# Patient Record
Sex: FEMALE | Race: WHITE | Employment: OTHER | URBAN - METROPOLITAN AREA
[De-identification: names, ages, dates, MRNs, and addresses within clinical notes are randomized per-mention and may not be internally consistent; named-entity substitution may affect disease eponyms.]

---

## 2018-10-18 ENCOUNTER — HOSPITAL ENCOUNTER (OUTPATIENT)
Dept: GENERAL RADIOLOGY | Age: 68
Discharge: HOME | End: 2018-10-20
Payer: MEDICARE

## 2018-10-18 DIAGNOSIS — M54.5 LOW BACK PAIN, UNSPECIFIED BACK PAIN LATERALITY, UNSPECIFIED CHRONICITY, WITH SCIATICA PRESENCE UNSPECIFIED: ICD-10-CM

## 2018-10-18 PROCEDURE — 72110 X-RAY EXAM L-2 SPINE 4/>VWS: CPT

## 2019-01-07 DIAGNOSIS — C34.12 MALIGNANT NEOPLASM OF UPPER LOBE OF LEFT LUNG (HCC): ICD-10-CM

## 2019-01-07 DIAGNOSIS — C78.02 MALIGNANT NEOPLASM METASTATIC TO LEFT LUNG (HCC): ICD-10-CM

## 2019-01-07 LAB
ALBUMIN SERPL-MCNC: 4.3 G/DL (ref 3.9–4.9)
ALP BLD-CCNC: 79 U/L (ref 40–130)
ALT SERPL-CCNC: 15 U/L (ref 0–33)
ANION GAP SERPL CALCULATED.3IONS-SCNC: 17 MEQ/L (ref 7–13)
AST SERPL-CCNC: 17 U/L (ref 0–35)
BILIRUB SERPL-MCNC: 0.3 MG/DL (ref 0–1.2)
BUN BLDV-MCNC: 4 MG/DL (ref 8–23)
CALCIUM SERPL-MCNC: 9.7 MG/DL (ref 8.6–10.2)
CHLORIDE BLD-SCNC: 96 MEQ/L (ref 98–107)
CO2: 26 MEQ/L (ref 22–29)
CREAT SERPL-MCNC: 0.67 MG/DL (ref 0.5–0.9)
GFR AFRICAN AMERICAN: >60
GFR NON-AFRICAN AMERICAN: >60
GLOBULIN: 2.9 G/DL (ref 2.3–3.5)
GLUCOSE BLD-MCNC: 90 MG/DL (ref 74–109)
POTASSIUM SERPL-SCNC: 4.2 MEQ/L (ref 3.5–5.1)
SODIUM BLD-SCNC: 139 MEQ/L (ref 132–144)
TOTAL PROTEIN: 7.2 G/DL (ref 6.4–8.1)

## 2019-01-18 PROBLEM — C78.01 SECONDARY MALIGNANT NEOPLASM OF RIGHT LUNG (HCC): Status: ACTIVE | Noted: 2019-01-18

## 2019-01-18 PROBLEM — C78.02 SECONDARY MALIGNANT NEOPLASM OF LEFT LUNG (HCC): Status: ACTIVE | Noted: 2019-01-18

## 2019-02-01 ENCOUNTER — OFFICE VISIT (OUTPATIENT)
Dept: PALLATIVE CARE | Age: 69
End: 2019-02-01
Payer: MEDICARE

## 2019-02-01 VITALS
DIASTOLIC BLOOD PRESSURE: 75 MMHG | RESPIRATION RATE: 20 BRPM | SYSTOLIC BLOOD PRESSURE: 143 MMHG | WEIGHT: 154 LBS | HEIGHT: 63 IN | HEART RATE: 96 BPM | TEMPERATURE: 98.6 F | OXYGEN SATURATION: 96 % | BODY MASS INDEX: 27.29 KG/M2

## 2019-02-01 DIAGNOSIS — C78.01 SECONDARY MALIGNANT NEOPLASM OF RIGHT LUNG (HCC): ICD-10-CM

## 2019-02-01 DIAGNOSIS — Z51.5 ENCOUNTER FOR PALLIATIVE CARE: ICD-10-CM

## 2019-02-01 DIAGNOSIS — R10.13 EPIGASTRIC PAIN: ICD-10-CM

## 2019-02-01 DIAGNOSIS — K59.01 SLOW TRANSIT CONSTIPATION: ICD-10-CM

## 2019-02-01 DIAGNOSIS — T45.1X5A CINV (CHEMOTHERAPY-INDUCED NAUSEA AND VOMITING): ICD-10-CM

## 2019-02-01 DIAGNOSIS — C34.12 MALIGNANT NEOPLASM OF UPPER LOBE OF LEFT LUNG (HCC): ICD-10-CM

## 2019-02-01 DIAGNOSIS — F41.9 ANXIETY: ICD-10-CM

## 2019-02-01 DIAGNOSIS — R11.2 CINV (CHEMOTHERAPY-INDUCED NAUSEA AND VOMITING): ICD-10-CM

## 2019-02-01 DIAGNOSIS — G89.3 NEOPLASM RELATED PAIN: Primary | ICD-10-CM

## 2019-02-01 PROCEDURE — 1101F PT FALLS ASSESS-DOCD LE1/YR: CPT | Performed by: CLINICAL NURSE SPECIALIST

## 2019-02-01 PROCEDURE — 4040F PNEUMOC VAC/ADMIN/RCVD: CPT | Performed by: CLINICAL NURSE SPECIALIST

## 2019-02-01 PROCEDURE — 1036F TOBACCO NON-USER: CPT | Performed by: CLINICAL NURSE SPECIALIST

## 2019-02-01 PROCEDURE — G8419 CALC BMI OUT NRM PARAM NOF/U: HCPCS | Performed by: CLINICAL NURSE SPECIALIST

## 2019-02-01 PROCEDURE — 99204 OFFICE O/P NEW MOD 45 MIN: CPT | Performed by: CLINICAL NURSE SPECIALIST

## 2019-02-01 PROCEDURE — 99497 ADVNCD CARE PLAN 30 MIN: CPT | Performed by: CLINICAL NURSE SPECIALIST

## 2019-02-01 PROCEDURE — 3017F COLORECTAL CA SCREEN DOC REV: CPT | Performed by: CLINICAL NURSE SPECIALIST

## 2019-02-01 PROCEDURE — G8598 ASA/ANTIPLAT THER USED: HCPCS | Performed by: CLINICAL NURSE SPECIALIST

## 2019-02-01 PROCEDURE — G8427 DOCREV CUR MEDS BY ELIG CLIN: HCPCS | Performed by: CLINICAL NURSE SPECIALIST

## 2019-02-01 PROCEDURE — G8484 FLU IMMUNIZE NO ADMIN: HCPCS | Performed by: CLINICAL NURSE SPECIALIST

## 2019-02-01 PROCEDURE — G8399 PT W/DXA RESULTS DOCUMENT: HCPCS | Performed by: CLINICAL NURSE SPECIALIST

## 2019-02-01 PROCEDURE — 1123F ACP DISCUSS/DSCN MKR DOCD: CPT | Performed by: CLINICAL NURSE SPECIALIST

## 2019-02-01 PROCEDURE — 1090F PRES/ABSN URINE INCON ASSESS: CPT | Performed by: CLINICAL NURSE SPECIALIST

## 2019-02-01 RX ORDER — OXYCODONE AND ACETAMINOPHEN 10; 325 MG/1; MG/1
1 TABLET ORAL EVERY 6 HOURS PRN
Qty: 120 TABLET | Refills: 0 | Status: SHIPPED | OUTPATIENT
Start: 2019-02-01 | End: 2019-04-12 | Stop reason: SDUPTHER

## 2019-02-01 RX ORDER — LORAZEPAM 0.5 MG/1
1 TABLET ORAL 2 TIMES DAILY PRN
Qty: 80 TABLET | Refills: 0 | Status: SHIPPED
Start: 2019-02-01 | End: 2019-03-01

## 2019-02-01 RX ORDER — PANTOPRAZOLE SODIUM 40 MG/1
40 TABLET, DELAYED RELEASE ORAL
Qty: 30 TABLET | Refills: 0 | Status: SHIPPED | OUTPATIENT
Start: 2019-02-01 | End: 2019-07-15 | Stop reason: SDUPTHER

## 2019-02-01 RX ORDER — SENNA AND DOCUSATE SODIUM 50; 8.6 MG/1; MG/1
1 TABLET, FILM COATED ORAL 2 TIMES DAILY
Qty: 120 TABLET | Refills: 3 | Status: SHIPPED | OUTPATIENT
Start: 2019-02-01 | End: 2021-09-09

## 2019-02-01 ASSESSMENT — ENCOUNTER SYMPTOMS
BACK PAIN: 1
NAUSEA: 1
SHORTNESS OF BREATH: 0
VOMITING: 1
COUGH: 1
EYES NEGATIVE: 1
CONSTIPATION: 1

## 2019-02-05 ENCOUNTER — CLINICAL DOCUMENTATION (OUTPATIENT)
Dept: PALLATIVE CARE | Age: 69
End: 2019-02-05

## 2019-02-08 ENCOUNTER — OFFICE VISIT (OUTPATIENT)
Dept: PALLATIVE CARE | Age: 69
End: 2019-02-08
Payer: MEDICARE

## 2019-02-08 VITALS
BODY MASS INDEX: 28.27 KG/M2 | OXYGEN SATURATION: 96 % | TEMPERATURE: 98.4 F | SYSTOLIC BLOOD PRESSURE: 173 MMHG | DIASTOLIC BLOOD PRESSURE: 72 MMHG | WEIGHT: 159.6 LBS | HEART RATE: 80 BPM | RESPIRATION RATE: 20 BRPM

## 2019-02-08 DIAGNOSIS — J06.9 URTI (ACUTE UPPER RESPIRATORY INFECTION): Primary | ICD-10-CM

## 2019-02-08 DIAGNOSIS — G89.3 NEOPLASM RELATED PAIN: ICD-10-CM

## 2019-02-08 DIAGNOSIS — R53.0 NEOPLASTIC (MALIGNANT) RELATED FATIGUE: ICD-10-CM

## 2019-02-08 DIAGNOSIS — C34.12 MALIGNANT NEOPLASM OF UPPER LOBE OF LEFT LUNG (HCC): ICD-10-CM

## 2019-02-08 DIAGNOSIS — C78.01 SECONDARY MALIGNANT NEOPLASM OF RIGHT LUNG (HCC): ICD-10-CM

## 2019-02-08 DIAGNOSIS — Z71.6 ENCOUNTER FOR SMOKING CESSATION COUNSELING: ICD-10-CM

## 2019-02-08 DIAGNOSIS — Z51.5 ENCOUNTER FOR PALLIATIVE CARE: ICD-10-CM

## 2019-02-08 PROCEDURE — 99214 OFFICE O/P EST MOD 30 MIN: CPT | Performed by: CLINICAL NURSE SPECIALIST

## 2019-02-08 PROCEDURE — 3017F COLORECTAL CA SCREEN DOC REV: CPT | Performed by: CLINICAL NURSE SPECIALIST

## 2019-02-08 PROCEDURE — G8427 DOCREV CUR MEDS BY ELIG CLIN: HCPCS | Performed by: CLINICAL NURSE SPECIALIST

## 2019-02-08 PROCEDURE — 4040F PNEUMOC VAC/ADMIN/RCVD: CPT | Performed by: CLINICAL NURSE SPECIALIST

## 2019-02-08 PROCEDURE — 1123F ACP DISCUSS/DSCN MKR DOCD: CPT | Performed by: CLINICAL NURSE SPECIALIST

## 2019-02-08 PROCEDURE — 1036F TOBACCO NON-USER: CPT | Performed by: CLINICAL NURSE SPECIALIST

## 2019-02-08 PROCEDURE — 1101F PT FALLS ASSESS-DOCD LE1/YR: CPT | Performed by: CLINICAL NURSE SPECIALIST

## 2019-02-08 PROCEDURE — G8419 CALC BMI OUT NRM PARAM NOF/U: HCPCS | Performed by: CLINICAL NURSE SPECIALIST

## 2019-02-08 PROCEDURE — 1090F PRES/ABSN URINE INCON ASSESS: CPT | Performed by: CLINICAL NURSE SPECIALIST

## 2019-02-08 PROCEDURE — G8598 ASA/ANTIPLAT THER USED: HCPCS | Performed by: CLINICAL NURSE SPECIALIST

## 2019-02-08 PROCEDURE — G8399 PT W/DXA RESULTS DOCUMENT: HCPCS | Performed by: CLINICAL NURSE SPECIALIST

## 2019-02-08 PROCEDURE — G8484 FLU IMMUNIZE NO ADMIN: HCPCS | Performed by: CLINICAL NURSE SPECIALIST

## 2019-02-08 RX ORDER — NICOTINE 21 MG/24HR
1 PATCH, TRANSDERMAL 24 HOURS TRANSDERMAL EVERY 24 HOURS
Qty: 45 PATCH | Refills: 0 | Status: SHIPPED | OUTPATIENT
Start: 2019-02-08 | End: 2019-02-11

## 2019-02-08 RX ORDER — MONTELUKAST SODIUM 10 MG/1
10 TABLET ORAL NIGHTLY
Qty: 90 TABLET | Refills: 3 | Status: SHIPPED | OUTPATIENT
Start: 2019-02-08 | End: 2021-07-15 | Stop reason: SDUPTHER

## 2019-02-08 ASSESSMENT — ENCOUNTER SYMPTOMS
SHORTNESS OF BREATH: 0
COUGH: 1
BACK PAIN: 1
GASTROINTESTINAL NEGATIVE: 1
EYES NEGATIVE: 1

## 2019-02-11 ENCOUNTER — TELEPHONE (OUTPATIENT)
Dept: PALLATIVE CARE | Age: 69
End: 2019-02-11

## 2019-02-12 ENCOUNTER — TELEPHONE (OUTPATIENT)
Dept: PALLATIVE CARE | Age: 69
End: 2019-02-12

## 2019-02-12 DIAGNOSIS — C34.12 MALIGNANT NEOPLASM OF UPPER LOBE OF LEFT LUNG (HCC): ICD-10-CM

## 2019-02-12 DIAGNOSIS — C78.01 SECONDARY MALIGNANT NEOPLASM OF RIGHT LUNG (HCC): ICD-10-CM

## 2019-02-12 DIAGNOSIS — Z71.6 ENCOUNTER FOR SMOKING CESSATION COUNSELING: Primary | ICD-10-CM

## 2019-02-12 PROBLEM — M04.9 AUTOINFLAMMATORY SYNDROME (HCC): Status: ACTIVE | Noted: 2019-02-12

## 2019-02-12 LAB
ALBUMIN SERPL-MCNC: 3.8 G/DL (ref 3.5–4.6)
ALP BLD-CCNC: 81 U/L (ref 40–130)
ALT SERPL-CCNC: 16 U/L (ref 0–33)
ANION GAP SERPL CALCULATED.3IONS-SCNC: 14 MEQ/L (ref 9–15)
AST SERPL-CCNC: 13 U/L (ref 0–35)
BILIRUB SERPL-MCNC: <0.2 MG/DL (ref 0.2–0.7)
BUN BLDV-MCNC: 10 MG/DL (ref 8–23)
CALCIUM SERPL-MCNC: 9 MG/DL (ref 8.5–9.9)
CHLORIDE BLD-SCNC: 99 MEQ/L (ref 95–107)
CO2: 26 MEQ/L (ref 20–31)
CREAT SERPL-MCNC: 0.62 MG/DL (ref 0.5–0.9)
GFR AFRICAN AMERICAN: >60
GFR NON-AFRICAN AMERICAN: >60
GLOBULIN: 2.6 G/DL (ref 2.3–3.5)
GLUCOSE BLD-MCNC: 125 MG/DL (ref 70–99)
POTASSIUM SERPL-SCNC: 3.5 MEQ/L (ref 3.4–4.9)
SODIUM BLD-SCNC: 139 MEQ/L (ref 135–144)
TOTAL PROTEIN: 6.4 G/DL (ref 6.3–8)

## 2019-02-12 RX ORDER — VARENICLINE TARTRATE 0.5 MG/1
TABLET, FILM COATED ORAL
Qty: 57 TABLET | Refills: 0 | Status: SHIPPED | OUTPATIENT
Start: 2019-02-12 | End: 2019-02-12 | Stop reason: SDUPTHER

## 2019-02-12 RX ORDER — VARENICLINE TARTRATE 0.5 MG/1
TABLET, FILM COATED ORAL
Qty: 57 TABLET | Refills: 0 | Status: SHIPPED | OUTPATIENT
Start: 2019-02-12 | End: 2019-03-22 | Stop reason: SDUPTHER

## 2019-02-18 ENCOUNTER — TELEPHONE (OUTPATIENT)
Dept: PALLATIVE CARE | Age: 69
End: 2019-02-18

## 2019-02-18 DIAGNOSIS — R14.3 FLATULENCE, ERUCTATION AND GAS PAIN: ICD-10-CM

## 2019-02-18 DIAGNOSIS — R14.2 FLATULENCE, ERUCTATION AND GAS PAIN: ICD-10-CM

## 2019-02-18 DIAGNOSIS — R14.1 FLATULENCE, ERUCTATION AND GAS PAIN: ICD-10-CM

## 2019-02-18 DIAGNOSIS — R14.3 FLATULENCE: Primary | ICD-10-CM

## 2019-02-18 RX ORDER — SIMETHICONE 80 MG
80 TABLET,CHEWABLE ORAL 4 TIMES DAILY PRN
Qty: 180 TABLET | Refills: 3 | COMMUNITY
Start: 2019-02-18

## 2019-02-22 ENCOUNTER — TELEPHONE (OUTPATIENT)
Dept: PALLATIVE CARE | Age: 69
End: 2019-02-22

## 2019-02-22 DIAGNOSIS — C34.90 MALIGNANT NEOPLASM OF LUNG, UNSPECIFIED LATERALITY, UNSPECIFIED PART OF LUNG (HCC): ICD-10-CM

## 2019-02-22 DIAGNOSIS — G89.29 CHRONIC THORACIC BACK PAIN, UNSPECIFIED BACK PAIN LATERALITY: Primary | Chronic | ICD-10-CM

## 2019-02-22 DIAGNOSIS — M04.9 AUTOINFLAMMATORY SYNDROME (HCC): ICD-10-CM

## 2019-02-22 DIAGNOSIS — M54.6 CHRONIC THORACIC BACK PAIN, UNSPECIFIED BACK PAIN LATERALITY: Primary | Chronic | ICD-10-CM

## 2019-03-01 ENCOUNTER — OFFICE VISIT (OUTPATIENT)
Dept: PALLATIVE CARE | Age: 69
End: 2019-03-01
Payer: MEDICARE

## 2019-03-01 VITALS
BODY MASS INDEX: 28.17 KG/M2 | HEART RATE: 94 BPM | SYSTOLIC BLOOD PRESSURE: 147 MMHG | WEIGHT: 159 LBS | DIASTOLIC BLOOD PRESSURE: 70 MMHG | OXYGEN SATURATION: 95 % | TEMPERATURE: 98.7 F | RESPIRATION RATE: 18 BRPM

## 2019-03-01 DIAGNOSIS — C34.90 MALIGNANT NEOPLASM OF LUNG, UNSPECIFIED LATERALITY, UNSPECIFIED PART OF LUNG (HCC): ICD-10-CM

## 2019-03-01 DIAGNOSIS — F41.1 GENERALIZED ANXIETY DISORDER WITH PANIC ATTACKS: Primary | ICD-10-CM

## 2019-03-01 DIAGNOSIS — M54.6 CHRONIC THORACIC BACK PAIN, UNSPECIFIED BACK PAIN LATERALITY: ICD-10-CM

## 2019-03-01 DIAGNOSIS — F41.0 GENERALIZED ANXIETY DISORDER WITH PANIC ATTACKS: Primary | ICD-10-CM

## 2019-03-01 DIAGNOSIS — Z51.5 PALLIATIVE CARE BY SPECIALIST: ICD-10-CM

## 2019-03-01 DIAGNOSIS — G43.709 CHRONIC MIGRAINE W/O AURA W/O STATUS MIGRAINOSUS, NOT INTRACTABLE: ICD-10-CM

## 2019-03-01 DIAGNOSIS — G89.29 CHRONIC THORACIC BACK PAIN, UNSPECIFIED BACK PAIN LATERALITY: ICD-10-CM

## 2019-03-01 PROCEDURE — 1036F TOBACCO NON-USER: CPT | Performed by: CLINICAL NURSE SPECIALIST

## 2019-03-01 PROCEDURE — 1123F ACP DISCUSS/DSCN MKR DOCD: CPT | Performed by: CLINICAL NURSE SPECIALIST

## 2019-03-01 PROCEDURE — G8419 CALC BMI OUT NRM PARAM NOF/U: HCPCS | Performed by: CLINICAL NURSE SPECIALIST

## 2019-03-01 PROCEDURE — 1090F PRES/ABSN URINE INCON ASSESS: CPT | Performed by: CLINICAL NURSE SPECIALIST

## 2019-03-01 PROCEDURE — G8399 PT W/DXA RESULTS DOCUMENT: HCPCS | Performed by: CLINICAL NURSE SPECIALIST

## 2019-03-01 PROCEDURE — G8598 ASA/ANTIPLAT THER USED: HCPCS | Performed by: CLINICAL NURSE SPECIALIST

## 2019-03-01 PROCEDURE — 4040F PNEUMOC VAC/ADMIN/RCVD: CPT | Performed by: CLINICAL NURSE SPECIALIST

## 2019-03-01 PROCEDURE — 1101F PT FALLS ASSESS-DOCD LE1/YR: CPT | Performed by: CLINICAL NURSE SPECIALIST

## 2019-03-01 PROCEDURE — G8484 FLU IMMUNIZE NO ADMIN: HCPCS | Performed by: CLINICAL NURSE SPECIALIST

## 2019-03-01 PROCEDURE — 99214 OFFICE O/P EST MOD 30 MIN: CPT | Performed by: CLINICAL NURSE SPECIALIST

## 2019-03-01 PROCEDURE — 3017F COLORECTAL CA SCREEN DOC REV: CPT | Performed by: CLINICAL NURSE SPECIALIST

## 2019-03-01 PROCEDURE — G8427 DOCREV CUR MEDS BY ELIG CLIN: HCPCS | Performed by: CLINICAL NURSE SPECIALIST

## 2019-03-01 RX ORDER — ALPRAZOLAM 1 MG/1
1 TABLET ORAL 2 TIMES DAILY PRN
Qty: 60 TABLET | Refills: 1 | Status: SHIPPED | OUTPATIENT
Start: 2019-03-01 | End: 2019-03-31

## 2019-03-01 RX ORDER — AMOXICILLIN 500 MG/1
TABLET, FILM COATED ORAL
COMMUNITY
Start: 2019-02-22 | End: 2019-06-28

## 2019-03-01 ASSESSMENT — ENCOUNTER SYMPTOMS
SHORTNESS OF BREATH: 0
COUGH: 1
EYES NEGATIVE: 1
BACK PAIN: 1
GASTROINTESTINAL NEGATIVE: 1

## 2019-03-05 DIAGNOSIS — C34.12 MALIGNANT NEOPLASM OF UPPER LOBE OF LEFT LUNG (HCC): ICD-10-CM

## 2019-03-05 DIAGNOSIS — M04.9 AUTOINFLAMMATORY SYNDROME (HCC): ICD-10-CM

## 2019-03-05 DIAGNOSIS — C78.01 SECONDARY MALIGNANT NEOPLASM OF RIGHT LUNG (HCC): ICD-10-CM

## 2019-03-05 LAB
ALBUMIN SERPL-MCNC: 3.8 G/DL (ref 3.5–4.6)
ALP BLD-CCNC: 88 U/L (ref 40–130)
ALT SERPL-CCNC: 17 U/L (ref 0–33)
ANION GAP SERPL CALCULATED.3IONS-SCNC: 12 MEQ/L (ref 9–15)
AST SERPL-CCNC: 22 U/L (ref 0–35)
BILIRUB SERPL-MCNC: <0.2 MG/DL (ref 0.2–0.7)
BUN BLDV-MCNC: 6 MG/DL (ref 8–23)
CALCIUM SERPL-MCNC: 8.6 MG/DL (ref 8.5–9.9)
CHLORIDE BLD-SCNC: 97 MEQ/L (ref 95–107)
CO2: 30 MEQ/L (ref 20–31)
CREAT SERPL-MCNC: 0.57 MG/DL (ref 0.5–0.9)
GFR AFRICAN AMERICAN: >60
GFR NON-AFRICAN AMERICAN: >60
GLOBULIN: 2.3 G/DL (ref 2.3–3.5)
GLUCOSE BLD-MCNC: 91 MG/DL (ref 70–99)
POTASSIUM SERPL-SCNC: 3.4 MEQ/L (ref 3.4–4.9)
SODIUM BLD-SCNC: 139 MEQ/L (ref 135–144)
TOTAL PROTEIN: 6.1 G/DL (ref 6.3–8)
TSH SERPL DL<=0.05 MIU/L-ACNC: 0.89 UIU/ML (ref 0.44–3.86)

## 2019-03-22 ENCOUNTER — OFFICE VISIT (OUTPATIENT)
Dept: PALLATIVE CARE | Age: 69
End: 2019-03-22
Payer: MEDICARE

## 2019-03-22 VITALS
SYSTOLIC BLOOD PRESSURE: 169 MMHG | RESPIRATION RATE: 20 BRPM | BODY MASS INDEX: 29.26 KG/M2 | TEMPERATURE: 95.7 F | WEIGHT: 165.2 LBS | DIASTOLIC BLOOD PRESSURE: 81 MMHG | HEART RATE: 82 BPM | OXYGEN SATURATION: 97 %

## 2019-03-22 DIAGNOSIS — F41.1 GENERALIZED ANXIETY DISORDER WITH PANIC ATTACKS: ICD-10-CM

## 2019-03-22 DIAGNOSIS — Z51.5 PALLIATIVE CARE BY SPECIALIST: ICD-10-CM

## 2019-03-22 DIAGNOSIS — Z71.6 ENCOUNTER FOR SMOKING CESSATION COUNSELING: ICD-10-CM

## 2019-03-22 DIAGNOSIS — F41.0 GENERALIZED ANXIETY DISORDER WITH PANIC ATTACKS: ICD-10-CM

## 2019-03-22 DIAGNOSIS — G89.29 CHRONIC THORACIC BACK PAIN, UNSPECIFIED BACK PAIN LATERALITY: ICD-10-CM

## 2019-03-22 DIAGNOSIS — M54.6 CHRONIC THORACIC BACK PAIN, UNSPECIFIED BACK PAIN LATERALITY: ICD-10-CM

## 2019-03-22 DIAGNOSIS — C34.90 MALIGNANT NEOPLASM OF LUNG, UNSPECIFIED LATERALITY, UNSPECIFIED PART OF LUNG (HCC): ICD-10-CM

## 2019-03-22 DIAGNOSIS — G89.3 NEOPLASM RELATED PAIN: ICD-10-CM

## 2019-03-22 DIAGNOSIS — J20.9 ACUTE BRONCHITIS, UNSPECIFIED ORGANISM: Primary | ICD-10-CM

## 2019-03-22 DIAGNOSIS — F32.A MODERATE DEPRESSIVE DISORDER: ICD-10-CM

## 2019-03-22 DIAGNOSIS — R53.0 NEOPLASTIC (MALIGNANT) RELATED FATIGUE: ICD-10-CM

## 2019-03-22 PROCEDURE — 4004F PT TOBACCO SCREEN RCVD TLK: CPT | Performed by: CLINICAL NURSE SPECIALIST

## 2019-03-22 PROCEDURE — 1123F ACP DISCUSS/DSCN MKR DOCD: CPT | Performed by: CLINICAL NURSE SPECIALIST

## 2019-03-22 PROCEDURE — G8399 PT W/DXA RESULTS DOCUMENT: HCPCS | Performed by: CLINICAL NURSE SPECIALIST

## 2019-03-22 PROCEDURE — G8419 CALC BMI OUT NRM PARAM NOF/U: HCPCS | Performed by: CLINICAL NURSE SPECIALIST

## 2019-03-22 PROCEDURE — 1101F PT FALLS ASSESS-DOCD LE1/YR: CPT | Performed by: CLINICAL NURSE SPECIALIST

## 2019-03-22 PROCEDURE — 4040F PNEUMOC VAC/ADMIN/RCVD: CPT | Performed by: CLINICAL NURSE SPECIALIST

## 2019-03-22 PROCEDURE — G8427 DOCREV CUR MEDS BY ELIG CLIN: HCPCS | Performed by: CLINICAL NURSE SPECIALIST

## 2019-03-22 PROCEDURE — G8484 FLU IMMUNIZE NO ADMIN: HCPCS | Performed by: CLINICAL NURSE SPECIALIST

## 2019-03-22 PROCEDURE — 3017F COLORECTAL CA SCREEN DOC REV: CPT | Performed by: CLINICAL NURSE SPECIALIST

## 2019-03-22 PROCEDURE — 1090F PRES/ABSN URINE INCON ASSESS: CPT | Performed by: CLINICAL NURSE SPECIALIST

## 2019-03-22 PROCEDURE — 99214 OFFICE O/P EST MOD 30 MIN: CPT | Performed by: CLINICAL NURSE SPECIALIST

## 2019-03-22 PROCEDURE — G8598 ASA/ANTIPLAT THER USED: HCPCS | Performed by: CLINICAL NURSE SPECIALIST

## 2019-03-22 RX ORDER — BUPROPION HYDROCHLORIDE 300 MG/1
300 TABLET ORAL DAILY
Qty: 30 TABLET | Refills: 2 | Status: SHIPPED | OUTPATIENT
Start: 2019-03-22 | End: 2019-04-12 | Stop reason: SDUPTHER

## 2019-03-22 RX ORDER — SUMATRIPTAN 100 MG/1
100 TABLET, FILM COATED ORAL
COMMUNITY
Start: 2017-02-21 | End: 2019-09-06

## 2019-03-22 RX ORDER — AZITHROMYCIN 250 MG/1
TABLET, FILM COATED ORAL
Qty: 1 PACKET | Refills: 0 | Status: SHIPPED | OUTPATIENT
Start: 2019-03-22 | End: 2019-04-12

## 2019-03-22 RX ORDER — VARENICLINE TARTRATE 1 MG/1
TABLET, FILM COATED ORAL
Qty: 30 TABLET | Refills: 1 | Status: SHIPPED | OUTPATIENT
Start: 2019-03-22 | End: 2019-04-12

## 2019-03-22 ASSESSMENT — ENCOUNTER SYMPTOMS
RHINORRHEA: 1
EYES NEGATIVE: 1
BACK PAIN: 1
COUGH: 1
GASTROINTESTINAL NEGATIVE: 1
SORE THROAT: 0
SHORTNESS OF BREATH: 0
SINUS PRESSURE: 1

## 2019-03-27 DIAGNOSIS — C78.01 SECONDARY MALIGNANT NEOPLASM OF RIGHT LUNG (HCC): ICD-10-CM

## 2019-03-27 DIAGNOSIS — C34.12 MALIGNANT NEOPLASM OF UPPER LOBE OF LEFT LUNG (HCC): ICD-10-CM

## 2019-03-27 LAB
ALBUMIN SERPL-MCNC: 4.1 G/DL (ref 3.5–4.6)
ALP BLD-CCNC: 77 U/L (ref 40–130)
ALT SERPL-CCNC: 13 U/L (ref 0–33)
ANION GAP SERPL CALCULATED.3IONS-SCNC: 14 MEQ/L (ref 9–15)
AST SERPL-CCNC: 22 U/L (ref 0–35)
BILIRUB SERPL-MCNC: 0.4 MG/DL (ref 0.2–0.7)
BUN BLDV-MCNC: 8 MG/DL (ref 8–23)
CALCIUM SERPL-MCNC: 9.4 MG/DL (ref 8.5–9.9)
CHLORIDE BLD-SCNC: 102 MEQ/L (ref 95–107)
CO2: 23 MEQ/L (ref 20–31)
CREAT SERPL-MCNC: 0.78 MG/DL (ref 0.5–0.9)
GFR AFRICAN AMERICAN: >60
GFR NON-AFRICAN AMERICAN: >60
GLOBULIN: 2.7 G/DL (ref 2.3–3.5)
GLUCOSE BLD-MCNC: 115 MG/DL (ref 70–99)
POTASSIUM SERPL-SCNC: 3.9 MEQ/L (ref 3.4–4.9)
SODIUM BLD-SCNC: 139 MEQ/L (ref 135–144)
TOTAL PROTEIN: 6.8 G/DL (ref 6.3–8)

## 2019-04-12 ENCOUNTER — OFFICE VISIT (OUTPATIENT)
Dept: PALLATIVE CARE | Age: 69
End: 2019-04-12
Payer: MEDICARE

## 2019-04-12 VITALS
DIASTOLIC BLOOD PRESSURE: 71 MMHG | OXYGEN SATURATION: 95 % | RESPIRATION RATE: 20 BRPM | HEART RATE: 81 BPM | TEMPERATURE: 98.2 F | SYSTOLIC BLOOD PRESSURE: 158 MMHG | BODY MASS INDEX: 29.62 KG/M2 | WEIGHT: 167.2 LBS

## 2019-04-12 DIAGNOSIS — C34.12 MALIGNANT NEOPLASM OF UPPER LOBE OF LEFT LUNG (HCC): ICD-10-CM

## 2019-04-12 DIAGNOSIS — M54.6 CHRONIC THORACIC BACK PAIN, UNSPECIFIED BACK PAIN LATERALITY: ICD-10-CM

## 2019-04-12 DIAGNOSIS — F41.1 GENERALIZED ANXIETY DISORDER WITH PANIC ATTACKS: ICD-10-CM

## 2019-04-12 DIAGNOSIS — J30.2 SEASONAL ALLERGIES: Primary | ICD-10-CM

## 2019-04-12 DIAGNOSIS — G89.29 CHRONIC THORACIC BACK PAIN, UNSPECIFIED BACK PAIN LATERALITY: ICD-10-CM

## 2019-04-12 DIAGNOSIS — C78.01 SECONDARY MALIGNANT NEOPLASM OF RIGHT LUNG (HCC): ICD-10-CM

## 2019-04-12 DIAGNOSIS — F41.0 GENERALIZED ANXIETY DISORDER WITH PANIC ATTACKS: ICD-10-CM

## 2019-04-12 DIAGNOSIS — G89.3 NEOPLASM RELATED PAIN: ICD-10-CM

## 2019-04-12 DIAGNOSIS — F32.A MODERATE DEPRESSIVE DISORDER: ICD-10-CM

## 2019-04-12 DIAGNOSIS — Z51.5 ENCOUNTER FOR PALLIATIVE CARE: ICD-10-CM

## 2019-04-12 PROCEDURE — 4004F PT TOBACCO SCREEN RCVD TLK: CPT | Performed by: CLINICAL NURSE SPECIALIST

## 2019-04-12 PROCEDURE — 1090F PRES/ABSN URINE INCON ASSESS: CPT | Performed by: CLINICAL NURSE SPECIALIST

## 2019-04-12 PROCEDURE — G8419 CALC BMI OUT NRM PARAM NOF/U: HCPCS | Performed by: CLINICAL NURSE SPECIALIST

## 2019-04-12 PROCEDURE — 3017F COLORECTAL CA SCREEN DOC REV: CPT | Performed by: CLINICAL NURSE SPECIALIST

## 2019-04-12 PROCEDURE — 99214 OFFICE O/P EST MOD 30 MIN: CPT | Performed by: CLINICAL NURSE SPECIALIST

## 2019-04-12 PROCEDURE — 4040F PNEUMOC VAC/ADMIN/RCVD: CPT | Performed by: CLINICAL NURSE SPECIALIST

## 2019-04-12 PROCEDURE — G8427 DOCREV CUR MEDS BY ELIG CLIN: HCPCS | Performed by: CLINICAL NURSE SPECIALIST

## 2019-04-12 PROCEDURE — G8399 PT W/DXA RESULTS DOCUMENT: HCPCS | Performed by: CLINICAL NURSE SPECIALIST

## 2019-04-12 PROCEDURE — G8598 ASA/ANTIPLAT THER USED: HCPCS | Performed by: CLINICAL NURSE SPECIALIST

## 2019-04-12 PROCEDURE — 1123F ACP DISCUSS/DSCN MKR DOCD: CPT | Performed by: CLINICAL NURSE SPECIALIST

## 2019-04-12 RX ORDER — LORATADINE 10 MG/1
10 TABLET ORAL DAILY
Qty: 30 TABLET | Refills: 0 | Status: SHIPPED | OUTPATIENT
Start: 2019-04-12 | End: 2021-06-10

## 2019-04-12 RX ORDER — OXYCODONE AND ACETAMINOPHEN 10; 325 MG/1; MG/1
1 TABLET ORAL EVERY 6 HOURS PRN
Qty: 120 TABLET | Refills: 0 | Status: SHIPPED | OUTPATIENT
Start: 2019-04-12 | End: 2019-05-14 | Stop reason: SDUPTHER

## 2019-04-12 RX ORDER — PREDNISONE 10 MG/1
10 TABLET ORAL DAILY
Qty: 30 TABLET | Refills: 1 | Status: SHIPPED | OUTPATIENT
Start: 2019-04-12 | End: 2019-06-07

## 2019-04-12 RX ORDER — ALPRAZOLAM 1 MG/1
1 TABLET ORAL 2 TIMES DAILY
COMMUNITY
Start: 2019-03-31 | End: 2019-05-14 | Stop reason: SDUPTHER

## 2019-04-12 RX ORDER — BUPROPION HYDROCHLORIDE 300 MG/1
300 TABLET ORAL DAILY
Qty: 30 TABLET | Refills: 2 | Status: SHIPPED | OUTPATIENT
Start: 2019-04-12 | End: 2019-07-15 | Stop reason: SDUPTHER

## 2019-04-12 RX ORDER — PROCHLORPERAZINE MALEATE 10 MG
10 TABLET ORAL EVERY 6 HOURS PRN
Qty: 40 TABLET | Refills: 1 | Status: SHIPPED | OUTPATIENT
Start: 2019-04-12 | End: 2019-09-06

## 2019-04-12 ASSESSMENT — ENCOUNTER SYMPTOMS
COUGH: 1
GASTROINTESTINAL NEGATIVE: 1
EYE ITCHING: 1
BACK PAIN: 1
EYE REDNESS: 1
SHORTNESS OF BREATH: 0

## 2019-04-12 NOTE — PROGRESS NOTES
Subjective:      Patient Id: Seen Bettina Bob at the clinic at the [address] [place] [address] in follow up for symptom management. She was accompanied to the appointment by: self. Chief Complaint   Patient presents with    Anxiety    Back Pain    Shoulder Pain    Neck Pain    Medication Refill     prednisone, compazine and percocet        HPI    Bettina Bob presents with complaint of intermittent watering and redness of eyes x 1 week. States she is intermittently coughing up clear phlegym which is her baseline. States this occurs yearly around the start of spring. Denies increase in cough, SOB, wheezing, chills or fever. Was diagnosed with bronchitis on last visit and finished treatment with resolution of symptoms. Pt states she has been having an increase in situational anxiety related to pending Abdominal CT results. She has chronic anxiety and depression. Was referred to psychology at previous encounter due to symptoms associated with anxiety and depression and states they did not call and she lost the printed order. Will need reprinted. Bettina Bob has chronic back and shoulder pain. Has been using Percocet 10/325 PRN two times daily. She states she has further decreased smoking to 2 cigerettes daily. Was prescribed chantix but stopped taking it due to nightmares.     Past Medical History:   Diagnosis Date    Anxiety     Asthma     Cervical radiculopathy at C7 9/28/2013    CERVICAL SPONDYLOSIS, WITH COMBINATION OF DISK BULGING AND    Cervical radiculopathy at C7     SUBTLE PER EMG    Cervical spondylosis 10/12/13    PER MRI    Chronic back pain     COPD (chronic obstructive pulmonary disease) (HCC)     Chronic bonchitis    CTS (carpal tunnel syndrome) 9/28/13    PER EMG    Depression     Diverticulitis     GERD (gastroesophageal reflux disease)     Headache(784.0)     Hyperlipidemia     Hypertension     Irritable bowel syndrome     Malignant neoplasm of upper lobe of left lung ([facility] [address] 1/7/2019    Osteoarthritis      Past Surgical History:   Procedure Laterality Date    APPENDECTOMY      CARDIAC CATHETERIZATION  13,2013    DR. GRAVES    COLON SURGERY      HAND SURGERY  1996    HYSTERECTOMY  1980    TONSILLECTOMY  1963     Social History     Socioeconomic History    Marital status:       Spouse name: Not on file    Number of children: Not on file    Years of education: Not on file    Highest education level: Not on file   Occupational History    Not on file   Social Needs    Financial resource strain: Not on file    Food insecurity:     Worry: Not on file     Inability: Not on file    Transportation needs:     Medical: Not on file     Non-medical: Not on file   Tobacco Use    Smoking status: Light Tobacco Smoker     Packs/day: 1.50     Years: 48.00     Pack years: 72.00     Types: Cigarettes     Last attempt to quit: 2019     Years since quittin.2    Smokeless tobacco: Never Used    Tobacco comment: Smokes 5 or less cigarettes daily   Substance and Sexual Activity    Alcohol use: No    Drug use: Not on file    Sexual activity: Not on file   Lifestyle    Physical activity:     Days per week: Not on file     Minutes per session: Not on file    Stress: Not on file   Relationships    Social connections:     Talks on phone: Not on file     Gets together: Not on file     Attends [employer]ism service: Not on file     Active member of club or organization: Not on file     Attends meetings of clubs or organizations: Not on file     Relationship status: Not on file    Intimate partner violence:     Fear of current or ex partner: Not on file     Emotionally abused: Not on file     Physically abused: Not on file     Forced sexual activity: Not on file   Other Topics Concern    Not on file   Social History Narrative    Not on file     Family History   Problem Relation Age of Onset    Stroke Mother     Heart Attack Mother     Cancer Father         LUNG     No Known Allergies  Current Outpatient Musculoskeletal: Positive for arthralgias, back pain and neck pain. Skin: Negative. Neurological: Negative. Hematological: Negative. Psychiatric/Behavioral: Positive for dysphoric mood ( intermittent). The patient is nervous/anxious. Objective:   BP (!) 158/71 (Site: Left Upper Arm, Position: Sitting)   Pulse 81   Temp 98.2 °F (36.8 °C)   Resp 20   Wt 167 lb 3.2 oz (75.8 kg)   SpO2 95%   BMI 29.62 kg/m²    Wt Readings from Last 3 Encounters:   04/12/19 167 lb 3.2 oz (75.8 kg)   03/27/19 164 lb (74.4 kg)   03/22/19 165 lb 3.2 oz (74.9 kg)     Physical Exam   Constitutional: She is oriented to person, place, and time. She appears well-developed and well-nourished. HENT:   Head: Normocephalic and atraumatic. Nose: Mucosal edema present. Eyes: Pupils are equal, round, and reactive to light. EOM are normal.   Neck: Normal range of motion. Neck supple. Cardiovascular: Normal rate and regular rhythm. Pulmonary/Chest: Effort normal. She has decreased breath sounds. diminished throughout   Abdominal: Soft. Bowel sounds are normal. She exhibits no distension and no mass. There is no rebound. Musculoskeletal: Normal range of motion. She exhibits no edema. Neurological: She is alert and oriented to person, place, and time. Skin: Skin is warm and dry. Psychiatric: Judgment and thought content normal.   Vitals reviewed. Assessment and Plan:      1. Seasonal allergies  - Symptoms likely associated with seasonal allergies. - loratadine (CLARITIN) 10 MG tablet; Take 1 tablet by mouth daily  Dispense: 30 tablet; Refill: 0    2. Moderate depressive disorder  - Will reorder consult. Pt educated to call office if she does not hear from them in three days  - Cheryl Franco, PhD, Psychology, [place]  - buPROPion (WELLBUTRIN XL) 300 MG extended release tablet; Take 1 tablet by mouth daily  Dispense: 30 tablet; Refill: 2    3.  Generalized anxiety disorder with panic attacks  - See #2    4. Neoplasm related pain  - Controlled on Current Regimen. - oxyCODONE-acetaminophen (PERCOCET)  MG per tablet; Take 1 tablet by mouth every 6 hours as needed for Pain for up to 30 days. Dispense: 120 tablet; Refill: 0    5. Malignant ne[facility] of upper lobe of [facility] ([facility]  - see #4  Refill  - prochlorperazine (COMPAZINE) 10 MG tablet; Take 1 tablet by mouth every 6 hours as needed (nausea)  Dispense: 40 tablet; Refill: 1    6. Secondary malignant neoplasm of right lung ([facility] [address]  - See #4    7. Chronic thoracic back pain, unspecified back pain laterality  - Handicap Placard reordered as pt lost form    - Handicap Placard MISC; by Does not apply route Diagnoses: LUNG CANCER, DEGENERATIVE ARTHRITIS, CANCER PAIN   EXPIRATION: 4/12/2024  Dispense: 1 each; Refill: 0    8. Encounter for palliative care    Medications Discontinued During This Encounter   Medication Reason    azithromycin (ZITHROMAX Z-DIGNA) 250 MG tablet LIST CLEANUP    varenicline (CHANTIX) 1 MG tablet LIST CLEANUP    buPROPion (WELLBUTRIN XL) 300 MG extended release tablet REORDER    prochlorperazine (COMPAZINE) 10 MG tablet REORDER    predniSONE (DELTASONE) 10 MG tablet REORDER    oxyCODONE-acetaminophen (PERCOCET)  MG per tablet REORDER       Discussed with patient/surrogate: POC, Treatment risks/benefits, and alternatives including as noted above. All questions were answered. Gave much active listening, presence, and emotional support. Due to acuity, symptomatology and high-risk medication management,   I advised patient to Return in about 1 month (around 5/10/2019), or if symptoms worsen or fail to improve. Total Time 30 mins   > 50% Time Spent Counseling/Care coordination?  yes     CHRISTO Caldwell - CNS, BC, ACHPN, MS

## 2019-05-08 PROBLEM — D22.9 ATYPICAL MOLE: Status: ACTIVE | Noted: 2019-05-08

## 2019-05-08 PROBLEM — E03.2 HYPOTHYROIDISM DUE TO MEDICATION: Status: ACTIVE | Noted: 2019-05-08

## 2019-05-10 ENCOUNTER — OFFICE VISIT (OUTPATIENT)
Dept: PALLATIVE CARE | Age: 69
End: 2019-05-10
Payer: MEDICARE

## 2019-05-10 VITALS
BODY MASS INDEX: 28.95 KG/M2 | RESPIRATION RATE: 20 BRPM | DIASTOLIC BLOOD PRESSURE: 70 MMHG | HEART RATE: 94 BPM | WEIGHT: 163.4 LBS | SYSTOLIC BLOOD PRESSURE: 147 MMHG | OXYGEN SATURATION: 97 %

## 2019-05-10 DIAGNOSIS — F41.0 GENERALIZED ANXIETY DISORDER WITH PANIC ATTACKS: ICD-10-CM

## 2019-05-10 DIAGNOSIS — C34.12 MALIGNANT NEOPLASM OF UPPER LOBE OF LEFT LUNG (HCC): ICD-10-CM

## 2019-05-10 DIAGNOSIS — C78.01 SECONDARY MALIGNANT NEOPLASM OF RIGHT LUNG (HCC): ICD-10-CM

## 2019-05-10 DIAGNOSIS — F32.A MODERATE DEPRESSIVE DISORDER: Primary | ICD-10-CM

## 2019-05-10 DIAGNOSIS — R11.0 NAUSEA: ICD-10-CM

## 2019-05-10 DIAGNOSIS — F41.1 GENERALIZED ANXIETY DISORDER WITH PANIC ATTACKS: ICD-10-CM

## 2019-05-10 DIAGNOSIS — Z51.5 ENCOUNTER FOR PALLIATIVE CARE: ICD-10-CM

## 2019-05-10 DIAGNOSIS — M54.6 CHRONIC THORACIC BACK PAIN, UNSPECIFIED BACK PAIN LATERALITY: ICD-10-CM

## 2019-05-10 DIAGNOSIS — G89.3 NEOPLASM RELATED PAIN: ICD-10-CM

## 2019-05-10 DIAGNOSIS — G89.29 CHRONIC THORACIC BACK PAIN, UNSPECIFIED BACK PAIN LATERALITY: ICD-10-CM

## 2019-05-10 PROCEDURE — G8598 ASA/ANTIPLAT THER USED: HCPCS | Performed by: CLINICAL NURSE SPECIALIST

## 2019-05-10 PROCEDURE — G8419 CALC BMI OUT NRM PARAM NOF/U: HCPCS | Performed by: CLINICAL NURSE SPECIALIST

## 2019-05-10 PROCEDURE — G8399 PT W/DXA RESULTS DOCUMENT: HCPCS | Performed by: CLINICAL NURSE SPECIALIST

## 2019-05-10 PROCEDURE — 99214 OFFICE O/P EST MOD 30 MIN: CPT | Performed by: CLINICAL NURSE SPECIALIST

## 2019-05-10 PROCEDURE — 4004F PT TOBACCO SCREEN RCVD TLK: CPT | Performed by: CLINICAL NURSE SPECIALIST

## 2019-05-10 PROCEDURE — 4040F PNEUMOC VAC/ADMIN/RCVD: CPT | Performed by: CLINICAL NURSE SPECIALIST

## 2019-05-10 PROCEDURE — 3017F COLORECTAL CA SCREEN DOC REV: CPT | Performed by: CLINICAL NURSE SPECIALIST

## 2019-05-10 PROCEDURE — 1123F ACP DISCUSS/DSCN MKR DOCD: CPT | Performed by: CLINICAL NURSE SPECIALIST

## 2019-05-10 PROCEDURE — 1090F PRES/ABSN URINE INCON ASSESS: CPT | Performed by: CLINICAL NURSE SPECIALIST

## 2019-05-10 PROCEDURE — G8427 DOCREV CUR MEDS BY ELIG CLIN: HCPCS | Performed by: CLINICAL NURSE SPECIALIST

## 2019-05-10 ASSESSMENT — ENCOUNTER SYMPTOMS
EYE ITCHING: 1
BACK PAIN: 1
COUGH: 1
GASTROINTESTINAL NEGATIVE: 1
SHORTNESS OF BREATH: 0
EYE REDNESS: 1

## 2019-05-10 NOTE — PROGRESS NOTES
Subjective:      Patient Id: Seen Claudia Barnes at the clinic at the [address] [place] [address] in follow up for symptom management. Chief Complaint   Patient presents with    Shortness of Breath    Pain        HPI        Patient presents with typical pain complaints: back, shoulders, and neck. She walked in with a cane today, attributing her increased pain to the cooler weather. Says her pain is well controlled on current regimen. Currently taking Percocet on average two to three times daily with relief. Denies sedation, constipation, or other adverse effects on current pain regimen. At this visit, she states her anxiety and depression are well controlled. Having more good days then bad. Still has not followed up with psychologist as she \"lost their number\" again. Claudia Barnes states she is still having nausea with emesis approx every three days. Is unsure of how nausea medication regimen should be carried out and how she takes nausea medications. Denies dysphagia, dysgeusia, or mouth sores. No further  GI or  concerns. Past Medical History:   Diagnosis Date    Anxiety     Asthma     Cervical radiculopathy at C7 9/28/2013    CERVICAL SPONDYLOSIS, WITH COMBINATION OF DISK BULGING AND    Cervical radiculopathy at C7     SUBTLE PER EMG    Cervical spondylosis 10/12/13    PER MRI    Chronic back pain     COPD (chronic obstructive pulmonary disease) (HCC)     Chronic bonchitis    CTS (carpal tunnel syndrome) 9/28/13    PER EMG    Depression     Diverticulitis     GERD (gastroesophageal reflux disease)     Headache(784.0)     Hyperlipidemia     Hypertension     Hypothyroidism due to medication 5/8/2019    Irritable bowel syndrome     Malignant neoplasm of upper lobe of left lung ([facility] [address] 1/7/2019    Osteoarthritis      Past Surgical History:   Procedure Laterality Date    APPENDECTOMY  1980    CARDIAC CATHETERIZATION  5/16/13,11/8/2013    DR. GRAVES    COLON SURGERY      HAND [address] HYSTERECTOMY  1980    TONSILLECTOMY  1963     Social History     Socioeconomic History    Marital status:       Spouse name: Not on file    Number of children: Not on file    Years of education: Not on file    Highest education level: Not on file   Occupational History    Not on file   Social Needs    Financial resource strain: Not on file    Food insecurity:     Worry: Not on file     Inability: Not on file    Transportation needs:     Medical: Not on file     Non-medical: Not on file   Tobacco Use    Smoking status: Light Tobacco Smoker     Packs/day: 1.50     Years: 48.00     Pack years: 72.00     Types: Cigarettes     Last attempt to quit: 2019     Years since quittin.3    Smokeless tobacco: Never Used    Tobacco comment: Smokes 5 or less cigarettes daily   Substance and Sexual Activity    Alcohol use: No    Drug use: Not on file    Sexual activity: Not on file   Lifestyle    Physical activity:     Days per week: Not on file     Minutes per session: Not on file    Stress: Not on file   Relationships    Social connections:     Talks on phone: Not on file     Gets together: Not on file     Attends Christian service: Not on file     Active member of club or organization: Not on file     Attends meetings of clubs or organizations: Not on file     Relationship status: Not on file    Intimate partner violence:     Fear of current or ex partner: Not on file     Emotionally abused: Not on file     Physically abused: Not on file     Forced sexual activity: Not on file   Other Topics Concern    Not on file   Social History Narrative    Not on file     Family History   Problem Relation Age of Onset    Stroke Mother     Heart Attack Mother     Cancer Father         LUNG     No Known Allergies  Current Outpatient Medications on File Prior to Visit   Medication Sig Dispense Refill    ondansetron (ZOFRAN) 8 MG tablet Take 1 tablet by mouth every 8 hours as needed for Nausea or Vomiting 30 tablet 3    ALPRAZolam (XANAX) 1 MG tablet 1 mg 2 times daily.  buPROPion (WELLBUTRIN XL) 300 MG extended release tablet Take 1 tablet by mouth daily 30 tablet 2    prochlorperazine (COMPAZINE) 10 MG tablet Take 1 tablet by mouth every 6 hours as needed (nausea) 40 tablet 1    predniSONE (DELTASONE) 10 MG tablet Take 1 tablet by mouth daily Indications: Prednisone taper 30 tablet 1    oxyCODONE-acetaminophen (PERCOCET)  MG per tablet Take 1 tablet by mouth every 6 hours as needed for Pain for up to 30 days.  120 tablet 0    loratadine (CLARITIN) 10 MG tablet Take 1 tablet by mouth daily 30 tablet 0    Handicap Placard MISC by Does not apply route Diagnoses: LUNG CANCER, DEGENERATIVE ARTHRITIS, CANCER PAIN   EXPIRATION: 4/12/2024 1 each 0    SUMAtriptan (IMITREX) 100 MG tablet Take 100 mg by mouth      Amoxicillin 500 MG TABS       simethicone (MYLICON) 80 MG chewable tablet Take 1 tablet by mouth 4 times daily as needed for Flatulence 180 tablet 3    hydrALAZINE (APRESOLINE) 25 MG tablet Take 1 tablet by mouth daily      montelukast (SINGULAIR) 10 MG tablet Take 1 tablet by mouth nightly 90 tablet 3    pantoprazole (PROTONIX) 40 MG tablet Take 1 tablet by mouth every morning (before breakfast) 30 tablet 0    sennosides-docusate sodium (SENOKOT-S) 8.6-50 MG tablet Take 1 tablet by mouth 2 times daily 120 tablet 3    PROAIR  (90 Base) MCG/ACT inhaler Inhale 2 Inhalers into the lungs 4 times daily  1    aspirin (ECOTRIN LOW STRENGTH) 81 MG EC tablet Take 81 mg by mouth daily      vitamin D (ERGOCALCIFEROL) 87273 units CAPS capsule Take 1 capsule by mouth every 7 days  2    BREO ELLIPTA 100-25 MCG/INH AEPB inhaler Inhale 2 puffs into the lungs 2 times daily  0    lisinopril-hydrochlorothiazide (PRINZIDE;ZESTORETIC) 20-25 MG per tablet Take 1 tablet by mouth daily  0    metoprolol succinate (TOPROL XL) 50 MG extended release tablet Take 50 mg by mouth daily      folic acid (FOLVITE) 1 MG tablet

## 2019-05-14 DIAGNOSIS — C34.12 MALIGNANT NEOPLASM OF UPPER LOBE OF LEFT LUNG (HCC): ICD-10-CM

## 2019-05-14 DIAGNOSIS — G89.3 NEOPLASM RELATED PAIN: ICD-10-CM

## 2019-05-14 DIAGNOSIS — C78.01 SECONDARY MALIGNANT NEOPLASM OF RIGHT LUNG (HCC): ICD-10-CM

## 2019-05-14 DIAGNOSIS — F41.1 GENERALIZED ANXIETY DISORDER WITH PANIC ATTACKS: Primary | ICD-10-CM

## 2019-05-14 DIAGNOSIS — F41.0 GENERALIZED ANXIETY DISORDER WITH PANIC ATTACKS: Primary | ICD-10-CM

## 2019-05-14 RX ORDER — ALPRAZOLAM 1 MG/1
1 TABLET ORAL 2 TIMES DAILY
Qty: 60 TABLET | Refills: 1 | Status: SHIPPED | OUTPATIENT
Start: 2019-05-14 | End: 2019-08-09 | Stop reason: SDUPTHER

## 2019-05-14 RX ORDER — OXYCODONE AND ACETAMINOPHEN 10; 325 MG/1; MG/1
1 TABLET ORAL EVERY 6 HOURS PRN
Qty: 120 TABLET | Refills: 0 | Status: SHIPPED | OUTPATIENT
Start: 2019-05-14 | End: 2019-06-12 | Stop reason: SDUPTHER

## 2019-05-16 ENCOUNTER — HOSPITAL ENCOUNTER (OUTPATIENT)
Dept: WOMENS IMAGING | Age: 69
Discharge: HOME | End: 2019-05-18
Payer: MEDICARE

## 2019-05-16 DIAGNOSIS — Z12.39 ENCOUNTER FOR SCREENING BREAST EXAMINATION: ICD-10-CM

## 2019-05-16 PROCEDURE — 77067 SCR MAMMO BI INCL CAD: CPT

## 2019-06-07 ENCOUNTER — OFFICE VISIT (OUTPATIENT)
Dept: PALLATIVE CARE | Age: 69
End: 2019-06-07
Payer: MEDICARE

## 2019-06-07 VITALS
BODY MASS INDEX: 29.32 KG/M2 | SYSTOLIC BLOOD PRESSURE: 153 MMHG | HEART RATE: 80 BPM | OXYGEN SATURATION: 94 % | WEIGHT: 165.5 LBS | TEMPERATURE: 98.2 F | DIASTOLIC BLOOD PRESSURE: 78 MMHG | RESPIRATION RATE: 20 BRPM

## 2019-06-07 DIAGNOSIS — C34.12 MALIGNANT NEOPLASM OF UPPER LOBE OF LEFT LUNG (HCC): ICD-10-CM

## 2019-06-07 DIAGNOSIS — Z51.5 ENCOUNTER FOR PALLIATIVE CARE: ICD-10-CM

## 2019-06-07 DIAGNOSIS — J20.9 ACUTE BRONCHITIS, UNSPECIFIED ORGANISM: Primary | ICD-10-CM

## 2019-06-07 DIAGNOSIS — G89.3 NEOPLASM RELATED PAIN: ICD-10-CM

## 2019-06-07 DIAGNOSIS — C78.01 SECONDARY MALIGNANT NEOPLASM OF RIGHT LUNG (HCC): ICD-10-CM

## 2019-06-07 DIAGNOSIS — M15.9 PRIMARY OSTEOARTHRITIS INVOLVING MULTIPLE JOINTS: ICD-10-CM

## 2019-06-07 PROCEDURE — G8598 ASA/ANTIPLAT THER USED: HCPCS | Performed by: CLINICAL NURSE SPECIALIST

## 2019-06-07 PROCEDURE — G8419 CALC BMI OUT NRM PARAM NOF/U: HCPCS | Performed by: CLINICAL NURSE SPECIALIST

## 2019-06-07 PROCEDURE — G8399 PT W/DXA RESULTS DOCUMENT: HCPCS | Performed by: CLINICAL NURSE SPECIALIST

## 2019-06-07 PROCEDURE — 3017F COLORECTAL CA SCREEN DOC REV: CPT | Performed by: CLINICAL NURSE SPECIALIST

## 2019-06-07 PROCEDURE — 1090F PRES/ABSN URINE INCON ASSESS: CPT | Performed by: CLINICAL NURSE SPECIALIST

## 2019-06-07 PROCEDURE — 99213 OFFICE O/P EST LOW 20 MIN: CPT | Performed by: CLINICAL NURSE SPECIALIST

## 2019-06-07 PROCEDURE — 4004F PT TOBACCO SCREEN RCVD TLK: CPT | Performed by: CLINICAL NURSE SPECIALIST

## 2019-06-07 PROCEDURE — 4040F PNEUMOC VAC/ADMIN/RCVD: CPT | Performed by: CLINICAL NURSE SPECIALIST

## 2019-06-07 PROCEDURE — 1123F ACP DISCUSS/DSCN MKR DOCD: CPT | Performed by: CLINICAL NURSE SPECIALIST

## 2019-06-07 PROCEDURE — G8427 DOCREV CUR MEDS BY ELIG CLIN: HCPCS | Performed by: CLINICAL NURSE SPECIALIST

## 2019-06-07 RX ORDER — IBUPROFEN 200 MG
400 TABLET ORAL EVERY 8 HOURS PRN
COMMUNITY
End: 2021-12-16

## 2019-06-07 ASSESSMENT — ENCOUNTER SYMPTOMS
SHORTNESS OF BREATH: 1
EYE ITCHING: 1
COUGH: 1
GASTROINTESTINAL NEGATIVE: 1
EYE REDNESS: 1
BACK PAIN: 1

## 2019-06-07 NOTE — PROGRESS NOTES
daily for 30 days. 60 tablet 1    oxyCODONE-acetaminophen (PERCOCET)  MG per tablet Take 1 tablet by mouth every 6 hours as needed for Pain for up to 30 days.  120 tablet 0    ondansetron (ZOFRAN) 8 MG tablet Take 1 tablet by mouth every 8 hours as needed for Nausea or Vomiting 30 tablet 3    buPROPion (WELLBUTRIN XL) 300 MG extended release tablet Take 1 tablet by mouth daily 30 tablet 2    prochlorperazine (COMPAZINE) 10 MG tablet Take 1 tablet by mouth every 6 hours as needed (nausea) 40 tablet 1    loratadine (CLARITIN) 10 MG tablet Take 1 tablet by mouth daily 30 tablet 0    Handicap Placard MISC by Does not apply route Diagnoses: LUNG CANCER, DEGENERATIVE ARTHRITIS, CANCER PAIN   EXPIRATION: 4/12/2024 1 each 0    SUMAtriptan (IMITREX) 100 MG tablet Take 100 mg by mouth      Amoxicillin 500 MG TABS       simethicone (MYLICON) 80 MG chewable tablet Take 1 tablet by mouth 4 times daily as needed for Flatulence 180 tablet 3    hydrALAZINE (APRESOLINE) 25 MG tablet Take 1 tablet by mouth daily      montelukast (SINGULAIR) 10 MG tablet Take 1 tablet by mouth nightly 90 tablet 3    pantoprazole (PROTONIX) 40 MG tablet Take 1 tablet by mouth every morning (before breakfast) 30 tablet 0    sennosides-docusate sodium (SENOKOT-S) 8.6-50 MG tablet Take 1 tablet by mouth 2 times daily 120 tablet 3    PROAIR  (90 Base) MCG/ACT inhaler Inhale 2 Inhalers into the lungs 4 times daily  1    aspirin (ECOTRIN LOW STRENGTH) 81 MG EC tablet Take 81 mg by mouth daily      vitamin D (ERGOCALCIFEROL) 27628 units CAPS capsule Take 1 capsule by mouth every 7 days  2    BREO ELLIPTA 100-25 MCG/INH AEPB inhaler Inhale 2 puffs into the lungs 2 times daily  0    lisinopril-hydrochlorothiazide (PRINZIDE;ZESTORETIC) 20-25 MG per tablet Take 1 tablet by mouth daily  0    metoprolol succinate (TOPROL XL) 50 MG extended release tablet Take 50 mg by mouth daily      folic acid (FOLVITE) 1 MG tablet Take 1 tablet by mouth daily 30 tablet 5    levalbuterol (XOPENEX) 1.25 MG/3ML nebulizer solution Take 3 mLs by nebulization every 4 hours as needed. 240 mL 3    fluticasone (FLONASE) 50 MCG/ACT nasal spray 1 spray by Nasal route daily. 1 Bottle 6     No current facility-administered medications on file prior to visit. Review of Systems   Constitutional: Negative. HENT: Positive for congestion (worse than baseline). Dysgeusia   Eyes: Positive for redness (better) and itching (better). Respiratory: Positive for cough (worse) and shortness of breath. Cardiovascular: Negative. Gastrointestinal: Negative. Genitourinary: Negative. Musculoskeletal: Positive for arthralgias, back pain and neck pain. Skin: Negative. Neurological: Negative. Hematological: Negative. Psychiatric/Behavioral: Positive for dysphoric mood (intermittent). The patient is nervous/anxious (intermittent). Reviewed lab and imaging on record. Objective:   BP (!) 153/78 (Site: Left Upper Arm, Position: Sitting)   Pulse 80   Temp 98.2 °F (36.8 °C) (Oral)   Resp 20   Wt 165 lb 8 oz (75.1 kg)   SpO2 94%   BMI 29.32 kg/m²    Wt Readings from Last 3 Encounters:   06/07/19 165 lb 8 oz (75.1 kg)   05/29/19 165 lb 12.8 oz (75.2 kg)   05/10/19 163 lb 6.4 oz (74.1 kg)     Physical Exam   Constitutional: She is oriented to person, place, and time. She appears well-developed and well-nourished. HENT:   Head: Normocephalic and atraumatic. Eyes: Pupils are equal, round, and reactive to light. EOM are normal.   Neck: Normal range of motion. Neck supple. Cardiovascular: Normal rate and regular rhythm. Pulmonary/Chest: Effort normal. She has decreased breath sounds. She has wheezes (throughout). She has rhonchi (throughout). Abdominal: Soft. Bowel sounds are normal.   Musculoskeletal:   Moves all 4 extremities   Neurological: She is alert and oriented to person, place, and time. Skin: Skin is warm and dry.    Psychiatric: Judgment and thought content normal.   Vitals reviewed. Assessment and Plan:      1. Acute bronchitis, unspecified organism  - Continue amoxicillin as ordered. Call me if not feeling better in 2 days (would have been 1/2 way through amoxicillin therapy). May need steroid eventually. If needed, will need to clear with oncology given ongoing immunotherapy. 2. Neoplasm related pain: managed  continue percocet    3. Primary osteoarthritis involving multiple joints  - may take aleve prn; skip days before immunotherapy    4. Malignant neoplasm of upper lobe of left lung (HCC)  - on chemotx    5. Secondary malignant neoplasm of right lung ([facility] [address]    6. Encounter for palliative care      - take aleve BID with food irrespective of last dosing of opioid regimen    - advised to speak with PCP, ask for recommendation/referral for counseling    Medications Discontinued During This Encounter   Medication Reason    predniSONE (DELTASONE) 10 MG tablet LIST CLEANUP       Discussed with patient/surrogate: POC, Treatment risks/benefits, and alternatives including as noted above. All questions were answered. Gave much active listening, presence, and emotional support. Due to acuity, symptomatology and high-risk medication management,   I advised patient to Return in about 1 month (around 7/5/2019), or if symptoms worsen or fail to improve. Total Time 20 mins   > 50% Time Spent Counseling/Care coordination?  yes -      CHRISTO Roldan - CNS, BC, ACHPN, MS

## 2019-06-12 DIAGNOSIS — G89.3 NEOPLASM RELATED PAIN: ICD-10-CM

## 2019-06-12 DIAGNOSIS — C78.01 SECONDARY MALIGNANT NEOPLASM OF RIGHT LUNG (HCC): ICD-10-CM

## 2019-06-12 DIAGNOSIS — C34.12 MALIGNANT NEOPLASM OF UPPER LOBE OF LEFT LUNG (HCC): ICD-10-CM

## 2019-06-12 RX ORDER — OXYCODONE AND ACETAMINOPHEN 10; 325 MG/1; MG/1
1 TABLET ORAL EVERY 6 HOURS PRN
Qty: 120 TABLET | Refills: 0 | Status: SHIPPED | OUTPATIENT
Start: 2019-06-12 | End: 2019-07-10 | Stop reason: SDUPTHER

## 2019-06-28 ENCOUNTER — OFFICE VISIT (OUTPATIENT)
Dept: PALLATIVE CARE | Age: 69
End: 2019-06-28
Payer: MEDICARE

## 2019-06-28 VITALS
TEMPERATURE: 98.3 F | RESPIRATION RATE: 16 BRPM | OXYGEN SATURATION: 97 % | WEIGHT: 167 LBS | DIASTOLIC BLOOD PRESSURE: 67 MMHG | SYSTOLIC BLOOD PRESSURE: 148 MMHG | HEART RATE: 65 BPM | BODY MASS INDEX: 29.58 KG/M2

## 2019-06-28 DIAGNOSIS — G89.3 NEOPLASM RELATED PAIN: ICD-10-CM

## 2019-06-28 DIAGNOSIS — C34.90 MALIGNANT NEOPLASM OF LUNG, UNSPECIFIED LATERALITY, UNSPECIFIED PART OF LUNG (HCC): Primary | ICD-10-CM

## 2019-06-28 DIAGNOSIS — R06.02 SOB (SHORTNESS OF BREATH): ICD-10-CM

## 2019-06-28 DIAGNOSIS — Z51.5 PALLIATIVE CARE ENCOUNTER: ICD-10-CM

## 2019-06-28 PROCEDURE — G8399 PT W/DXA RESULTS DOCUMENT: HCPCS | Performed by: NURSE PRACTITIONER

## 2019-06-28 PROCEDURE — G8598 ASA/ANTIPLAT THER USED: HCPCS | Performed by: NURSE PRACTITIONER

## 2019-06-28 PROCEDURE — G8419 CALC BMI OUT NRM PARAM NOF/U: HCPCS | Performed by: NURSE PRACTITIONER

## 2019-06-28 PROCEDURE — 4004F PT TOBACCO SCREEN RCVD TLK: CPT | Performed by: NURSE PRACTITIONER

## 2019-06-28 PROCEDURE — 99214 OFFICE O/P EST MOD 30 MIN: CPT | Performed by: NURSE PRACTITIONER

## 2019-06-28 PROCEDURE — 4040F PNEUMOC VAC/ADMIN/RCVD: CPT | Performed by: NURSE PRACTITIONER

## 2019-06-28 PROCEDURE — 1123F ACP DISCUSS/DSCN MKR DOCD: CPT | Performed by: NURSE PRACTITIONER

## 2019-06-28 PROCEDURE — 3017F COLORECTAL CA SCREEN DOC REV: CPT | Performed by: NURSE PRACTITIONER

## 2019-06-28 PROCEDURE — 1090F PRES/ABSN URINE INCON ASSESS: CPT | Performed by: NURSE PRACTITIONER

## 2019-06-28 PROCEDURE — G8427 DOCREV CUR MEDS BY ELIG CLIN: HCPCS | Performed by: NURSE PRACTITIONER

## 2019-06-28 ASSESSMENT — ENCOUNTER SYMPTOMS
DIARRHEA: 0
STRIDOR: 0
NAUSEA: 0
EYE DISCHARGE: 0
COUGH: 1
ABDOMINAL PAIN: 0
VOICE CHANGE: 0
VOMITING: 0
BACK PAIN: 1
SORE THROAT: 0
ANAL BLEEDING: 0
SHORTNESS OF BREATH: 1
APNEA: 0
RECTAL PAIN: 0
BLOOD IN STOOL: 0
CONSTIPATION: 0
CHEST TIGHTNESS: 0
COLOR CHANGE: 0
WHEEZING: 0
ABDOMINAL DISTENTION: 0
TROUBLE SWALLOWING: 0
CHOKING: 0

## 2019-06-28 NOTE — PROGRESS NOTES
Social History     Socioeconomic History    Marital status:      Spouse name: Not on file    Number of children: Not on file    Years of education: Not on file    Highest education level: Not on file   Occupational History    Not on file   Social Needs    Financial resource strain: Not on file    Food insecurity:     Worry: Not on file     Inability: Not on file    Transportation needs:     Medical: Not on file     Non-medical: Not on file   Tobacco Use    Smoking status: Light Tobacco Smoker     Packs/day: 1.50     Years: 48.00     Pack years: 72.00     Types: Cigarettes     Last attempt to quit: 2019     Years since quittin.4    Smokeless tobacco: Never Used    Tobacco comment: Smokes 5 or less cigarettes daily   Substance and Sexual Activity    Alcohol use: No    Drug use: Not on file    Sexual activity: Not on file   Lifestyle    Physical activity:     Days per week: Not on file     Minutes per session: Not on file    Stress: Not on file   Relationships    Social connections:     Talks on phone: Not on file     Gets together: Not on file     Attends Congregational service: Not on file     Active member of club or organization: Not on file     Attends meetings of clubs or organizations: Not on file     Relationship status: Not on file    Intimate partner violence:     Fear of current or ex partner: Not on file     Emotionally abused: Not on file     Physically abused: Not on file     Forced sexual activity: Not on file   Other Topics Concern    Not on file   Social History Narrative    Not on file     Family History   Problem Relation Age of Onset    Stroke Mother     Heart Attack Mother     Cancer Father         LUNG     No Known Allergies  Current Outpatient Medications on File Prior to Visit   Medication Sig Dispense Refill    oxyCODONE-acetaminophen (PERCOCET)  MG per tablet Take 1 tablet by mouth every 6 hours as needed for Pain for up to 30 days.  120 tablet 0    ibuprofen (ADVIL) 200 MG tablet Take 400 mg by mouth every 8 hours as needed for Pain      buPROPion (WELLBUTRIN XL) 300 MG extended release tablet Take 1 tablet by mouth daily 30 tablet 2    prochlorperazine (COMPAZINE) 10 MG tablet Take 1 tablet by mouth every 6 hours as needed (nausea) 40 tablet 1    loratadine (CLARITIN) 10 MG tablet Take 1 tablet by mouth daily 30 tablet 0    Handicap Placard MISC by Does not apply route Diagnoses: LUNG CANCER, DEGENERATIVE ARTHRITIS, CANCER PAIN   EXPIRATION: 4/12/2024 1 each 0    SUMAtriptan (IMITREX) 100 MG tablet Take 100 mg by mouth      simethicone (MYLICON) 80 MG chewable tablet Take 1 tablet by mouth 4 times daily as needed for Flatulence 180 tablet 3    hydrALAZINE (APRESOLINE) 25 MG tablet Take 1 tablet by mouth daily      montelukast (SINGULAIR) 10 MG tablet Take 1 tablet by mouth nightly 90 tablet 3    pantoprazole (PROTONIX) 40 MG tablet Take 1 tablet by mouth every morning (before breakfast) 30 tablet 0    sennosides-docusate sodium (SENOKOT-S) 8.6-50 MG tablet Take 1 tablet by mouth 2 times daily 120 tablet 3    PROAIR  (90 Base) MCG/ACT inhaler Inhale 2 Inhalers into the lungs 4 times daily  1    aspirin (ECOTRIN LOW STRENGTH) 81 MG EC tablet Take 81 mg by mouth daily      vitamin D (ERGOCALCIFEROL) 81483 units CAPS capsule Take 1 capsule by mouth every 7 days  2    BREO ELLIPTA 100-25 MCG/INH AEPB inhaler Inhale 2 puffs into the lungs 2 times daily  0    lisinopril-hydrochlorothiazide (PRINZIDE;ZESTORETIC) 20-25 MG per tablet Take 1 tablet by mouth daily  0    metoprolol succinate (TOPROL XL) 50 MG extended release tablet Take 50 mg by mouth daily      folic acid (FOLVITE) 1 MG tablet Take 1 tablet by mouth daily 30 tablet 5    levalbuterol (XOPENEX) 1.25 MG/3ML nebulizer solution Take 3 mLs by nebulization every 4 hours as needed. 240 mL 3    fluticasone (FLONASE) 50 MCG/ACT nasal spray 1 spray by Nasal route daily.  1 Bottle 6     No current facility-administered medications on file prior to visit. Review of Systems   Constitutional: Positive for fatigue. Negative for activity change, appetite change, chills, diaphoresis, fever and unexpected weight change. HENT: Negative for drooling, hearing loss, mouth sores, sore throat, trouble swallowing and voice change. Eyes: Negative for discharge and visual disturbance. Respiratory: Positive for cough and shortness of breath. Negative for apnea, choking, chest tightness, wheezing and stridor. Cardiovascular: Negative for chest pain, palpitations and leg swelling. Gastrointestinal: Negative for abdominal distention, abdominal pain, anal bleeding, blood in stool, constipation, diarrhea, nausea, rectal pain and vomiting. Genitourinary: Negative for difficulty urinating, dysuria, enuresis, frequency and hematuria. Musculoskeletal: Positive for back pain. Negative for arthralgias, gait problem, joint swelling and myalgias. Skin: Negative for color change, pallor, rash and wound. Allergic/Immunologic: Negative for food allergies and immunocompromised state. Neurological: Negative for dizziness, tremors, seizures, syncope, facial asymmetry, speech difficulty, weakness, light-headedness, numbness and headaches. Hematological: Negative for adenopathy. Does not bruise/bleed easily. Psychiatric/Behavioral: Negative for agitation, behavioral problems, confusion, decreased concentration, dysphoric mood, hallucinations, self-injury, sleep disturbance and suicidal ideas. The patient is not nervous/anxious and is not hyperactive. Objective:   BP (!) 148/67   Pulse 65   Temp 98.3 °F (36.8 °C)   Resp 16   Wt 167 lb (75.8 kg)   SpO2 97%   BMI 29.58 kg/m²     Physical Exam   Constitutional: She is oriented to person, place, and time. She appears well-developed and well-nourished. No distress. HENT:   Head: Normocephalic and atraumatic.    Right Ear: External ear normal. vs benefit assessed. Pt educated on risk of addiction. Pt advised to take only as prescribed and not to change frequency of pain meds without consulting palliative care first.      SOB  - Continue COPD Directed therapies   - Call for increased SOB, cough, sputum production, excessive fatigue, fever, chills, or myalgias  - Gentle exercise as tolerated  -  Rinse out mouth after inhaler use.   - COPD ER PACK in place    CHRISTO Deal - CNP

## 2019-07-10 DIAGNOSIS — C78.01 SECONDARY MALIGNANT NEOPLASM OF RIGHT LUNG (HCC): ICD-10-CM

## 2019-07-10 DIAGNOSIS — G89.3 NEOPLASM RELATED PAIN: ICD-10-CM

## 2019-07-10 DIAGNOSIS — C34.12 MALIGNANT NEOPLASM OF UPPER LOBE OF LEFT LUNG (HCC): ICD-10-CM

## 2019-07-10 RX ORDER — OXYCODONE AND ACETAMINOPHEN 10; 325 MG/1; MG/1
1 TABLET ORAL EVERY 6 HOURS PRN
Qty: 120 TABLET | Refills: 0 | Status: SHIPPED | OUTPATIENT
Start: 2019-07-10 | End: 2019-08-09 | Stop reason: SDUPTHER

## 2019-07-15 DIAGNOSIS — F41.1 GENERALIZED ANXIETY DISORDER WITH PANIC ATTACKS: ICD-10-CM

## 2019-07-15 DIAGNOSIS — R10.13 EPIGASTRIC PAIN: ICD-10-CM

## 2019-07-15 DIAGNOSIS — F41.0 GENERALIZED ANXIETY DISORDER WITH PANIC ATTACKS: ICD-10-CM

## 2019-07-15 RX ORDER — BUPROPION HYDROCHLORIDE 300 MG/1
300 TABLET ORAL DAILY
Qty: 30 TABLET | Refills: 2 | Status: SHIPPED | OUTPATIENT
Start: 2019-07-15 | End: 2019-11-15

## 2019-07-15 RX ORDER — PANTOPRAZOLE SODIUM 40 MG/1
40 TABLET, DELAYED RELEASE ORAL
Qty: 30 TABLET | Refills: 0 | Status: SHIPPED | OUTPATIENT
Start: 2019-07-15 | End: 2019-10-04

## 2019-08-09 ENCOUNTER — OFFICE VISIT (OUTPATIENT)
Dept: PALLATIVE CARE | Age: 69
End: 2019-08-09
Payer: MEDICARE

## 2019-08-09 VITALS
SYSTOLIC BLOOD PRESSURE: 153 MMHG | WEIGHT: 171 LBS | RESPIRATION RATE: 20 BRPM | TEMPERATURE: 98.7 F | BODY MASS INDEX: 30.29 KG/M2 | HEART RATE: 78 BPM | OXYGEN SATURATION: 94 % | DIASTOLIC BLOOD PRESSURE: 67 MMHG

## 2019-08-09 DIAGNOSIS — Z51.5 ENCOUNTER FOR PALLIATIVE CARE: Primary | ICD-10-CM

## 2019-08-09 DIAGNOSIS — J18.9 PNEUMONIA OF BOTH LOWER LOBES DUE TO INFECTIOUS ORGANISM: ICD-10-CM

## 2019-08-09 DIAGNOSIS — C78.01 SECONDARY MALIGNANT NEOPLASM OF RIGHT LUNG (HCC): ICD-10-CM

## 2019-08-09 DIAGNOSIS — F41.1 GENERALIZED ANXIETY DISORDER WITH PANIC ATTACKS: ICD-10-CM

## 2019-08-09 DIAGNOSIS — C34.90 MALIGNANT NEOPLASM OF LUNG, UNSPECIFIED LATERALITY, UNSPECIFIED PART OF LUNG (HCC): ICD-10-CM

## 2019-08-09 DIAGNOSIS — C34.12 MALIGNANT NEOPLASM OF UPPER LOBE OF LEFT LUNG (HCC): ICD-10-CM

## 2019-08-09 DIAGNOSIS — F41.0 GENERALIZED ANXIETY DISORDER WITH PANIC ATTACKS: ICD-10-CM

## 2019-08-09 DIAGNOSIS — G89.3 NEOPLASM RELATED PAIN: ICD-10-CM

## 2019-08-09 DIAGNOSIS — R06.02 SOB (SHORTNESS OF BREATH): ICD-10-CM

## 2019-08-09 PROCEDURE — 99215 OFFICE O/P EST HI 40 MIN: CPT | Performed by: FAMILY MEDICINE

## 2019-08-09 RX ORDER — OXYCODONE AND ACETAMINOPHEN 10; 325 MG/1; MG/1
1 TABLET ORAL EVERY 6 HOURS PRN
Qty: 120 TABLET | Refills: 0 | Status: SHIPPED | OUTPATIENT
Start: 2019-08-09 | End: 2019-09-06 | Stop reason: ALTCHOICE

## 2019-08-09 RX ORDER — ALPRAZOLAM 1 MG/1
1 TABLET ORAL 2 TIMES DAILY
Qty: 60 TABLET | Refills: 0 | Status: SHIPPED | OUTPATIENT
Start: 2019-08-09 | End: 2019-10-04

## 2019-08-09 RX ORDER — LEVOFLOXACIN 500 MG/1
500 TABLET, FILM COATED ORAL DAILY
Qty: 10 TABLET | Refills: 0 | Status: SHIPPED | OUTPATIENT
Start: 2019-08-09 | End: 2019-08-19

## 2019-08-09 ASSESSMENT — ENCOUNTER SYMPTOMS
COUGH: 1
TROUBLE SWALLOWING: 0
APNEA: 0
SORE THROAT: 0
BACK PAIN: 1
DIARRHEA: 0
CHEST TIGHTNESS: 0
COLOR CHANGE: 0
CHOKING: 0
CONSTIPATION: 0
VOICE CHANGE: 0
EYE DISCHARGE: 0
VOMITING: 0
ABDOMINAL DISTENTION: 0
NAUSEA: 0
ABDOMINAL PAIN: 0
BLOOD IN STOOL: 0
ANAL BLEEDING: 0
RECTAL PAIN: 0
WHEEZING: 1
SHORTNESS OF BREATH: 1
STRIDOR: 0

## 2019-08-09 NOTE — PROGRESS NOTES
Subjective:      Patient Id: Seen Sandra Blanca at [address] [place] [address] in follow up for symptom management. She was accompanied to the appointment by: self. Chief Complaint   Patient presents with    Shortness of Breath    Cough    Pain    Fatigue    Medication Refill        PRINCESS Contreras Fabio is a 77 y.o. female seen and examined for symptomatic mangement related to Lung Cancer and COPD. Sandra Blanca has complex medical history that includes Asthma, anxiety, GERD, and OA. Pt is calm, cooperative and in NAD. Patient complains of increasing SOB, cough, and fatigue for one week. States she is also coughing up yellow phlegm. SOB increasing with exertion. Is currently on prednisone taper and amoxicillin prescribe by other provider. Denies fever, chills, nausea, vomiting, and myalgias. States pain is well controled on current regime. Taking percocet on average 3-4 times daily with relief. Denies Sedation, Constipation, or other adverse effects. States anxiety well controled on xanax. Taking on average once every two to three days with great relief. Denies significant sleep disturbance, depression, or agitation episodes; denies suicidal or homicidal ideation or signs suggesting existential grief or spiritual pain.        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

## 2019-08-29 ENCOUNTER — HOSPITAL ENCOUNTER (OUTPATIENT)
Dept: RADIATION ONCOLOGY | Age: 69
Discharge: HOME | End: 2019-08-29
Payer: MEDICARE

## 2019-08-29 VITALS
HEART RATE: 98 BPM | RESPIRATION RATE: 18 BRPM | OXYGEN SATURATION: 96 % | TEMPERATURE: 97.6 F | SYSTOLIC BLOOD PRESSURE: 116 MMHG | WEIGHT: 171.8 LBS | BODY MASS INDEX: 30.43 KG/M2 | DIASTOLIC BLOOD PRESSURE: 66 MMHG

## 2019-08-29 DIAGNOSIS — C34.12 MALIGNANT NEOPLASM OF UPPER LOBE OF LEFT LUNG (HCC): Primary | ICD-10-CM

## 2019-08-29 PROCEDURE — 99212 OFFICE O/P EST SF 10 MIN: CPT | Performed by: RADIOLOGY

## 2019-09-06 ENCOUNTER — OFFICE VISIT (OUTPATIENT)
Dept: PALLATIVE CARE | Age: 69
End: 2019-09-06
Payer: MEDICARE

## 2019-09-06 VITALS
OXYGEN SATURATION: 93 % | DIASTOLIC BLOOD PRESSURE: 67 MMHG | TEMPERATURE: 98.5 F | BODY MASS INDEX: 30.54 KG/M2 | HEART RATE: 104 BPM | RESPIRATION RATE: 20 BRPM | WEIGHT: 172.4 LBS | SYSTOLIC BLOOD PRESSURE: 153 MMHG

## 2019-09-06 DIAGNOSIS — F41.9 ANXIETY: ICD-10-CM

## 2019-09-06 DIAGNOSIS — C34.90 MALIGNANT NEOPLASM OF LUNG, UNSPECIFIED LATERALITY, UNSPECIFIED PART OF LUNG (HCC): ICD-10-CM

## 2019-09-06 DIAGNOSIS — Z51.5 PALLIATIVE CARE ENCOUNTER: ICD-10-CM

## 2019-09-06 DIAGNOSIS — R11.0 CHEMOTHERAPY-INDUCED NAUSEA: Primary | ICD-10-CM

## 2019-09-06 DIAGNOSIS — G89.3 NEOPLASM RELATED PAIN: ICD-10-CM

## 2019-09-06 DIAGNOSIS — T45.1X5A CHEMOTHERAPY-INDUCED NAUSEA: Primary | ICD-10-CM

## 2019-09-06 PROCEDURE — 99214 OFFICE O/P EST MOD 30 MIN: CPT | Performed by: NURSE PRACTITIONER

## 2019-09-06 PROCEDURE — G8417 CALC BMI ABV UP PARAM F/U: HCPCS | Performed by: NURSE PRACTITIONER

## 2019-09-06 PROCEDURE — 3017F COLORECTAL CA SCREEN DOC REV: CPT | Performed by: NURSE PRACTITIONER

## 2019-09-06 PROCEDURE — G8399 PT W/DXA RESULTS DOCUMENT: HCPCS | Performed by: NURSE PRACTITIONER

## 2019-09-06 PROCEDURE — G8598 ASA/ANTIPLAT THER USED: HCPCS | Performed by: NURSE PRACTITIONER

## 2019-09-06 PROCEDURE — 1123F ACP DISCUSS/DSCN MKR DOCD: CPT | Performed by: NURSE PRACTITIONER

## 2019-09-06 PROCEDURE — 4040F PNEUMOC VAC/ADMIN/RCVD: CPT | Performed by: NURSE PRACTITIONER

## 2019-09-06 PROCEDURE — 1090F PRES/ABSN URINE INCON ASSESS: CPT | Performed by: NURSE PRACTITIONER

## 2019-09-06 PROCEDURE — G8427 DOCREV CUR MEDS BY ELIG CLIN: HCPCS | Performed by: NURSE PRACTITIONER

## 2019-09-06 PROCEDURE — 4004F PT TOBACCO SCREEN RCVD TLK: CPT | Performed by: NURSE PRACTITIONER

## 2019-09-06 RX ORDER — PROCHLORPERAZINE MALEATE 10 MG
10 TABLET ORAL EVERY 6 HOURS PRN
Qty: 120 TABLET | Refills: 3 | Status: SHIPPED | OUTPATIENT
Start: 2019-09-06 | End: 2019-10-04

## 2019-09-06 RX ORDER — OXYCODONE AND ACETAMINOPHEN 10; 325 MG/1; MG/1
1 TABLET ORAL EVERY 6 HOURS PRN
Qty: 120 TABLET | Refills: 0 | Status: SHIPPED | OUTPATIENT
Start: 2019-09-06 | End: 2019-10-04 | Stop reason: ALTCHOICE

## 2019-09-06 RX ORDER — ALPRAZOLAM 1 MG/1
1 TABLET ORAL 2 TIMES DAILY PRN
Qty: 60 TABLET | Refills: 0 | Status: SHIPPED | OUTPATIENT
Start: 2019-09-06 | End: 2019-09-06

## 2019-09-06 RX ORDER — ALPRAZOLAM 1 MG/1
1 TABLET ORAL 2 TIMES DAILY PRN
Qty: 60 TABLET | Refills: 0 | Status: SHIPPED | OUTPATIENT
Start: 2019-09-06 | End: 2019-11-11 | Stop reason: SDUPTHER

## 2019-09-06 ASSESSMENT — ENCOUNTER SYMPTOMS
DIARRHEA: 0
ABDOMINAL PAIN: 0
RECTAL PAIN: 0
CONSTIPATION: 0
CHEST TIGHTNESS: 0
APNEA: 0
NAUSEA: 0
STRIDOR: 0
BACK PAIN: 1
SHORTNESS OF BREATH: 1
COLOR CHANGE: 0
TROUBLE SWALLOWING: 0
VOICE CHANGE: 0
EYE DISCHARGE: 0
CHOKING: 0
ABDOMINAL DISTENTION: 0
WHEEZING: 0
ANAL BLEEDING: 0
BLOOD IN STOOL: 0
COUGH: 0
SORE THROAT: 0
VOMITING: 0

## 2019-09-06 NOTE — PROGRESS NOTES
facility-administered medications on file prior to visit. Review of Systems   Constitutional: Positive for fatigue. Negative for activity change, appetite change, chills, diaphoresis, fever and unexpected weight change. HENT: Negative for drooling, hearing loss, mouth sores, sore throat, trouble swallowing and voice change. Eyes: Negative for discharge and visual disturbance. Respiratory: Positive for shortness of breath. Negative for apnea, cough, choking, chest tightness, wheezing and stridor. Cardiovascular: Negative for chest pain, palpitations and leg swelling. Gastrointestinal: Negative for abdominal distention, abdominal pain, anal bleeding, blood in stool, constipation, diarrhea, nausea, rectal pain and vomiting. Genitourinary: Negative for difficulty urinating, dysuria, enuresis, frequency and hematuria. Musculoskeletal: Positive for arthralgias and back pain. Negative for gait problem, joint swelling and myalgias. Skin: Negative for color change, pallor, rash and wound. Allergic/Immunologic: Negative for food allergies and immunocompromised state. Neurological: Positive for headaches. Negative for dizziness, tremors, seizures, syncope, facial asymmetry, speech difficulty, weakness, light-headedness and numbness. Hematological: Negative for adenopathy. Does not bruise/bleed easily. Psychiatric/Behavioral: Negative for agitation, behavioral problems, confusion, decreased concentration, dysphoric mood, hallucinations, self-injury, sleep disturbance and suicidal ideas. The patient is not nervous/anxious and is not hyperactive. Objective:   BP (!) 153/67 (Site: Right Upper Arm, Position: Sitting)   Pulse 104   Temp 98.5 °F (36.9 °C)   Resp 20   Wt 172 lb 6.4 oz (78.2 kg)   SpO2 93%   BMI 30.54 kg/m²     Physical Exam      Assessment:       Diagnosis Orders   1. Chemotherapy-induced nausea  prochlorperazine (COMPAZINE) 10 MG tablet   2.  Palliative care encounter

## 2019-10-04 ENCOUNTER — OFFICE VISIT (OUTPATIENT)
Dept: PALLATIVE CARE | Age: 69
End: 2019-10-04
Payer: MEDICARE

## 2019-10-04 VITALS
OXYGEN SATURATION: 98 % | DIASTOLIC BLOOD PRESSURE: 74 MMHG | HEART RATE: 68 BPM | RESPIRATION RATE: 16 BRPM | SYSTOLIC BLOOD PRESSURE: 110 MMHG | TEMPERATURE: 97.3 F

## 2019-10-04 DIAGNOSIS — C34.90 MALIGNANT NEOPLASM OF LUNG, UNSPECIFIED LATERALITY, UNSPECIFIED PART OF LUNG (HCC): ICD-10-CM

## 2019-10-04 DIAGNOSIS — R10.9 ABDOMINAL PAIN, UNSPECIFIED ABDOMINAL LOCATION: ICD-10-CM

## 2019-10-04 DIAGNOSIS — F41.9 ANXIETY: Primary | ICD-10-CM

## 2019-10-04 DIAGNOSIS — G89.3 NEOPLASM RELATED PAIN: ICD-10-CM

## 2019-10-04 DIAGNOSIS — R11.0 NAUSEA: ICD-10-CM

## 2019-10-04 DIAGNOSIS — Z51.5 PALLIATIVE CARE ENCOUNTER: ICD-10-CM

## 2019-10-04 DIAGNOSIS — G47.00 INSOMNIA, UNSPECIFIED TYPE: ICD-10-CM

## 2019-10-04 PROCEDURE — 4004F PT TOBACCO SCREEN RCVD TLK: CPT | Performed by: NURSE PRACTITIONER

## 2019-10-04 PROCEDURE — G8417 CALC BMI ABV UP PARAM F/U: HCPCS | Performed by: NURSE PRACTITIONER

## 2019-10-04 PROCEDURE — 4040F PNEUMOC VAC/ADMIN/RCVD: CPT | Performed by: NURSE PRACTITIONER

## 2019-10-04 PROCEDURE — G8427 DOCREV CUR MEDS BY ELIG CLIN: HCPCS | Performed by: NURSE PRACTITIONER

## 2019-10-04 PROCEDURE — G8598 ASA/ANTIPLAT THER USED: HCPCS | Performed by: NURSE PRACTITIONER

## 2019-10-04 PROCEDURE — 3017F COLORECTAL CA SCREEN DOC REV: CPT | Performed by: NURSE PRACTITIONER

## 2019-10-04 PROCEDURE — G8484 FLU IMMUNIZE NO ADMIN: HCPCS | Performed by: NURSE PRACTITIONER

## 2019-10-04 PROCEDURE — G8399 PT W/DXA RESULTS DOCUMENT: HCPCS | Performed by: NURSE PRACTITIONER

## 2019-10-04 PROCEDURE — 1090F PRES/ABSN URINE INCON ASSESS: CPT | Performed by: NURSE PRACTITIONER

## 2019-10-04 PROCEDURE — 99214 OFFICE O/P EST MOD 30 MIN: CPT | Performed by: NURSE PRACTITIONER

## 2019-10-04 PROCEDURE — 1123F ACP DISCUSS/DSCN MKR DOCD: CPT | Performed by: NURSE PRACTITIONER

## 2019-10-04 RX ORDER — ONDANSETRON 4 MG/1
4 TABLET, FILM COATED ORAL DAILY PRN
Qty: 30 TABLET | Refills: 0 | Status: SHIPPED | OUTPATIENT
Start: 2019-10-04 | End: 2021-12-16

## 2019-10-04 RX ORDER — OXYCODONE HYDROCHLORIDE 10 MG/1
10 TABLET ORAL EVERY 6 HOURS PRN
Qty: 120 TABLET | Refills: 0 | Status: SHIPPED | OUTPATIENT
Start: 2019-10-04 | End: 2019-11-11 | Stop reason: SDUPTHER

## 2019-10-04 RX ORDER — OLANZAPINE 5 MG/1
5 TABLET ORAL NIGHTLY
Qty: 30 TABLET | Refills: 3 | Status: SHIPPED | OUTPATIENT
Start: 2019-10-04 | End: 2019-11-15 | Stop reason: SDUPTHER

## 2019-10-04 RX ORDER — OMEPRAZOLE 40 MG/1
40 CAPSULE, DELAYED RELEASE ORAL
Qty: 30 CAPSULE | Refills: 0 | Status: SHIPPED | OUTPATIENT
Start: 2019-10-04 | End: 2019-10-30 | Stop reason: SDUPTHER

## 2019-10-04 ASSESSMENT — ENCOUNTER SYMPTOMS
SHORTNESS OF BREATH: 1
APNEA: 0
COUGH: 0
EYE DISCHARGE: 0
VOMITING: 0
BACK PAIN: 1
ANAL BLEEDING: 0
CHOKING: 0
BLOOD IN STOOL: 0
CONSTIPATION: 0
ABDOMINAL PAIN: 0
SORE THROAT: 0
VOICE CHANGE: 0
WHEEZING: 0
TROUBLE SWALLOWING: 0
ABDOMINAL DISTENTION: 0
CHEST TIGHTNESS: 0
RECTAL PAIN: 0
COLOR CHANGE: 0
STRIDOR: 0
NAUSEA: 1
DIARRHEA: 0

## 2019-10-18 ENCOUNTER — TELEPHONE (OUTPATIENT)
Dept: PALLATIVE CARE | Age: 69
End: 2019-10-18

## 2019-10-30 DIAGNOSIS — R10.9 ABDOMINAL PAIN, UNSPECIFIED ABDOMINAL LOCATION: ICD-10-CM

## 2019-10-30 DIAGNOSIS — Z51.5 PALLIATIVE CARE ENCOUNTER: ICD-10-CM

## 2019-10-30 DIAGNOSIS — R11.0 NAUSEA: ICD-10-CM

## 2019-10-30 RX ORDER — OMEPRAZOLE 40 MG/1
40 CAPSULE, DELAYED RELEASE ORAL
Qty: 30 CAPSULE | Refills: 3 | Status: SHIPPED | OUTPATIENT
Start: 2019-10-30 | End: 2020-04-10 | Stop reason: SDUPTHER

## 2019-11-11 DIAGNOSIS — Z51.5 PALLIATIVE CARE ENCOUNTER: ICD-10-CM

## 2019-11-11 DIAGNOSIS — F41.9 ANXIETY: ICD-10-CM

## 2019-11-11 DIAGNOSIS — R10.9 ABDOMINAL PAIN, UNSPECIFIED ABDOMINAL LOCATION: ICD-10-CM

## 2019-11-11 DIAGNOSIS — G89.3 NEOPLASM RELATED PAIN: ICD-10-CM

## 2019-11-11 DIAGNOSIS — C34.90 MALIGNANT NEOPLASM OF LUNG, UNSPECIFIED LATERALITY, UNSPECIFIED PART OF LUNG (HCC): ICD-10-CM

## 2019-11-11 DIAGNOSIS — G89.3 NEOPLASM RELATED PAIN: Primary | ICD-10-CM

## 2019-11-11 RX ORDER — ALPRAZOLAM 1 MG/1
1 TABLET ORAL 2 TIMES DAILY PRN
Qty: 60 TABLET | Refills: 0 | Status: SHIPPED | OUTPATIENT
Start: 2019-11-11 | End: 2019-12-30 | Stop reason: SDUPTHER

## 2019-11-11 RX ORDER — OXYCODONE AND ACETAMINOPHEN 10; 325 MG/1; MG/1
1 TABLET ORAL EVERY 6 HOURS PRN
Qty: 120 TABLET | Refills: 0 | OUTPATIENT
Start: 2019-11-11 | End: 2019-12-11

## 2019-11-11 RX ORDER — OXYCODONE HYDROCHLORIDE 10 MG/1
10 TABLET ORAL EVERY 6 HOURS PRN
Qty: 120 TABLET | Refills: 0 | Status: SHIPPED | OUTPATIENT
Start: 2019-11-11 | End: 2019-11-25 | Stop reason: SDUPTHER

## 2019-11-11 RX ORDER — OXYCODONE HYDROCHLORIDE 10 MG/1
10 TABLET ORAL EVERY 6 HOURS PRN
Qty: 60 TABLET | Refills: 0 | Status: SHIPPED | OUTPATIENT
Start: 2019-11-11 | End: 2019-11-11

## 2019-11-15 ENCOUNTER — OFFICE VISIT (OUTPATIENT)
Dept: PALLATIVE CARE | Age: 69
End: 2019-11-15
Payer: MEDICARE

## 2019-11-15 VITALS
RESPIRATION RATE: 20 BRPM | HEART RATE: 97 BPM | OXYGEN SATURATION: 93 % | BODY MASS INDEX: 31.32 KG/M2 | WEIGHT: 176.8 LBS | DIASTOLIC BLOOD PRESSURE: 75 MMHG | TEMPERATURE: 98.6 F | SYSTOLIC BLOOD PRESSURE: 181 MMHG

## 2019-11-15 DIAGNOSIS — R11.0 NAUSEA: ICD-10-CM

## 2019-11-15 DIAGNOSIS — G47.00 INSOMNIA, UNSPECIFIED TYPE: ICD-10-CM

## 2019-11-15 DIAGNOSIS — Z51.5 PALLIATIVE CARE ENCOUNTER: ICD-10-CM

## 2019-11-15 DIAGNOSIS — C34.92 MALIGNANT NEOPLASM OF LEFT LUNG, UNSPECIFIED PART OF LUNG (HCC): Primary | ICD-10-CM

## 2019-11-15 DIAGNOSIS — F41.9 ANXIETY: ICD-10-CM

## 2019-11-15 PROCEDURE — G8484 FLU IMMUNIZE NO ADMIN: HCPCS | Performed by: NURSE PRACTITIONER

## 2019-11-15 PROCEDURE — 3017F COLORECTAL CA SCREEN DOC REV: CPT | Performed by: NURSE PRACTITIONER

## 2019-11-15 PROCEDURE — G8427 DOCREV CUR MEDS BY ELIG CLIN: HCPCS | Performed by: NURSE PRACTITIONER

## 2019-11-15 PROCEDURE — G8598 ASA/ANTIPLAT THER USED: HCPCS | Performed by: NURSE PRACTITIONER

## 2019-11-15 PROCEDURE — 1123F ACP DISCUSS/DSCN MKR DOCD: CPT | Performed by: NURSE PRACTITIONER

## 2019-11-15 PROCEDURE — G8399 PT W/DXA RESULTS DOCUMENT: HCPCS | Performed by: NURSE PRACTITIONER

## 2019-11-15 PROCEDURE — 1090F PRES/ABSN URINE INCON ASSESS: CPT | Performed by: NURSE PRACTITIONER

## 2019-11-15 PROCEDURE — 4040F PNEUMOC VAC/ADMIN/RCVD: CPT | Performed by: NURSE PRACTITIONER

## 2019-11-15 PROCEDURE — 99214 OFFICE O/P EST MOD 30 MIN: CPT | Performed by: NURSE PRACTITIONER

## 2019-11-15 PROCEDURE — 4004F PT TOBACCO SCREEN RCVD TLK: CPT | Performed by: NURSE PRACTITIONER

## 2019-11-15 PROCEDURE — G8417 CALC BMI ABV UP PARAM F/U: HCPCS | Performed by: NURSE PRACTITIONER

## 2019-11-15 RX ORDER — PREDNISONE 10 MG/1
TABLET ORAL
Refills: 0 | COMMUNITY
Start: 2019-11-10 | End: 2019-12-20

## 2019-11-15 RX ORDER — OLANZAPINE 5 MG/1
5 TABLET ORAL NIGHTLY
Qty: 30 TABLET | Refills: 3 | Status: SHIPPED | OUTPATIENT
Start: 2019-11-15 | End: 2020-01-08

## 2019-11-15 ASSESSMENT — ENCOUNTER SYMPTOMS
EYE DISCHARGE: 0
STRIDOR: 0
CONSTIPATION: 0
VOICE CHANGE: 0
RECTAL PAIN: 0
SORE THROAT: 0
ABDOMINAL DISTENTION: 0
COLOR CHANGE: 0
DIARRHEA: 0
NAUSEA: 0
CHEST TIGHTNESS: 0
BACK PAIN: 1
ABDOMINAL PAIN: 0
VOMITING: 0
SHORTNESS OF BREATH: 1
BLOOD IN STOOL: 0
TROUBLE SWALLOWING: 0
CHOKING: 0
WHEEZING: 0
ANAL BLEEDING: 0
COUGH: 1
APNEA: 0

## 2019-11-25 DIAGNOSIS — G89.3 NEOPLASM RELATED PAIN: ICD-10-CM

## 2019-11-25 DIAGNOSIS — Z51.5 PALLIATIVE CARE ENCOUNTER: ICD-10-CM

## 2019-11-25 RX ORDER — OXYCODONE HYDROCHLORIDE 10 MG/1
10 TABLET ORAL EVERY 6 HOURS PRN
Qty: 120 TABLET | Refills: 0 | Status: SHIPPED | OUTPATIENT
Start: 2019-11-25 | End: 2019-12-20

## 2019-11-26 DIAGNOSIS — G89.3 NEOPLASM RELATED PAIN: ICD-10-CM

## 2019-11-26 DIAGNOSIS — C34.90 MALIGNANT NEOPLASM OF LUNG, UNSPECIFIED LATERALITY, UNSPECIFIED PART OF LUNG (HCC): ICD-10-CM

## 2019-11-26 DIAGNOSIS — Z51.5 PALLIATIVE CARE ENCOUNTER: ICD-10-CM

## 2019-11-26 RX ORDER — OXYCODONE AND ACETAMINOPHEN 10; 325 MG/1; MG/1
1 TABLET ORAL EVERY 6 HOURS PRN
Qty: 120 TABLET | Refills: 0 | Status: SHIPPED | OUTPATIENT
Start: 2019-11-26 | End: 2019-12-26

## 2019-12-20 ENCOUNTER — OFFICE VISIT (OUTPATIENT)
Dept: PALLATIVE CARE | Age: 69
End: 2019-12-20
Payer: MEDICARE

## 2019-12-20 VITALS
DIASTOLIC BLOOD PRESSURE: 75 MMHG | WEIGHT: 166.2 LBS | HEART RATE: 51 BPM | TEMPERATURE: 98.3 F | RESPIRATION RATE: 20 BRPM | BODY MASS INDEX: 29.44 KG/M2 | OXYGEN SATURATION: 95 % | SYSTOLIC BLOOD PRESSURE: 152 MMHG

## 2019-12-20 DIAGNOSIS — R06.02 SOB (SHORTNESS OF BREATH): ICD-10-CM

## 2019-12-20 DIAGNOSIS — J44.9 CHRONIC OBSTRUCTIVE PULMONARY DISEASE, UNSPECIFIED COPD TYPE (HCC): Primary | ICD-10-CM

## 2019-12-20 DIAGNOSIS — G89.3 NEOPLASM RELATED PAIN: ICD-10-CM

## 2019-12-20 DIAGNOSIS — R05.9 COUGH: ICD-10-CM

## 2019-12-20 DIAGNOSIS — Z51.5 PALLIATIVE CARE ENCOUNTER: ICD-10-CM

## 2019-12-20 DIAGNOSIS — B37.0 ORAL CANDIDIASIS: ICD-10-CM

## 2019-12-20 DIAGNOSIS — R06.2 WHEEZE: ICD-10-CM

## 2019-12-20 PROCEDURE — 3017F COLORECTAL CA SCREEN DOC REV: CPT | Performed by: NURSE PRACTITIONER

## 2019-12-20 PROCEDURE — 4040F PNEUMOC VAC/ADMIN/RCVD: CPT | Performed by: NURSE PRACTITIONER

## 2019-12-20 PROCEDURE — G8926 SPIRO NO PERF OR DOC: HCPCS | Performed by: NURSE PRACTITIONER

## 2019-12-20 PROCEDURE — 4004F PT TOBACCO SCREEN RCVD TLK: CPT | Performed by: NURSE PRACTITIONER

## 2019-12-20 PROCEDURE — 99215 OFFICE O/P EST HI 40 MIN: CPT | Performed by: NURSE PRACTITIONER

## 2019-12-20 PROCEDURE — 1123F ACP DISCUSS/DSCN MKR DOCD: CPT | Performed by: NURSE PRACTITIONER

## 2019-12-20 PROCEDURE — G8484 FLU IMMUNIZE NO ADMIN: HCPCS | Performed by: NURSE PRACTITIONER

## 2019-12-20 PROCEDURE — G8598 ASA/ANTIPLAT THER USED: HCPCS | Performed by: NURSE PRACTITIONER

## 2019-12-20 PROCEDURE — 3023F SPIROM DOC REV: CPT | Performed by: NURSE PRACTITIONER

## 2019-12-20 PROCEDURE — G8427 DOCREV CUR MEDS BY ELIG CLIN: HCPCS | Performed by: NURSE PRACTITIONER

## 2019-12-20 PROCEDURE — G8417 CALC BMI ABV UP PARAM F/U: HCPCS | Performed by: NURSE PRACTITIONER

## 2019-12-20 PROCEDURE — 1090F PRES/ABSN URINE INCON ASSESS: CPT | Performed by: NURSE PRACTITIONER

## 2019-12-20 PROCEDURE — G8399 PT W/DXA RESULTS DOCUMENT: HCPCS | Performed by: NURSE PRACTITIONER

## 2019-12-20 RX ORDER — METHYLPREDNISOLONE 4 MG/1
TABLET ORAL
COMMUNITY
Start: 2019-12-17 | End: 2019-12-20

## 2019-12-20 RX ORDER — IPRATROPIUM BROMIDE AND ALBUTEROL SULFATE 2.5; .5 MG/3ML; MG/3ML
1 SOLUTION RESPIRATORY (INHALATION) EVERY 4 HOURS
Qty: 540 ML | Refills: 0 | Status: SHIPPED | OUTPATIENT
Start: 2019-12-20 | End: 2022-02-03 | Stop reason: SDUPTHER

## 2019-12-20 RX ORDER — AZITHROMYCIN 250 MG/1
500 TABLET, FILM COATED ORAL DAILY
Qty: 5 TABLET | Refills: 0 | Status: SHIPPED | OUTPATIENT
Start: 2019-12-20 | End: 2019-12-25

## 2019-12-20 RX ORDER — PREDNISONE 10 MG/1
TABLET ORAL
Qty: 30 TABLET | Refills: 0 | Status: SHIPPED | OUTPATIENT
Start: 2019-12-20 | End: 2020-01-08 | Stop reason: ALTCHOICE

## 2019-12-20 RX ORDER — AZITHROMYCIN 500 MG/1
TABLET, FILM COATED ORAL
COMMUNITY
Start: 2019-12-17 | End: 2019-12-20

## 2019-12-20 RX ORDER — FLUCONAZOLE 100 MG/1
100 TABLET ORAL DAILY
Qty: 14 TABLET | Refills: 0 | Status: SHIPPED | OUTPATIENT
Start: 2019-12-20 | End: 2020-01-03

## 2019-12-20 ASSESSMENT — ENCOUNTER SYMPTOMS
EYE DISCHARGE: 0
VOICE CHANGE: 0
STRIDOR: 0
APNEA: 0
COLOR CHANGE: 0
RECTAL PAIN: 0
WHEEZING: 1
CHOKING: 0
BACK PAIN: 1
ABDOMINAL PAIN: 0
CHEST TIGHTNESS: 0
CONSTIPATION: 0
BLOOD IN STOOL: 0
VOMITING: 0
ANAL BLEEDING: 0
SHORTNESS OF BREATH: 1
COUGH: 1
NAUSEA: 0
ABDOMINAL DISTENTION: 0
SORE THROAT: 0
DIARRHEA: 0
TROUBLE SWALLOWING: 0

## 2019-12-30 DIAGNOSIS — G89.3 NEOPLASM RELATED PAIN: ICD-10-CM

## 2019-12-30 DIAGNOSIS — F41.9 ANXIETY: ICD-10-CM

## 2019-12-30 DIAGNOSIS — C34.90 MALIGNANT NEOPLASM OF LUNG, UNSPECIFIED LATERALITY, UNSPECIFIED PART OF LUNG (HCC): ICD-10-CM

## 2019-12-30 DIAGNOSIS — Z51.5 PALLIATIVE CARE ENCOUNTER: ICD-10-CM

## 2019-12-30 RX ORDER — ALPRAZOLAM 1 MG/1
1 TABLET ORAL 2 TIMES DAILY PRN
Qty: 60 TABLET | Refills: 0 | Status: SHIPPED | OUTPATIENT
Start: 2019-12-30 | End: 2020-02-11 | Stop reason: SDUPTHER

## 2019-12-30 RX ORDER — OXYCODONE AND ACETAMINOPHEN 10; 325 MG/1; MG/1
1 TABLET ORAL EVERY 6 HOURS PRN
Qty: 120 TABLET | Refills: 0 | Status: CANCELLED | OUTPATIENT
Start: 2019-12-30 | End: 2020-01-29

## 2019-12-31 ENCOUNTER — TELEPHONE (OUTPATIENT)
Dept: PALLATIVE CARE | Age: 69
End: 2019-12-31

## 2020-01-08 ENCOUNTER — HOSPITAL ENCOUNTER (OUTPATIENT)
Dept: RADIATION ONCOLOGY | Age: 70
Discharge: HOME | End: 2020-01-08
Payer: MEDICARE

## 2020-01-08 VITALS
BODY MASS INDEX: 28.43 KG/M2 | TEMPERATURE: 99.3 F | DIASTOLIC BLOOD PRESSURE: 79 MMHG | RESPIRATION RATE: 29 BRPM | OXYGEN SATURATION: 94 % | SYSTOLIC BLOOD PRESSURE: 126 MMHG | WEIGHT: 160.5 LBS | HEART RATE: 96 BPM

## 2020-01-08 PROCEDURE — 99212 OFFICE O/P EST SF 10 MIN: CPT | Performed by: RADIOLOGY

## 2020-01-08 RX ORDER — OLANZAPINE 5 MG/1
5 TABLET ORAL NIGHTLY
COMMUNITY
End: 2020-08-27 | Stop reason: SDUPTHER

## 2020-01-08 NOTE — ONCOLOGY
RADIATION ONCOLOGY FOLLOW-UP    DATE OF VISIT: 1/8/2020    Patient Active Problem List   Diagnosis Code    Asthma J45.909    GERD (gastroesophageal reflux disease) K21.9    Anxiety F41.9    Diverticulitis K57.92    DDD (degenerative disc disease) [id number]    Bronchitis J40    Bronchitis J40    Bronchitis J40    Tobacco abuse Z72.0    Tobacco abuse counseling Z71.6    FH: CAD (coronary artery disease) Z82.49    Angina, class III (HCC) I20.9    Abnormal stress test R94.39    Abnormal ECG R94.31    HTN (hypertension) I10    Shoulder pain-right M25.519    Neck pain M54.2    Thoracic back pain M54.6    Cervical radiculopathy at C7 M54.12    Severe low back pain M54.5    Vitamin B 12 deficiency E53.8    Malignant neoplasm of upper lobe of left lung (HCC) C34.12    Secondary malignant neoplasm of right lung (HCC) C78.01    Secondary malignant neoplasm of left lung (HCC) C78.02    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 There remains a small left upper lobe spiculated mass as her only site of measurable disease. PRIOR TREATMENT: Carbotaxol/Keytruda. Gina Messer continues, last dose expected in 4 weeks. INTERVAL HISTORY: The patient has been tolerating Keytruda well, recently receiving her next to last dose. The last dose will be about 1 month from now. She was hospitalized briefly for pneumonia a month ago, now recovered. This was treated with IV Levaquin. She has no significant side effects from immunotherapy. PET/CT is anticipated after completion of Keytruda in a month. Dr. Albino Bosworth is arranging this.     PAST MEDICAL HISTORY:   Past Medical History:

## 2020-01-08 NOTE — ONCOLOGY
NURSING ASSESSMENT     Date: 1/8/2020        Patient Name: Mariluz Krishnan     YOB: 1950      Age:  77 y.o. MRN: [id number]     Chaperone [] Yes   [x] No      Advance Directives: Do you currently have completed advance directives (living will)? [x] Yes   [] No         *If yes, please bring us a copy for your records. *If no, would you like info or assistance in completing advance directives (living will)? [] Yes   [x] No    Pain Score: 0  Pain Score (1-10):  Pain Location: Pain Duration:Pain Management/Control:     Is pain affecting your ability to take care of yourself or move throughout your home? [] Yes   [] No    General: Fatigue  Patient has gained weight [] Yes   [x] No  Patient has lost weight [x] Yes   [] No  How much weight in pounds and over what length of time: 10lb since our visit in Aug  Eyes (Ophthalmic): No Problem   Has cataracts     Skin (Dermatological): Bruising     ENT: No Problems     Respiratory: Cough, GLEZ and Oxygen(hs)  Recent pneumonia and bronchitits   . Productive  Yellowish/white  Cardiovascular: No Problems   Gastrointestinal: No Problems    Genito-Urinary: No Problems     Breast: No Problems     Musculoskeletal: Joint Pain and Back Pain- has pain management and chiropracter    Neurological: Numbness- fingers    Hematological and Lymphatic: No Problems     Endocrine: No Problems         A 10-point review of systems has been conducted and pertinent positives have been   recorded. All other review of systems are negative    Had been hospitalized a month and a half ago with pneumonia. Hasnt used inhalers this morning. Moist cough- states it has to break up each morning and she was rushed to get here early.   Has no idea what Dr Taty Lozada plans until after appt on the 27th of this month

## 2020-01-17 ENCOUNTER — OFFICE VISIT (OUTPATIENT)
Dept: PALLATIVE CARE | Age: 70
End: 2020-01-17
Payer: MEDICARE

## 2020-01-17 VITALS
DIASTOLIC BLOOD PRESSURE: 69 MMHG | OXYGEN SATURATION: 95 % | BODY MASS INDEX: 28.84 KG/M2 | WEIGHT: 162.8 LBS | HEART RATE: 80 BPM | RESPIRATION RATE: 20 BRPM | SYSTOLIC BLOOD PRESSURE: 166 MMHG | TEMPERATURE: 98 F

## 2020-01-17 PROCEDURE — G8926 SPIRO NO PERF OR DOC: HCPCS | Performed by: NURSE PRACTITIONER

## 2020-01-17 PROCEDURE — 1090F PRES/ABSN URINE INCON ASSESS: CPT | Performed by: NURSE PRACTITIONER

## 2020-01-17 PROCEDURE — G8427 DOCREV CUR MEDS BY ELIG CLIN: HCPCS | Performed by: NURSE PRACTITIONER

## 2020-01-17 PROCEDURE — 99214 OFFICE O/P EST MOD 30 MIN: CPT | Performed by: NURSE PRACTITIONER

## 2020-01-17 PROCEDURE — 4004F PT TOBACCO SCREEN RCVD TLK: CPT | Performed by: NURSE PRACTITIONER

## 2020-01-17 PROCEDURE — 4040F PNEUMOC VAC/ADMIN/RCVD: CPT | Performed by: NURSE PRACTITIONER

## 2020-01-17 PROCEDURE — 3023F SPIROM DOC REV: CPT | Performed by: NURSE PRACTITIONER

## 2020-01-17 PROCEDURE — 1123F ACP DISCUSS/DSCN MKR DOCD: CPT | Performed by: NURSE PRACTITIONER

## 2020-01-17 PROCEDURE — G8484 FLU IMMUNIZE NO ADMIN: HCPCS | Performed by: NURSE PRACTITIONER

## 2020-01-17 PROCEDURE — G8399 PT W/DXA RESULTS DOCUMENT: HCPCS | Performed by: NURSE PRACTITIONER

## 2020-01-17 PROCEDURE — G8417 CALC BMI ABV UP PARAM F/U: HCPCS | Performed by: NURSE PRACTITIONER

## 2020-01-17 PROCEDURE — 3017F COLORECTAL CA SCREEN DOC REV: CPT | Performed by: NURSE PRACTITIONER

## 2020-01-17 RX ORDER — FLUCONAZOLE 100 MG/1
TABLET ORAL
COMMUNITY
Start: 2020-01-13 | End: 2020-02-11

## 2020-01-17 RX ORDER — LEVOFLOXACIN 500 MG/1
TABLET, FILM COATED ORAL
COMMUNITY
Start: 2020-01-13 | End: 2020-04-10 | Stop reason: ALTCHOICE

## 2020-01-17 RX ORDER — CLINDAMYCIN HYDROCHLORIDE 300 MG/1
CAPSULE ORAL
COMMUNITY
Start: 2020-01-13 | End: 2020-04-10

## 2020-01-17 RX ORDER — PREDNISONE 10 MG/1
10 TABLET ORAL
COMMUNITY
Start: 2020-01-13 | End: 2020-04-10 | Stop reason: ALTCHOICE

## 2020-01-17 NOTE — PROGRESS NOTES
 Cancer Paternal Uncle         lung     No Known Allergies  Current Outpatient Medications on File Prior to Visit   Medication Sig Dispense Refill    predniSONE (DELTASONE) 10 MG tablet 10 mg Taper dose      fluconazole (DIFLUCAN) 100 MG tablet TK 1 T PO QD      levofloxacin (LEVAQUIN) 500 MG tablet TK 1 T PO QD      clindamycin (CLEOCIN) 300 MG capsule TK 1 C PO TID      OLANZapine (ZYPREXA) 5 MG tablet Take 5 mg by mouth nightly      ALPRAZolam (XANAX) 1 MG tablet Take 1 tablet by mouth 2 times daily as needed for Sleep or Anxiety for up to 30 days.  60 tablet 0    ipratropium-albuterol (DUONEB) 0.5-2.5 (3) MG/3ML SOLN nebulizer solution Inhale 3 mLs into the lungs every 4 hours 540 mL 0    ondansetron (ZOFRAN) 4 MG tablet Take 1 tablet by mouth daily as needed for Nausea or Vomiting 30 tablet 0    ibuprofen (ADVIL) 200 MG tablet Take 400 mg by mouth every 8 hours as needed for Pain      loratadine (CLARITIN) 10 MG tablet Take 1 tablet by mouth daily 30 tablet 0    Handicap Placard MISC by Does not apply route Diagnoses: LUNG CANCER, DEGENERATIVE ARTHRITIS, CANCER PAIN   EXPIRATION: 4/12/2024 1 each 0    simethicone (MYLICON) 80 MG chewable tablet Take 1 tablet by mouth 4 times daily as needed for Flatulence 180 tablet 3    hydrALAZINE (APRESOLINE) 25 MG tablet Take 1 tablet by mouth daily      montelukast (SINGULAIR) 10 MG tablet Take 1 tablet by mouth nightly 90 tablet 3    sennosides-docusate sodium (SENOKOT-S) 8.6-50 MG tablet Take 1 tablet by mouth 2 times daily 120 tablet 3    PROAIR  (90 Base) MCG/ACT inhaler Inhale 2 Inhalers into the lungs 4 times daily  1    aspirin (ECOTRIN LOW STRENGTH) 81 MG EC tablet Take 81 mg by mouth daily      vitamin D (ERGOCALCIFEROL) 97569 units CAPS capsule Take 1 capsule by mouth every 7 days  2    BREO ELLIPTA 100-25 MCG/INH AEPB inhaler Inhale 2 puffs into the lungs 2 times daily  0    lisinopril-hydrochlorothiazide (PRINZIDE;ZESTORETIC) 20-25 MG per tablet Take 1 tablet by mouth daily  0    metoprolol succinate (TOPROL XL) 50 MG extended release tablet Take 50 mg by mouth daily      folic acid (FOLVITE) 1 MG tablet Take 1 tablet by mouth daily 30 tablet 5    fluticasone (FLONASE) 50 MCG/ACT nasal spray 1 spray by Nasal route daily. 1 Bottle 6    omeprazole (PRILOSEC) 40 MG delayed release capsule Take 1 capsule by mouth every morning (before breakfast) 30 capsule 3     No current facility-administered medications on file prior to visit. Review of Systems      Objective:   BP (!) 166/69 (Site: Right Upper Arm, Position: Sitting)   Pulse 80   Temp 98 °F (36.7 °C)   Resp 20   Wt 162 lb 12.8 oz (73.8 kg)   SpO2 95%   BMI 28.84 kg/m²     Physical Exam      Assessment:       Diagnosis Orders   1. SOB (shortness of breath)     2. Chronic obstructive pulmonary disease, unspecified COPD type ([facility] [address]     3. Palliative care encounter     4. Malignant neoplasm of lung, unspecified laterality, unspecified part of lung ([facility] [address]     5. Candidiasis            Plan:    Pain rt Spondylosis  - Continue working with chiropractor  - I will no longer provide narcotics as her pain is not related to cancer and she has not taken any narcotics for two weeks.  I offered pain management referral, she declines  - Gentle exercise as tolerated    Oral candidiasis  - Continue current POC  - Call if no improvement    CHRISTO Pete - CNP

## 2020-02-11 RX ORDER — FLUCONAZOLE 100 MG/1
100 TABLET ORAL DAILY
Qty: 14 TABLET | Refills: 0 | Status: SHIPPED | OUTPATIENT
Start: 2020-02-11 | End: 2020-03-10 | Stop reason: SDUPTHER

## 2020-02-11 RX ORDER — ALPRAZOLAM 1 MG/1
1 TABLET ORAL 2 TIMES DAILY PRN
Qty: 60 TABLET | Refills: 0 | Status: SHIPPED | OUTPATIENT
Start: 2020-02-11 | End: 2020-03-10 | Stop reason: SDUPTHER

## 2020-02-11 NOTE — TELEPHONE ENCOUNTER
Rx requested:  Requested Prescriptions     Pending Prescriptions Disp Refills    ALPRAZolam (XANAX) 1 MG tablet 60 tablet 0     Sig: Take 1 tablet by mouth 2 times daily as needed for Sleep or Anxiety for up to 30 days.        Last Office Visit:   1/17/2020      Last filled:  12/30/2019    Next Visit Date:  Future Appointments   Date Time Provider Renetta Lizette   2/28/2020  2:00 PM Rafael Cancer, APRN - CNP [facility]   3/9/2020  9:00 AM MD MAKENZIE Shaffer RAD [facility]

## 2020-02-28 ENCOUNTER — OFFICE VISIT (OUTPATIENT)
Dept: PALLATIVE CARE | Age: 70
End: 2020-02-28
Payer: MEDICARE

## 2020-02-28 VITALS
TEMPERATURE: 98.4 F | HEART RATE: 79 BPM | OXYGEN SATURATION: 97 % | DIASTOLIC BLOOD PRESSURE: 64 MMHG | RESPIRATION RATE: 18 BRPM | BODY MASS INDEX: 27.81 KG/M2 | WEIGHT: 157 LBS | SYSTOLIC BLOOD PRESSURE: 138 MMHG

## 2020-02-28 PROCEDURE — 4004F PT TOBACCO SCREEN RCVD TLK: CPT | Performed by: NURSE PRACTITIONER

## 2020-02-28 PROCEDURE — G8417 CALC BMI ABV UP PARAM F/U: HCPCS | Performed by: NURSE PRACTITIONER

## 2020-02-28 PROCEDURE — G8926 SPIRO NO PERF OR DOC: HCPCS | Performed by: NURSE PRACTITIONER

## 2020-02-28 PROCEDURE — G8484 FLU IMMUNIZE NO ADMIN: HCPCS | Performed by: NURSE PRACTITIONER

## 2020-02-28 PROCEDURE — 1090F PRES/ABSN URINE INCON ASSESS: CPT | Performed by: NURSE PRACTITIONER

## 2020-02-28 PROCEDURE — 4040F PNEUMOC VAC/ADMIN/RCVD: CPT | Performed by: NURSE PRACTITIONER

## 2020-02-28 PROCEDURE — 3017F COLORECTAL CA SCREEN DOC REV: CPT | Performed by: NURSE PRACTITIONER

## 2020-02-28 PROCEDURE — G8399 PT W/DXA RESULTS DOCUMENT: HCPCS | Performed by: NURSE PRACTITIONER

## 2020-02-28 PROCEDURE — 1123F ACP DISCUSS/DSCN MKR DOCD: CPT | Performed by: NURSE PRACTITIONER

## 2020-02-28 PROCEDURE — 3023F SPIROM DOC REV: CPT | Performed by: NURSE PRACTITIONER

## 2020-02-28 PROCEDURE — 99214 OFFICE O/P EST MOD 30 MIN: CPT | Performed by: NURSE PRACTITIONER

## 2020-02-28 PROCEDURE — G8427 DOCREV CUR MEDS BY ELIG CLIN: HCPCS | Performed by: NURSE PRACTITIONER

## 2020-02-28 RX ORDER — VARENICLINE TARTRATE 1 MG/1
1 TABLET, FILM COATED ORAL 2 TIMES DAILY
Qty: 60 TABLET | Refills: 0 | Status: SHIPPED | OUTPATIENT
Start: 2020-02-28 | End: 2020-05-04 | Stop reason: ALTCHOICE

## 2020-02-28 ASSESSMENT — ENCOUNTER SYMPTOMS
BACK PAIN: 0
EYE DISCHARGE: 0
ANAL BLEEDING: 0
RECTAL PAIN: 0
WHEEZING: 0
SHORTNESS OF BREATH: 0
COLOR CHANGE: 0
COUGH: 0
TROUBLE SWALLOWING: 0
APNEA: 0
BLOOD IN STOOL: 0
CONSTIPATION: 0
NAUSEA: 0
VOICE CHANGE: 0
DIARRHEA: 0
ABDOMINAL PAIN: 0
SORE THROAT: 0
STRIDOR: 0
VOMITING: 0
CHEST TIGHTNESS: 0
ABDOMINAL DISTENTION: 0
CHOKING: 0

## 2020-02-28 NOTE — PROGRESS NOTES
education: Not on file    Highest education level: Not on file   Occupational History    Not on file   Social Needs    Financial resource strain: Not on file    Food insecurity:     Worry: Not on file     Inability: Not on file    Transportation needs:     Medical: Not on file     Non-medical: Not on file   Tobacco Use    Smoking status: Heavy Tobacco Smoker     Packs/day: 0.50     Years: 48.00     Pack years: 24.00     Types: Cigarettes     Last attempt to quit: 10/15/2019     Years since quittin.3    Smokeless tobacco: Never Used   Substance and Sexual Activity    Alcohol use: Yes     Comment: sporadic, occasional    Drug use: Never    Sexual activity: Not on file   Lifestyle    Physical activity:     Days per week: Not on file     Minutes per session: Not on file    Stress: Not on file   Relationships    Social connections:     Talks on phone: Not on file     Gets together: Not on file     Attends Tenriism service: Not on file     Active member of club or organization: Not on file     Attends meetings of clubs or organizations: Not on file     Relationship status: Not on file    Intimate partner violence:     Fear of current or ex partner: Not on file     Emotionally abused: Not on file     Physically abused: Not on file     Forced sexual activity: Not on file   Other Topics Concern    Not on file   Social History Narrative    Not on file     Family History   Problem Relation Age of Onset    Stroke Mother     Heart Attack Mother     Cancer Father         LUNG    Cancer Paternal Uncle         lung    Breast Cancer Maternal Grandmother [de-identified]    Cancer Paternal Grandfather         lung    Cancer Paternal Uncle         lung    Cancer Paternal Uncle         lung    Cancer Paternal Uncle         lung    Cancer Paternal Uncle         lung    Cancer Paternal Uncle         lung     No Known Allergies  Current Outpatient Medications on File Prior to Visit   Medication Sig Dispense Refill    27.81 kg/m²     Physical Exam  Constitutional:       General: She is not in acute distress. Appearance: She is well-developed. She is not diaphoretic. HENT:      Head: Normocephalic and atraumatic. Right Ear: External ear normal.      Left Ear: External ear normal.      Nose: Nose normal.      Mouth/Throat:      Pharynx: No oropharyngeal exudate. Eyes:      General: No scleral icterus. Right eye: No discharge. Left eye: No discharge. Conjunctiva/sclera: Conjunctivae normal.      Pupils: Pupils are equal, round, and reactive to light. Neck:      Musculoskeletal: Normal range of motion and neck supple. Thyroid: No thyromegaly. Vascular: No JVD. Trachea: No tracheal deviation. Cardiovascular:      Rate and Rhythm: Normal rate and regular rhythm. Heart sounds: Normal heart sounds. Pulmonary:      Effort: Pulmonary effort is normal. No respiratory distress. Breath sounds: Normal breath sounds. No stridor. No wheezing or rales. Chest:      Chest wall: No tenderness. Abdominal:      General: Bowel sounds are normal. There is no distension. Palpations: Abdomen is soft. There is no mass. Tenderness: There is no abdominal tenderness. There is no guarding or rebound. Musculoskeletal: Normal range of motion. General: No tenderness or deformity. Lymphadenopathy:      Cervical: No cervical adenopathy. Skin:     General: Skin is warm and dry. Findings: No erythema or rash. Neurological:      Mental Status: She is alert and oriented to person, place, and time. Psychiatric:         Behavior: Behavior normal.         Thought Content: Thought content normal.           Assessment:       Diagnosis Orders   1. SOB (shortness of breath)     2. Chronic obstructive pulmonary disease, unspecified COPD type ([address]     3. Palliative care encounter     4. Malignant neoplasm of lung, unspecified laterality, unspecified part of lung ([address]     5. Oral candidiasis     6. Encounter for smoking cessation counseling            Plan:      Smoking cessation  - Start Chantix, discussed risks vs benefits, including night weir and depression, call for intolerable symptoms  - Discussed non pharmacological coping mechanisms    SOB  - Continue COPD Directed therapies   - Call for increased SOB, cough, sputum production, excessive fatigue, fever, chills, or myalgias  - Gentle exercise as tolerated  -  Rinse out mouth after inhaler use. - COPD ER PACK in place: Call prior to initializing    Oral Candidiasis  - Continue current POC  - call for uncontrolled symptoms    Lung Cancer  - Follow with oncology/ radiation    Orders Placed This Encounter   Medications    varenicline (CHANTIX) 1 MG tablet     Sig: Take 1 tablet by mouth 2 times daily     Dispense:  60 tablet     Refill:  0       No follow-ups on file.     CHRISTO Romero - CNP

## 2020-03-09 ENCOUNTER — APPOINTMENT (OUTPATIENT)
Dept: RADIATION ONCOLOGY | Age: 70
End: 2020-03-09
Attending: RADIOLOGY
Payer: MEDICARE

## 2020-03-10 RX ORDER — ALPRAZOLAM 1 MG/1
1 TABLET ORAL 2 TIMES DAILY PRN
Qty: 60 TABLET | Refills: 0 | Status: SHIPPED | OUTPATIENT
Start: 2020-03-10 | End: 2020-04-10 | Stop reason: SDUPTHER

## 2020-03-10 RX ORDER — FLUCONAZOLE 100 MG/1
100 TABLET ORAL DAILY
Qty: 14 TABLET | Refills: 0 | Status: SHIPPED | OUTPATIENT
Start: 2020-03-10 | End: 2020-03-24

## 2020-03-12 ENCOUNTER — HOSPITAL ENCOUNTER (OUTPATIENT)
Dept: RADIATION ONCOLOGY | Age: 70
Discharge: HOME | End: 2020-03-12
Attending: RADIOLOGY
Payer: MEDICARE

## 2020-03-12 VITALS
DIASTOLIC BLOOD PRESSURE: 69 MMHG | BODY MASS INDEX: 27.83 KG/M2 | HEART RATE: 78 BPM | OXYGEN SATURATION: 91 % | WEIGHT: 157.13 LBS | RESPIRATION RATE: 16 BRPM | TEMPERATURE: 97.2 F | SYSTOLIC BLOOD PRESSURE: 138 MMHG

## 2020-03-12 PROCEDURE — 77334 RADIATION TREATMENT AID(S): CPT | Performed by: RADIOLOGY

## 2020-03-12 PROCEDURE — 77290 THER RAD SIMULAJ FIELD CPLX: CPT | Performed by: RADIOLOGY

## 2020-03-12 PROCEDURE — 99212 OFFICE O/P EST SF 10 MIN: CPT | Performed by: RADIOLOGY

## 2020-03-12 NOTE — ONCOLOGY
NURSING ASSESSMENT     Date: 3/12/2020        Patient Name: Karishma Pedroza     YOB: 1950      Age:  77 y.o. MRN: [id number]     Chaperone [] Yes   [x] No      Advance Directives:   Do you currently have completed advance directives (living will)? [x] Yes   [] No         *If yes, please bring us a copy for your records. *If no, would you like info or assistance in completing advance directives (living will)? [] Yes   [] No    Pain Score:   Pain Score (1-10): denies  Pain Location:  Pain Duration:    Pain Management/Control:       Is pain affecting your ability to take care of yourself or move throughout your home? [] Yes   [x] No    General: no problem  Patient has gained weight [] Yes   [x] No  Patient has lost weight [] Yes   [x] No  How much weight in pounds and over what length of time:     Eyes (Ophthalmic):  No Problem   Skin (Dermatological): no problem     ENT: thrush     Respiratory: No Problems     Cardiovascular: No Problems      Device   [] Yes   [x] No   Copy of Card Obtained [] Yes   [x] No    Gastrointestinal: Diarrhea chronic, diverticulitis    Genito-Urinary: No Problems     Breast: No Problems     Musculoskeletal: No Problems    Neurological: No Problems      Hematological and Lymphatic: No Problems     Endocrine: No Problems         Additional Comments:

## 2020-03-17 PROCEDURE — 77295 3-D RADIOTHERAPY PLAN: CPT | Performed by: RADIOLOGY

## 2020-03-17 PROCEDURE — 77293 RESPIRATOR MOTION MGMT SIMUL: CPT | Performed by: RADIOLOGY

## 2020-03-17 PROCEDURE — 77300 RADIATION THERAPY DOSE PLAN: CPT | Performed by: RADIOLOGY

## 2020-03-17 PROCEDURE — 77334 RADIATION TREATMENT AID(S): CPT | Performed by: RADIOLOGY

## 2020-03-18 PROCEDURE — 77370 RADIATION PHYSICS CONSULT: CPT | Performed by: RADIOLOGY

## 2020-03-18 PROCEDURE — 77470 SPECIAL RADIATION TREATMENT: CPT | Performed by: RADIOLOGY

## 2020-03-30 ENCOUNTER — APPOINTMENT (OUTPATIENT)
Dept: RADIATION ONCOLOGY | Age: 70
End: 2020-03-30
Attending: RADIOLOGY
Payer: MEDICARE

## 2020-03-31 ENCOUNTER — APPOINTMENT (OUTPATIENT)
Dept: RADIATION ONCOLOGY | Age: 70
End: 2020-03-31
Attending: RADIOLOGY
Payer: MEDICARE

## 2020-04-01 ENCOUNTER — APPOINTMENT (OUTPATIENT)
Dept: RADIATION ONCOLOGY | Age: 70
End: 2020-04-01
Attending: RADIOLOGY
Payer: MEDICARE

## 2020-04-02 ENCOUNTER — APPOINTMENT (OUTPATIENT)
Dept: RADIATION ONCOLOGY | Age: 70
End: 2020-04-02
Attending: RADIOLOGY
Payer: MEDICARE

## 2020-04-03 ENCOUNTER — APPOINTMENT (OUTPATIENT)
Dept: RADIATION ONCOLOGY | Age: 70
End: 2020-04-03
Attending: RADIOLOGY
Payer: MEDICARE

## 2020-04-07 ENCOUNTER — HOSPITAL ENCOUNTER (OUTPATIENT)
Dept: RADIATION ONCOLOGY | Age: 70
Discharge: HOME | End: 2020-04-07
Attending: RADIOLOGY
Payer: MEDICARE

## 2020-04-07 PROCEDURE — 77293 RESPIRATOR MOTION MGMT SIMUL: CPT | Performed by: RADIOLOGY

## 2020-04-07 PROCEDURE — 77295 3-D RADIOTHERAPY PLAN: CPT | Performed by: RADIOLOGY

## 2020-04-07 PROCEDURE — 77300 RADIATION THERAPY DOSE PLAN: CPT | Performed by: RADIOLOGY

## 2020-04-07 PROCEDURE — 77334 RADIATION TREATMENT AID(S): CPT | Performed by: RADIOLOGY

## 2020-04-10 ENCOUNTER — VIRTUAL VISIT (OUTPATIENT)
Dept: PALLATIVE CARE | Age: 70
End: 2020-04-10
Payer: MEDICARE

## 2020-04-10 PROCEDURE — 3017F COLORECTAL CA SCREEN DOC REV: CPT | Performed by: NURSE PRACTITIONER

## 2020-04-10 PROCEDURE — 99348 HOME/RES VST EST LOW MDM 30: CPT | Performed by: NURSE PRACTITIONER

## 2020-04-10 PROCEDURE — 4040F PNEUMOC VAC/ADMIN/RCVD: CPT | Performed by: NURSE PRACTITIONER

## 2020-04-10 PROCEDURE — 1090F PRES/ABSN URINE INCON ASSESS: CPT | Performed by: NURSE PRACTITIONER

## 2020-04-10 PROCEDURE — 1123F ACP DISCUSS/DSCN MKR DOCD: CPT | Performed by: NURSE PRACTITIONER

## 2020-04-10 RX ORDER — PREDNISONE 10 MG/1
TABLET ORAL
Qty: 30 TABLET | Refills: 0 | Status: SHIPPED | OUTPATIENT
Start: 2020-04-10 | End: 2020-08-05 | Stop reason: ALTCHOICE

## 2020-04-10 RX ORDER — LEVOFLOXACIN 750 MG/1
750 TABLET ORAL DAILY
Qty: 10 TABLET | Refills: 0 | Status: SHIPPED | OUTPATIENT
Start: 2020-04-10 | End: 2020-04-20

## 2020-04-10 RX ORDER — ALPRAZOLAM 1 MG/1
1 TABLET ORAL 2 TIMES DAILY PRN
Qty: 60 TABLET | Refills: 0 | Status: SHIPPED | OUTPATIENT
Start: 2020-04-10 | End: 2020-08-05 | Stop reason: SDUPTHER

## 2020-04-10 RX ORDER — OMEPRAZOLE 40 MG/1
40 CAPSULE, DELAYED RELEASE ORAL
Qty: 30 CAPSULE | Refills: 3 | Status: SHIPPED | OUTPATIENT
Start: 2020-04-10 | End: 2020-08-05 | Stop reason: SDUPTHER

## 2020-04-10 ASSESSMENT — ENCOUNTER SYMPTOMS
RECTAL PAIN: 0
NAUSEA: 0
VOMITING: 0
BLOOD IN STOOL: 0
ABDOMINAL DISTENTION: 0
CHEST TIGHTNESS: 0
WHEEZING: 0
CHOKING: 0
ABDOMINAL PAIN: 0
STRIDOR: 0
APNEA: 0
SHORTNESS OF BREATH: 0
DIARRHEA: 0
CONSTIPATION: 0
TROUBLE SWALLOWING: 0
BACK PAIN: 0
SORE THROAT: 0
COUGH: 0
VOICE CHANGE: 0
COLOR CHANGE: 0
ANAL BLEEDING: 0
EYE DISCHARGE: 0

## 2020-04-10 NOTE — PROGRESS NOTES
Subjective:      Patient Id:  Felecia Lara is a 77 y.o. female who presents today with   Chief Complaint   Patient presents with    Follow-up          HPI      TELEHEALTH EVALUATION -- Audio/Visual (During [id number] public health emergency)     Due to COVID 23 outbreak, patient's office visit was converted to a virtual visit. Patient was contacted and agreed to proceed with a virtual visit via [employer]. me  The risks and benefits of converting to a virtual visit were discussed in light of the current infectious disease epidemic. Patient also understood that insurance coverage and co-pays are up to their individual insurance plans. Symptom management follow up rt COPD and lung cancer, demeanor calm and relaxed, NAD. Radiation has been postponed until next month due to [address]. Blanca Thakur did respond well to chemotherapy and tells me, \" I feel real good\". Negative pain. Negative headaches, dizziness, or vision changes. No dysphagia, dysgeusia, or mouth sores; weight stable, Appetite is good. No GI or  concerns. No conor blood in stool or urine. Requests refill on omeprazole. SOB and edema at baseline. Negative excessive fatigue, fever, chills, myalgias, increased sputum production or cough, nausea, vomiting, chest pain, or orthopnea. Negative significant Sleep disturbance, depression, anxiety, or agitation episodes; denies suicidal or homicidal ideation or signs suggesting existential grief or spiritual pain.        Past Medical History:   Diagnosis Date    Anxiety     Asthma     Cervical radiculopathy at C7 9/28/2013    CERVICAL SPONDYLOSIS, WITH COMBINATION OF DISK BULGING AND    Cervical radiculopathy at C7     SUBTLE PER EMG    Cervical spondylosis 10/12/13    PER MRI    Chronic back pain     COPD (chronic obstructive pulmonary disease) (HCC)     Chronic bonchitis    CTS (carpal tunnel syndrome) 9/28/13    PER EMG    Depression     Diverticulitis     GERD (gastroesophageal reflux disease)  Headache(784.0)     Hyperlipidemia     Hypertension     Hypothyroidism due to medication 2019    Irritable bowel syndrome     Malignant neoplasm of upper lobe of left lung ([facility] [address] 2019    Osteoarthritis      Past Surgical History:   Procedure Laterality Date    APPENDECTOMY      CARDIAC CATHETERIZATION  13,2013    DR. GRAVES    COLON SURGERY      HAND SURGERY  1996    HYSTERECTOMY  1980    TONSILLECTOMY  1963     Social History     Socioeconomic History    Marital status:       Spouse name: Not on file    Number of children: Not on file    Years of education: Not on file    Highest education level: Not on file   Occupational History    Not on file   Social Needs    Financial resource strain: Not on file    Food insecurity     Worry: Not on file     Inability: Not on file    Transportation needs     Medical: Not on file     Non-medical: Not on file   Tobacco Use    Smoking status: Heavy Tobacco Smoker     Packs/day: 0.50     Years: 48.00     Pack years: 24.00     Types: Cigarettes     Last attempt to quit: 10/15/2019     Years since quittin.4    Smokeless tobacco: Never Used   Substance and Sexual Activity    Alcohol use: Yes     Comment: sporadic, occasional    Drug use: Never    Sexual activity: Not on file   Lifestyle    Physical activity     Days per week: Not on file     Minutes per session: Not on file    Stress: Not on file   Relationships    Social connections     Talks on phone: Not on file     Gets together: Not on file     Attends Zoroastrian service: Not on file     Active member of club or organization: Not on file     Attends meetings of clubs or organizations: Not on file     Relationship status: Not on file    Intimate partner violence     Fear of current or ex partner: Not on file     Emotionally abused: Not on file     Physically abused: Not on file     Forced sexual activity: Not on file   Other Topics Concern    Not on file   Social History Narrative    Not on file     Family History   Problem Relation Age of Onset   [facility] Stroke Mother     Heart Attack Mother     Cancer Father         LUNG    Cancer Paternal Uncle         lung    Breast Cancer Maternal Grandmother [de-identified]    Cancer Paternal Grandfather         lung    Cancer Paternal Uncle         lung    Cancer Paternal Uncle         lung    Cancer Paternal Uncle         lung    Cancer Paternal Uncle         lung    Cancer Paternal Uncle         lung     No Known Allergies  Current Outpatient Medications on File Prior to Visit   Medication Sig Dispense Refill    ALPRAZolam (XANAX) 1 MG tablet Take 1 tablet by mouth 2 times daily as needed for Sleep or Anxiety for up to 30 days.  60 tablet 0    varenicline (CHANTIX) 1 MG tablet Take 1 tablet by mouth 2 times daily 60 tablet 0    predniSONE (DELTASONE) 10 MG tablet 10 mg Taper dose      levofloxacin (LEVAQUIN) 500 MG tablet TK 1 T PO QD      OLANZapine (ZYPREXA) 5 MG tablet Take 5 mg by mouth nightly      ipratropium-albuterol (DUONEB) 0.5-2.5 (3) MG/3ML SOLN nebulizer solution Inhale 3 mLs into the lungs every 4 hours 540 mL 0    omeprazole (PRILOSEC) 40 MG delayed release capsule Take 1 capsule by mouth every morning (before breakfast) 30 capsule 3    ondansetron (ZOFRAN) 4 MG tablet Take 1 tablet by mouth daily as needed for Nausea or Vomiting 30 tablet 0    ibuprofen (ADVIL) 200 MG tablet Take 400 mg by mouth every 8 hours as needed for Pain      loratadine (CLARITIN) 10 MG tablet Take 1 tablet by mouth daily 30 tablet 0    Handicap Placard MISC by Does not apply route Diagnoses: LUNG CANCER, DEGENERATIVE ARTHRITIS, CANCER PAIN   EXPIRATION: 4/12/2024 1 each 0    simethicone (MYLICON) 80 MG chewable tablet Take 1 tablet by mouth 4 times daily as needed for Flatulence 180 tablet 3    hydrALAZINE (APRESOLINE) 25 MG tablet Take 1 tablet by mouth daily      montelukast (SINGULAIR) 10 MG tablet Take 1 tablet by mouth nightly 90 tablet 3    sennosides-docusate sodium (SENOKOT-S) 8.6-50 MG tablet Take 1 tablet by mouth 2 times daily 120 tablet 3    PROAIR  (90 Base) MCG/ACT inhaler Inhale 2 Inhalers into the lungs 4 times daily  1    aspirin (ECOTRIN LOW STRENGTH) 81 MG EC tablet Take 81 mg by mouth daily      vitamin D (ERGOCALCIFEROL) 27537 units CAPS capsule Take 1 capsule by mouth every 7 days  2    BREO ELLIPTA 100-25 MCG/INH AEPB inhaler Inhale 2 puffs into the lungs 2 times daily  0    lisinopril-hydrochlorothiazide (PRINZIDE;ZESTORETIC) 20-25 MG per tablet Take 1 tablet by mouth daily  0    metoprolol succinate (TOPROL XL) 50 MG extended release tablet Take 50 mg by mouth daily      folic acid (FOLVITE) 1 MG tablet Take 1 tablet by mouth daily 30 tablet 5    fluticasone (FLONASE) 50 MCG/ACT nasal spray 1 spray by Nasal route daily. 1 Bottle 6     No current facility-administered medications on file prior to visit. Review of Systems   Constitutional: Negative for activity change, appetite change, chills, diaphoresis, fatigue, fever and unexpected weight change. HENT: Negative for drooling, hearing loss, mouth sores, sore throat, trouble swallowing and voice change. Eyes: Negative for discharge and visual disturbance. Respiratory: Negative for apnea, cough, choking, chest tightness, shortness of breath, wheezing and stridor. Cardiovascular: Negative for chest pain, palpitations and leg swelling. Gastrointestinal: Negative for abdominal distention, abdominal pain, anal bleeding, blood in stool, constipation, diarrhea, nausea, rectal pain and vomiting. Genitourinary: Negative for difficulty urinating, dysuria, enuresis, frequency and hematuria. Musculoskeletal: Negative for arthralgias, back pain, gait problem, joint swelling and myalgias. Skin: Negative for color change, pallor, rash and wound. Allergic/Immunologic: Negative for food allergies and immunocompromised state.    Neurological:

## 2020-04-17 ENCOUNTER — HOSPITAL ENCOUNTER (OUTPATIENT)
Dept: RADIATION ONCOLOGY | Age: 70
Discharge: HOME | End: 2020-04-17
Attending: RADIOLOGY
Payer: MEDICARE

## 2020-04-17 PROCEDURE — 99214 OFFICE O/P EST MOD 30 MIN: CPT | Performed by: RADIOLOGY

## 2020-04-17 PROCEDURE — 77280 THER RAD SIMULAJ FIELD SMPL: CPT | Performed by: RADIOLOGY

## 2020-04-17 NOTE — PROGRESS NOTES
Teaching & Instructions - Chest  General  Simulation  Initial Treatment  Self-Care Info  Nutrition  Social Service    Site-Specific  Side Effects  Cough Mgmt  Esophagitis/Pharyngitis  Fatigue Mgmt  Pain Mgmt  SOB Mgmt  Skin Care    Other/Prevention  Smoking Cessation    Educational Handouts  Radiation Therapy & You  Site Specific Instructions  Radiation Oncology Dept Information  CBMS Program    Patient eager to learn, verbalized understanding of verbal education and handouts.

## 2020-04-20 ENCOUNTER — APPOINTMENT (OUTPATIENT)
Dept: RADIATION ONCOLOGY | Age: 70
End: 2020-04-20
Attending: RADIOLOGY
Payer: MEDICARE

## 2020-04-20 ENCOUNTER — HOSPITAL ENCOUNTER (OUTPATIENT)
Dept: RADIATION ONCOLOGY | Age: 70
Discharge: HOME | End: 2020-04-20
Attending: RADIOLOGY
Payer: MEDICARE

## 2020-04-20 PROCEDURE — 77412 RADIATION TX DELIVERY LVL 3: CPT | Performed by: RADIOLOGY

## 2020-04-20 PROCEDURE — 77387 GUIDANCE FOR RADJ TX DLVR: CPT | Performed by: RADIOLOGY

## 2020-04-21 ENCOUNTER — HOSPITAL ENCOUNTER (OUTPATIENT)
Dept: RADIATION ONCOLOGY | Age: 70
End: 2020-04-21
Attending: RADIOLOGY
Payer: MEDICARE

## 2020-04-21 PROCEDURE — 77412 RADIATION TX DELIVERY LVL 3: CPT | Performed by: RADIOLOGY

## 2020-04-21 PROCEDURE — 99211 OFF/OP EST MAY X REQ PHY/QHP: CPT | Performed by: RADIOLOGY

## 2020-04-21 PROCEDURE — 77387 GUIDANCE FOR RADJ TX DLVR: CPT | Performed by: RADIOLOGY

## 2020-04-22 ENCOUNTER — HOSPITAL ENCOUNTER (OUTPATIENT)
Dept: RADIATION ONCOLOGY | Age: 70
Discharge: HOME | End: 2020-04-22
Attending: RADIOLOGY
Payer: MEDICARE

## 2020-04-22 PROCEDURE — 77387 GUIDANCE FOR RADJ TX DLVR: CPT | Performed by: RADIOLOGY

## 2020-04-22 PROCEDURE — 77412 RADIATION TX DELIVERY LVL 3: CPT | Performed by: RADIOLOGY

## 2020-04-23 ENCOUNTER — APPOINTMENT (OUTPATIENT)
Dept: RADIATION ONCOLOGY | Age: 70
End: 2020-04-23
Attending: RADIOLOGY
Payer: MEDICARE

## 2020-04-23 PROCEDURE — 77412 RADIATION TX DELIVERY LVL 3: CPT | Performed by: RADIOLOGY

## 2020-04-23 PROCEDURE — 77387 GUIDANCE FOR RADJ TX DLVR: CPT | Performed by: RADIOLOGY

## 2020-04-24 ENCOUNTER — HOSPITAL ENCOUNTER (OUTPATIENT)
Dept: RADIATION ONCOLOGY | Age: 70
Discharge: HOME | End: 2020-04-24
Attending: RADIOLOGY
Payer: MEDICARE

## 2020-04-24 PROCEDURE — 77412 RADIATION TX DELIVERY LVL 3: CPT | Performed by: RADIOLOGY

## 2020-04-24 PROCEDURE — 77387 GUIDANCE FOR RADJ TX DLVR: CPT | Performed by: RADIOLOGY

## 2020-04-24 PROCEDURE — 77336 RADIATION PHYSICS CONSULT: CPT | Performed by: RADIOLOGY

## 2020-04-27 ENCOUNTER — APPOINTMENT (OUTPATIENT)
Dept: RADIATION ONCOLOGY | Age: 70
End: 2020-04-27
Attending: RADIOLOGY
Payer: MEDICARE

## 2020-04-27 PROCEDURE — 77387 GUIDANCE FOR RADJ TX DLVR: CPT | Performed by: RADIOLOGY

## 2020-04-27 PROCEDURE — 77412 RADIATION TX DELIVERY LVL 3: CPT | Performed by: RADIOLOGY

## 2020-04-28 ENCOUNTER — HOSPITAL ENCOUNTER (OUTPATIENT)
Dept: RADIATION ONCOLOGY | Age: 70
Discharge: HOME | End: 2020-04-28
Attending: RADIOLOGY
Payer: MEDICARE

## 2020-04-28 PROCEDURE — 77387 GUIDANCE FOR RADJ TX DLVR: CPT | Performed by: RADIOLOGY

## 2020-04-28 PROCEDURE — 99212 OFFICE O/P EST SF 10 MIN: CPT | Performed by: RADIOLOGY

## 2020-04-28 PROCEDURE — 77412 RADIATION TX DELIVERY LVL 3: CPT | Performed by: RADIOLOGY

## 2020-04-29 ENCOUNTER — APPOINTMENT (OUTPATIENT)
Dept: RADIATION ONCOLOGY | Age: 70
End: 2020-04-29
Attending: RADIOLOGY
Payer: MEDICARE

## 2020-04-29 PROCEDURE — 77412 RADIATION TX DELIVERY LVL 3: CPT | Performed by: RADIOLOGY

## 2020-04-29 PROCEDURE — 77387 GUIDANCE FOR RADJ TX DLVR: CPT | Performed by: RADIOLOGY

## 2020-04-30 ENCOUNTER — APPOINTMENT (OUTPATIENT)
Dept: RADIATION ONCOLOGY | Age: 70
End: 2020-04-30
Attending: RADIOLOGY
Payer: MEDICARE

## 2020-04-30 PROCEDURE — 77387 GUIDANCE FOR RADJ TX DLVR: CPT | Performed by: RADIOLOGY

## 2020-04-30 PROCEDURE — 77412 RADIATION TX DELIVERY LVL 3: CPT | Performed by: RADIOLOGY

## 2020-05-01 ENCOUNTER — HOSPITAL ENCOUNTER (OUTPATIENT)
Dept: RADIATION ONCOLOGY | Age: 70
Discharge: HOME | End: 2020-05-01
Attending: RADIOLOGY
Payer: MEDICARE

## 2020-05-01 PROCEDURE — 77387 GUIDANCE FOR RADJ TX DLVR: CPT | Performed by: RADIOLOGY

## 2020-05-01 PROCEDURE — 77336 RADIATION PHYSICS CONSULT: CPT | Performed by: RADIOLOGY

## 2020-05-01 PROCEDURE — 77412 RADIATION TX DELIVERY LVL 3: CPT | Performed by: RADIOLOGY

## 2020-05-04 ENCOUNTER — HOSPITAL ENCOUNTER (OUTPATIENT)
Dept: RADIATION ONCOLOGY | Age: 70
Discharge: HOME | End: 2020-05-04
Attending: RADIOLOGY
Payer: MEDICARE

## 2020-05-04 VITALS
HEART RATE: 102 BPM | OXYGEN SATURATION: 97 % | TEMPERATURE: 97.7 F | RESPIRATION RATE: 18 BRPM | DIASTOLIC BLOOD PRESSURE: 61 MMHG | SYSTOLIC BLOOD PRESSURE: 97 MMHG

## 2020-05-04 PROCEDURE — 77412 RADIATION TX DELIVERY LVL 3: CPT | Performed by: RADIOLOGY

## 2020-05-04 PROCEDURE — 99212 OFFICE O/P EST SF 10 MIN: CPT | Performed by: RADIOLOGY

## 2020-05-04 PROCEDURE — 77387 GUIDANCE FOR RADJ TX DLVR: CPT | Performed by: RADIOLOGY

## 2020-05-04 RX ORDER — ACETAMINOPHEN 325 MG/1
650 TABLET ORAL EVERY 6 HOURS PRN
COMMUNITY
End: 2021-10-28

## 2020-05-05 ENCOUNTER — APPOINTMENT (OUTPATIENT)
Dept: RADIATION ONCOLOGY | Age: 70
End: 2020-05-05
Attending: RADIOLOGY
Payer: MEDICARE

## 2020-05-05 PROCEDURE — 77412 RADIATION TX DELIVERY LVL 3: CPT | Performed by: RADIOLOGY

## 2020-05-05 PROCEDURE — 77387 GUIDANCE FOR RADJ TX DLVR: CPT | Performed by: RADIOLOGY

## 2020-05-06 ENCOUNTER — APPOINTMENT (OUTPATIENT)
Dept: RADIATION ONCOLOGY | Age: 70
End: 2020-05-06
Attending: RADIOLOGY
Payer: MEDICARE

## 2020-05-06 PROCEDURE — 77387 GUIDANCE FOR RADJ TX DLVR: CPT | Performed by: RADIOLOGY

## 2020-05-06 PROCEDURE — 77412 RADIATION TX DELIVERY LVL 3: CPT | Performed by: RADIOLOGY

## 2020-05-07 ENCOUNTER — APPOINTMENT (OUTPATIENT)
Dept: RADIATION ONCOLOGY | Age: 70
End: 2020-05-07
Attending: RADIOLOGY
Payer: MEDICARE

## 2020-05-07 PROCEDURE — 77412 RADIATION TX DELIVERY LVL 3: CPT | Performed by: RADIOLOGY

## 2020-05-07 PROCEDURE — 77387 GUIDANCE FOR RADJ TX DLVR: CPT | Performed by: RADIOLOGY

## 2020-05-08 ENCOUNTER — APPOINTMENT (OUTPATIENT)
Dept: RADIATION ONCOLOGY | Age: 70
End: 2020-05-08
Attending: RADIOLOGY
Payer: MEDICARE

## 2020-05-08 PROCEDURE — 77387 GUIDANCE FOR RADJ TX DLVR: CPT | Performed by: RADIOLOGY

## 2020-05-08 PROCEDURE — 77336 RADIATION PHYSICS CONSULT: CPT | Performed by: RADIOLOGY

## 2020-05-08 PROCEDURE — 77412 RADIATION TX DELIVERY LVL 3: CPT | Performed by: RADIOLOGY

## 2020-05-11 ENCOUNTER — APPOINTMENT (OUTPATIENT)
Dept: RADIATION ONCOLOGY | Age: 70
End: 2020-05-11
Attending: RADIOLOGY
Payer: MEDICARE

## 2020-05-11 PROCEDURE — 77412 RADIATION TX DELIVERY LVL 3: CPT | Performed by: RADIOLOGY

## 2020-05-11 PROCEDURE — 77387 GUIDANCE FOR RADJ TX DLVR: CPT | Performed by: RADIOLOGY

## 2020-05-12 ENCOUNTER — HOSPITAL ENCOUNTER (OUTPATIENT)
Dept: RADIATION ONCOLOGY | Age: 70
End: 2020-05-12
Attending: RADIOLOGY
Payer: MEDICARE

## 2020-05-12 PROCEDURE — 99213 OFFICE O/P EST LOW 20 MIN: CPT | Performed by: RADIOLOGY

## 2020-05-12 PROCEDURE — 77387 GUIDANCE FOR RADJ TX DLVR: CPT | Performed by: RADIOLOGY

## 2020-05-12 PROCEDURE — 77412 RADIATION TX DELIVERY LVL 3: CPT | Performed by: RADIOLOGY

## 2020-05-13 ENCOUNTER — HOSPITAL ENCOUNTER (OUTPATIENT)
Dept: RADIATION ONCOLOGY | Age: 70
Discharge: HOME | End: 2020-05-13
Attending: RADIOLOGY
Payer: MEDICARE

## 2020-05-13 PROCEDURE — 77412 RADIATION TX DELIVERY LVL 3: CPT | Performed by: RADIOLOGY

## 2020-05-13 PROCEDURE — 77387 GUIDANCE FOR RADJ TX DLVR: CPT | Performed by: RADIOLOGY

## 2020-05-14 ENCOUNTER — APPOINTMENT (OUTPATIENT)
Dept: RADIATION ONCOLOGY | Age: 70
End: 2020-05-14
Attending: RADIOLOGY
Payer: MEDICARE

## 2020-05-14 PROCEDURE — 77412 RADIATION TX DELIVERY LVL 3: CPT | Performed by: RADIOLOGY

## 2020-05-14 PROCEDURE — 77387 GUIDANCE FOR RADJ TX DLVR: CPT | Performed by: RADIOLOGY

## 2020-05-15 ENCOUNTER — HOSPITAL ENCOUNTER (OUTPATIENT)
Dept: RADIATION ONCOLOGY | Age: 70
Discharge: HOME | End: 2020-05-15
Attending: RADIOLOGY
Payer: MEDICARE

## 2020-05-15 PROCEDURE — 77336 RADIATION PHYSICS CONSULT: CPT | Performed by: RADIOLOGY

## 2020-05-15 PROCEDURE — 77412 RADIATION TX DELIVERY LVL 3: CPT | Performed by: RADIOLOGY

## 2020-05-15 PROCEDURE — 77387 GUIDANCE FOR RADJ TX DLVR: CPT | Performed by: RADIOLOGY

## 2020-05-15 NOTE — ONCOLOGY
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 Washings positive for squamous cell carcinoma. Near CR to carbotaxol/Keytruda, with a residual smaller ZACH spiculated mass as the sole site of disease. Treatment Dates: 4/20/20 - 5/15/15    Site: Dose: Total # fractions: Dose per fraction: Energy: Prescription Isodose: Technique   PTV ZACH lung 60 Gy 20 3 Gy 6 MV photons 98% Isodose line Counter-rotating arcs, 180 degree     Concurrent therapy:  none    Technique:   A target volume was constructed using a 4 dimensional scan to account for respiratory motion. Additional margins were added to account for microscopic extension. Her insurance refused coverage for SBRT despite the fact that this is clearly oligometastatic disease for which that modality is indicated. Insurance also refused IMRT to minimize dose to uninvolved organs. We therefore treated her with a hypofractionated course of RT to definitive doses, utilizing rotating 180 degree arcs to spare the other lung. Daily image guidance was used to reduce planning margins and spare normal tissue. Flattening filter free (FFF) mode was used to increase the dose-rate and improve efficiency. Treatment course:  Ms. Taylor Barron tolerated radiation treatment well with no significant toxicity. She had a recurrence of thrush, and this was managed with continuing care through Palliative Care, where fluconazole was prescribed. This has been a chronic problem for her after starting systemic therapy one year ago. At completion she was doing well.     Plan: f/u in 6 weeks with a CT of the chest. No immediate plans to continue

## 2020-08-05 ENCOUNTER — HOSPITAL ENCOUNTER (OUTPATIENT)
Dept: RADIATION ONCOLOGY | Age: 70
Discharge: HOME | End: 2020-08-05
Attending: RADIOLOGY
Payer: MEDICARE

## 2020-08-05 VITALS
OXYGEN SATURATION: 98 % | DIASTOLIC BLOOD PRESSURE: 69 MMHG | HEART RATE: 85 BPM | BODY MASS INDEX: 23.64 KG/M2 | HEIGHT: 63 IN | RESPIRATION RATE: 16 BRPM | SYSTOLIC BLOOD PRESSURE: 134 MMHG | TEMPERATURE: 97.8 F | WEIGHT: 133.4 LBS

## 2020-08-05 PROCEDURE — 99212 OFFICE O/P EST SF 10 MIN: CPT | Performed by: RADIOLOGY

## 2020-08-05 RX ORDER — OMEPRAZOLE 40 MG/1
40 CAPSULE, DELAYED RELEASE ORAL
Qty: 30 CAPSULE | Refills: 3 | Status: SHIPPED | OUTPATIENT
Start: 2020-08-05 | End: 2020-08-27 | Stop reason: SDUPTHER

## 2020-08-05 RX ORDER — ALPRAZOLAM 1 MG/1
1 TABLET ORAL EVERY 12 HOURS PRN
Qty: 14 TABLET | Refills: 0 | Status: SHIPPED | OUTPATIENT
Start: 2020-08-05 | End: 2020-08-27

## 2020-08-05 NOTE — ONCOLOGY
NURSING ASSESSMENT     Date: 8/5/2020        Patient Name: Bebe Marin     YOB: 1950      Age:  72 y.o. MRN: [id number]     Chaperone [] Yes   [x] No      Pain Score:   Pain Score (1-10): 5-10  Pain Location:mid to upper back  Pain Duration:constant  Pain Management/Control: Tylenol   Not very effectve      Is pain affecting your ability to take care of yourself or move throughout your home? [x] Yes   [] No    General: Anorexia, Weakness and Fatigue  Patient has gained weight [] Yes   [x] No  Patient has lost weight [x] Yes   [] No  How much weight in pounds and over what length of time:9 lbs  2 mo  Eyes (Ophthalmic): water eyes- feels like a film over eyes. Wears glasses more     Skin (Dermatological): Bruising     ENT: remains hoarse. She feels its improving. Sore throat from treatment has improved  Uses Ensure 2x/daily     Respiratory: Cough clear most of time. occ yellowish  Has O2 at home  Not using  GLEZ only with long walks     Cardiovascular: No Problems     Gastrointestinal: No Problems    Genito-Urinary: No Problems     Breast: No Problems     Musculoskeletal: Back Pain across bra line of her back    Neurological: loses balance at times. Occ headaches      Hematological and Lymphatic: Prior Transfusion     Endocrine: No Problems     A 10-point review of systems has been conducted and pertinent positives have been   recorded. All other review of systems are negative    Was the patient admitted during the course of treatment OR within 30 days of treatment?  NO      Additional Comments: 6/5 CT chest   Repeats 9/14/20

## 2020-08-05 NOTE — ONCOLOGY
RADIATION ONCOLOGY FOLLOW-UP    DATE OF VISIT: 8/5/2020    DIAGNOSIS & STAGING: T4 N3 M1a, overall stage JUDI squamous cell carcinoma of the lung, 3cm ZACH mass, bilateral and SCLV adenopathy, multiple bilateral PET+ pulmonary nodules. Washings positive for squamous cell carcinoma. Near CR to carbotaxol/Keytruda, with a residual smaller ZACH spiculated mass as the sole site of disease. PREVIOUS TREATMENT: 60Gy in 20 fractions of 3Gy each, delivered to the left upper lobe residual nodule, completing on 5/15/15. SBRT was not approved by insurance, so we proceeded with hypofractionated radiotherapy, delivered with conventional arcs (unmodulated static arcs). TIME SINCE TREATMENT: Nearly 3 months    INTERVAL HISTORY: The patient was expected to have had recent imaging by now, but the CT scan was done in early June, only a couple of weeks after completion of RT. This showed no real change in the geometry of the spiculated left upper lobe mass. There was no evidence of radiation pneumonitis. There are few small mediastinal nodes of uncertain significance, and the report raises question about possible adenopathy involving the gastrohepatic ligament. I reviewed the imaging. She remains asymptomatic but has been losing some weight. She has had problems with thrush in the past, minimally symptomatic at the present. No hemoptysis. No acute dyspnea. No chest wall pain. Her next CT is due in September. She is currently not on systemic therapy.     PAST MEDICAL HISTORY:   Past Medical History:   Diagnosis Date    Anxiety     Asthma     Cervical radiculopathy at C7 9/28/2013    CERVICAL SPONDYLOSIS, WITH COMBINATION OF DISK BULGING AND    Cervical radiculopathy at C7     SUBTLE PER EMG    Cervical spondylosis 10/12/13    PER MRI    Chronic back pain     COPD (chronic obstructive pulmonary disease) (HCC)     Chronic bonchitis    CTS (carpal tunnel syndrome) 9/28/13    PER EMG    Depression     Diverticulitis  GERD (gastroesophageal reflux disease)     Headache(784.0)     Hyperlipidemia     Hypertension     Hypothyroidism due to medication 2019    Irritable bowel syndrome     Malignant neoplasm of upper lobe of left lung ([facility] [address] 2019    Osteoarthritis        PAST SURGICAL HISTORY:  Past Surgical History:   Procedure Laterality Date    APPENDECTOMY      CARDIAC CATHETERIZATION  13,2013    DR. GRAVES    COLON SURGERY      HAND SURGERY  1996    HYSTERECTOMY      TONSILLECTOMY  1963        Social History     Tobacco Use   Smoking Status Heavy Tobacco Smoker    Packs/day: 0.50    Years: 48.00    Pack years: 24.00    Types: Cigarettes    Last attempt to quit: 10/15/2019    Years since quittin.8   Smokeless Tobacco Never Used     Social History     Substance and Sexual Activity   Alcohol Use Yes    Comment: sporadic, occasional       ALLERGIES:   No Known Allergies     CURRENT MEDICATIONS:     Prior to Admission medications    Medication Sig Start Date End Date Taking?  Authorizing Provider   acetaminophen (TYLENOL) 325 MG tablet Take 650 mg by mouth every 6 hours as needed for Pain   Yes Historical Provider, MD   OLANZapine (ZYPREXA) 5 MG tablet Take 5 mg by mouth nightly   Yes Historical Provider, MD   ipratropium-albuterol (DUONEB) 0.5-2.5 (3) MG/3ML SOLN nebulizer solution Inhale 3 mLs into the lungs every 4 hours 19 Yes CHRISTO Chavarria CNP   ibuprofen (ADVIL) 200 MG tablet Take 400 mg by mouth every 8 hours as needed for Pain   Yes Historical Provider, MD   loratadine (CLARITIN) 10 MG tablet Take 1 tablet by mouth daily 19  Yes CHRISTO Jamison   simethicone (MYLICON) 80 MG chewable tablet Take 1 tablet by mouth 4 times daily as needed for Flatulence 19  Yes CHRISTO Jamison   montelukast (SINGULAIR) 10 MG tablet Take 1 tablet by mouth nightly 19  Yes CHRISTO Jamison   PROAIR  (90 Base) MCG/ACT inhaler Inhale 2 Inhalers into the lungs 4 times daily 1/5/19  Yes Historical Provider, MD   aspirin (ECOTRIN LOW STRENGTH) 81 MG EC tablet Take 81 mg by mouth daily 2/21/17  Yes Historical Provider, MD REGINO TITUSTA 100-25 MCG/INH AEPB inhaler Inhale 2 puffs into the lungs 2 times daily 11/13/18  Yes Historical Provider, MD   metoprolol succinate (TOPROL XL) 50 MG extended release tablet Take 50 mg by mouth daily 2/21/17  Yes Historical Provider, MD   folic acid (FOLVITE) 1 MG tablet Take 1 tablet by mouth daily 1/18/19  Yes Bryan Hawk,    fluticasone (FLONASE) 50 MCG/ACT nasal spray 1 spray by Nasal route daily. 4/9/14  Yes Israel Molina MD   ALPRAZolam Daryel Ham) 1 MG tablet Take 1 tablet by mouth 2 times daily as needed for Sleep or Anxiety for up to 30 days. 4/10/20 5/10/20  CHRISTO Guallpa CNP   omeprazole (PRILOSEC) 40 MG delayed release capsule Take 1 capsule by mouth every morning (before breakfast) 4/10/20 5/12/20  CHRISTO Guallpa CNP   ondansetron [facility]) 4 MG tablet Take 1 tablet by mouth daily as needed for Nausea or Vomiting 10/4/19   CHRISTO Guallpa CNP   Handicap Placard MISC by Does not apply route Diagnoses: LUNG CANCER, DEGENERATIVE ARTHRITIS, CANCER PAIN   EXPIRATION: 4/12/2024 4/12/19   CHRISTO Zimmer   hydrALAZINE (APRESOLINE) 25 MG tablet Take 1 tablet by mouth daily 2/7/19   Historical Provider, MD   sennosides-docusate sodium (SENOKOT-S) 8.6-50 MG tablet Take 1 tablet by mouth 2 times daily 2/1/19   CHRISTO Zimmer   lisinopril-hydrochlorothiazide (PRINZIDE;ZESTORETIC) 20-25 MG per tablet Take 1 tablet by mouth daily 11/13/18   Historical Provider, MD DUVALL have personally reviewed and reconciled the medications listed.     Review Of Systems:   Pain Score:   Pain Score (1-10): 5-10  Pain Location:mid to upper back  Pain Duration:constant  Pain Management/Control: Tylenol   Not very effectve Is pain affecting your ability to take care of yourself or move throughout your home? [x]? Yes   []? No  General: Anorexia, Weakness and Fatigue  Patient has gained weight []? Yes   [x]? No  Patient has lost weight [x]? Yes   []? No  How much weight in pounds and over what length of time:9 lbs  2 mo  Eyes (Ophthalmic): water eyes- feels like a film over eyes. Wears glasses more              Skin (Dermatological): Bruising              ENT: remains hoarse. She feels its improving. Sore throat from treatment has improved  Uses Ensure 2x/daily              Respiratory: Cough clear most of time. occ yellowish  Has O2 at home  Not using  GLEZ only with long walks              Cardiovascular: No Problems              Gastrointestinal: No Problems   Genito-Urinary: No Problems    Breast: No Problems              Musculoskeletal: Back Pain across bra line of her back   Neurological: loses balance at times. Occ headaches                          Hematological and Lymphatic: Prior Transfusion              Endocrine: No Problems    A 10-point review of systems has been conducted and pertinent positives have been   recorded. All other review of systems are negative. ECOG PERFORMANCE STATUS: 0    VITAL SIGNS:    Vitals:    08/05/20 1333   BP: 134/69   Pulse: 85   Resp: 16   Temp: 97.8 °F (36.6 °C)   SpO2: 98%   Weight: 133 lb 6.4 oz (60.5 kg)   Height: 5' 3\" (1.6 m)     PHYSICAL EXAMINATION:  GENERAL: No acute distress. Alert, oriented, cooperative. HEENT:  PERRLA, EOMI. Oral cavity WNL. NECK:  No cervical or supraclavicular adenopathy. CHEST/LUNGS: CTA, no rib or spine tenderness. No dullness to percussion. CARDIOVASCULAR:  RRR, no audible murmur  ABDOMEN:  Nontender, nondistended, normal bowel sounds, soft, no masses  EXTREMITIES: No C/C/E   NEUROLOGIC:  Romberg negative, normal gait. No pronator drift with normal proprioception and coordination. STUDIES: CT of the chest 6/5/2020. Stable spiculated mass. Low-volume adenopathy in the mediastinum and upper abdomen, uncertain significance. IMPRESSION/PLAN:    No clear evidence of progression, awaiting further imaging. The next chest CT will happen in September, and perhaps the scan after that could be a PET/CT. She is following closely with Dr. Marielena Bermeo. I will see her back in 6 months. Electronically signed by Sera Cruz MD on 8/5/20 at 2:55 PM EDT      Thank you for allowing us to participate in the care of this patient.   cc:   MD Damari Conte MD Russel Macleod, [address] [facility] [address]  [address], [address]

## 2020-08-05 NOTE — TELEPHONE ENCOUNTER
Last refill 4/10/20  Last visit 4/10/20  States she was never told to make another follow up visit.   Will be making appointment with pt

## 2020-08-27 ENCOUNTER — OFFICE VISIT (OUTPATIENT)
Dept: PALLATIVE CARE | Age: 70
End: 2020-08-27
Payer: MEDICARE

## 2020-08-27 VITALS
SYSTOLIC BLOOD PRESSURE: 120 MMHG | HEART RATE: 70 BPM | BODY MASS INDEX: 21.61 KG/M2 | WEIGHT: 122 LBS | DIASTOLIC BLOOD PRESSURE: 74 MMHG | OXYGEN SATURATION: 99 % | RESPIRATION RATE: 18 BRPM

## 2020-08-27 PROCEDURE — 1090F PRES/ABSN URINE INCON ASSESS: CPT | Performed by: NURSE PRACTITIONER

## 2020-08-27 PROCEDURE — G8420 CALC BMI NORM PARAMETERS: HCPCS | Performed by: NURSE PRACTITIONER

## 2020-08-27 PROCEDURE — G8428 CUR MEDS NOT DOCUMENT: HCPCS | Performed by: NURSE PRACTITIONER

## 2020-08-27 PROCEDURE — 3017F COLORECTAL CA SCREEN DOC REV: CPT | Performed by: NURSE PRACTITIONER

## 2020-08-27 PROCEDURE — 4040F PNEUMOC VAC/ADMIN/RCVD: CPT | Performed by: NURSE PRACTITIONER

## 2020-08-27 PROCEDURE — G8399 PT W/DXA RESULTS DOCUMENT: HCPCS | Performed by: NURSE PRACTITIONER

## 2020-08-27 PROCEDURE — 4004F PT TOBACCO SCREEN RCVD TLK: CPT | Performed by: NURSE PRACTITIONER

## 2020-08-27 PROCEDURE — 1123F ACP DISCUSS/DSCN MKR DOCD: CPT | Performed by: NURSE PRACTITIONER

## 2020-08-27 PROCEDURE — 99214 OFFICE O/P EST MOD 30 MIN: CPT | Performed by: NURSE PRACTITIONER

## 2020-08-27 RX ORDER — OXYCODONE HYDROCHLORIDE AND ACETAMINOPHEN 5; 325 MG/1; MG/1
1 TABLET ORAL EVERY 8 HOURS PRN
Qty: 30 TABLET | Refills: 0 | Status: SHIPPED | OUTPATIENT
Start: 2020-08-27 | End: 2020-10-02

## 2020-08-27 RX ORDER — LEVOFLOXACIN 500 MG/1
500 TABLET, FILM COATED ORAL DAILY
Qty: 10 TABLET | Refills: 0 | Status: SHIPPED | OUTPATIENT
Start: 2020-08-27 | End: 2020-09-06

## 2020-08-27 RX ORDER — OMEPRAZOLE 40 MG/1
40 CAPSULE, DELAYED RELEASE ORAL
Qty: 30 CAPSULE | Refills: 3 | Status: SHIPPED | OUTPATIENT
Start: 2020-08-27 | End: 2021-12-16

## 2020-08-27 RX ORDER — OLANZAPINE 5 MG/1
5 TABLET ORAL NIGHTLY
Qty: 30 TABLET | Refills: 3 | Status: SHIPPED | OUTPATIENT
Start: 2020-08-27 | End: 2021-01-04 | Stop reason: SDUPTHER

## 2020-08-27 RX ORDER — CLONAZEPAM 0.5 MG/1
0.5 TABLET ORAL NIGHTLY PRN
Qty: 30 TABLET | Refills: 0 | Status: SHIPPED | OUTPATIENT
Start: 2020-08-27 | End: 2021-02-04 | Stop reason: SDUPTHER

## 2020-08-27 ASSESSMENT — ENCOUNTER SYMPTOMS
WHEEZING: 0
VOMITING: 0
COLOR CHANGE: 0
BLOOD IN STOOL: 0
ANAL BLEEDING: 0
SHORTNESS OF BREATH: 1
BACK PAIN: 0
STRIDOR: 0
CONSTIPATION: 0
COUGH: 1
ABDOMINAL PAIN: 0
DIARRHEA: 0
ABDOMINAL DISTENTION: 0
TROUBLE SWALLOWING: 0
NAUSEA: 1
APNEA: 0
SORE THROAT: 0
EYE DISCHARGE: 0
CHEST TIGHTNESS: 0
VOICE CHANGE: 0
CHOKING: 0
RECTAL PAIN: 0

## 2020-08-27 NOTE — PROGRESS NOTES
Subjective:      Patient Id:  Douglas Cartagena is a 72 y.o. female who presents today with   Chief Complaint   Patient presents with    Pain          HPI  Seen at [facility] clinic for symptom management follow up rt lung cancer, demeanor calm and relaxed, NAD. She is having severe epigastric pain, all day worse when she eats, \" I feel like food is getting stuck and I have to drink a bunch of water to get it down\". Pain radiates under left breast and thorough back, this has been increasing in severity over the last month. She has lost almost ten pounds in three weeks as she is not eating due to discomfort and nausea. \" It feels worse then when I was on pain medicine for cancer before\". She finished radiation three months ago, CT of the chest 6/5/2020. Stable spiculated mass. Low-volume adenopathy in the mediastinum and upper abdomen. She is scheduled to have another CT in September. Some nausea, no vomiting. No constipation or diarrhea. concerns. No conor blood in stool or urine. She did run out of many of her medications which could be contributing to her discomfort as well. SOB at edema at baseline. Negative excessive fatigue, fever, chills, myalgias, increased sputum production or cough, nausea, vomiting, chest pain, or orthopnea. Increased anxiety and insomnia as she is worried her cancer is progression and general stress of pandemic. Negative significant depression, denies suicidal or homicidal ideation or signs suggesting existential grief or spiritual pain.      Past Medical History:   Diagnosis Date    Anxiety     Asthma     Cervical radiculopathy at C7 9/28/2013    CERVICAL SPONDYLOSIS, WITH COMBINATION OF DISK BULGING AND    Cervical radiculopathy at C7     SUBTLE PER EMG    Cervical spondylosis 10/12/13    PER MRI    Chronic back pain     COPD (chronic obstructive pulmonary disease) (HCC)     Chronic bonchitis    CTS (carpal tunnel syndrome) 9/28/13    PER EMG    Depression     Diverticulitis     GERD (gastroesophageal reflux disease)     Headache(784.0)     Hyperlipidemia     Hypertension     Hypothyroidism due to medication 2019    Irritable bowel syndrome     Malignant neoplasm of upper lobe of left lung ([address] 2019    Osteoarthritis      Past Surgical History:   Procedure Laterality Date    APPENDECTOMY      CARDIAC CATHETERIZATION  13,2013    DR. GRAVES    COLON SURGERY      HAND SURGERY  1996    HYSTERECTOMY  1980    TONSILLECTOMY  1963     Social History     Socioeconomic History    Marital status:       Spouse name: Not on file    Number of children: Not on file    Years of education: Not on file    Highest education level: Not on file   Occupational History    Not on file   Social Needs    Financial resource strain: Not on file    Food insecurity     Worry: Not on file     Inability: Not on file    Transportation needs     Medical: Not on file     Non-medical: Not on file   Tobacco Use    Smoking status: Heavy Tobacco Smoker     Packs/day: 0.50     Years: 48.00     Pack years: 24.00     Types: Cigarettes     Last attempt to quit: 10/15/2019     Years since quittin.8    Smokeless tobacco: Never Used   Substance and Sexual Activity    Alcohol use: Yes     Comment: sporadic, occasional    Drug use: Never    Sexual activity: Not on file   Lifestyle    Physical activity     Days per week: Not on file     Minutes per session: Not on file    Stress: Not on file   Relationships    Social connections     Talks on phone: Not on file     Gets together: Not on file     Attends [employer] service: Not on file     Active member of club or organization: Not on file     Attends meetings of clubs or organizations: Not on file     Relationship status: Not on file    Intimate partner violence     Fear of current or ex partner: Not on file     Emotionally abused: Not on file     Physically abused: Not on file     Forced sexual activity: Not on file   Other Topics Concern    Not on file   Social History Narrative    Not on file     Family History   Problem Relation Age of Onset    Stroke Mother     Heart Attack Mother     Cancer Father         LUNG    Cancer Paternal Uncle         lung    Breast Cancer Maternal Grandmother [de-identified]    Cancer Paternal Grandfather         lung    Cancer Paternal Uncle         lung    Cancer Paternal Uncle         lung    Cancer Paternal Uncle         lung    Cancer Paternal Uncle         lung    Cancer Paternal Uncle         lung     No Known Allergies  Current Outpatient Medications on File Prior to Visit   Medication Sig Dispense Refill    acetaminophen (TYLENOL) 325 MG tablet Take 650 mg by mouth every 6 hours as needed for Pain      ipratropium-albuterol (DUONEB) 0.5-2.5 (3) MG/3ML SOLN nebulizer solution Inhale 3 mLs into the lungs every 4 hours 540 mL 0    ondansetron (ZOFRAN) 4 MG tablet Take 1 tablet by mouth daily as needed for Nausea or Vomiting 30 tablet 0    ibuprofen (ADVIL) 200 MG tablet Take 400 mg by mouth every 8 hours as needed for Pain      loratadine (CLARITIN) 10 MG tablet Take 1 tablet by mouth daily 30 tablet 0    Handicap Placard MISC by Does not apply route Diagnoses: LUNG CANCER, DEGENERATIVE ARTHRITIS, CANCER PAIN   EXPIRATION: 4/12/2024 1 each 0    simethicone (MYLICON) 80 MG chewable tablet Take 1 tablet by mouth 4 times daily as needed for Flatulence 180 tablet 3    hydrALAZINE (APRESOLINE) 25 MG tablet Take 1 tablet by mouth daily      montelukast (SINGULAIR) 10 MG tablet Take 1 tablet by mouth nightly 90 tablet 3    sennosides-docusate sodium (SENOKOT-S) 8.6-50 MG tablet Take 1 tablet by mouth 2 times daily 120 tablet 3    PROAIR  (90 Base) MCG/ACT inhaler Inhale 2 Inhalers into the lungs 4 times daily  1    aspirin (ECOTRIN LOW STRENGTH) 81 MG EC tablet Take 81 mg by mouth daily      BREO ELLIPTA 100-25 MCG/INH AEPB inhaler Inhale 2 puffs into the lungs 2 times daily  0    lisinopril-hydrochlorothiazide (PRINZIDE;ZESTORETIC) 20-25 MG per tablet Take 1 tablet by mouth daily  0    metoprolol succinate (TOPROL XL) 50 MG extended release tablet Take 50 mg by mouth daily      folic acid (FOLVITE) 1 MG tablet Take 1 tablet by mouth daily 30 tablet 5    fluticasone (FLONASE) 50 MCG/ACT nasal spray 1 spray by Nasal route daily. 1 Bottle 6     No current facility-administered medications on file prior to visit. Review of Systems   Constitutional: Positive for unexpected weight change. Negative for activity change, appetite change, chills, diaphoresis, fatigue and fever. HENT: Negative for drooling, hearing loss, mouth sores, sore throat, trouble swallowing and voice change. Eyes: Negative for discharge and visual disturbance. Respiratory: Positive for cough and shortness of breath. Negative for apnea, choking, chest tightness, wheezing and stridor. Cardiovascular: Negative for chest pain, palpitations and leg swelling. Gastrointestinal: Positive for nausea. Negative for abdominal distention, abdominal pain, anal bleeding, blood in stool, constipation, diarrhea, rectal pain and vomiting. Genitourinary: Negative for difficulty urinating, dysuria, enuresis, frequency and hematuria. Musculoskeletal: Negative for arthralgias, back pain, gait problem, joint swelling and myalgias. Skin: Negative for color change, pallor, rash and wound. Allergic/Immunologic: Negative for food allergies and immunocompromised state. Neurological: Negative for dizziness, tremors, seizures, syncope, facial asymmetry, speech difficulty, weakness, light-headedness, numbness and headaches. Hematological: Negative for adenopathy. Does not bruise/bleed easily. Psychiatric/Behavioral: Positive for sleep disturbance. Negative for agitation, behavioral problems, confusion, decreased concentration, dysphoric mood, hallucinations, self-injury and suicidal ideas.  The patient is nervous/anxious. The patient is not hyperactive. Objective:   /74   Pulse 70   Resp 18   Wt 122 lb (55.3 kg)   SpO2 99%   BMI 21.61 kg/m²     Physical Exam      Assessment:       Diagnosis Orders   1. Anorexia  OLANZapine (ZYPREXA) 5 MG tablet   2. Malignant neoplasm of lung, unspecified laterality, unspecified part of lung (HCC)  OLANZapine (ZYPREXA) 5 MG tablet    clonazePAM (KLONOPIN) 0.5 MG tablet    oxyCODONE-acetaminophen (PERCOCET) 5-325 MG per tablet   3. Nausea  OLANZapine (ZYPREXA) 5 MG tablet    omeprazole (PRILOSEC) 40 MG delayed release capsule   4. Anxiety  OLANZapine (ZYPREXA) 5 MG tablet    clonazePAM (KLONOPIN) 0.5 MG tablet   5. Palliative care encounter  omeprazole (PRILOSEC) 40 MG delayed release capsule    clonazePAM (KLONOPIN) 0.5 MG tablet    oxyCODONE-acetaminophen (PERCOCET) 5-325 MG per tablet   6. Abdominal pain, unspecified abdominal location  omeprazole (PRILOSEC) 40 MG delayed release capsule    oxyCODONE-acetaminophen (PERCOCET) 5-325 MG per tablet   7. Neoplasm related pain  oxyCODONE-acetaminophen (PERCOCET) 5-325 MG per tablet   8. Insomnia, unspecified type            Plan:    Pain in epigastric/ thoracic back area  - restart Omeprazole 40 mg po in am  - Percocet 5/325 mg po every 8 hour prn moderate to severe pain  - Heat or ice to painful areas, whichever is preferred  - Gentle ROM exercises  Pt is tolerating current pain meds without adverse effects or over sedation. Lowest effective dose used. Pt advised to call and notify palliative care for any adverse effects or sedation  Pt is able to maintain adequate functional level and participate in ADLs  OARRS reviewed. There is no indication of aberrant behavior  Pt advised to call for increasing or uncontrolled pain  Risk vs benefit assessed. Pt educated on risk of addiction.    Pt advised to take only as prescribed and not to change frequency of pain meds without consulting palliative care first.        SOB  -

## 2020-10-02 ENCOUNTER — TELEPHONE (OUTPATIENT)
Dept: PALLATIVE CARE | Age: 70
End: 2020-10-02

## 2020-10-02 NOTE — TELEPHONE ENCOUNTER
Pt came to the office today, was confused about her appointment date. Pt would like to speak with Nader Severino. Please call her.

## 2020-10-22 ENCOUNTER — OFFICE VISIT (OUTPATIENT)
Dept: PALLATIVE CARE | Age: 70
End: 2020-10-22
Payer: MEDICARE

## 2020-10-22 VITALS
RESPIRATION RATE: 18 BRPM | OXYGEN SATURATION: 96 % | DIASTOLIC BLOOD PRESSURE: 74 MMHG | HEART RATE: 68 BPM | SYSTOLIC BLOOD PRESSURE: 110 MMHG

## 2020-10-22 PROCEDURE — G8399 PT W/DXA RESULTS DOCUMENT: HCPCS | Performed by: NURSE PRACTITIONER

## 2020-10-22 PROCEDURE — G8484 FLU IMMUNIZE NO ADMIN: HCPCS | Performed by: NURSE PRACTITIONER

## 2020-10-22 PROCEDURE — 1090F PRES/ABSN URINE INCON ASSESS: CPT | Performed by: NURSE PRACTITIONER

## 2020-10-22 PROCEDURE — 99214 OFFICE O/P EST MOD 30 MIN: CPT | Performed by: NURSE PRACTITIONER

## 2020-10-22 PROCEDURE — G8427 DOCREV CUR MEDS BY ELIG CLIN: HCPCS | Performed by: NURSE PRACTITIONER

## 2020-10-22 PROCEDURE — 1123F ACP DISCUSS/DSCN MKR DOCD: CPT | Performed by: NURSE PRACTITIONER

## 2020-10-22 PROCEDURE — G8420 CALC BMI NORM PARAMETERS: HCPCS | Performed by: NURSE PRACTITIONER

## 2020-10-22 PROCEDURE — 4040F PNEUMOC VAC/ADMIN/RCVD: CPT | Performed by: NURSE PRACTITIONER

## 2020-10-22 PROCEDURE — 3017F COLORECTAL CA SCREEN DOC REV: CPT | Performed by: NURSE PRACTITIONER

## 2020-10-22 PROCEDURE — 4004F PT TOBACCO SCREEN RCVD TLK: CPT | Performed by: NURSE PRACTITIONER

## 2020-10-30 RX ORDER — GABAPENTIN 100 MG/1
200 CAPSULE ORAL 3 TIMES DAILY
Qty: 180 CAPSULE | Refills: 0 | Status: SHIPPED | OUTPATIENT
Start: 2020-10-30 | End: 2020-11-30 | Stop reason: SDUPTHER

## 2020-10-30 NOTE — TELEPHONE ENCOUNTER
Rx requested:  Requested Prescriptions     Pending Prescriptions Disp Refills    gabapentin (NEURONTIN) 100 MG capsule 90 capsule 0     Sig: Take 1 capsule by mouth 3 times daily for 30 days.        Last Office Visit:   10/22/2020      Last filled:  10/2/2020    Next Visit Date:  Future Appointments   Date Time Provider Renetta Bauer   12/31/2020 11:00 AM CHRISTO Sykes - CNP [facility] [place] [facility]GENCY [facility]   2/5/2021  9:30 AM MD AYALA Mansfield RAD ONC Eleanor Seth

## 2020-11-30 RX ORDER — GABAPENTIN 100 MG/1
200 CAPSULE ORAL 3 TIMES DAILY
Qty: 180 CAPSULE | Refills: 0 | Status: SHIPPED | OUTPATIENT
Start: 2020-11-30 | End: 2021-01-04 | Stop reason: SDUPTHER

## 2020-11-30 NOTE — TELEPHONE ENCOUNTER
Rx requested:  Requested Prescriptions     Pending Prescriptions Disp Refills    gabapentin (NEURONTIN) 100 MG capsule 180 capsule 0     Sig: Take 2 capsules by mouth 3 times daily for 30 days.        Last Office Visit:   10/22/2020      Last filled:  10/30/2020    Next Visit Date:  Future Appointments   Date Time Provider Renetta Bauer   12/31/2020 11:00 AM CHRISTO Silva - CNP [facility] EMERGENCY [facility] AT [facility]   2/5/2021  9:30 AM MD MAKENZIE Saavedra ONC Aj Reyes

## 2021-01-04 DIAGNOSIS — C34.90 MALIGNANT NEOPLASM OF LUNG, UNSPECIFIED LATERALITY, UNSPECIFIED PART OF LUNG (HCC): ICD-10-CM

## 2021-01-04 DIAGNOSIS — Z51.5 PALLIATIVE CARE ENCOUNTER: ICD-10-CM

## 2021-01-04 DIAGNOSIS — R63.0 ANOREXIA: ICD-10-CM

## 2021-01-04 DIAGNOSIS — R11.0 NAUSEA: ICD-10-CM

## 2021-01-04 DIAGNOSIS — F41.9 ANXIETY: ICD-10-CM

## 2021-01-04 DIAGNOSIS — G62.0 NEUROPATHY DUE TO CHEMOTHERAPEUTIC DRUG (HCC): ICD-10-CM

## 2021-01-04 DIAGNOSIS — T45.1X5A NEUROPATHY DUE TO CHEMOTHERAPEUTIC DRUG (HCC): ICD-10-CM

## 2021-01-04 RX ORDER — OLANZAPINE 5 MG/1
5 TABLET ORAL NIGHTLY
Qty: 30 TABLET | Refills: 3 | Status: SHIPPED | OUTPATIENT
Start: 2021-01-04 | End: 2021-03-04 | Stop reason: SDUPTHER

## 2021-01-04 RX ORDER — GABAPENTIN 100 MG/1
200 CAPSULE ORAL 3 TIMES DAILY
Qty: 180 CAPSULE | Refills: 0 | Status: SHIPPED | OUTPATIENT
Start: 2021-01-04 | End: 2021-03-04 | Stop reason: ALTCHOICE

## 2021-02-04 DIAGNOSIS — F41.9 ANXIETY: ICD-10-CM

## 2021-02-04 DIAGNOSIS — C34.90 MALIGNANT NEOPLASM OF LUNG, UNSPECIFIED LATERALITY, UNSPECIFIED PART OF LUNG (HCC): ICD-10-CM

## 2021-02-04 DIAGNOSIS — Z51.5 PALLIATIVE CARE ENCOUNTER: ICD-10-CM

## 2021-02-04 RX ORDER — CLONAZEPAM 0.5 MG/1
0.5 TABLET ORAL NIGHTLY PRN
Qty: 15 TABLET | Refills: 0 | Status: SHIPPED | OUTPATIENT
Start: 2021-02-04 | End: 2021-03-04 | Stop reason: SDUPTHER

## 2021-02-04 NOTE — TELEPHONE ENCOUNTER
Rx requested:  Requested Prescriptions     Pending Prescriptions Disp Refills    clonazePAM (KLONOPIN) 0.5 MG tablet 30 tablet 0     Sig: Take 1 tablet by mouth nightly as needed for Anxiety for up to 30 days.        Last Office Visit:   1/21/2021      Last filled:  8/27/2020    Next Visit Date:  Future Appointments   Date Time Provider Renetta Bauer   2/18/2021 10:00 AM MD AYALA AnneOZ RAD ONC Susana Vora

## 2021-03-04 ENCOUNTER — OFFICE VISIT (OUTPATIENT)
Dept: PALLATIVE CARE | Age: 71
End: 2021-03-04
Payer: MEDICARE

## 2021-03-04 VITALS
SYSTOLIC BLOOD PRESSURE: 160 MMHG | RESPIRATION RATE: 18 BRPM | DIASTOLIC BLOOD PRESSURE: 74 MMHG | OXYGEN SATURATION: 97 % | HEART RATE: 76 BPM

## 2021-03-04 DIAGNOSIS — F41.9 ANXIETY: ICD-10-CM

## 2021-03-04 DIAGNOSIS — Z51.5 PALLIATIVE CARE ENCOUNTER: ICD-10-CM

## 2021-03-04 DIAGNOSIS — C34.90 MALIGNANT NEOPLASM OF LUNG, UNSPECIFIED LATERALITY, UNSPECIFIED PART OF LUNG (HCC): ICD-10-CM

## 2021-03-04 DIAGNOSIS — R11.0 NAUSEA: ICD-10-CM

## 2021-03-04 DIAGNOSIS — R63.0 ANOREXIA: ICD-10-CM

## 2021-03-04 DIAGNOSIS — G62.9 NEUROPATHY: Primary | ICD-10-CM

## 2021-03-04 DIAGNOSIS — G47.00 INSOMNIA, UNSPECIFIED TYPE: ICD-10-CM

## 2021-03-04 PROCEDURE — G8420 CALC BMI NORM PARAMETERS: HCPCS | Performed by: NURSE PRACTITIONER

## 2021-03-04 PROCEDURE — 99214 OFFICE O/P EST MOD 30 MIN: CPT | Performed by: NURSE PRACTITIONER

## 2021-03-04 PROCEDURE — G8427 DOCREV CUR MEDS BY ELIG CLIN: HCPCS | Performed by: NURSE PRACTITIONER

## 2021-03-04 PROCEDURE — 1090F PRES/ABSN URINE INCON ASSESS: CPT | Performed by: NURSE PRACTITIONER

## 2021-03-04 PROCEDURE — 4040F PNEUMOC VAC/ADMIN/RCVD: CPT | Performed by: NURSE PRACTITIONER

## 2021-03-04 PROCEDURE — G8399 PT W/DXA RESULTS DOCUMENT: HCPCS | Performed by: NURSE PRACTITIONER

## 2021-03-04 PROCEDURE — 4004F PT TOBACCO SCREEN RCVD TLK: CPT | Performed by: NURSE PRACTITIONER

## 2021-03-04 PROCEDURE — 3017F COLORECTAL CA SCREEN DOC REV: CPT | Performed by: NURSE PRACTITIONER

## 2021-03-04 PROCEDURE — G8484 FLU IMMUNIZE NO ADMIN: HCPCS | Performed by: NURSE PRACTITIONER

## 2021-03-04 PROCEDURE — 1123F ACP DISCUSS/DSCN MKR DOCD: CPT | Performed by: NURSE PRACTITIONER

## 2021-03-04 RX ORDER — CLONAZEPAM 0.5 MG/1
0.5 TABLET ORAL NIGHTLY PRN
Qty: 15 TABLET | Refills: 0 | Status: SHIPPED | OUTPATIENT
Start: 2021-03-04 | End: 2021-06-07 | Stop reason: SDUPTHER

## 2021-03-04 RX ORDER — GABAPENTIN 400 MG/1
400 CAPSULE ORAL 3 TIMES DAILY
Qty: 90 CAPSULE | Refills: 3 | Status: SHIPPED | OUTPATIENT
Start: 2021-03-04 | End: 2021-06-07 | Stop reason: SDUPTHER

## 2021-03-04 RX ORDER — DULOXETIN HYDROCHLORIDE 20 MG/1
20 CAPSULE, DELAYED RELEASE ORAL DAILY
Qty: 30 CAPSULE | Refills: 3 | Status: SHIPPED | OUTPATIENT
Start: 2021-03-04 | End: 2021-06-10 | Stop reason: SDUPTHER

## 2021-03-04 RX ORDER — OLANZAPINE 5 MG/1
5 TABLET ORAL NIGHTLY
Qty: 30 TABLET | Refills: 3 | Status: SHIPPED | OUTPATIENT
Start: 2021-03-04 | End: 2021-06-10 | Stop reason: SDUPTHER

## 2021-03-04 ASSESSMENT — ENCOUNTER SYMPTOMS
SHORTNESS OF BREATH: 0
SORE THROAT: 0
DIARRHEA: 0
ANAL BLEEDING: 0
VOICE CHANGE: 0
CHEST TIGHTNESS: 0
CONSTIPATION: 0
ABDOMINAL PAIN: 0
NAUSEA: 0
CHOKING: 0
RECTAL PAIN: 0
TROUBLE SWALLOWING: 0
VOMITING: 0
COUGH: 0
APNEA: 0
COLOR CHANGE: 0
ABDOMINAL DISTENTION: 0
BLOOD IN STOOL: 0
BACK PAIN: 0
EYE DISCHARGE: 0
WHEEZING: 0
STRIDOR: 0

## 2021-03-04 NOTE — PROGRESS NOTES
Subjective:      Patient Id:  Rahul Robertson is a 72 y.o. female who presents today with   Chief Complaint   Patient presents with    Follow-up          HPI   Symptom management follow up rt lung cancer, COPD, HTN, HLD, hypothyroidism, DDD, anxiety, depression, demeanor calm and relaxed, NAD, no sedation. Complains of increased neuropathy in left arm, initially Gabapentin had improved it, but it is worsening. She feels warm showers improve the discomfort, no known aggravating factors, Pain described as an ache and burn. Anxiety and insomnia has been aggravated lately due to financial issues. No homicidal or suicidal ideation. Weight stable, Appetite is good. She has gained six pounds. No GI or  concerns. No conor blood in stool or urine. SOB and edema at baseline. Negative excessive fatigue, fever, chills, myalgias, increased sputum production or cough, nausea, vomiting, chest pain, or orthopnea. Past Medical History:   Diagnosis Date    Anxiety     Asthma     Cervical radiculopathy at C7 9/28/2013    CERVICAL SPONDYLOSIS, WITH COMBINATION OF DISK BULGING AND    Cervical radiculopathy at C7     SUBTLE PER EMG    Cervical spondylosis 10/12/13    PER MRI    Chronic back pain     COPD (chronic obstructive pulmonary disease) (HCC)     Chronic bonchitis    CTS (carpal tunnel syndrome) 9/28/13    PER EMG    Depression     Diverticulitis     GERD (gastroesophageal reflux disease)     Headache(784.0)     Hyperlipidemia     Hypertension     Hypothyroidism due to medication 5/8/2019    Irritable bowel syndrome     Malignant neoplasm of upper lobe of left lung ([facility] [address] 1/7/2019    Osteoarthritis      Past Surgical History:   Procedure Laterality Date    APPENDECTOMY  1980    CARDIAC CATHETERIZATION  5/16/13,11/8/2013    DR. GRAVES    COLON SURGERY      HAND SURGERY  1996    HYSTERECTOMY  1980    TONSILLECTOMY  1963     Social History     Socioeconomic History    Marital status:   Spouse name: Not on file    Number of children: Not on file    Years of education: Not on file    Highest education level: Not on file   Occupational History    Not on file   Social Needs    Financial resource strain: Not on file    Food insecurity     Worry: Not on file     Inability: Not on file    Transportation needs     Medical: Not on file     Non-medical: Not on file   Tobacco Use    Smoking status: Heavy Tobacco Smoker     Packs/day: 0.50     Years: 48.00     Pack years: 24.00     Types: Cigarettes     Last attempt to quit: 10/15/2019     Years since quittin.3    Smokeless tobacco: Never Used   Substance and Sexual Activity    Alcohol use: Yes     Comment: sporadic, occasional    Drug use: Never    Sexual activity: Not on file   Lifestyle    Physical activity     Days per week: Not on file     Minutes per session: Not on file    Stress: Not on file   Relationships    Social connections     Talks on phone: Not on file     Gets together: Not on file     Attends Anab[employer] service: Not on file     Active member of club or organization: Not on file     Attends meetings of clubs or organizations: Not on file     Relationship status: Not on file    Intimate partner violence     Fear of current or ex partner: Not on file     Emotionally abused: Not on file     Physically abused: Not on file     Forced sexual activity: Not on file   Other Topics Concern    Not on file   Social History Narrative    Not on file     Family History   Problem Relation Age of Onset    Stroke Mother     Heart Attack Mother     Cancer Father         LUNG    Cancer Paternal Uncle         lung    Breast Cancer Maternal Grandmother [de-identified]    Cancer Paternal Grandfather         lung    Cancer Paternal Uncle         lung    Cancer Paternal Uncle         lung    Cancer Paternal Uncle         lung    Cancer Paternal Uncle         lung    Cancer Paternal Uncle         lung     No Known Allergies  Current Outpatient Medications on File Prior to Visit   Medication Sig Dispense Refill    omeprazole (PRILOSEC) 40 MG delayed release capsule Take 1 capsule by mouth every morning (before breakfast) 30 capsule 3    acetaminophen (TYLENOL) 325 MG tablet Take 650 mg by mouth every 6 hours as needed for Pain      ipratropium-albuterol (DUONEB) 0.5-2.5 (3) MG/3ML SOLN nebulizer solution Inhale 3 mLs into the lungs every 4 hours 540 mL 0    ondansetron (ZOFRAN) 4 MG tablet Take 1 tablet by mouth daily as needed for Nausea or Vomiting 30 tablet 0    ibuprofen (ADVIL) 200 MG tablet Take 400 mg by mouth every 8 hours as needed for Pain      loratadine (CLARITIN) 10 MG tablet Take 1 tablet by mouth daily 30 tablet 0    Handicap Placard MISC by Does not apply route Diagnoses: LUNG CANCER, DEGENERATIVE ARTHRITIS, CANCER PAIN   EXPIRATION: 4/12/2024 1 each 0    simethicone (MYLICON) 80 MG chewable tablet Take 1 tablet by mouth 4 times daily as needed for Flatulence 180 tablet 3    hydrALAZINE (APRESOLINE) 25 MG tablet Take 1 tablet by mouth daily      montelukast (SINGULAIR) 10 MG tablet Take 1 tablet by mouth nightly 90 tablet 3    sennosides-docusate sodium (SENOKOT-S) 8.6-50 MG tablet Take 1 tablet by mouth 2 times daily 120 tablet 3    PROAIR  (90 Base) MCG/ACT inhaler Inhale 2 Inhalers into the lungs 4 times daily  1    aspirin (ECOTRIN LOW STRENGTH) 81 MG EC tablet Take 81 mg by mouth daily      BREO ELLIPTA 100-25 MCG/INH AEPB inhaler Inhale 2 puffs into the lungs 2 times daily  0    lisinopril-hydrochlorothiazide (PRINZIDE;ZESTORETIC) 20-25 MG per tablet Take 1 tablet by mouth daily  0    metoprolol succinate (TOPROL XL) 50 MG extended release tablet Take 50 mg by mouth daily      folic acid (FOLVITE) 1 MG tablet Take 1 tablet by mouth daily 30 tablet 5    fluticasone (FLONASE) 50 MCG/ACT nasal spray 1 spray by Nasal route daily. 1 Bottle 6     No current facility-administered medications on file prior to visit. Review of Systems   Constitutional: Negative for activity change, appetite change, chills, diaphoresis, fatigue, fever and unexpected weight change. HENT: Negative for drooling, hearing loss, mouth sores, sore throat, trouble swallowing and voice change. Eyes: Negative for discharge and visual disturbance. Respiratory: Negative for apnea, cough, choking, chest tightness, shortness of breath, wheezing and stridor. Cardiovascular: Negative for chest pain, palpitations and leg swelling. Gastrointestinal: Negative for abdominal distention, abdominal pain, anal bleeding, blood in stool, constipation, diarrhea, nausea, rectal pain and vomiting. Genitourinary: Negative for difficulty urinating, dysuria, enuresis, frequency and hematuria. Musculoskeletal: Negative for arthralgias, back pain, gait problem, joint swelling and myalgias. Skin: Negative for color change, pallor, rash and wound. Allergic/Immunologic: Negative for food allergies and immunocompromised state. Neurological: Negative for dizziness, tremors, seizures, syncope, facial asymmetry, speech difficulty, weakness, light-headedness, numbness and headaches. Hematological: Negative for adenopathy. Does not bruise/bleed easily. Psychiatric/Behavioral: Positive for sleep disturbance. Negative for agitation, behavioral problems, confusion, decreased concentration, dysphoric mood, hallucinations, self-injury and suicidal ideas. The patient is not nervous/anxious and is not hyperactive. Objective:   BP (!) 160/74   Pulse 76   Resp 18   SpO2 97%     Physical Exam  Constitutional:       General: She is not in acute distress. Appearance: She is well-developed. She is not diaphoretic. HENT:      Head: Normocephalic and atraumatic. Right Ear: External ear normal.      Left Ear: External ear normal.      Nose: Nose normal.      Mouth/Throat:      Pharynx: No oropharyngeal exudate.    Eyes:      General: No scleral icterus. Right eye: No discharge. Left eye: No discharge. Conjunctiva/sclera: Conjunctivae normal.      Pupils: Pupils are equal, round, and reactive to light. Neck:      Musculoskeletal: Normal range of motion and neck supple. Thyroid: No thyromegaly. Vascular: No JVD. Trachea: No tracheal deviation. Cardiovascular:      Rate and Rhythm: Normal rate and regular rhythm. Heart sounds: Normal heart sounds. Pulmonary:      Effort: Pulmonary effort is normal. No respiratory distress. Breath sounds: No stridor. Examination of the right-lower field reveals wheezing. Wheezing present. No rales. Chest:      Chest wall: No tenderness. Abdominal:      General: Bowel sounds are normal. There is no distension. Palpations: Abdomen is soft. There is no mass. Tenderness: There is no abdominal tenderness. There is no guarding or rebound. Musculoskeletal: Normal range of motion. General: No tenderness or deformity. Lymphadenopathy:      Cervical: No cervical adenopathy. Skin:     General: Skin is warm and dry. Findings: No erythema or rash. Neurological:      Mental Status: She is alert and oriented to person, place, and time. Psychiatric:         Behavior: Behavior normal.         Thought Content: Thought content normal.           Assessment:       Diagnosis Orders   1. Neuropathy  gabapentin (NEURONTIN) 400 MG capsule    DULoxetine (CYMBALTA) 20 MG extended release capsule   2. Malignant neoplasm of lung, unspecified laterality, unspecified part of lung (HCC)  clonazePAM (KLONOPIN) 0.5 MG tablet    OLANZapine (ZYPREXA) 5 MG tablet   3. Anxiety  clonazePAM (KLONOPIN) 0.5 MG tablet    OLANZapine (ZYPREXA) 5 MG tablet    DULoxetine (CYMBALTA) 20 MG extended release capsule   4. Palliative care encounter  clonazePAM (KLONOPIN) 0.5 MG tablet    gabapentin (NEURONTIN) 400 MG capsule    DULoxetine (CYMBALTA) 20 MG extended release capsule   5.

## 2021-06-03 DIAGNOSIS — Z51.5 PALLIATIVE CARE ENCOUNTER: ICD-10-CM

## 2021-06-03 DIAGNOSIS — G62.9 NEUROPATHY: ICD-10-CM

## 2021-06-03 DIAGNOSIS — F41.9 ANXIETY: ICD-10-CM

## 2021-06-03 DIAGNOSIS — C34.90 MALIGNANT NEOPLASM OF LUNG, UNSPECIFIED LATERALITY, UNSPECIFIED PART OF LUNG (HCC): ICD-10-CM

## 2021-06-07 RX ORDER — GABAPENTIN 400 MG/1
400 CAPSULE ORAL EVERY EVENING
Qty: 90 CAPSULE | Refills: 3 | Status: SHIPPED | OUTPATIENT
Start: 2021-06-07 | End: 2021-10-28 | Stop reason: ALTCHOICE

## 2021-06-07 RX ORDER — CLONAZEPAM 0.5 MG/1
0.5 TABLET ORAL NIGHTLY PRN
Qty: 15 TABLET | Refills: 0 | Status: SHIPPED | OUTPATIENT
Start: 2021-06-07 | End: 2021-10-28 | Stop reason: ALTCHOICE

## 2021-06-10 ENCOUNTER — OFFICE VISIT (OUTPATIENT)
Dept: PALLATIVE CARE | Age: 71
End: 2021-06-10
Payer: MEDICARE

## 2021-06-10 VITALS
SYSTOLIC BLOOD PRESSURE: 130 MMHG | RESPIRATION RATE: 18 BRPM | BODY MASS INDEX: 25.51 KG/M2 | HEART RATE: 68 BPM | WEIGHT: 144 LBS | OXYGEN SATURATION: 96 % | DIASTOLIC BLOOD PRESSURE: 68 MMHG

## 2021-06-10 DIAGNOSIS — R63.0 ANOREXIA: ICD-10-CM

## 2021-06-10 DIAGNOSIS — Z51.5 PALLIATIVE CARE ENCOUNTER: ICD-10-CM

## 2021-06-10 DIAGNOSIS — J30.2 SEASONAL ALLERGIES: ICD-10-CM

## 2021-06-10 DIAGNOSIS — G62.9 NEUROPATHY: ICD-10-CM

## 2021-06-10 DIAGNOSIS — C34.90 MALIGNANT NEOPLASM OF LUNG, UNSPECIFIED LATERALITY, UNSPECIFIED PART OF LUNG (HCC): ICD-10-CM

## 2021-06-10 DIAGNOSIS — F41.9 ANXIETY: ICD-10-CM

## 2021-06-10 DIAGNOSIS — J44.9 CHRONIC OBSTRUCTIVE PULMONARY DISEASE, UNSPECIFIED COPD TYPE (HCC): Primary | ICD-10-CM

## 2021-06-10 DIAGNOSIS — R11.0 NAUSEA: ICD-10-CM

## 2021-06-10 DIAGNOSIS — R06.02 SOB (SHORTNESS OF BREATH): ICD-10-CM

## 2021-06-10 PROCEDURE — 3017F COLORECTAL CA SCREEN DOC REV: CPT | Performed by: NURSE PRACTITIONER

## 2021-06-10 PROCEDURE — 4040F PNEUMOC VAC/ADMIN/RCVD: CPT | Performed by: NURSE PRACTITIONER

## 2021-06-10 PROCEDURE — 99214 OFFICE O/P EST MOD 30 MIN: CPT | Performed by: NURSE PRACTITIONER

## 2021-06-10 PROCEDURE — 3023F SPIROM DOC REV: CPT | Performed by: NURSE PRACTITIONER

## 2021-06-10 PROCEDURE — G8399 PT W/DXA RESULTS DOCUMENT: HCPCS | Performed by: NURSE PRACTITIONER

## 2021-06-10 PROCEDURE — 4004F PT TOBACCO SCREEN RCVD TLK: CPT | Performed by: NURSE PRACTITIONER

## 2021-06-10 PROCEDURE — G8427 DOCREV CUR MEDS BY ELIG CLIN: HCPCS | Performed by: NURSE PRACTITIONER

## 2021-06-10 PROCEDURE — 1123F ACP DISCUSS/DSCN MKR DOCD: CPT | Performed by: NURSE PRACTITIONER

## 2021-06-10 PROCEDURE — G8926 SPIRO NO PERF OR DOC: HCPCS | Performed by: NURSE PRACTITIONER

## 2021-06-10 PROCEDURE — 1090F PRES/ABSN URINE INCON ASSESS: CPT | Performed by: NURSE PRACTITIONER

## 2021-06-10 PROCEDURE — G8419 CALC BMI OUT NRM PARAM NOF/U: HCPCS | Performed by: NURSE PRACTITIONER

## 2021-06-10 RX ORDER — OLANZAPINE 5 MG/1
5 TABLET ORAL NIGHTLY
Qty: 30 TABLET | Refills: 3 | Status: SHIPPED | OUTPATIENT
Start: 2021-06-10 | End: 2022-02-03 | Stop reason: SDUPTHER

## 2021-06-10 RX ORDER — FLUTICASONE FUROATE AND VILANTEROL TRIFENATATE 100; 25 UG/1; UG/1
1 POWDER RESPIRATORY (INHALATION) DAILY
Qty: 30 EACH | Refills: 0 | Status: SHIPPED | OUTPATIENT
Start: 2021-06-10 | End: 2021-07-19 | Stop reason: SDUPTHER

## 2021-06-10 RX ORDER — DOXYCYCLINE HYCLATE 100 MG
100 TABLET ORAL 2 TIMES DAILY
Qty: 20 TABLET | Refills: 0 | Status: SHIPPED | OUTPATIENT
Start: 2021-06-10 | End: 2021-06-20

## 2021-06-10 RX ORDER — FEXOFENADINE HCL 180 MG/1
180 TABLET ORAL DAILY
Qty: 30 TABLET | Refills: 0 | Status: SHIPPED | OUTPATIENT
Start: 2021-06-10 | End: 2021-07-10

## 2021-06-10 RX ORDER — DULOXETIN HYDROCHLORIDE 20 MG/1
20 CAPSULE, DELAYED RELEASE ORAL DAILY
Qty: 30 CAPSULE | Refills: 3 | Status: SHIPPED | OUTPATIENT
Start: 2021-06-10 | End: 2021-07-15

## 2021-06-10 ASSESSMENT — ENCOUNTER SYMPTOMS
ABDOMINAL PAIN: 0
EYE DISCHARGE: 0
NAUSEA: 0
COLOR CHANGE: 0
CHEST TIGHTNESS: 0
ABDOMINAL DISTENTION: 0
VOMITING: 0
TROUBLE SWALLOWING: 0
COUGH: 1
APNEA: 0
BLOOD IN STOOL: 0
BACK PAIN: 1
STRIDOR: 0
SHORTNESS OF BREATH: 1
VOICE CHANGE: 0
CHOKING: 0
ANAL BLEEDING: 0
CONSTIPATION: 0
SORE THROAT: 0
WHEEZING: 0
DIARRHEA: 0
RECTAL PAIN: 0

## 2021-06-10 NOTE — PROGRESS NOTES
Subjective:      Patient Id:  Laurent Pinzon is a 72 y.o. female who presents today with   Chief Complaint   Patient presents with    Follow-up          HPI   Symptom management follow up rt lung cancer, COPD, HTN, HLD, hypothyroidism, DDD, anxiety, depression, demeanor calm and relaxed, NAD, no sedation. COmplains of increased pain in lower left thoracic back, hurts with deep inspiration . No known injury, Auscultation reveals decresed lung sounds in this area. She is coughing, increased SOB, headache, increased PND, itchy eyes, Claritin and singgulair are not helping. No fever, chills, mylalgias. Sh is fully vaccinated against Covid. She is out of her rescue inhaler and breo ellipta, she has been using her duoneb nebulizer treatments. Weight stable, Appetite is good. She has gained six pounds. No GI or  concerns. No conor blood in stool or urine. She feels both insomnia and anxiety are improved with duloxetine and clonazepam.    Past Medical History:   Diagnosis Date    Anxiety     Asthma     Cervical radiculopathy at C7 9/28/2013    CERVICAL SPONDYLOSIS, WITH COMBINATION OF DISK BULGING AND    Cervical radiculopathy at C7     SUBTLE PER EMG    Cervical spondylosis 10/12/13    PER MRI    Chronic back pain     COPD (chronic obstructive pulmonary disease) (HCC)     Chronic bonchitis    CTS (carpal tunnel syndrome) 9/28/13    PER EMG    Depression     Diverticulitis     GERD (gastroesophageal reflux disease)     Headache(784.0)     Hyperlipidemia     Hypertension     Hypothyroidism due to medication 5/8/2019    Irritable bowel syndrome     Malignant neoplasm of upper lobe of left lung ([facility] [address] 1/7/2019    Osteoarthritis      Past Surgical History:   Procedure Laterality Date    APPENDECTOMY  1980    CARDIAC CATHETERIZATION  5/16/13,11/8/2013    DR. GRAVES    COLON SURGERY      HAND SURGERY  1996    HYSTERECTOMY  1980   [address]     Social History     Socioeconomic History    Marital status:      Spouse name: Not on file    Number of children: Not on file    Years of education: Not on file    Highest education level: Not on file   Occupational History    Not on file   Tobacco Use    Smoking status: Heavy Tobacco Smoker     Packs/day: 0.50     Years: 48.00     Pack years: 24.00     Types: Cigarettes     Last attempt to quit: 10/15/2019     Years since quittin.6    Smokeless tobacco: Never Used   Vaping Use    Vaping Use: Some days    Substances: Unknown   Substance and Sexual Activity    Alcohol use: Yes     Comment: sporadic, occasional    Drug use: Never    Sexual activity: Not on file   Other Topics Concern    Not on file   Social History Narrative    Not on file     Social Determinants of Health     Financial Resource Strain:     Difficulty of Paying Living Expenses:    Food Insecurity:     Worried About Running Out of Food in the Last Year:     [address] in the Last Year:    Transportation Needs:     Lack of Transportation (Medical):      Lack of Transportation (Non-Medical):    Physical Activity:     Days of Exercise per Week:     Minutes of Exercise per Session:    Stress:     Feeling of Stress :    Social Connections:     Frequency of Communication with Friends and Family:     Frequency of Social Gatherings with Friends and Family:     Attends Sikhism Services:     Active Member of Clubs or Organizations:     Attends Club or Organization Meetings:     Marital Status:    Intimate Partner Violence:     Fear of Current or Ex-Partner:     Emotionally Abused:     Physically Abused:     Sexually Abused:      Family History   Problem Relation Age of Onset    Stroke Mother     Heart Attack Mother     Cancer Father         LUNG    Cancer Paternal Uncle         lung    Breast Cancer Maternal Grandmother [de-identified]    Cancer Paternal Grandfather         lung    Cancer Paternal Uncle         lung    Cancer Paternal Uncle lung    Cancer Paternal Uncle         lung    Cancer Paternal Uncle         lung    Cancer Paternal Uncle         lung     No Known Allergies  Current Outpatient Medications on File Prior to Visit   Medication Sig Dispense Refill    gabapentin (NEURONTIN) 400 MG capsule Take 1 capsule by mouth every evening for 90 days. (Patient taking differently: Take 400 mg by mouth 3 times daily. ) 90 capsule 3    acetaminophen (TYLENOL) 325 MG tablet Take 650 mg by mouth every 6 hours as needed for Pain      aspirin (ECOTRIN LOW STRENGTH) 81 MG EC tablet Take 81 mg by mouth daily      clonazePAM (KLONOPIN) 0.5 MG tablet Take 1 tablet by mouth nightly as needed for Anxiety for up to 15 days.  15 tablet 0    omeprazole (PRILOSEC) 40 MG delayed release capsule Take 1 capsule by mouth every morning (before breakfast) 30 capsule 3    ipratropium-albuterol (DUONEB) 0.5-2.5 (3) MG/3ML SOLN nebulizer solution Inhale 3 mLs into the lungs every 4 hours 540 mL 0    ondansetron (ZOFRAN) 4 MG tablet Take 1 tablet by mouth daily as needed for Nausea or Vomiting 30 tablet 0    ibuprofen (ADVIL) 200 MG tablet Take 400 mg by mouth every 8 hours as needed for Pain      Handicap Placard MISC by Does not apply route Diagnoses: LUNG CANCER, DEGENERATIVE ARTHRITIS, CANCER PAIN   EXPIRATION: 4/12/2024 1 each 0    simethicone (MYLICON) 80 MG chewable tablet Take 1 tablet by mouth 4 times daily as needed for Flatulence 180 tablet 3    hydrALAZINE (APRESOLINE) 25 MG tablet Take 1 tablet by mouth daily      montelukast (SINGULAIR) 10 MG tablet Take 1 tablet by mouth nightly 90 tablet 3    sennosides-docusate sodium (SENOKOT-S) 8.6-50 MG tablet Take 1 tablet by mouth 2 times daily 120 tablet 3    lisinopril-hydrochlorothiazide (PRINZIDE;ZESTORETIC) 20-25 MG per tablet Take 1 tablet by mouth daily  0    metoprolol succinate (TOPROL XL) 50 MG extended release tablet Take 50 mg by mouth daily      folic acid (FOLVITE) 1 MG tablet Take 1 tablet by mouth daily 30 tablet 5    fluticasone (FLONASE) 50 MCG/ACT nasal spray 1 spray by Nasal route daily. 1 Bottle 6     No current facility-administered medications on file prior to visit. Review of Systems   Constitutional: Positive for fatigue. Negative for activity change, appetite change, chills, diaphoresis, fever and unexpected weight change. HENT: Negative for drooling, hearing loss, mouth sores, sore throat, trouble swallowing and voice change. Eyes: Negative for discharge and visual disturbance. Respiratory: Positive for cough and shortness of breath. Negative for apnea, choking, chest tightness, wheezing and stridor. Cardiovascular: Negative for chest pain, palpitations and leg swelling. Gastrointestinal: Negative for abdominal distention, abdominal pain, anal bleeding, blood in stool, constipation, diarrhea, nausea, rectal pain and vomiting. Genitourinary: Negative for difficulty urinating, dysuria, enuresis, frequency and hematuria. Musculoskeletal: Positive for back pain. Negative for arthralgias, gait problem, joint swelling and myalgias. Skin: Negative for color change, pallor, rash and wound. Allergic/Immunologic: Negative for food allergies and immunocompromised state. Neurological: Negative for dizziness, tremors, seizures, syncope, facial asymmetry, speech difficulty, weakness, light-headedness, numbness and headaches. Hematological: Negative for adenopathy. Does not bruise/bleed easily. Psychiatric/Behavioral: Positive for sleep disturbance. Negative for agitation, behavioral problems, confusion, decreased concentration, dysphoric mood, hallucinations, self-injury and suicidal ideas. The patient is not nervous/anxious and is not hyperactive. Objective:   /68   Pulse 68   Resp 18   Wt 144 lb (65.3 kg)   SpO2 96%   BMI 25.51 kg/m²     Physical Exam  Constitutional:       General: She is not in acute distress.      Appearance: She is well-developed. She is not diaphoretic. HENT:      Head: Normocephalic and atraumatic. Right Ear: External ear normal.      Left Ear: External ear normal.      Nose: Nose normal.      Mouth/Throat:      Pharynx: No oropharyngeal exudate. Eyes:      General: No scleral icterus. Right eye: No discharge. Left eye: No discharge. Conjunctiva/sclera: Conjunctivae normal.      Pupils: Pupils are equal, round, and reactive to light. Neck:      Thyroid: No thyromegaly. Vascular: No JVD. Trachea: No tracheal deviation. Cardiovascular:      Rate and Rhythm: Normal rate and regular rhythm. Heart sounds: Normal heart sounds. Pulmonary:      Effort: Pulmonary effort is normal. No respiratory distress. Breath sounds: No stridor. Examination of the left-lower field reveals decreased breath sounds. Decreased breath sounds present. No wheezing or rales. Chest:      Chest wall: No tenderness. Abdominal:      General: Bowel sounds are normal. There is no distension. Palpations: Abdomen is soft. There is no mass. Tenderness: There is no abdominal tenderness. There is no guarding or rebound. Musculoskeletal:         General: No tenderness or deformity. Normal range of motion. Cervical back: Normal range of motion and neck supple. Lymphadenopathy:      Cervical: No cervical adenopathy. Skin:     General: Skin is warm and dry. Findings: No erythema or rash. Neurological:      Mental Status: She is alert and oriented to person, place, and time. Psychiatric:         Behavior: Behavior normal.         Thought Content: Thought content normal.           Assessment:       Diagnosis Orders   1. Chronic obstructive pulmonary disease, unspecified COPD type (HCC)  BREO ELLIPTA 100-25 MCG/INH AEPB inhaler    PROAIR  (90 Base) MCG/ACT inhaler   2. Anorexia  OLANZapine (ZYPREXA) 5 MG tablet   3.  Malignant neoplasm of lung, unspecified laterality, unspecified severe anxiety  - Continue olanzapine 5 mg po at [facility], APRN - CNP

## 2021-06-17 ENCOUNTER — TELEPHONE (OUTPATIENT)
Dept: PALLATIVE CARE | Age: 71
End: 2021-06-17

## 2021-06-17 DIAGNOSIS — J44.9 CHRONIC OBSTRUCTIVE PULMONARY DISEASE, UNSPECIFIED COPD TYPE (HCC): ICD-10-CM

## 2021-06-17 DIAGNOSIS — R06.02 SOB (SHORTNESS OF BREATH): ICD-10-CM

## 2021-06-17 NOTE — TELEPHONE ENCOUNTER
Pt kayla seen by Kathrin Leyden on 6/10/2021, Pt's pharmacy called stating that the patient has been using 2 puffs of her [facility] Rx, and the Rx they have on file states that she is to take 1 puff of this medication daily. Please call the pharmacy and clarify directions of send a New Rx with new instructions.

## 2021-07-15 ENCOUNTER — OFFICE VISIT (OUTPATIENT)
Dept: PALLATIVE CARE | Age: 71
End: 2021-07-15
Payer: MEDICARE

## 2021-07-15 VITALS
TEMPERATURE: 97.7 F | OXYGEN SATURATION: 95 % | DIASTOLIC BLOOD PRESSURE: 78 MMHG | SYSTOLIC BLOOD PRESSURE: 145 MMHG | RESPIRATION RATE: 18 BRPM | HEART RATE: 64 BPM | WEIGHT: 143 LBS | BODY MASS INDEX: 25.33 KG/M2

## 2021-07-15 DIAGNOSIS — C34.90 MALIGNANT NEOPLASM OF LUNG, UNSPECIFIED LATERALITY, UNSPECIFIED PART OF LUNG (HCC): ICD-10-CM

## 2021-07-15 DIAGNOSIS — F41.9 ANXIETY: ICD-10-CM

## 2021-07-15 DIAGNOSIS — J01.90 ACUTE NON-RECURRENT SINUSITIS, UNSPECIFIED LOCATION: ICD-10-CM

## 2021-07-15 DIAGNOSIS — R06.02 SOB (SHORTNESS OF BREATH): Primary | ICD-10-CM

## 2021-07-15 DIAGNOSIS — G62.9 NEUROPATHY: ICD-10-CM

## 2021-07-15 DIAGNOSIS — Z51.5 PALLIATIVE CARE ENCOUNTER: ICD-10-CM

## 2021-07-15 PROCEDURE — 3017F COLORECTAL CA SCREEN DOC REV: CPT | Performed by: NURSE PRACTITIONER

## 2021-07-15 PROCEDURE — 1090F PRES/ABSN URINE INCON ASSESS: CPT | Performed by: NURSE PRACTITIONER

## 2021-07-15 PROCEDURE — 99214 OFFICE O/P EST MOD 30 MIN: CPT | Performed by: NURSE PRACTITIONER

## 2021-07-15 PROCEDURE — G8417 CALC BMI ABV UP PARAM F/U: HCPCS | Performed by: NURSE PRACTITIONER

## 2021-07-15 PROCEDURE — 4004F PT TOBACCO SCREEN RCVD TLK: CPT | Performed by: NURSE PRACTITIONER

## 2021-07-15 PROCEDURE — G8428 CUR MEDS NOT DOCUMENT: HCPCS | Performed by: NURSE PRACTITIONER

## 2021-07-15 PROCEDURE — 4040F PNEUMOC VAC/ADMIN/RCVD: CPT | Performed by: NURSE PRACTITIONER

## 2021-07-15 PROCEDURE — 1123F ACP DISCUSS/DSCN MKR DOCD: CPT | Performed by: NURSE PRACTITIONER

## 2021-07-15 PROCEDURE — G8399 PT W/DXA RESULTS DOCUMENT: HCPCS | Performed by: NURSE PRACTITIONER

## 2021-07-15 RX ORDER — MONTELUKAST SODIUM 10 MG/1
10 TABLET ORAL NIGHTLY
Qty: 90 TABLET | Refills: 3 | Status: SHIPPED | OUTPATIENT
Start: 2021-07-15 | End: 2022-10-12 | Stop reason: SDUPTHER

## 2021-07-15 RX ORDER — DULOXETIN HYDROCHLORIDE 30 MG/1
30 CAPSULE, DELAYED RELEASE ORAL DAILY
Qty: 30 CAPSULE | Refills: 0 | Status: SHIPPED | OUTPATIENT
Start: 2021-07-15 | End: 2021-08-10 | Stop reason: SDUPTHER

## 2021-07-15 RX ORDER — LEVOFLOXACIN 500 MG/1
500 TABLET, FILM COATED ORAL DAILY
Qty: 10 TABLET | Refills: 0 | Status: SHIPPED | OUTPATIENT
Start: 2021-07-15 | End: 2022-04-27 | Stop reason: SDUPTHER

## 2021-07-15 ASSESSMENT — ENCOUNTER SYMPTOMS
CHEST TIGHTNESS: 0
STRIDOR: 0
COLOR CHANGE: 0
WHEEZING: 0
ABDOMINAL DISTENTION: 0
CHOKING: 0
TROUBLE SWALLOWING: 0
CONSTIPATION: 0
ABDOMINAL PAIN: 0
ANAL BLEEDING: 0
VOICE CHANGE: 0
BLOOD IN STOOL: 0
SORE THROAT: 0
APNEA: 0
EYE DISCHARGE: 0
SHORTNESS OF BREATH: 1
VOMITING: 0
COUGH: 1
DIARRHEA: 0
RECTAL PAIN: 0
NAUSEA: 0
BACK PAIN: 1

## 2021-07-15 NOTE — PROGRESS NOTES
Subjective:      Patient Id:  Rancho Campos is a 80 y.o. female who presents today with   Chief Complaint   Patient presents with    Shortness of Breath          Shortness of Breath  Pertinent negatives include no abdominal pain, chest pain, fever, headaches, leg swelling, rash, sore throat, vomiting or wheezing. Symptom management follow up rt lung cancer, COPD, HTN, HLD, hypothyroidism, DDD, anxiety, depression, demeanor calm and relaxed, NAD, no sedation. She is coughing, increased SOB, headache, increased PND, sinus pain and pressure,  No fever, chills, mylalgias. Sh is fully vaccinated against Covid. Weight stable, Appetite is good. She has gained six pounds. No GI or  concerns. No conor blood in stool or urine. She feels both insomnia and anxiety are improved with duloxetine and clonazepam.    Past Medical History:   Diagnosis Date    Anxiety     Asthma     Cervical radiculopathy at C7 9/28/2013    CERVICAL SPONDYLOSIS, WITH COMBINATION OF DISK BULGING AND    Cervical radiculopathy at C7     SUBTLE PER EMG    Cervical spondylosis 10/12/13    PER MRI    Chronic back pain     COPD (chronic obstructive pulmonary disease) (HCC)     Chronic bonchitis    CTS (carpal tunnel syndrome) 9/28/13    PER EMG    Depression     Diverticulitis     GERD (gastroesophageal reflux disease)     Headache(784.0)     Hyperlipidemia     Hypertension     Hypothyroidism due to medication 5/8/2019    Irritable bowel syndrome     Malignant neoplasm of upper lobe of left lung ([facility] [address] 1/7/2019    Osteoarthritis      Past Surgical History:   Procedure Laterality Date    APPENDECTOMY  1980    CARDIAC CATHETERIZATION  5/16/13,11/8/2013    DR. GRAVES    COLON SURGERY      HAND SURGERY  1996    HYSTERECTOMY  1980    TONSILLECTOMY  1963     Social History     Socioeconomic History    Marital status:       Spouse name: Not on file    Number of children: Not on file    Years of education: Not on file    Highest education level: Not on file   Occupational History    Not on file   Tobacco Use    Smoking status: Heavy Tobacco Smoker     Packs/day: 0.50     Years: 48.00     Pack years: 24.00     Types: Cigarettes     Last attempt to quit: 10/15/2019     Years since quittin.7    Smokeless tobacco: Never Used   Vaping Use    Vaping Use: Some days    Substances: Unknown   Substance and Sexual Activity    Alcohol use: Yes     Comment: sporadic, occasional    Drug use: Never    Sexual activity: Not on file   Other Topics Concern    Not on file   Social History Narrative    Not on file     Social Determinants of Health     Financial Resource Strain:     Difficulty of Paying Living Expenses:    Food Insecurity:     Worried About Running Out of Food in the Last Year:     Ran Out of Food in the Last Year:    Transportation Needs:     Lack of Transportation (Medical):      Lack of Transportation (Non-Medical):    Physical Activity:     Days of Exercise per Week:     Minutes of Exercise per Session:    Stress:     Feeling of Stress :    Social Connections:     Frequency of Communication with Friends and Family:     Frequency of Social Gatherings with Friends and Family:     Attends Pentecostal Services:     Active Member of Clubs or Organizations:     Attends Club or Organization Meetings:     Marital Status:    Intimate Partner Violence:     Fear of Current or Ex-Partner:     Emotionally Abused:     Physically Abused:     Sexually Abused:      Family History   Problem Relation Age of Onset    Stroke Mother     Heart Attack Mother     Cancer Father         LUNG    Cancer Paternal Uncle         lung    Breast Cancer Maternal Grandmother [de-identified]    Cancer Paternal Grandfather         lung    Cancer Paternal Uncle         lung    Cancer Paternal Uncle         lung    Cancer Paternal Uncle         lung    Cancer Paternal Uncle         lung    Cancer Paternal Uncle         lung     No Known Allergies  Current Outpatient Medications on File Prior to Visit   Medication Sig Dispense Refill    gabapentin (NEURONTIN) 400 MG capsule Take 1 capsule by mouth every evening for 90 days. (Patient taking differently: Take 400 mg by mouth 3 times daily. ) 90 capsule 3    acetaminophen (TYLENOL) 325 MG tablet Take 650 mg by mouth every 6 hours as needed for Pain      ondansetron (ZOFRAN) 4 MG tablet Take 1 tablet by mouth daily as needed for Nausea or Vomiting 30 tablet 0    ibuprofen (ADVIL) 200 MG tablet Take 400 mg by mouth every 8 hours as needed for Pain      simethicone (MYLICON) 80 MG chewable tablet Take 1 tablet by mouth 4 times daily as needed for Flatulence 180 tablet 3    hydrALAZINE (APRESOLINE) 25 MG tablet Take 1 tablet by mouth daily      sennosides-docusate sodium (SENOKOT-S) 8.6-50 MG tablet Take 1 tablet by mouth 2 times daily 120 tablet 3    aspirin (ECOTRIN LOW STRENGTH) 81 MG EC tablet Take 81 mg by mouth daily      fluticasone (FLONASE) 50 MCG/ACT nasal spray 1 spray by Nasal route daily. 1 Bottle 6    OLANZapine (ZYPREXA) 5 MG tablet Take 1 tablet by mouth nightly 30 tablet 3    BREO ELLIPTA 100-25 MCG/INH AEPB inhaler Inhale 1 puff into the lungs daily 30 each 0    PROAIR  (90 Base) MCG/ACT inhaler Inhale 1 puff into the lungs 4 times daily 1 Inhaler 5    clonazePAM (KLONOPIN) 0.5 MG tablet Take 1 tablet by mouth nightly as needed for Anxiety for up to 15 days.  15 tablet 0    omeprazole (PRILOSEC) 40 MG delayed release capsule Take 1 capsule by mouth every morning (before breakfast) 30 capsule 3    ipratropium-albuterol (DUONEB) 0.5-2.5 (3) MG/3ML SOLN nebulizer solution Inhale 3 mLs into the lungs every 4 hours 540 mL 0    Handicap Placard MISC by Does not apply route Diagnoses: LUNG CANCER, DEGENERATIVE ARTHRITIS, CANCER PAIN   EXPIRATION: 4/12/2024 1 each 0    lisinopril-hydrochlorothiazide (PRINZIDE;ZESTORETIC) 20-25 MG per tablet Take 1 tablet by mouth daily (Patient not taking: Reported on 7/15/2021)  0    metoprolol succinate (TOPROL XL) 50 MG extended release tablet Take 50 mg by mouth daily (Patient not taking: Reported on 4/95/8950)      folic acid (FOLVITE) 1 MG tablet Take 1 tablet by mouth daily 30 tablet 5     No current facility-administered medications on file prior to visit. Review of Systems   Constitutional: Positive for fatigue. Negative for activity change, appetite change, chills, diaphoresis, fever and unexpected weight change. HENT: Negative for drooling, hearing loss, mouth sores, sore throat, trouble swallowing and voice change. Eyes: Negative for discharge and visual disturbance. Respiratory: Positive for cough and shortness of breath. Negative for apnea, choking, chest tightness, wheezing and stridor. Cardiovascular: Negative for chest pain, palpitations and leg swelling. Gastrointestinal: Negative for abdominal distention, abdominal pain, anal bleeding, blood in stool, constipation, diarrhea, nausea, rectal pain and vomiting. Genitourinary: Negative for difficulty urinating, dysuria, enuresis, frequency and hematuria. Musculoskeletal: Positive for back pain. Negative for arthralgias, gait problem, joint swelling and myalgias. Skin: Negative for color change, pallor, rash and wound. Allergic/Immunologic: Negative for food allergies and immunocompromised state. Neurological: Negative for dizziness, tremors, seizures, syncope, facial asymmetry, speech difficulty, weakness, light-headedness, numbness and headaches. Hematological: Negative for adenopathy. Does not bruise/bleed easily. Psychiatric/Behavioral: Positive for sleep disturbance. Negative for agitation, behavioral problems, confusion, decreased concentration, dysphoric mood, hallucinations, self-injury and suicidal ideas. The patient is not nervous/anxious and is not hyperactive.           Objective:   BP (!) 145/78   Pulse 64   Temp 97.7 °F (36.5 °C) Resp 18   Wt 143 lb (64.9 kg)   SpO2 95%   BMI 25.33 kg/m²     Physical Exam  Constitutional:       General: She is not in acute distress. Appearance: She is well-developed. She is not diaphoretic. HENT:      Head: Normocephalic and atraumatic. Right Ear: Hearing and external ear normal.      Left Ear: Hearing and external ear normal.      Nose:      Right Sinus: Maxillary sinus tenderness and frontal sinus tenderness present. Left Sinus: Maxillary sinus tenderness and frontal sinus tenderness present. Mouth/Throat:      Lips: Pink. Mouth: Mucous membranes are moist.      Comments: PND  Eyes:      General: Lids are normal. No scleral icterus. Right eye: No discharge. Left eye: No discharge. Conjunctiva/sclera: Conjunctivae normal.      Pupils: Pupils are equal, round, and reactive to light. Neck:      Thyroid: No thyromegaly. Vascular: No JVD. Trachea: No tracheal deviation. Cardiovascular:      Rate and Rhythm: Normal rate and regular rhythm. Heart sounds: Normal heart sounds. Pulmonary:      Effort: Pulmonary effort is normal. No respiratory distress. Breath sounds: No stridor. Examination of the left-lower field reveals decreased breath sounds. Decreased breath sounds and wheezing present. No rales. Chest:      Chest wall: No tenderness. Abdominal:      General: Bowel sounds are normal. There is no distension. Palpations: Abdomen is soft. There is no mass. Tenderness: There is no abdominal tenderness. There is no guarding or rebound. Musculoskeletal:         General: No tenderness or deformity. Normal range of motion. Cervical back: Normal range of motion and neck supple. Lymphadenopathy:      Cervical: No cervical adenopathy. Skin:     General: Skin is warm and dry. Findings: No erythema or rash. Neurological:      Mental Status: She is alert and oriented to person, place, and time.    Psychiatric: Behavior: Behavior normal.         Thought Content: Thought content normal.           Assessment:       Diagnosis Orders   1. SOB (shortness of breath)     2. Anxiety     3. Palliative care encounter     4. Neuropathy     5. Acute non-recurrent sinusitis, unspecified location     6. Malignant neoplasm of lung, unspecified laterality, unspecified part of lung ([facility] [address]            Plan:    Neuropathic pain rt DJD and carpal tunnel    - Continue Gabapentin 400 mg po tid  - Increase Duloxetine 30 mg po at HS  - Discussed possibility of muscle spasm from stress irritating nerve, continue warm compress  - Heat or ice to painful areas, whichever is preferred  - Gentle ROM exercises, consider PT, declined at this time due to transportation  Pt is tolerating current pain meds without adverse effects or over sedation. Lowest effective dose used. Pt advised to call and notify palliative care for any adverse effects or sedation  Pt is able to maintain adequate functional level and participate in ADLs  OARRS reviewed. There is no indication of aberrant behavior  Pt advised to call for increasing or uncontrolled pain  Risk vs benefit assessed. Pt educated on risk of addiction. Pt advised to take only as prescribed and not to change frequency of pain meds without consulting palliative care first.      SOB rt COPD and Lung cancer  - Follow with pulmonology  - Continue COPD Directed therapies   - Call for increased SOB, cough, sputum production, excessive fatigue, fever, chills, or myalgias  - Gentle exercise as tolerated  -  Rinse out mouth after inhaler use.     Anxiety   - Continue duloxetine 20 mg po daily  - Continue Clonazepam 0.5 mg prn severe anxiety   - Continue olanzapine 5 mg po at HS    Sinusitis  - restart Singulair 10 mg po at HS  - Levaquin 500 mg po for 7 days  - Saline nasal wash    Lung Cancer  Follow with oncology      CHRISTO Almaguer - CNP

## 2021-07-19 DIAGNOSIS — R06.02 SOB (SHORTNESS OF BREATH): ICD-10-CM

## 2021-07-19 DIAGNOSIS — J44.9 CHRONIC OBSTRUCTIVE PULMONARY DISEASE, UNSPECIFIED COPD TYPE (HCC): ICD-10-CM

## 2021-07-19 RX ORDER — FLUTICASONE FUROATE AND VILANTEROL TRIFENATATE 100; 25 UG/1; UG/1
1 POWDER RESPIRATORY (INHALATION) DAILY
Qty: 30 EACH | Refills: 0 | Status: SHIPPED | OUTPATIENT
Start: 2021-07-19 | End: 2021-12-16

## 2021-07-19 NOTE — TELEPHONE ENCOUNTER
Rx requested:  Requested Prescriptions     Pending Prescriptions Disp Refills    BREO ELLIPTA 100-25 MCG/INH AEPB inhaler 30 each 0     Sig: Inhale 1 puff into the lungs daily       Last Office Visit:   7/15/2021      Last filled:  6/10/2021      Next Visit Date:  Future Appointments   Date Time Provider Renetta Bauer   8/26/2021  3:00 PM Suni Deluna APRN - CNP [address] [facility]

## 2021-08-10 RX ORDER — DULOXETIN HYDROCHLORIDE 30 MG/1
30 CAPSULE, DELAYED RELEASE ORAL DAILY
Qty: 30 CAPSULE | Refills: 0 | Status: SHIPPED | OUTPATIENT
Start: 2021-08-10 | End: 2021-09-09

## 2021-08-10 NOTE — TELEPHONE ENCOUNTER
Rx requested:  Requested Prescriptions     Pending Prescriptions Disp Refills    DULoxetine (CYMBALTA) 30 MG extended release capsule 30 capsule 0     Sig: Take 1 capsule by mouth daily       Last Office Visit:   7/15/2021      Last filled:  7/15/2021    Next Visit Date:  Future Appointments   Date Time Provider Renetta Bauer   8/26/2021  3:00 PM CHRISTO Silva - CNP [address] [facility]

## 2021-09-09 ENCOUNTER — OFFICE VISIT (OUTPATIENT)
Dept: PALLATIVE CARE | Age: 71
End: 2021-09-09
Payer: MEDICARE

## 2021-09-09 VITALS
HEART RATE: 94 BPM | TEMPERATURE: 97.6 F | DIASTOLIC BLOOD PRESSURE: 78 MMHG | RESPIRATION RATE: 18 BRPM | OXYGEN SATURATION: 98 % | SYSTOLIC BLOOD PRESSURE: 134 MMHG

## 2021-09-09 DIAGNOSIS — R51.9 NONINTRACTABLE HEADACHE, UNSPECIFIED CHRONICITY PATTERN, UNSPECIFIED HEADACHE TYPE: ICD-10-CM

## 2021-09-09 DIAGNOSIS — G62.9 NEUROPATHY: ICD-10-CM

## 2021-09-09 DIAGNOSIS — R53.83 OTHER FATIGUE: ICD-10-CM

## 2021-09-09 DIAGNOSIS — F41.9 ANXIETY: ICD-10-CM

## 2021-09-09 DIAGNOSIS — C34.90 MALIGNANT NEOPLASM OF LUNG, UNSPECIFIED LATERALITY, UNSPECIFIED PART OF LUNG (HCC): ICD-10-CM

## 2021-09-09 DIAGNOSIS — R06.02 SOB (SHORTNESS OF BREATH): ICD-10-CM

## 2021-09-09 DIAGNOSIS — J44.9 CHRONIC OBSTRUCTIVE PULMONARY DISEASE, UNSPECIFIED COPD TYPE (HCC): ICD-10-CM

## 2021-09-09 DIAGNOSIS — Z51.5 PALLIATIVE CARE PATIENT: ICD-10-CM

## 2021-09-09 DIAGNOSIS — M79.10 MYALGIA: Primary | ICD-10-CM

## 2021-09-09 PROCEDURE — 99214 OFFICE O/P EST MOD 30 MIN: CPT | Performed by: NURSE PRACTITIONER

## 2021-09-09 PROCEDURE — G8417 CALC BMI ABV UP PARAM F/U: HCPCS | Performed by: NURSE PRACTITIONER

## 2021-09-09 PROCEDURE — G8427 DOCREV CUR MEDS BY ELIG CLIN: HCPCS | Performed by: NURSE PRACTITIONER

## 2021-09-09 PROCEDURE — 3017F COLORECTAL CA SCREEN DOC REV: CPT | Performed by: NURSE PRACTITIONER

## 2021-09-09 PROCEDURE — 3023F SPIROM DOC REV: CPT | Performed by: NURSE PRACTITIONER

## 2021-09-09 PROCEDURE — 1123F ACP DISCUSS/DSCN MKR DOCD: CPT | Performed by: NURSE PRACTITIONER

## 2021-09-09 PROCEDURE — G8399 PT W/DXA RESULTS DOCUMENT: HCPCS | Performed by: NURSE PRACTITIONER

## 2021-09-09 PROCEDURE — 4004F PT TOBACCO SCREEN RCVD TLK: CPT | Performed by: NURSE PRACTITIONER

## 2021-09-09 PROCEDURE — G8926 SPIRO NO PERF OR DOC: HCPCS | Performed by: NURSE PRACTITIONER

## 2021-09-09 PROCEDURE — 4040F PNEUMOC VAC/ADMIN/RCVD: CPT | Performed by: NURSE PRACTITIONER

## 2021-09-09 PROCEDURE — 1090F PRES/ABSN URINE INCON ASSESS: CPT | Performed by: NURSE PRACTITIONER

## 2021-09-09 RX ORDER — DULOXETIN HYDROCHLORIDE 60 MG/1
60 CAPSULE, DELAYED RELEASE ORAL DAILY
Qty: 90 CAPSULE | Refills: 1 | Status: SHIPPED | OUTPATIENT
Start: 2021-09-09 | End: 2021-10-28 | Stop reason: ALTCHOICE

## 2021-09-09 RX ORDER — DOXYCYCLINE HYCLATE 100 MG
100 TABLET ORAL 2 TIMES DAILY
Qty: 20 TABLET | Refills: 0 | Status: SHIPPED | OUTPATIENT
Start: 2021-09-09 | End: 2021-09-19

## 2021-09-09 ASSESSMENT — ENCOUNTER SYMPTOMS
VOMITING: 0
ABDOMINAL DISTENTION: 0
RECTAL PAIN: 0
EYE DISCHARGE: 0
NAUSEA: 0
COLOR CHANGE: 0
APNEA: 0
SORE THROAT: 0
STRIDOR: 0
DIARRHEA: 1
VOICE CHANGE: 0
SINUS PRESSURE: 1
CHEST TIGHTNESS: 0
SHORTNESS OF BREATH: 1
COUGH: 1
ANAL BLEEDING: 0
BACK PAIN: 1
ABDOMINAL PAIN: 0
CONSTIPATION: 0
CHOKING: 0
SINUS PAIN: 1
BLOOD IN STOOL: 0
EYE PAIN: 1
WHEEZING: 0
TROUBLE SWALLOWING: 0

## 2021-09-09 NOTE — PROGRESS NOTES
Subjective:      Patient Id:  Chad Childers is a 80 y.o. female who presents today with   Chief Complaint   Patient presents with    Follow-up             Symptom management follow up rt lung cancer, COPD, HTN, HLD, hypothyroidism, DDD, anxiety, depression, demeanor calm and relaxed, NAD, no sedation. She is coughing, increased SOB, headache, increased PND, sinus pain and pressure,chills, myalgias, faigue, sore throat. No fever. chills, mylalgias. She is fully vaccinated against Covid and has a history of recurrent sinus infections. Started three days ago. Weight stable, Appetite is good. She has gained six pounds. She started having diarrhea three days ago. No conor blood in stool or urine. She feels both insomnia and anxiety are improved with duloxetine and clonazepam, her brother  one week ago. She has increased Gabapentin on her own and feels it controls her multi joint and neuropathic pain better. She is taking 800 mg po tid and has tolerated it well. Past Medical History:   Diagnosis Date    Anxiety     Asthma     Cervical radiculopathy at C7 2013    CERVICAL SPONDYLOSIS, WITH COMBINATION OF DISK BULGING AND    Cervical radiculopathy at C7     SUBTLE PER EMG    Cervical spondylosis 10/12/13    PER MRI    Chronic back pain     COPD (chronic obstructive pulmonary disease) (HCC)     Chronic bonchitis    CTS (carpal tunnel syndrome) 13    PER EMG    Depression     Diverticulitis     GERD (gastroesophageal reflux disease)     Headache(784.0)     Hyperlipidemia     Hypertension     Hypothyroidism due to medication 2019    Irritable bowel syndrome     Malignant neoplasm of upper lobe of left lung ([facility] [address] 2019    Osteoarthritis      Past Surgical History:   Procedure Laterality Date    APPENDECTOMY      CARDIAC CATHETERIZATION  13,2013    DR. Alice Schlatter COLON SURGERY      HAND SURGERY     [address]   [address]  Cancer Paternal Uncle         lung    Cancer Paternal Uncle         lung    Cancer Paternal Uncle         lung    Cancer Paternal Uncle         lung     No Known Allergies  Current Outpatient Medications on File Prior to Visit   Medication Sig Dispense Refill    montelukast (SINGULAIR) 10 MG tablet Take 1 tablet by mouth nightly 90 tablet 3    acetaminophen (TYLENOL) 325 MG tablet Take 650 mg by mouth every 6 hours as needed for Pain      ondansetron (ZOFRAN) 4 MG tablet Take 1 tablet by mouth daily as needed for Nausea or Vomiting 30 tablet 0    ibuprofen (ADVIL) 200 MG tablet Take 400 mg by mouth every 8 hours as needed for Pain      Handicap Placard MISC by Does not apply route Diagnoses: LUNG CANCER, DEGENERATIVE ARTHRITIS, CANCER PAIN   EXPIRATION: 4/12/2024 1 each 0    simethicone (MYLICON) 80 MG chewable tablet Take 1 tablet by mouth 4 times daily as needed for Flatulence 180 tablet 3    hydrALAZINE (APRESOLINE) 25 MG tablet Take 1 tablet by mouth daily      aspirin (ECOTRIN LOW STRENGTH) 81 MG EC tablet Take 81 mg by mouth daily      lisinopril-hydrochlorothiazide (PRINZIDE;ZESTORETIC) 20-25 MG per tablet Take 1 tablet by mouth daily   0    metoprolol succinate (TOPROL XL) 50 MG extended release tablet Take 50 mg by mouth daily       BREO ELLIPTA 100-25 MCG/INH AEPB inhaler Inhale 1 puff into the lungs daily 30 each 0    OLANZapine (ZYPREXA) 5 MG tablet Take 1 tablet by mouth nightly 30 tablet 3    PROAIR  (90 Base) MCG/ACT inhaler Inhale 1 puff into the lungs 4 times daily 1 Inhaler 5    gabapentin (NEURONTIN) 400 MG capsule Take 1 capsule by mouth every evening for 90 days. (Patient taking differently: Take 800 mg by mouth 3 times daily. ) 90 capsule 3    clonazePAM (KLONOPIN) 0.5 MG tablet Take 1 tablet by mouth nightly as needed for Anxiety for up to 15 days.  15 tablet 0    omeprazole (PRILOSEC) 40 MG delayed release capsule Take 1 capsule by mouth every morning (before self-injury and suicidal ideas. The patient is not nervous/anxious and is not hyperactive. Objective:   /78   Pulse 94   Temp 97.6 °F (36.4 °C) (Temporal)   Resp 18   SpO2 98%     Physical Exam  Constitutional:       General: She is not in acute distress. Appearance: She is well-developed. She is not diaphoretic. HENT:      Head: Normocephalic and atraumatic. Right Ear: Hearing and external ear normal.      Left Ear: Hearing and external ear normal.      Nose:      Right Sinus: Maxillary sinus tenderness and frontal sinus tenderness present. Left Sinus: Maxillary sinus tenderness and frontal sinus tenderness present. Mouth/Throat:      Lips: Pink. Mouth: Mucous membranes are moist.      Comments: PND  Eyes:      General: Lids are normal. No scleral icterus. Right eye: No discharge. Left eye: No discharge. Conjunctiva/sclera:      Right eye: Right conjunctiva is injected. Left eye: Left conjunctiva is injected. Pupils: Pupils are equal, round, and reactive to light. Neck:      Thyroid: No thyromegaly. Vascular: No JVD. Trachea: No tracheal deviation. Cardiovascular:      Rate and Rhythm: Normal rate and regular rhythm. Heart sounds: Normal heart sounds. Pulmonary:      Effort: Pulmonary effort is normal. No respiratory distress. Breath sounds: No stridor. Examination of the left-lower field reveals decreased breath sounds. Decreased breath sounds present. No wheezing or rales. Chest:      Chest wall: No tenderness. Abdominal:      General: Bowel sounds are normal. There is no distension. Palpations: Abdomen is soft. There is no mass. Tenderness: There is no abdominal tenderness. There is no guarding or rebound. Musculoskeletal:         General: No tenderness or deformity. Normal range of motion. Cervical back: Normal range of motion and neck supple.    Lymphadenopathy:      Cervical: No cervical 5 mg po at HS     Possible Sinusitis  - Continue Singulair 10 mg po at HS  - Start Doxy  - Saline nasal wash  - Covid 19 test    Lung Cancer  Follow with oncology      Rose Marie Mena, CHRISTO - CNP

## 2021-09-13 ENCOUNTER — TELEPHONE (OUTPATIENT)
Dept: PALLATIVE CARE | Age: 71
End: 2021-09-13

## 2021-09-13 NOTE — TELEPHONE ENCOUNTER
Called Ángela and notified her of results. She is on third day of Doxy and is starting to feel better.

## 2021-10-28 ENCOUNTER — OFFICE VISIT (OUTPATIENT)
Dept: PALLATIVE CARE | Age: 71
End: 2021-10-28
Payer: MEDICARE

## 2021-10-28 VITALS
OXYGEN SATURATION: 97 % | HEART RATE: 75 BPM | DIASTOLIC BLOOD PRESSURE: 81 MMHG | SYSTOLIC BLOOD PRESSURE: 157 MMHG | RESPIRATION RATE: 18 BRPM

## 2021-10-28 DIAGNOSIS — M25.50 ARTHRALGIA, UNSPECIFIED JOINT: ICD-10-CM

## 2021-10-28 DIAGNOSIS — G89.29 CHRONIC BILATERAL LOW BACK PAIN WITH BILATERAL SCIATICA: ICD-10-CM

## 2021-10-28 DIAGNOSIS — R06.02 SOB (SHORTNESS OF BREATH): ICD-10-CM

## 2021-10-28 DIAGNOSIS — F41.9 ANXIETY: ICD-10-CM

## 2021-10-28 DIAGNOSIS — M54.41 CHRONIC BILATERAL LOW BACK PAIN WITH BILATERAL SCIATICA: ICD-10-CM

## 2021-10-28 DIAGNOSIS — Z51.5 PALLIATIVE CARE ENCOUNTER: ICD-10-CM

## 2021-10-28 DIAGNOSIS — M54.42 CHRONIC BILATERAL LOW BACK PAIN WITH BILATERAL SCIATICA: ICD-10-CM

## 2021-10-28 DIAGNOSIS — M19.90 OSTEOARTHRITIS, UNSPECIFIED OSTEOARTHRITIS TYPE, UNSPECIFIED SITE: ICD-10-CM

## 2021-10-28 DIAGNOSIS — C34.12 MALIGNANT NEOPLASM OF UPPER LOBE OF LEFT LUNG (HCC): ICD-10-CM

## 2021-10-28 DIAGNOSIS — J44.9 CHRONIC OBSTRUCTIVE PULMONARY DISEASE, UNSPECIFIED COPD TYPE (HCC): ICD-10-CM

## 2021-10-28 DIAGNOSIS — G62.9 NEUROPATHY: Primary | ICD-10-CM

## 2021-10-28 PROCEDURE — G8484 FLU IMMUNIZE NO ADMIN: HCPCS | Performed by: NURSE PRACTITIONER

## 2021-10-28 PROCEDURE — 1123F ACP DISCUSS/DSCN MKR DOCD: CPT | Performed by: NURSE PRACTITIONER

## 2021-10-28 PROCEDURE — 3017F COLORECTAL CA SCREEN DOC REV: CPT | Performed by: NURSE PRACTITIONER

## 2021-10-28 PROCEDURE — G8926 SPIRO NO PERF OR DOC: HCPCS | Performed by: NURSE PRACTITIONER

## 2021-10-28 PROCEDURE — G8399 PT W/DXA RESULTS DOCUMENT: HCPCS | Performed by: NURSE PRACTITIONER

## 2021-10-28 PROCEDURE — G8417 CALC BMI ABV UP PARAM F/U: HCPCS | Performed by: NURSE PRACTITIONER

## 2021-10-28 PROCEDURE — 4040F PNEUMOC VAC/ADMIN/RCVD: CPT | Performed by: NURSE PRACTITIONER

## 2021-10-28 PROCEDURE — 1090F PRES/ABSN URINE INCON ASSESS: CPT | Performed by: NURSE PRACTITIONER

## 2021-10-28 PROCEDURE — 4004F PT TOBACCO SCREEN RCVD TLK: CPT | Performed by: NURSE PRACTITIONER

## 2021-10-28 PROCEDURE — 3023F SPIROM DOC REV: CPT | Performed by: NURSE PRACTITIONER

## 2021-10-28 PROCEDURE — 99214 OFFICE O/P EST MOD 30 MIN: CPT | Performed by: NURSE PRACTITIONER

## 2021-10-28 PROCEDURE — G8428 CUR MEDS NOT DOCUMENT: HCPCS | Performed by: NURSE PRACTITIONER

## 2021-10-28 RX ORDER — HYDROCODONE BITARTRATE AND ACETAMINOPHEN 5; 325 MG/1; MG/1
1 TABLET ORAL EVERY 8 HOURS PRN
Qty: 45 TABLET | Refills: 0 | Status: SHIPPED | OUTPATIENT
Start: 2021-10-28 | End: 2021-11-12

## 2021-10-28 RX ORDER — GABAPENTIN 800 MG/1
800 TABLET ORAL 3 TIMES DAILY
Qty: 90 TABLET | Refills: 3 | Status: SHIPPED | OUTPATIENT
Start: 2021-10-28 | End: 2022-03-16 | Stop reason: SDUPTHER

## 2021-10-28 RX ORDER — DULOXETIN HYDROCHLORIDE 60 MG/1
60 CAPSULE, DELAYED RELEASE ORAL DAILY
Qty: 90 CAPSULE | Refills: 1 | Status: SHIPPED | OUTPATIENT
Start: 2021-10-28 | End: 2022-02-03 | Stop reason: SDUPTHER

## 2021-10-28 RX ORDER — MELOXICAM 7.5 MG/1
7.5 TABLET ORAL DAILY
Qty: 30 TABLET | Refills: 3 | Status: SHIPPED | OUTPATIENT
Start: 2021-10-28 | End: 2022-02-03 | Stop reason: SDUPTHER

## 2021-10-28 ASSESSMENT — ENCOUNTER SYMPTOMS
CHOKING: 0
EYE PAIN: 1
CONSTIPATION: 0
WHEEZING: 0
COLOR CHANGE: 0
ABDOMINAL PAIN: 0
COUGH: 1
VOICE CHANGE: 0
RECTAL PAIN: 0
EYE DISCHARGE: 0
SINUS PRESSURE: 1
NAUSEA: 0
APNEA: 0
ANAL BLEEDING: 0
SHORTNESS OF BREATH: 1
SINUS PAIN: 1
CHEST TIGHTNESS: 0
VOMITING: 0
TROUBLE SWALLOWING: 0
STRIDOR: 0
ABDOMINAL DISTENTION: 0
SORE THROAT: 0
DIARRHEA: 1
BLOOD IN STOOL: 0
BACK PAIN: 1

## 2021-10-28 NOTE — PROGRESS NOTES
Subjective:      Patient Id:  Connor Amador is a 80 y.o. female who presents today with   Chief Complaint   Patient presents with    Follow-up             Symptom management follow up rt lung cancer, COPD, HTN, HLD, hypothyroidism, DDD, anxiety, depression, demeanor calm and relaxed, NAD, no sedation. Gait is stiff and short, difficulty sitting and standing. Complains of severe pain and neuropathy in both neck, radiating out to left arm and  low back radiating down both legs. Exacerbation began after coming back from two weeks in [place]. She went to [place] to bury her brother. Weather change was extreme as well. Using gabapentin 800 mg po tid, Duloxetine 60 mg, Ibuprofen 600 mg po tid. Afraid to bathe as she may not get out of tub. Weight stable, Appetite is good. No GI or  complaints. No conor blood in stool or urine. She feels both insomnia and anxiety are improved with duloxetine     Past Medical History:   Diagnosis Date    Anxiety     Asthma     Cervical radiculopathy at C7 9/28/2013    CERVICAL SPONDYLOSIS, WITH COMBINATION OF DISK BULGING AND    Cervical radiculopathy at C7     SUBTLE PER EMG    Cervical spondylosis 10/12/13    PER MRI    Chronic back pain     COPD (chronic obstructive pulmonary disease) (HCC)     Chronic bonchitis    CTS (carpal tunnel syndrome) 9/28/13    PER EMG    Depression     Diverticulitis     GERD (gastroesophageal reflux disease)     Headache(784.0)     Hyperlipidemia     Hypertension     Hypothyroidism due to medication 5/8/2019    Irritable bowel syndrome     Malignant neoplasm of upper lobe of left lung ([facility] [address] 1/7/2019    Osteoarthritis      Past Surgical History:   Procedure Laterality Date    APPENDECTOMY  1980    CARDIAC CATHETERIZATION  5/16/13,11/8/2013    DR. GRAVES    COLON SURGERY      HAND SURGERY  1996    HYSTERECTOMY  1980    TONSILLECTOMY  1963     Social History     Socioeconomic History    Marital status:       Spouse name: Not on file    Number of children: Not on file    Years of education: Not on file    Highest education level: Not on file   Occupational History    Not on file   Tobacco Use    Smoking status: Heavy Tobacco Smoker     Packs/day: 0.50     Years: 48.00     Pack years: 24.00     Types: Cigarettes     Last attempt to quit: 10/15/2019     Years since quittin.0    Smokeless tobacco: Never Used   Vaping Use    Vaping Use: Some days    Substances: Unknown   Substance and Sexual Activity    Alcohol use: Yes     Comment: sporadic, occasional    Drug use: Never    Sexual activity: Not on file   Other Topics Concern    Not on file   Social History Narrative    Not on file     Social Determinants of Health     Financial Resource Strain:     Difficulty of Paying Living Expenses:    Food Insecurity:     Worried About Running Out of Food in the Last Year:     [address] in the Last Year:    Transportation Needs:     Lack of Transportation (Medical):      Lack of Transportation (Non-Medical):    Physical Activity:     Days of Exercise per Week:     Minutes of Exercise per Session:    Stress:     Feeling of Stress :    Social Connections:     Frequency of Communication with Friends and Family:     Frequency of Social Gatherings with Friends and Family:     Attends [employer] Services:     Active Member of Clubs or Organizations:     Attends Club or Organization Meetings:     Marital Status:    Intimate Partner Violence:     Fear of Current or Ex-Partner:     Emotionally Abused:     Physically Abused:     Sexually Abused:      Family History   Problem Relation Age of Onset    Stroke Mother     Heart Attack Mother     Cancer Father         LUNG    Cancer Paternal Uncle         lung    Breast Cancer Maternal Grandmother [de-identified]    Cancer Paternal Grandfather         lung    Cancer Paternal Uncle         lung    Cancer Paternal Uncle         lung    Cancer Paternal Uncle         lung    Cancer Paternal Uncle         lung    Cancer Paternal Uncle         lung     No Known Allergies  Current Outpatient Medications on File Prior to Visit   Medication Sig Dispense Refill    BREO ELLIPTA 100-25 MCG/INH AEPB inhaler Inhale 1 puff into the lungs daily 30 each 0    montelukast (SINGULAIR) 10 MG tablet Take 1 tablet by mouth nightly 90 tablet 3    OLANZapine (ZYPREXA) 5 MG tablet Take 1 tablet by mouth nightly 30 tablet 3    PROAIR  (90 Base) MCG/ACT inhaler Inhale 1 puff into the lungs 4 times daily 1 Inhaler 5    omeprazole (PRILOSEC) 40 MG delayed release capsule Take 1 capsule by mouth every morning (before breakfast) 30 capsule 3    ipratropium-albuterol (DUONEB) 0.5-2.5 (3) MG/3ML SOLN nebulizer solution Inhale 3 mLs into the lungs every 4 hours 540 mL 0    ondansetron (ZOFRAN) 4 MG tablet Take 1 tablet by mouth daily as needed for Nausea or Vomiting 30 tablet 0    ibuprofen (ADVIL) 200 MG tablet Take 400 mg by mouth every 8 hours as needed for Pain      Handicap Placard MISC by Does not apply route Diagnoses: LUNG CANCER, DEGENERATIVE ARTHRITIS, CANCER PAIN   EXPIRATION: 4/12/2024 1 each 0    simethicone (MYLICON) 80 MG chewable tablet Take 1 tablet by mouth 4 times daily as needed for Flatulence 180 tablet 3    hydrALAZINE (APRESOLINE) 25 MG tablet Take 1 tablet by mouth daily      aspirin (ECOTRIN LOW STRENGTH) 81 MG EC tablet Take 81 mg by mouth daily      lisinopril-hydrochlorothiazide (PRINZIDE;ZESTORETIC) 20-25 MG per tablet Take 1 tablet by mouth daily   0    metoprolol succinate (TOPROL XL) 50 MG extended release tablet Take 50 mg by mouth daily       folic acid (FOLVITE) 1 MG tablet Take 1 tablet by mouth daily 30 tablet 5    fluticasone (FLONASE) 50 MCG/ACT nasal spray 1 spray by Nasal route daily. 1 Bottle 6     No current facility-administered medications on file prior to visit. Review of Systems   Constitutional: Positive for activity change and fatigue. Negative for appetite change, chills, diaphoresis, fever and unexpected weight change. HENT: Positive for sinus pressure, sinus pain and sneezing. Negative for drooling, hearing loss, mouth sores, sore throat, trouble swallowing and voice change. Eyes: Positive for pain. Negative for discharge and visual disturbance. Respiratory: Positive for cough and shortness of breath. Negative for apnea, choking, chest tightness, wheezing and stridor. Cardiovascular: Negative for chest pain, palpitations and leg swelling. Gastrointestinal: Positive for diarrhea. Negative for abdominal distention, abdominal pain, anal bleeding, blood in stool, constipation, nausea, rectal pain and vomiting. Genitourinary: Negative for difficulty urinating, dysuria, enuresis, frequency and hematuria. Musculoskeletal: Positive for back pain and myalgias. Negative for arthralgias, gait problem and joint swelling. Skin: Negative for color change, pallor, rash and wound. Allergic/Immunologic: Negative for food allergies and immunocompromised state. Neurological: Positive for dizziness, light-headedness and headaches. Negative for tremors, seizures, syncope, facial asymmetry, speech difficulty, weakness and numbness. Hematological: Negative for adenopathy. Does not bruise/bleed easily. Psychiatric/Behavioral: Positive for sleep disturbance. Negative for agitation, behavioral problems, confusion, decreased concentration, dysphoric mood, hallucinations, self-injury and suicidal ideas. The patient is not nervous/anxious and is not hyperactive. Objective:   BP (!) 157/81   Pulse 75   Resp 18   SpO2 97%     Physical Exam  Constitutional:       General: She is not in acute distress. Appearance: She is well-developed. She is not diaphoretic. HENT:      Head: Normocephalic and atraumatic.       Right Ear: Hearing and external ear normal.      Left Ear: Hearing and external ear normal.      Nose:      Right Sinus: Maxillary sinus tenderness and frontal sinus tenderness present. Left Sinus: Maxillary sinus tenderness and frontal sinus tenderness present. Mouth/Throat:      Lips: Pink. Mouth: Mucous membranes are moist.      Comments: PND  Eyes:      General: Lids are normal. No scleral icterus. Right eye: No discharge. Left eye: No discharge. Conjunctiva/sclera:      Right eye: Right conjunctiva is injected. Left eye: Left conjunctiva is injected. Pupils: Pupils are equal, round, and reactive to light. Neck:      Thyroid: No thyromegaly. Vascular: No JVD. Trachea: No tracheal deviation. Cardiovascular:      Rate and Rhythm: Normal rate and regular rhythm. Heart sounds: Normal heart sounds. Pulmonary:      Effort: Pulmonary effort is normal. No respiratory distress. Breath sounds: No stridor. Examination of the left-lower field reveals decreased breath sounds. Decreased breath sounds present. No wheezing or rales. Chest:      Chest wall: No tenderness. Abdominal:      General: Bowel sounds are normal. There is no distension. Palpations: Abdomen is soft. There is no mass. Tenderness: There is no abdominal tenderness. There is no guarding or rebound. Musculoskeletal:         General: No tenderness or deformity. Normal range of motion. Cervical back: Normal range of motion and neck supple. Lymphadenopathy:      Cervical: No cervical adenopathy. Skin:     General: Skin is warm and dry. Findings: No erythema or rash. Neurological:      Mental Status: She is alert and oriented to person, place, and time. Psychiatric:         Behavior: Behavior normal.         Thought Content: Thought content normal.           Assessment:       Diagnosis Orders   1.  Neuropathy  gabapentin (NEURONTIN) 800 MG tablet    DULoxetine (CYMBALTA) 60 MG extended release capsule    HYDROcodone-acetaminophen (NORCO) 5-325 MG per tablet    [facility] Keren Mujica MD, Pain Management, [place]   2. Palliative care encounter  gabapentin (NEURONTIN) 800 MG tablet    DULoxetine (CYMBALTA) 60 MG extended release capsule    HYDROcodone-acetaminophen (NORCO) 5-325 MG per tablet    Beckie Pérez MD, Pain Management, [place]   3. Osteoarthritis, unspecified osteoarthritis type, unspecified site  DULoxetine (CYMBALTA) 60 MG extended release capsule    HYDROcodone-acetaminophen (NORCO) 5-325 MG per tablet    Beckie Pérez MD, Pain Management, [place]   4. Arthralgia, unspecified joint  HYDROcodone-acetaminophen (NORCO) 5-325 MG per tablet    Beckie Pérez MD, Pain Management, [place]   5. Chronic bilateral low back pain with bilateral sciatica  Beckie Pérez MD, Pain Management, [place]   6. Malignant neoplasm of upper lobe of left lung ([facility] [address]     7. Anxiety     8. SOB (shortness of breath)     9. Chronic obstructive pulmonary disease, unspecified COPD type ([facility] [address]            Plan:    Neuropathic pain rt DJD and carpal tunnel    - Continue Gabapentin 800 mg po tid  - Increase Duloxetine 60 mg po at HS  - Mobic 7.5 mg po daily with food  - Norco 5/325 mg po every  Hours prn moderate to severe pain  - Referral to pain management for interventional therapy  - Heat or ice to painful areas, whichever is preferred  - Gentle ROM exercises, consider PT, declined at this time due to transportation  Pt is tolerating current pain meds without adverse effects or over sedation. Lowest effective dose used. Pt advised to call and notify palliative care for any adverse effects or sedation  Pt is able to maintain adequate functional level and participate in ADLs  OARRS reviewed.  There is no indication of aberrant behavior  Pt advised to call for increasing or uncontrolled pain  Pt advised to take only as prescribed and not to change frequency of pain meds without consulting palliative care first.      SOB rt COPD and Lung cancer  - Follow with pulmonology  - Continue COPD Directed therapies   - Call for increased SOB, cough, sputum production, excessive fatigue, fever, chills, or myalgias  - Gentle exercise as tolerated  -  Rinse out mouth after inhaler use.     Anxiety   - Continue duloxetine 60 mg po daily  - Continue olanzapine 5 mg po at HS      Lung Cancer  Follow with oncology      Johnson Stack, APRN - CNP

## 2021-12-08 ENCOUNTER — HOSPITAL ENCOUNTER (OUTPATIENT)
Dept: RADIATION ONCOLOGY | Age: 71
Discharge: HOME | End: 2021-12-08
Payer: MEDICARE

## 2021-12-08 ENCOUNTER — HOSPITAL ENCOUNTER (OUTPATIENT)
Dept: RADIATION ONCOLOGY | Age: 71
Discharge: HOME | End: 2021-12-08
Attending: RADIOLOGY
Payer: MEDICARE

## 2021-12-08 VITALS
OXYGEN SATURATION: 95 % | RESPIRATION RATE: 18 BRPM | BODY MASS INDEX: 24.75 KG/M2 | WEIGHT: 145 LBS | HEART RATE: 111 BPM | SYSTOLIC BLOOD PRESSURE: 136 MMHG | DIASTOLIC BLOOD PRESSURE: 63 MMHG | HEIGHT: 64 IN | TEMPERATURE: 97.5 F

## 2021-12-08 PROCEDURE — 99213 OFFICE O/P EST LOW 20 MIN: CPT | Performed by: RADIOLOGY

## 2021-12-08 PROCEDURE — 77334 RADIATION TREATMENT AID(S): CPT | Performed by: RADIOLOGY

## 2021-12-08 PROCEDURE — 99205 OFFICE O/P NEW HI 60 MIN: CPT | Performed by: RADIOLOGY

## 2021-12-08 PROCEDURE — 77290 THER RAD SIMULAJ FIELD CPLX: CPT | Performed by: RADIOLOGY

## 2021-12-08 PROCEDURE — 77263 THER RADIOLOGY TX PLNG CPLX: CPT | Performed by: RADIOLOGY

## 2021-12-08 RX ORDER — HYDROCODONE BITARTRATE AND ACETAMINOPHEN 7.5; 325 MG/1; MG/1
1 TABLET ORAL EVERY 6 HOURS PRN
COMMUNITY
End: 2021-12-16

## 2021-12-08 NOTE — PROGRESS NOTES
Teaching & Instructions - Head & Neck  General  Simulation  Initial Treatment  Self-Care Info  Nutrition  Social Service    Site-Specific  Side Effects  Dental Hygiene  Esophagitis Mgmt  Fatigue Mgmt  Hearing Loss Mgmt  Laryngitis Mgmt  Mucositis Mgmt  Pain Mgmt  Skin Care  Taste Mgmt  Xerostomia Mgmt  Weight Loss Mgmt      Educational Handouts  Radiation Therapy & You  Site Specific Instructions  Radiation Oncology Dept Information  CBMS Program      Patient eager to learn, verbalized understanding of verbal education and handouts.

## 2021-12-08 NOTE — CONSULTS
NURSING ASSESSMENT     Date: 2021        Patient Name: Jamie Fleming     YOB: 1950      Age:  80 y.o. MRN: [id number]       Chaperone [] Yes   [x] No      Advance Directives:  Do you currently have completed advance directives (living will)? [x] Yes   [] No         *If yes, please bring us a copy for your records. *If no, would you like info or assistance in completing advance directives (living will)? [] Yes   [] No    Pain Score:   Pain Score (1-10): 7  Pain Location: Right neck  Pain Duration: Feels the pain when moving her neck   Pain Management/Control: Norco 7.5 mg      Is pain affecting your ability to take care of yourself or move throughout your home? [x] Yes   [] No    General: Weakness and Fatigue  Patient has gained weight [x] Yes   [] No  Patient has lost weight [] Yes   [x] No  How much weight in pounds and over what length of time:     Eyes (Ophthalmic): Blurred Vision due to cataracts     Skin (Dermatological): No Problems     ENT: Masses (Lumps) and Pain to right neck. H/o [place]     Respiratory: chronic dry cough     Cardiovascular: No Problems      Device   [] Yes   [x] No   Copy of Card Obtained [] Yes   [x] No    Gastrointestinal: Nausea and Diarrhea. Surgery for diverticulitis in     Genito-Urinary:   No Problems       Breast: No Problems     Musculoskeletal: Joint Pain and Back Pain    Neurological: Numbness, Tingling and Weakness in legs.  Gets dizzy at times upon standing      Hematological and Lymphatic: Swelling in Neck on the right side     Endocrine: No Problems     Gyn History:   /Para: 2/2  Age of Menarche: 16  Age of Menopause 30  Vaginal Bleeding: No   First Pregnancy: 20  Breast Feeding:  No  Last Menstrual period:  35  Oral Contraceptives: Yes     Number of years: unknown  Hormone Replacement therapy: No     Number of Years:   Last Pap Smear:   Last Mammogram:     A 10-point review of systems has been conducted and pertinent positives have been   recorded. All other review of systems are negative    Was the patient admitted during the course of treatment OR within 30 days of treatment?  No        Additional Comments:

## 2021-12-10 ENCOUNTER — HOSPITAL ENCOUNTER (OUTPATIENT)
Dept: RADIATION ONCOLOGY | Age: 71
Discharge: HOME | End: 2021-12-10
Attending: RADIOLOGY
Payer: MEDICARE

## 2021-12-10 PROCEDURE — 77280 THER RAD SIMULAJ FIELD SMPL: CPT | Performed by: RADIOLOGY

## 2021-12-10 PROCEDURE — 77334 RADIATION TREATMENT AID(S): CPT | Performed by: RADIOLOGY

## 2021-12-10 PROCEDURE — 77300 RADIATION THERAPY DOSE PLAN: CPT | Performed by: RADIOLOGY

## 2021-12-10 PROCEDURE — 77295 3-D RADIOTHERAPY PLAN: CPT | Performed by: RADIOLOGY

## 2021-12-13 ENCOUNTER — HOSPITAL ENCOUNTER (OUTPATIENT)
Dept: RADIATION ONCOLOGY | Age: 71
Discharge: HOME | End: 2021-12-13
Attending: RADIOLOGY
Payer: MEDICARE

## 2021-12-13 PROCEDURE — 77412 RADIATION TX DELIVERY LVL 3: CPT | Performed by: RADIOLOGY

## 2021-12-14 ENCOUNTER — HOSPITAL ENCOUNTER (OUTPATIENT)
Dept: RADIATION ONCOLOGY | Age: 71
Discharge: HOME | End: 2021-12-14
Attending: RADIOLOGY
Payer: MEDICARE

## 2021-12-14 PROCEDURE — 77412 RADIATION TX DELIVERY LVL 3: CPT | Performed by: RADIOLOGY

## 2021-12-15 ENCOUNTER — HOSPITAL ENCOUNTER (OUTPATIENT)
Dept: RADIATION ONCOLOGY | Age: 71
Discharge: HOME | End: 2021-12-15
Attending: RADIOLOGY
Payer: MEDICARE

## 2021-12-15 PROCEDURE — 77336 RADIATION PHYSICS CONSULT: CPT | Performed by: RADIOLOGY

## 2021-12-15 PROCEDURE — 77412 RADIATION TX DELIVERY LVL 3: CPT | Performed by: RADIOLOGY

## 2021-12-16 ENCOUNTER — OFFICE VISIT (OUTPATIENT)
Dept: PALLATIVE CARE | Age: 71
End: 2021-12-16
Payer: MEDICARE

## 2021-12-16 ENCOUNTER — HOSPITAL ENCOUNTER (OUTPATIENT)
Dept: RADIATION ONCOLOGY | Age: 71
Discharge: HOME | End: 2021-12-16
Attending: RADIOLOGY
Payer: MEDICARE

## 2021-12-16 ENCOUNTER — HOSPITAL ENCOUNTER (OUTPATIENT)
Dept: RADIATION ONCOLOGY | Age: 71
Discharge: HOME | End: 2021-12-16
Payer: MEDICARE

## 2021-12-16 VITALS
BODY MASS INDEX: 25.4 KG/M2 | HEART RATE: 70 BPM | OXYGEN SATURATION: 98 % | SYSTOLIC BLOOD PRESSURE: 150 MMHG | TEMPERATURE: 98.6 F | RESPIRATION RATE: 18 BRPM | DIASTOLIC BLOOD PRESSURE: 78 MMHG | WEIGHT: 148 LBS

## 2021-12-16 VITALS
WEIGHT: 148.4 LBS | RESPIRATION RATE: 18 BRPM | TEMPERATURE: 96.7 F | HEART RATE: 92 BPM | BODY MASS INDEX: 25.47 KG/M2 | SYSTOLIC BLOOD PRESSURE: 151 MMHG | OXYGEN SATURATION: 94 % | DIASTOLIC BLOOD PRESSURE: 67 MMHG

## 2021-12-16 DIAGNOSIS — F41.9 ANXIETY: ICD-10-CM

## 2021-12-16 DIAGNOSIS — Z51.5 PALLIATIVE CARE ENCOUNTER: ICD-10-CM

## 2021-12-16 DIAGNOSIS — R06.02 SOB (SHORTNESS OF BREATH): ICD-10-CM

## 2021-12-16 DIAGNOSIS — T45.1X5A CHEMOTHERAPY INDUCED NAUSEA AND VOMITING: ICD-10-CM

## 2021-12-16 DIAGNOSIS — J44.9 CHRONIC OBSTRUCTIVE PULMONARY DISEASE, UNSPECIFIED COPD TYPE (HCC): ICD-10-CM

## 2021-12-16 DIAGNOSIS — R25.2 SPASM: ICD-10-CM

## 2021-12-16 DIAGNOSIS — C34.90 MALIGNANT NEOPLASM OF LUNG, UNSPECIFIED LATERALITY, UNSPECIFIED PART OF LUNG (HCC): ICD-10-CM

## 2021-12-16 DIAGNOSIS — G89.3 NEOPLASM RELATED PAIN: Primary | ICD-10-CM

## 2021-12-16 DIAGNOSIS — R11.2 CHEMOTHERAPY INDUCED NAUSEA AND VOMITING: ICD-10-CM

## 2021-12-16 DIAGNOSIS — R07.0 THROAT PAIN: ICD-10-CM

## 2021-12-16 PROCEDURE — 77412 RADIATION TX DELIVERY LVL 3: CPT | Performed by: RADIOLOGY

## 2021-12-16 PROCEDURE — 3023F SPIROM DOC REV: CPT | Performed by: NURSE PRACTITIONER

## 2021-12-16 PROCEDURE — G8427 DOCREV CUR MEDS BY ELIG CLIN: HCPCS | Performed by: NURSE PRACTITIONER

## 2021-12-16 PROCEDURE — 99215 OFFICE O/P EST HI 40 MIN: CPT | Performed by: NURSE PRACTITIONER

## 2021-12-16 PROCEDURE — G8399 PT W/DXA RESULTS DOCUMENT: HCPCS | Performed by: NURSE PRACTITIONER

## 2021-12-16 PROCEDURE — 3017F COLORECTAL CA SCREEN DOC REV: CPT | Performed by: NURSE PRACTITIONER

## 2021-12-16 PROCEDURE — 4040F PNEUMOC VAC/ADMIN/RCVD: CPT | Performed by: NURSE PRACTITIONER

## 2021-12-16 PROCEDURE — G8417 CALC BMI ABV UP PARAM F/U: HCPCS | Performed by: NURSE PRACTITIONER

## 2021-12-16 PROCEDURE — 1123F ACP DISCUSS/DSCN MKR DOCD: CPT | Performed by: NURSE PRACTITIONER

## 2021-12-16 PROCEDURE — G8926 SPIRO NO PERF OR DOC: HCPCS | Performed by: NURSE PRACTITIONER

## 2021-12-16 PROCEDURE — 1090F PRES/ABSN URINE INCON ASSESS: CPT | Performed by: NURSE PRACTITIONER

## 2021-12-16 PROCEDURE — 77427 RADIATION TX MANAGEMENT X5: CPT | Performed by: RADIOLOGY

## 2021-12-16 PROCEDURE — G8484 FLU IMMUNIZE NO ADMIN: HCPCS | Performed by: NURSE PRACTITIONER

## 2021-12-16 PROCEDURE — 1036F TOBACCO NON-USER: CPT | Performed by: NURSE PRACTITIONER

## 2021-12-16 RX ORDER — OXYCODONE AND ACETAMINOPHEN 7.5; 325 MG/1; MG/1
1 TABLET ORAL EVERY 6 HOURS PRN
Qty: 60 TABLET | Refills: 0 | Status: SHIPPED | OUTPATIENT
Start: 2021-12-16 | End: 2021-12-20 | Stop reason: SDUPTHER

## 2021-12-16 RX ORDER — DIAZEPAM 2 MG/1
2 TABLET ORAL EVERY 8 HOURS PRN
Qty: 30 TABLET | Refills: 0 | Status: SHIPPED | OUTPATIENT
Start: 2021-12-16 | End: 2022-02-03 | Stop reason: SDUPTHER

## 2021-12-16 RX ORDER — ONDANSETRON 4 MG/1
4 TABLET, ORALLY DISINTEGRATING ORAL 3 TIMES DAILY PRN
Qty: 21 TABLET | Refills: 5 | Status: SHIPPED | OUTPATIENT
Start: 2021-12-16 | End: 2022-09-08 | Stop reason: SDUPTHER

## 2021-12-16 ASSESSMENT — ENCOUNTER SYMPTOMS
VOMITING: 0
DIARRHEA: 1
COLOR CHANGE: 0
APNEA: 0
ABDOMINAL PAIN: 0
CONSTIPATION: 0
COUGH: 1
BACK PAIN: 1
EYE PAIN: 1
CHOKING: 0
SORE THROAT: 0
TROUBLE SWALLOWING: 0
ABDOMINAL DISTENTION: 0
STRIDOR: 0
ANAL BLEEDING: 0
RECTAL PAIN: 0
BLOOD IN STOOL: 0
SINUS PAIN: 1
SINUS PRESSURE: 1
EYE DISCHARGE: 0
CHEST TIGHTNESS: 0
NAUSEA: 0
WHEEZING: 0
SHORTNESS OF BREATH: 1
VOICE CHANGE: 0

## 2021-12-16 NOTE — PROGRESS NOTES
Subjective:      Patient Id:  Getachew Simmons is a 80 y.o. female who presents today with              Symptom management follow up rt lung cancer, COPD, HTN, HLD, hypothyroidism, DDD, anxiety, depression, demeanor calm and relaxed, NAD, no sedation. She made an appointment with pain management, but woke up one day with a painful lump under her collar bone. It was a lymph node and imaging shows her lung cancer has returned. She is currently getting radiation in left neck, collar bone area, skin over mass is erythremic and painful to touch. She also has moderate to severe pain in thoracic back area. described as a constant ache. Igor Millington has offered minimal relief and her insurance is not covering it. Throat is red and she is starting to have trouble swallowing due to pain. Appetite is poor, starting to lose weight. no nausea or abdominal pain. Toñito Baig is incredibly anxious due to return of cancer and financial stress. She is having trouble sleeping and is having panic attacks even with duloxetine. NO increased SOB, chest pain, edema, cough, sputum production,fever, chills, myalgias.     Past Medical History:   Diagnosis Date    Anxiety     Asthma     Cervical radiculopathy at C7 9/28/2013    CERVICAL SPONDYLOSIS, WITH COMBINATION OF DISK BULGING AND    Cervical radiculopathy at C7     SUBTLE PER EMG    Cervical spondylosis 10/12/13    PER MRI    Chronic back pain     COPD (chronic obstructive pulmonary disease) (HCC)     Chronic bonchitis    CTS (carpal tunnel syndrome) 9/28/13    PER EMG    Depression     Diverticulitis     GERD (gastroesophageal reflux disease)     Headache(784.0)     Hyperlipidemia     Hypertension     Hypothyroidism due to medication 5/8/2019    Irritable bowel syndrome     Malignant neoplasm of upper lobe of left lung ([facility] [address] 1/7/2019    Osteoarthritis      Past Surgical History:   Procedure Laterality Date    APPENDECTOMY  1980    CARDIAC CATHETERIZATION 13,2013    DR. GRAVES    COLON SURGERY      HAND SURGERY  1996    HYSTERECTOMY  1980    TONSILLECTOMY  1963     Social History     Socioeconomic History    Marital status:      Spouse name: Not on file    Number of children: Not on file    Years of education: Not on file    Highest education level: Not on file   Occupational History    Not on file   Tobacco Use    Smoking status: Former Smoker     Packs/day: 0.50     Years: 48.00     Pack years: 24.00     Types: Cigarettes     Quit date: 2019     Years since quittin.1    Smokeless tobacco: Never Used   Vaping Use    Vaping Use: Former    Substances: Unknown   Substance and Sexual Activity    Alcohol use: Not Currently    Drug use: Never    Sexual activity: Not on file   Other Topics Concern    Not on file   Social History Narrative    Not on file     Social Determinants of Health     Financial Resource Strain:     Difficulty of Paying Living Expenses: Not on file   Food Insecurity:     Worried About [address] [employer] in the Last Year: Not on file    [address] in the Last Year: Not on file   Transportation Needs:     Lack of Transportation (Medical): Not on file    Lack of Transportation (Non-Medical):  Not on file   Physical Activity:     Days of Exercise per Week: Not on file    Minutes of Exercise per Session: Not on file   Stress:     Feeling of Stress : Not on file   Social Connections:     Frequency of Communication with Friends and Family: Not on file    Frequency of Social Gatherings with Friends and Family: Not on file    Attends Yarsani Services: Not on file    Active Member of Clubs or Organizations: Not on file    Attends Club or Organization Meetings: Not on file    Marital Status: Not on file   Intimate Partner Violence:     Fear of Current or Ex-Partner: Not on file    Emotionally Abused: Not on file    Physically Abused: Not on file    Sexually Abused: Not on file   Housing Stability:     Unable to Pay for Housing in the Last Year: Not on file    Number of Places Lived in the Last Year: Not on file    Unstable Housing in the Last Year: Not on file     Family History   Problem Relation Age of Onset    Stroke Mother     Heart Attack Mother     Cancer Father         LUNG    Cancer Paternal Uncle         lung    Breast Cancer Maternal Grandmother [de-identified]    Cancer Paternal Grandfather         lung    Cancer Paternal Uncle         lung    Cancer Paternal Uncle         lung    Cancer Paternal Uncle         lung    Cancer Paternal Uncle         lung    Cancer Paternal Uncle         lung    Cancer Brother      No Known Allergies  Current Outpatient Medications on File Prior to Visit   Medication Sig Dispense Refill    gabapentin (NEURONTIN) 800 MG tablet Take 1 tablet by mouth 3 times daily for 90 days.  90 tablet 3    DULoxetine (CYMBALTA) 60 MG extended release capsule Take 1 capsule by mouth daily 90 capsule 1    meloxicam (MOBIC) 7.5 MG tablet Take 1 tablet by mouth daily 30 tablet 3    BREO ELLIPTA 100-25 MCG/INH AEPB inhaler Inhale 1 puff into the lungs daily 30 each 0    montelukast (SINGULAIR) 10 MG tablet Take 1 tablet by mouth nightly 90 tablet 3    OLANZapine (ZYPREXA) 5 MG tablet Take 1 tablet by mouth nightly 30 tablet 3    PROAIR  (90 Base) MCG/ACT inhaler Inhale 1 puff into the lungs 4 times daily 1 Inhaler 5    omeprazole (PRILOSEC) 40 MG delayed release capsule Take 1 capsule by mouth every morning (before breakfast) 30 capsule 3    ipratropium-albuterol (DUONEB) 0.5-2.5 (3) MG/3ML SOLN nebulizer solution Inhale 3 mLs into the lungs every 4 hours 540 mL 0    Handicap Placard MISC by Does not apply route Diagnoses: LUNG CANCER, DEGENERATIVE ARTHRITIS, CANCER PAIN   EXPIRATION: 4/12/2024 1 each 0    simethicone (MYLICON) 80 MG chewable tablet Take 1 tablet by mouth 4 times daily as needed for Flatulence 180 tablet 3    aspirin (ECOTRIN LOW STRENGTH) 81 MG EC tablet Take 81 mg by mouth daily      folic acid (FOLVITE) 1 MG tablet Take 1 tablet by mouth daily 30 tablet 5    fluticasone (FLONASE) 50 MCG/ACT nasal spray 1 spray by Nasal route daily. 1 Bottle 6     No current facility-administered medications on file prior to visit. Review of Systems   Constitutional: Positive for activity change and fatigue. Negative for appetite change, chills, diaphoresis, fever and unexpected weight change. HENT: Positive for sinus pressure, sinus pain and sneezing. Negative for drooling, hearing loss, mouth sores, sore throat, trouble swallowing and voice change. Eyes: Positive for pain. Negative for discharge and visual disturbance. Respiratory: Positive for cough and shortness of breath. Negative for apnea, choking, chest tightness, wheezing and stridor. Cardiovascular: Negative for chest pain, palpitations and leg swelling. Gastrointestinal: Positive for diarrhea. Negative for abdominal distention, abdominal pain, anal bleeding, blood in stool, constipation, nausea, rectal pain and vomiting. Genitourinary: Negative for difficulty urinating, dysuria, enuresis, frequency and hematuria. Musculoskeletal: Positive for back pain and myalgias. Negative for arthralgias, gait problem and joint swelling. Skin: Negative for color change, pallor, rash and wound. Allergic/Immunologic: Negative for food allergies and immunocompromised state. Neurological: Positive for dizziness, light-headedness and headaches. Negative for tremors, seizures, syncope, facial asymmetry, speech difficulty, weakness and numbness. Hematological: Negative for adenopathy. Does not bruise/bleed easily. Psychiatric/Behavioral: Positive for sleep disturbance. Negative for agitation, behavioral problems, confusion, decreased concentration, dysphoric mood, hallucinations, self-injury and suicidal ideas. The patient is not nervous/anxious and is not hyperactive.           Objective: BP (!) 150/78   Pulse 70   Temp 98.6 °F (37 °C)   Resp 18   Wt 148 lb (67.1 kg)   SpO2 98%   BMI 25.40 kg/m²     Physical Exam  Constitutional:       General: She is not in acute distress. Appearance: She is well-developed. She is not diaphoretic. HENT:      Head: Normocephalic and atraumatic. Right Ear: Hearing and external ear normal.      Left Ear: Hearing and external ear normal.      Nose:      Right Sinus: Maxillary sinus tenderness and frontal sinus tenderness present. Left Sinus: Maxillary sinus tenderness and frontal sinus tenderness present. Mouth/Throat:      Lips: Pink. Mouth: Mucous membranes are moist.      Comments: PND  Eyes:      General: Lids are normal. No scleral icterus. Right eye: No discharge. Left eye: No discharge. Conjunctiva/sclera:      Right eye: Right conjunctiva is injected. Left eye: Left conjunctiva is injected. Pupils: Pupils are equal, round, and reactive to light. Neck:      Thyroid: No thyromegaly. Vascular: No JVD. Trachea: No tracheal deviation. Cardiovascular:      Rate and Rhythm: Normal rate and regular rhythm. Heart sounds: Normal heart sounds. Pulmonary:      Effort: Pulmonary effort is normal. No respiratory distress. Breath sounds: No stridor. Examination of the left-lower field reveals decreased breath sounds. Decreased breath sounds present. No wheezing or rales. Chest:      Chest wall: No tenderness. Abdominal:      General: Bowel sounds are normal. There is no distension. Palpations: Abdomen is soft. There is no mass. Tenderness: There is no abdominal tenderness. There is no guarding or rebound. Musculoskeletal:         General: No tenderness or deformity. Normal range of motion. Cervical back: Normal range of motion and neck supple. Lymphadenopathy:      Cervical: No cervical adenopathy. Skin:     General: Skin is warm and dry.       Findings: No frequency of pain   meds without consulting palliative care first.    Throat Pain  - magic mouthwash before meals and as needed         SOB rt COPD and Lung cancer  - Follow with pulmonology  - Follow with oncology  - Continue COPD Directed therapies   - Call for increased SOB, cough, sputum production, excessive fatigue, fever, chills, or myalgias  - Gentle exercise as tolerated  -  Rinse out mouth after inhaler use. Anxiety   - Continue duloxetine 60 mg po daily  - Continue olanzapine 5 mg po at HS  - Start Valium 2 mg po every 8 hours prn severe anxiety      Lung Cancer  Follow with oncology    Palliative Care  - Tasked our LSW as Vania Richard is having trouble paying her bills due to medical expenses and not being able to work.  She may  Help to find financial assistance and apply for medicaid      CHRISTO Mueller - CNP

## 2021-12-16 NOTE — PROGRESS NOTES
[facility]           Radiation Oncology      [address] [facility]        [address]        [facility] [place]: [phone]        F: [phone]       [email or web address]                   Dr. Jon Roberto MD PhD    ON TREATMENT VISIT (OTV) NOTE     Date of Service: 2021  Patient ID: Connie Rich   : 1950  MRN: [id number]   Acct Number: [de-identified]     DIAGNOSIS:  Cancer Staging  Malignant neoplasm of upper lobe of left lung [facility])  Staging form: Lung, AJCC 8th Edition  - Clinical stage from 2019: Stage IV (cT4, cN3, pM1a) - Signed by Martine Mon MD on 2019      Treatment Area: Head/Neck    Current Total Dose(cGy): 1200  Current Fraction: 4    Patient was seen today for weekly visit.      Wt Readings from Last 3 Encounters:   21 148 lb 6.4 oz (67.3 kg)   21 145 lb (65.8 kg)   07/15/21 143 lb (64.9 kg)       BP (!) 151/67   Pulse 92   Temp 96.7 °F (35.9 °C)   Resp 18   Wt 148 lb 6.4 oz (67.3 kg)   SpO2 94%   BMI 25.47 kg/m²     Lab Results   Component Value Date    WBC 9.0 2019     2019       Comfort Alteration  Fatigue:None, able to perform daily activities    Pain Location: Right neck- anuel after treatment from mask pressing- starting to have tenderness with swallowing  Pain Intensity (Current): 7 Between severe and very severe pain  Pain Treatment: Opioid- has run out but has appointment with palliative care  Pain Relief: not using opioids for head neck treatment- NA    Emotional Alteration:   Coping: effective    Nutritional Alteration  Anorexia: none   Nausea: No nausea noted  Vomiting: No vomiting   Salivary Glands- mild dryness- sips frequently  Taste Disturbance (Dysgeusia): Normal   Dyspepsia/Heartburn: None  Dysphagia/Esophagitis: Mild dysphagia, but can eat regular diet    Skin Alteration   Skin reaction: No changes noted  Alopecia: No loss    Mucous Membrane Alteration  Mucositis XRT Related: None  Pharynx and Esophagus- Acute: Mild dysphagia or odynophagia, topical anesthetic, soft diet  Voice Changes: Normal    Ventilation Alteration  Cough: Productive  Hemoptysis: None  Dyspnea: Normal  Mucous Quantity/Quality: clear to white- some dyspnea on exertion at times- weats oxygen at night as needed. CNS Alteration  Level of Consciousness: Alert, responds briskly, appropriately with minimal stimulus  Seizure Activity: None  Insomnia: Occasional difficulty sleeping not interfering with function  Orientation: Oriented in 3 spheres of person, place, and time  Comment:   Speech Impairment: No  Ataxia: Normal- occasional dizziness    Additional Comments: visible swelling area to rt neck- tender- sleeps to left side- does affect her ROM    MEDICATIONS:     Current Outpatient Medications   Medication Sig Dispense Refill    HYDROcodone-acetaminophen (NORCO) 7.5-325 MG per tablet Take 1 tablet by mouth every 6 hours as needed for Pain.  gabapentin (NEURONTIN) 800 MG tablet Take 1 tablet by mouth 3 times daily for 90 days.  90 tablet 3    DULoxetine (CYMBALTA) 60 MG extended release capsule Take 1 capsule by mouth daily 90 capsule 1    meloxicam (MOBIC) 7.5 MG tablet Take 1 tablet by mouth daily 30 tablet 3    montelukast (SINGULAIR) 10 MG tablet Take 1 tablet by mouth nightly 90 tablet 3    omeprazole (PRILOSEC) 40 MG delayed release capsule Take 1 capsule by mouth every morning (before breakfast) 30 capsule 3    ibuprofen (ADVIL) 200 MG tablet Take 400 mg by mouth every 8 hours as needed for Pain      Handicap Placard MISC by Does not apply route Diagnoses: LUNG CANCER, DEGENERATIVE ARTHRITIS, CANCER PAIN   EXPIRATION: 4/12/2024 1 each 0    simethicone (MYLICON) 80 MG chewable tablet Take 1 tablet by mouth 4 times daily as needed for Flatulence 180 tablet 3    aspirin (ECOTRIN LOW STRENGTH) 81 MG EC tablet Take 81 mg by mouth daily      metoprolol succinate (TOPROL XL) 50 MG extended release tablet Take 50 mg by mouth daily       fluticasone (FLONASE) 50 MCG/ACT nasal spray 1 spray by Nasal route daily. 1 Bottle 6    BREO ELLIPTA 100-25 MCG/INH AEPB inhaler Inhale 1 puff into the lungs daily 30 each 0    OLANZapine (ZYPREXA) 5 MG tablet Take 1 tablet by mouth nightly 30 tablet 3    PROAIR  (90 Base) MCG/ACT inhaler Inhale 1 puff into the lungs 4 times daily 1 Inhaler 5    ipratropium-albuterol (DUONEB) 0.5-2.5 (3) MG/3ML SOLN nebulizer solution Inhale 3 mLs into the lungs every 4 hours 540 mL 0    hydrALAZINE (APRESOLINE) 25 MG tablet Take 1 tablet by mouth daily      lisinopril-hydrochlorothiazide (PRINZIDE;ZESTORETIC) 20-25 MG per tablet Take 1 tablet by mouth daily   0    folic acid (FOLVITE) 1 MG tablet Take 1 tablet by mouth daily 30 tablet 5     No current facility-administered medications for this encounter. * New    PHYSICAL EXAM:       ECO - Symptomatic, <50% in bed during the day (Ambulatory and capable of all self care but unable to carry out any work activities. Up and about more than 50% of waking hours)     General: NAD, AO x 3, Mentation is clear with appropriate affect. HEENT: Normocephalic, atraumatic  HEENT:  No evidence of dermatitis of the skin of the right neck/SCV area. SCV adenopathy stable   Thorax:  Unlabored  Abdomen:  Non-distended    Chemotherapy Update: None    Treatment Imaging: Kv Pair;    ASSESSMENT: Minimal radiation side effects. Responding appropriately to symptomatic management. New medications, diagnostic results: Continue treatment as planned    PLAN: Again reviewed potential side effects of radiation for the patient's treatment. Continue local/topical care. Discussed medication for mild odynophagia, patient declined at this time and will let us know if it worsens. Will follow-up on CT scan ordered by medical oncology which is scheduled for tomorrow. Continue current radiation course as prescribed.

## 2021-12-17 ENCOUNTER — HOSPITAL ENCOUNTER (OUTPATIENT)
Dept: RADIATION ONCOLOGY | Age: 71
Discharge: HOME | End: 2021-12-17
Attending: RADIOLOGY
Payer: MEDICARE

## 2021-12-17 ENCOUNTER — TELEPHONE (OUTPATIENT)
Dept: PALLATIVE CARE | Age: 71
End: 2021-12-17

## 2021-12-17 PROCEDURE — 77417 THER RADIOLOGY PORT IMAGE(S): CPT | Performed by: RADIOLOGY

## 2021-12-17 PROCEDURE — 77412 RADIATION TX DELIVERY LVL 3: CPT | Performed by: RADIOLOGY

## 2021-12-17 NOTE — TELEPHONE ENCOUNTER
Pharmacy stated that they only gave patient 28 tablets for a 7 day supply of the oxycodone acetominophen 7.5-325mg, as that is what her insurance covered. Per Cadence, it will need a prior authorization if she should get the remaining quantity. Please advise.

## 2021-12-20 ENCOUNTER — HOSPITAL ENCOUNTER (OUTPATIENT)
Dept: RADIATION ONCOLOGY | Age: 71
Discharge: HOME | End: 2021-12-20
Attending: RADIOLOGY
Payer: MEDICARE

## 2021-12-20 DIAGNOSIS — G89.3 NEOPLASM RELATED PAIN: ICD-10-CM

## 2021-12-20 DIAGNOSIS — Z51.5 PALLIATIVE CARE ENCOUNTER: ICD-10-CM

## 2021-12-20 DIAGNOSIS — C34.90 MALIGNANT NEOPLASM OF LUNG, UNSPECIFIED LATERALITY, UNSPECIFIED PART OF LUNG (HCC): ICD-10-CM

## 2021-12-20 PROCEDURE — 77412 RADIATION TX DELIVERY LVL 3: CPT | Performed by: RADIOLOGY

## 2021-12-20 RX ORDER — OXYCODONE AND ACETAMINOPHEN 7.5; 325 MG/1; MG/1
1 TABLET ORAL EVERY 6 HOURS PRN
Qty: 60 TABLET | Refills: 0 | Status: SHIPPED | OUTPATIENT
Start: 2021-12-20 | End: 2022-02-03 | Stop reason: SDUPTHER

## 2021-12-21 ENCOUNTER — HOSPITAL ENCOUNTER (OUTPATIENT)
Dept: RADIATION ONCOLOGY | Age: 71
Discharge: HOME | End: 2021-12-21
Attending: RADIOLOGY
Payer: MEDICARE

## 2021-12-21 PROCEDURE — 77412 RADIATION TX DELIVERY LVL 3: CPT | Performed by: RADIOLOGY

## 2021-12-22 ENCOUNTER — HOSPITAL ENCOUNTER (OUTPATIENT)
Dept: RADIATION ONCOLOGY | Age: 71
Discharge: HOME | End: 2021-12-22
Attending: RADIOLOGY
Payer: MEDICARE

## 2021-12-22 PROCEDURE — 77412 RADIATION TX DELIVERY LVL 3: CPT | Performed by: RADIOLOGY

## 2021-12-23 ENCOUNTER — HOSPITAL ENCOUNTER (OUTPATIENT)
Dept: RADIATION ONCOLOGY | Age: 71
Discharge: HOME | End: 2021-12-23
Payer: MEDICARE

## 2021-12-23 ENCOUNTER — HOSPITAL ENCOUNTER (OUTPATIENT)
Dept: RADIATION ONCOLOGY | Age: 71
Discharge: HOME | End: 2021-12-23
Attending: RADIOLOGY
Payer: MEDICARE

## 2021-12-23 VITALS
BODY MASS INDEX: 26.06 KG/M2 | DIASTOLIC BLOOD PRESSURE: 78 MMHG | OXYGEN SATURATION: 96 % | SYSTOLIC BLOOD PRESSURE: 129 MMHG | HEART RATE: 90 BPM | RESPIRATION RATE: 18 BRPM | WEIGHT: 151.8 LBS | TEMPERATURE: 97.7 F

## 2021-12-23 PROCEDURE — 77427 RADIATION TX MANAGEMENT X5: CPT | Performed by: RADIOLOGY

## 2021-12-23 PROCEDURE — 77412 RADIATION TX DELIVERY LVL 3: CPT | Performed by: RADIOLOGY

## 2021-12-27 ENCOUNTER — HOSPITAL ENCOUNTER (OUTPATIENT)
Dept: RADIATION ONCOLOGY | Age: 71
Discharge: HOME | End: 2021-12-27
Attending: RADIOLOGY
Payer: MEDICARE

## 2021-12-27 PROCEDURE — 77412 RADIATION TX DELIVERY LVL 3: CPT | Performed by: RADIOLOGY

## 2022-02-03 ENCOUNTER — VIRTUAL VISIT (OUTPATIENT)
Dept: PALLATIVE CARE | Age: 72
End: 2022-02-03
Payer: MEDICARE

## 2022-02-03 DIAGNOSIS — R11.0 NAUSEA: ICD-10-CM

## 2022-02-03 DIAGNOSIS — Z51.5 PALLIATIVE CARE ENCOUNTER: ICD-10-CM

## 2022-02-03 DIAGNOSIS — R05.9 COUGH: ICD-10-CM

## 2022-02-03 DIAGNOSIS — R06.2 WHEEZE: ICD-10-CM

## 2022-02-03 DIAGNOSIS — C34.90 MALIGNANT NEOPLASM OF LUNG, UNSPECIFIED LATERALITY, UNSPECIFIED PART OF LUNG (HCC): ICD-10-CM

## 2022-02-03 DIAGNOSIS — R06.02 SOB (SHORTNESS OF BREATH): ICD-10-CM

## 2022-02-03 DIAGNOSIS — R63.0 ANOREXIA: ICD-10-CM

## 2022-02-03 DIAGNOSIS — G62.9 NEUROPATHY: ICD-10-CM

## 2022-02-03 DIAGNOSIS — M19.90 OSTEOARTHRITIS, UNSPECIFIED OSTEOARTHRITIS TYPE, UNSPECIFIED SITE: ICD-10-CM

## 2022-02-03 DIAGNOSIS — F41.9 ANXIETY: ICD-10-CM

## 2022-02-03 DIAGNOSIS — R25.2 SPASM: ICD-10-CM

## 2022-02-03 DIAGNOSIS — J44.9 CHRONIC OBSTRUCTIVE PULMONARY DISEASE, UNSPECIFIED COPD TYPE (HCC): ICD-10-CM

## 2022-02-03 DIAGNOSIS — G89.3 NEOPLASM RELATED PAIN: ICD-10-CM

## 2022-02-03 PROCEDURE — 1123F ACP DISCUSS/DSCN MKR DOCD: CPT | Performed by: NURSE PRACTITIONER

## 2022-02-03 PROCEDURE — 1090F PRES/ABSN URINE INCON ASSESS: CPT | Performed by: NURSE PRACTITIONER

## 2022-02-03 PROCEDURE — 99214 OFFICE O/P EST MOD 30 MIN: CPT | Performed by: NURSE PRACTITIONER

## 2022-02-03 PROCEDURE — 3017F COLORECTAL CA SCREEN DOC REV: CPT | Performed by: NURSE PRACTITIONER

## 2022-02-03 PROCEDURE — 4040F PNEUMOC VAC/ADMIN/RCVD: CPT | Performed by: NURSE PRACTITIONER

## 2022-02-03 RX ORDER — MELOXICAM 7.5 MG/1
7.5 TABLET ORAL DAILY
Qty: 30 TABLET | Refills: 3 | Status: SHIPPED | OUTPATIENT
Start: 2022-02-03 | End: 2022-03-15 | Stop reason: SDUPTHER

## 2022-02-03 RX ORDER — DIAZEPAM 2 MG/1
2 TABLET ORAL EVERY 8 HOURS PRN
Qty: 30 TABLET | Refills: 0 | Status: SHIPPED | OUTPATIENT
Start: 2022-02-03 | End: 2022-02-14 | Stop reason: SDUPTHER

## 2022-02-03 RX ORDER — OXYCODONE AND ACETAMINOPHEN 7.5; 325 MG/1; MG/1
1 TABLET ORAL EVERY 8 HOURS PRN
Qty: 90 TABLET | Refills: 0 | Status: SHIPPED | OUTPATIENT
Start: 2022-02-03 | End: 2022-02-14 | Stop reason: SDUPTHER

## 2022-02-03 RX ORDER — IPRATROPIUM BROMIDE AND ALBUTEROL SULFATE 2.5; .5 MG/3ML; MG/3ML
1 SOLUTION RESPIRATORY (INHALATION) EVERY 4 HOURS
Qty: 540 ML | Refills: 1 | Status: SHIPPED | OUTPATIENT
Start: 2022-02-03 | End: 2022-03-05

## 2022-02-03 RX ORDER — OLANZAPINE 5 MG/1
5 TABLET ORAL NIGHTLY
Qty: 30 TABLET | Refills: 3 | Status: SHIPPED | OUTPATIENT
Start: 2022-02-03 | End: 2022-03-15 | Stop reason: SDUPTHER

## 2022-02-03 RX ORDER — DULOXETIN HYDROCHLORIDE 60 MG/1
60 CAPSULE, DELAYED RELEASE ORAL DAILY
Qty: 30 CAPSULE | Refills: 1 | Status: SHIPPED | OUTPATIENT
Start: 2022-02-03 | End: 2022-03-15 | Stop reason: SDUPTHER

## 2022-02-03 ASSESSMENT — ENCOUNTER SYMPTOMS
WHEEZING: 0
EYE PAIN: 1
CHOKING: 0
SHORTNESS OF BREATH: 1
BLOOD IN STOOL: 0
EYE DISCHARGE: 0
SINUS PRESSURE: 1
ABDOMINAL DISTENTION: 0
SINUS PAIN: 1
TROUBLE SWALLOWING: 0
ANAL BLEEDING: 0
CONSTIPATION: 0
RECTAL PAIN: 0
STRIDOR: 0
SORE THROAT: 0
ABDOMINAL PAIN: 0
BACK PAIN: 1
COLOR CHANGE: 0
CHEST TIGHTNESS: 0
COUGH: 1
VOMITING: 0
DIARRHEA: 1
VOICE CHANGE: 0
APNEA: 0
NAUSEA: 0

## 2022-02-03 NOTE — PROGRESS NOTES
Subjective:      Patient Id:  Dinesh Mathew is a 80 y.o. female who presents today with   Dinesh Mathew, was evaluated through a synchronous (real-time) audio-video  encounter. The patient (or guardian if applicable) is aware that this is a billable  service, which includes applicable co-pays. This Virtual Visit was conducted with  patient's (and/or legal guardian's) consent. The visit was conducted pursuant to  the emergency declaration under the [address] [place] [address] and the [place] Act. Patient identification was verified,  and a caregiver was present when appropriate. The patient was located in a  state where the provider was licensed to provide care. Dinesh Mathew, was evaluated through a synchronous (real-time) audio-video  encounter. The patient (or guardian if applicable) is aware that this is a billable  service, which includes applicable co-pays. This Virtual Visit was conducted with  patient's (and/or legal guardian's) consent. The visit was conducted pursuant to  the emergency declaration under the [address] [place] [address] and the [place] Act. Patient identification was verified,  and a caregiver was present when appropriate. The patient was located in a  state where the provider was licensed to provide care. Symptom management follow up rt lung cancer, COPD, HTN, HLD, hypothyroidism, DDD, anxiety, depression, demeanor calm and relaxed, NAD, no sedation. RAdiation complete, now using Keytruda. Neoplasm pain in left neck, collar bone area and pain in thoracic back area is much improved with current regimen. However, Ashwini Camarillo is running out of medication and is not sure she can afford to get more. She asks which is most important to take.       Throat pain is improved and she no longer needs magic mouthwash. Appetite has improved with less overall pain, weight stabilized. No nausea or abdominal pain. Anxiety improved with increase of duloxetine, no panic attacks with valium. No increased SOB, chest pain, edema, cough, sputum production,fever, chills, myalgias. Past Medical History:   Diagnosis Date    Anxiety     Asthma     Cervical radiculopathy at C7 2013    CERVICAL SPONDYLOSIS, WITH COMBINATION OF DISK BULGING AND    Cervical radiculopathy at C7     SUBTLE PER EMG    Cervical spondylosis 10/12/13    PER MRI    Chronic back pain     COPD (chronic obstructive pulmonary disease) (HCC)     Chronic bonchitis    CTS (carpal tunnel syndrome) 13    PER EMG    Depression     Diverticulitis     GERD (gastroesophageal reflux disease)     Headache(784.0)     Hyperlipidemia     Hypertension     Hypothyroidism due to medication 2019    Irritable bowel syndrome     Malignant neoplasm of upper lobe of left lung ([facility] [address] 2019    Osteoarthritis      Past Surgical History:   Procedure Laterality Date    APPENDECTOMY      CARDIAC CATHETERIZATION  13,2013    DR. GRAVES    COLON SURGERY      HAND SURGERY      HYSTERECTOMY      TONSILLECTOMY  1963     Social History     Socioeconomic History    Marital status:       Spouse name: Not on file    Number of children: Not on file    Years of education: Not on file    Highest education level: Not on file   Occupational History    Not on file   Tobacco Use    Smoking status: Former Smoker     Packs/day: 0.50     Years: 48.00     Pack years: 24.00     Types: Cigarettes     Quit date: 2019     Years since quittin.2    Smokeless tobacco: Never Used   Vaping Use    Vaping Use: Former    Substances: Unknown   Substance and Sexual Activity    Alcohol use: Not Currently    Drug use: Never    Sexual activity: Not on file   Other Topics Concern    Not on file   Social History Narrative    Not on file     Social Determinants of Health     Financial Resource Strain:     Difficulty of Paying Living Expenses: Not on file   Food Insecurity:     Worried About Running Out of Food in the Last Year: Not on file    Jhony of Food in the Last Year: Not on file   Transportation Needs:     Lack of Transportation (Medical): Not on file    Lack of Transportation (Non-Medical):  Not on file   Physical Activity:     Days of Exercise per Week: Not on file    Minutes of Exercise per Session: Not on file   Stress:     Feeling of Stress : Not on file   Social Connections:     Frequency of Communication with Friends and Family: Not on file    Frequency of Social Gatherings with Friends and Family: Not on file    Attends Mandaeism Services: Not on file    Active Member of [address] [place] [address] [employer] or Organizations: Not on file    Attends Club or Organization Meetings: Not on file    Marital Status: Not on file   Intimate Partner Violence:     Fear of Current or Ex-Partner: Not on file    Emotionally Abused: Not on file    Physically Abused: Not on file    Sexually Abused: Not on file   Housing Stability:     Unable to Pay for Housing in the Last Year: Not on file    Number of [place] in the Last Year: Not on file    Unstable Housing in the Last Year: Not on file     Family History   Problem Relation Age of Onset    Stroke Mother     Heart Attack Mother     Cancer Father         LUNG    Cancer Paternal Uncle         lung    Breast Cancer Maternal Grandmother [de-identified]    Cancer Paternal Grandfather         lung    Cancer Paternal Uncle         lung    Cancer Paternal Uncle         lung    Cancer Paternal Uncle         lung    Cancer Paternal Uncle         lung    Cancer Paternal Uncle         lung    Cancer Brother      No Known Allergies  Current Outpatient Medications on File Prior to Visit   Medication Sig Dispense Refill    pembrolizumab (KEYTRUDA) 100 MG/4ML SOLN Infuse intravenously      ondansetron (ZOFRAN-ODT) 4 MG disintegrating tablet Take 1 tablet by mouth 3 times daily as needed for Nausea or Vomiting 21 tablet 5    Magic Mouthwash (MIRACLE MOUTHWASH) Swish and spit 5 mLs 4 times daily as needed for Irritation or Pain 240 mL 3    gabapentin (NEURONTIN) 800 MG tablet Take 1 tablet by mouth 3 times daily for 90 days. 90 tablet 3    BREO ELLIPTA 100-25 MCG/INH AEPB inhaler Inhale 1 puff into the lungs daily 30 each 0    montelukast (SINGULAIR) 10 MG tablet Take 1 tablet by mouth nightly 90 tablet 3    PROAIR  (90 Base) MCG/ACT inhaler Inhale 1 puff into the lungs 4 times daily 1 Inhaler 5    omeprazole (PRILOSEC) 40 MG delayed release capsule Take 1 capsule by mouth every morning (before breakfast) 30 capsule 3    Handicap Placard MISC by Does not apply route Diagnoses: LUNG CANCER, DEGENERATIVE ARTHRITIS, CANCER PAIN   EXPIRATION: 4/12/2024 1 each 0    simethicone (MYLICON) 80 MG chewable tablet Take 1 tablet by mouth 4 times daily as needed for Flatulence 180 tablet 3    aspirin (ECOTRIN LOW STRENGTH) 81 MG EC tablet Take 81 mg by mouth daily      folic acid (FOLVITE) 1 MG tablet Take 1 tablet by mouth daily 30 tablet 5    fluticasone (FLONASE) 50 MCG/ACT nasal spray 1 spray by Nasal route daily. 1 Bottle 6     No current facility-administered medications on file prior to visit. Review of Systems   Constitutional: Positive for activity change and fatigue. Negative for appetite change, chills, diaphoresis, fever and unexpected weight change. HENT: Positive for sinus pressure, sinus pain and sneezing. Negative for drooling, hearing loss, mouth sores, sore throat, trouble swallowing and voice change. Eyes: Positive for pain. Negative for discharge and visual disturbance. Respiratory: Positive for cough and shortness of breath. Negative for apnea, choking, chest tightness, wheezing and stridor.     Cardiovascular: Negative for chest pain, palpitations and leg swelling. Gastrointestinal: Positive for diarrhea. Negative for abdominal distention, abdominal pain, anal bleeding, blood in stool, constipation, nausea, rectal pain and vomiting. Genitourinary: Negative for difficulty urinating, dysuria, enuresis, frequency and hematuria. Musculoskeletal: Positive for back pain and myalgias. Negative for arthralgias, gait problem and joint swelling. Skin: Negative for color change, pallor, rash and wound. Allergic/Immunologic: Negative for food allergies and immunocompromised state. Neurological: Positive for dizziness, light-headedness and headaches. Negative for tremors, seizures, syncope, facial asymmetry, speech difficulty, weakness and numbness. Hematological: Negative for adenopathy. Does not bruise/bleed easily. Psychiatric/Behavioral: Positive for sleep disturbance. Negative for agitation, behavioral problems, confusion, decreased concentration, dysphoric mood, hallucinations, self-injury and suicidal ideas. The patient is not nervous/anxious and is not hyperactive. Objective: There were no vitals taken for this visit. Physical Exam  Constitutional:       General: She is not in acute distress. Appearance: She is well-developed. She is not diaphoretic. HENT:      Head: Normocephalic and atraumatic. Right Ear: Hearing and external ear normal.      Left Ear: Hearing and external ear normal.      Nose:      Right Sinus: Maxillary sinus tenderness and frontal sinus tenderness present. Left Sinus: Maxillary sinus tenderness and frontal sinus tenderness present. Mouth/Throat:      Lips: Pink. Mouth: Mucous membranes are moist.      Comments: PND  Eyes:      General: Lids are normal. No scleral icterus. Right eye: No discharge. Left eye: No discharge. Conjunctiva/sclera:      Right eye: Right conjunctiva is injected. Left eye: Left conjunctiva is injected.       Pupils: Pupils are equal, round, and reactive to light. Neck:      Thyroid: No thyromegaly. Vascular: No JVD. Trachea: No tracheal deviation. Cardiovascular:      Rate and Rhythm: Normal rate and regular rhythm. Heart sounds: Normal heart sounds. Pulmonary:      Effort: Pulmonary effort is normal. No respiratory distress. Breath sounds: No stridor. Examination of the left-lower field reveals decreased breath sounds. Decreased breath sounds present. No wheezing or rales. Chest:      Chest wall: No tenderness. Abdominal:      General: Bowel sounds are normal. There is no distension. Palpations: Abdomen is soft. There is no mass. Tenderness: There is no abdominal tenderness. There is no guarding or rebound. Musculoskeletal:         General: No tenderness or deformity. Normal range of motion. Cervical back: Normal range of motion and neck supple. Lymphadenopathy:      Cervical: No cervical adenopathy. Skin:     General: Skin is warm and dry. Findings: No erythema or rash. Neurological:      Mental Status: She is alert and oriented to person, place, and time. Psychiatric:         Behavior: Behavior normal.         Thought Content: Thought content normal.           Assessment:       Diagnosis Orders   1. Neoplasm related pain  diazePAM (VALIUM) 2 MG tablet    oxyCODONE-acetaminophen (PERCOCET) 7.5-325 MG per tablet    DULoxetine (CYMBALTA) 60 MG extended release capsule    meloxicam (MOBIC) 7.5 MG tablet   2. Palliative care encounter  diazePAM (VALIUM) 2 MG tablet    oxyCODONE-acetaminophen (PERCOCET) 7.5-325 MG per tablet    DULoxetine (CYMBALTA) 60 MG extended release capsule    meloxicam (MOBIC) 7.5 MG tablet   3. Spasm  diazePAM (VALIUM) 2 MG tablet   4. Anxiety  diazePAM (VALIUM) 2 MG tablet    DULoxetine (CYMBALTA) 60 MG extended release capsule    OLANZapine (ZYPREXA) 5 MG tablet   5.  Malignant neoplasm of lung, unspecified laterality, unspecified part of lung ([facility] [address]  oxyCODONE-acetaminophen (PERCOCET) 7.5-325 MG per tablet    ipratropium-albuterol (DUONEB) 0.5-2.5 (3) MG/3ML SOLN nebulizer solution    OLANZapine (ZYPREXA) 5 MG tablet    meloxicam (MOBIC) 7.5 MG tablet   6. Cough  ipratropium-albuterol (DUONEB) 0.5-2.5 (3) MG/3ML SOLN nebulizer solution   7. Wheeze  ipratropium-albuterol (DUONEB) 0.5-2.5 (3) MG/3ML SOLN nebulizer solution   8. SOB (shortness of breath)  ipratropium-albuterol (DUONEB) 0.5-2.5 (3) MG/3ML SOLN nebulizer solution   9. Chronic obstructive pulmonary disease, [facility]  ipratropium-albuterol (DUONEB) 0.5-2.5 (3) MG/3ML SOLN nebulizer solution   10. Neuropathy  DULoxetine (CYMBALTA) 60 MG extended release capsule   11. Osteoarthritis, unspecified osteoarthritis type, unspecified site  DULoxetine (CYMBALTA) 60 MG extended release capsule    meloxicam (MOBIC) 7.5 MG tablet   12. Anorexia  OLANZapine (ZYPREXA) 5 MG tablet   13. Nausea  OLANZapine (ZYPREXA) 5 MG tablet          Plan:   Neoplasm related pain   - Percocet 7.5/325 mg po every 8 hours prn moderate to severe pain  - Continue Gabapentin 800 mg po tid  - Continue Duloxetine 60 mg po at HS  - Mobic 7.5 mg po daily with food  - Heat or ice to painful areas, whichever is preferred  - Gentle ROM exercises, consider PT, declined at this time due to transportation  Pt is tolerating current pain meds without adverse effects or over sedation. Lowest effective dose used. Pt advised to call and notify palliative care for any adverse effects or sedation  Pt is able to maintain adequate functional level and participate in ADLs  [place] reviewed.  There is no indication of aberrant behavior  Pt advised to call for increasing or uncontrolled pain  Pt advised to take only as prescribed and not to change frequency of pain   meds without consulting palliative care first.           SOB rt COPD and Lung cancer  - Follow with pulmonology  - Follow with oncology  - Continue COPD Directed therapies   - Call for increased SOB, cough, sputum production, excessive fatigue, fever, chills, or myalgias  - Gentle exercise as tolerated  -  Rinse out mouth after inhaler use. Anxiety   - Continue duloxetine 60 mg po daily  - Continue olanzapine 5 mg po at HS  - Start Valium 2 mg po every 8 hours prn severe anxiety      Lung Cancer  Follow with oncology    Palliative Care  - Tasked our LSW as Christal Wetzel is having trouble paying her bills due to medical expenses and not being able to work. She may  Help to find financial assistance and apply for medicaid    - Christal Wetzel is unable to work due to cancer and is hopingg to get mediaid. She is running out of her medications as she cant afford to buy them.  Attempted to use MAP program with CHRISTO Christiansen - CNP

## 2022-02-14 DIAGNOSIS — C34.90 MALIGNANT NEOPLASM OF LUNG, UNSPECIFIED LATERALITY, UNSPECIFIED PART OF LUNG (HCC): ICD-10-CM

## 2022-02-14 DIAGNOSIS — Z51.5 PALLIATIVE CARE ENCOUNTER: ICD-10-CM

## 2022-02-14 DIAGNOSIS — G89.3 NEOPLASM RELATED PAIN: ICD-10-CM

## 2022-02-14 DIAGNOSIS — F41.9 ANXIETY: ICD-10-CM

## 2022-02-14 DIAGNOSIS — R25.2 SPASM: ICD-10-CM

## 2022-02-14 RX ORDER — DIAZEPAM 2 MG/1
2 TABLET ORAL EVERY 8 HOURS PRN
Qty: 30 TABLET | Refills: 0 | Status: SHIPPED | OUTPATIENT
Start: 2022-02-14 | End: 2022-03-15 | Stop reason: SDUPTHER

## 2022-02-14 RX ORDER — OXYCODONE AND ACETAMINOPHEN 7.5; 325 MG/1; MG/1
1 TABLET ORAL EVERY 8 HOURS PRN
Qty: 90 TABLET | Refills: 0 | Status: SHIPPED | OUTPATIENT
Start: 2022-02-14 | End: 2022-03-15 | Stop reason: SDUPTHER

## 2022-02-14 NOTE — TELEPHONE ENCOUNTER
Pt did not  the percocet from 2/3/22 d/t cost. She is requesting the valium and percocet be sent to [facility] in [facility], thanks! Rx requested:  Requested Prescriptions     Pending Prescriptions Disp Refills    diazePAM (VALIUM) 2 MG tablet 30 tablet 0     Sig: Take 1 tablet by mouth every 8 hours as needed for Anxiety for up to 10 days.  oxyCODONE-acetaminophen (PERCOCET) 7.5-325 MG per tablet 90 tablet 0     Sig: Take 1 tablet by mouth every 8 hours as needed for Pain for up to 30 days.        Last Office Visit:   2/3/2022      Last filled:     Next Visit Date:  Future Appointments   Date Time Provider Renetta Bauer   3/17/2022  1:00 PM CHRISTO Jean Baptiste - CNP [facility]

## 2022-02-20 NOTE — CONSULTS
[facility]           Radiation Oncology      [address] [facility]        [address]on [address]        Vicky Cargo: [phone]        F: [phone]       KOEZY                   Dr. Leidy Troncoso MD PhD    CONSULT NOTE     Date of Service: 2021  Patient ID: Ambreen Reynolds   : 1950  MRN: [id number]   Acct Number: [de-identified]       Ambreen Reynolds  80 y.o.   1950    REFERRING PROVIDER: Ricki Reed    PCP:  Raj Evans MD    DIAGNOSIS: Metastatic lung cancer to the right supraclavicular area. STAGING: Cancer Staging  Malignant neoplasm of upper lobe of left lung [facility])  Staging form: Lung, AJCC 8th Edition  - Clinical stage from 2019: Stage IV (cT4, cN3, pM1a) - Signed by Shyann Herrmann MD on 2019      HISTORY OF PRESENT ILLNESS: Ms. Ambreen Reynolds  is a 80y.o. year old female with history of COPD, hypertension, hyperlipidemia, hypothyroidism, anxiety, depression, and lung cancer with progression of disease and new metastases in the supraclavicular area. She presents to discuss the role and rationale of palliative radiation therapy in the management of her disease. Her oncologic history dates back to  when she was found to have a mass in the left lung as biopsy-proven squamous cell carcinoma. She was found to have mediastinal adenopathy and bilateral lung nodules. She was started on carboplatin Taxol and Keytruda and tolerated well with exception of minor infusion reaction on cycle 4 of Taxol. Repeat CT chest on pelvis on 2019 showed moderate response with no new lung lesions. Follow-up CT chest 2019 revealed continued stable disease. Patient continued Maria Antonia Perry with interval PET on 2020 showing additional response. She completed 6000 cGy of radiation to the left lung on 2020 for oligometastatic disease. Interval CT on 2020 revealed a spiculated mass slightly decreased in size in the left upper lobe.   Interval CT chest imaging on 2021 and Take 1 capsule by mouth every morning (before breakfast) 30 capsule 3    Handicap Placard MISC by Does not apply route Diagnoses: LUNG CANCER, DEGENERATIVE ARTHRITIS, CANCER PAIN   EXPIRATION: 4/12/2024 1 each 0    simethicone (MYLICON) 80 MG chewable tablet Take 1 tablet by mouth 4 times daily as needed for Flatulence 180 tablet 3    diazePAM (VALIUM) 2 MG tablet Take 1 tablet by mouth every 8 hours as needed for Anxiety for up to 10 days. 30 tablet 0    oxyCODONE-acetaminophen (PERCOCET) 7.5-325 MG per tablet Take 1 tablet by mouth every 8 hours as needed for Pain for up to 30 days. 90 tablet 0    pembrolizumab (KEYTRUDA) 100 MG/4ML SOLN Infuse intravenously      ipratropium-albuterol (DUONEB) 0.5-2.5 (3) MG/3ML SOLN nebulizer solution Inhale 3 mLs into the lungs every 4 hours 540 mL 1    DULoxetine (CYMBALTA) 60 MG extended release capsule Take 1 capsule by mouth daily 30 capsule 1    OLANZapine (ZYPREXA) 5 MG tablet Take 1 tablet by mouth nightly 30 tablet 3    meloxicam (MOBIC) 7.5 MG tablet Take 1 tablet by mouth daily For cancer pain 30 tablet 3    ondansetron (ZOFRAN-ODT) 4 MG disintegrating tablet Take 1 tablet by mouth 3 times daily as needed for Nausea or Vomiting 21 tablet 5    Magic Mouthwash (MIRACLE MOUTHWASH) Swish and spit 5 mLs 4 times daily as needed for Irritation or Pain 240 mL 3    BREO ELLIPTA 100-25 MCG/INH AEPB inhaler Inhale 1 puff into the lungs daily 30 each 0    montelukast (SINGULAIR) 10 MG tablet Take 1 tablet by mouth nightly 90 tablet 3    PROAIR  (90 Base) MCG/ACT inhaler Inhale 1 puff into the lungs 4 times daily 1 Inhaler 5    aspirin (ECOTRIN LOW STRENGTH) 81 MG EC tablet Take 81 mg by mouth daily      folic acid (FOLVITE) 1 MG tablet Take 1 tablet by mouth daily 30 tablet 5    fluticasone (FLONASE) 50 MCG/ACT nasal spray 1 spray by Nasal route daily. 1 Bottle 6     No current facility-administered medications for this encounter.        FAMILY HISTORY:  Family History   Problem Relation Age of Onset   Dudley Lasso Stroke Mother     Heart Attack Mother     Cancer Father         LUNG    Cancer Paternal Uncle         lung    Breast Cancer Maternal Grandmother [de-identified]    Cancer Paternal Grandfather         lung    Cancer Paternal Uncle         lung    Cancer Paternal Uncle         lung    Cancer Paternal Uncle         lung    Cancer Paternal Uncle         lung    Cancer Paternal Uncle         lung    Cancer Brother        SOCIAL HISTORY:       reports that she quit smoking about 2 years ago. Her smoking use included cigarettes. She has a 24.00 pack-year smoking history. She has never used smokeless tobacco..   reports previous alcohol use. .   reports no history of drug use. REVIEW OF SYSTEMS:  Obtained from the patient, chart review and nursing assessment. Review of Systems   All other systems reviewed and are negative. PHYSICAL EXAMINATION:      Vitals:    12/08/21 0906   BP: 136/63   Pulse: 111   Resp: 18   Temp: 97.5 °F (36.4 °C)   SpO2: 95%   Weight: 145 lb (65.8 kg)   Height: 5' 4\" (1.626 m)       Wt Readings from Last 3 Encounters:   12/23/21 151 lb 12.8 oz (68.9 kg)   12/16/21 148 lb 6.4 oz (67.3 kg)   12/16/21 148 lb (67.1 kg)       ECOG PERFORMANCE STATUS:      Physical Exam  Vitals and nursing note reviewed. Constitutional:       Appearance: Normal appearance. Comments: She is present unaccompanied. HENT:      Head: Normocephalic and atraumatic. Eyes:      General: No scleral icterus. Extraocular Movements: Extraocular movements intact. Conjunctiva/sclera: Conjunctivae normal.   Cardiovascular:      Heart sounds: Normal heart sounds. Pulmonary:      Effort: Pulmonary effort is normal.   Abdominal:      Palpations: Abdomen is soft. Musculoskeletal:         General: Normal range of motion. Cervical back: Normal range of motion. No rigidity. Right lower leg: No edema. Left lower leg: No edema. Lymphadenopathy:      Cervical: No cervical adenopathy. Skin:     General: Skin is warm and dry. Neurological:      General: No focal deficit present. Mental Status: She is alert. Mental status is at baseline. Psychiatric:         Mood and Affect: Mood normal.         Behavior: Behavior normal.         RADIATION SAFETY AND ONCOLOGIC TREATMENT SUPPORT:    - Previous radiation history: Per HPI  - History of autoimmune or connective tissue disease: None  - Nutritional support/ PEG: Our dietitian will see the patient prior to initiating radiation therapy  - Dental evaluation: Not applicable  -  requested:   met with the patient, please refer to the note from that encounter.  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 She went on to receive consolidative radiation therapy to her left lung mass to 6000 cGy completed on 5/2020. She had stable disease on Keytruda for the next several months but was found to have oligo progression in the form of supraclavicular adenopathy on the right. She presents to discuss the role and rationale of palliative radiation therapy in the management of her disease. DISCUSSION/PLAN:   I reviewed the risks, benefits, and rationale of palliative radiation therapy in this setting. I discussed that the primary goal is to help achieve local regional control in the supraclavicular area to diminish the likelihood of airway and esophageal compromise. Secondary goal is to help improve pain control.   Side effects such as fatigue, dermatitis of the skin of the neck at the being entry and exit points, odynophagia, esophagitis, worsening cough, and thyroid dysfunction were discussed. The patient had several questions regarding this all of which were answered to her apparent satisfaction. She wishes to move forward and informed consent was signed today. We will schedule simulation and initiation of radiation therapy during her intervals between immunotherapy and we'll coordinate with medical oncology regarding this. Her CT imaging is scheduled for 12/17/2021 and I discussed that we would use this to help facilitate radiation planning and to evaluate for systemic disease status at that juncture. Thank you very much for the referral and for the opportunity to provide care for this patient. A combined total of 60 minutes was spent in preparing this consultation as well as in meeting with the patient and her  in person. Please excuse any typos in this consultation note as voice dictation was used.     Baljit Wilkerson MD PHD  Radiation Oncologist, [address] [place] [address] Director    Department of [address] [place] [address]  Melchor Warner

## 2022-03-15 DIAGNOSIS — R25.2 SPASM: ICD-10-CM

## 2022-03-15 DIAGNOSIS — F41.9 ANXIETY: ICD-10-CM

## 2022-03-15 DIAGNOSIS — Z51.5 PALLIATIVE CARE ENCOUNTER: ICD-10-CM

## 2022-03-15 DIAGNOSIS — R11.0 NAUSEA: ICD-10-CM

## 2022-03-15 DIAGNOSIS — G89.3 NEOPLASM RELATED PAIN: ICD-10-CM

## 2022-03-15 DIAGNOSIS — R63.0 ANOREXIA: ICD-10-CM

## 2022-03-15 DIAGNOSIS — C34.90 MALIGNANT NEOPLASM OF LUNG, UNSPECIFIED LATERALITY, UNSPECIFIED PART OF LUNG (HCC): ICD-10-CM

## 2022-03-15 DIAGNOSIS — G62.9 NEUROPATHY: ICD-10-CM

## 2022-03-15 DIAGNOSIS — M19.90 OSTEOARTHRITIS, UNSPECIFIED OSTEOARTHRITIS TYPE, UNSPECIFIED SITE: ICD-10-CM

## 2022-03-15 RX ORDER — DULOXETIN HYDROCHLORIDE 60 MG/1
60 CAPSULE, DELAYED RELEASE ORAL DAILY
Qty: 30 CAPSULE | Refills: 1 | Status: SHIPPED | OUTPATIENT
Start: 2022-03-15 | End: 2022-05-26 | Stop reason: SDUPTHER

## 2022-03-15 RX ORDER — MELOXICAM 7.5 MG/1
7.5 TABLET ORAL DAILY
Qty: 30 TABLET | Refills: 3 | Status: SHIPPED | OUTPATIENT
Start: 2022-03-15 | End: 2022-04-27 | Stop reason: SDUPTHER

## 2022-03-15 RX ORDER — DIAZEPAM 2 MG/1
2 TABLET ORAL EVERY 8 HOURS PRN
Qty: 30 TABLET | Refills: 0 | Status: SHIPPED | OUTPATIENT
Start: 2022-03-15 | End: 2022-03-16 | Stop reason: SDUPTHER

## 2022-03-15 RX ORDER — OLANZAPINE 5 MG/1
5 TABLET ORAL NIGHTLY
Qty: 30 TABLET | Refills: 3 | Status: SHIPPED | OUTPATIENT
Start: 2022-03-15 | End: 2022-09-08

## 2022-03-15 RX ORDER — OXYCODONE AND ACETAMINOPHEN 7.5; 325 MG/1; MG/1
1 TABLET ORAL EVERY 8 HOURS PRN
Qty: 90 TABLET | Refills: 0 | Status: SHIPPED | OUTPATIENT
Start: 2022-03-15 | End: 2022-03-16 | Stop reason: SDUPTHER

## 2022-03-15 NOTE — TELEPHONE ENCOUNTER
Rx requested:  Requested Prescriptions     Pending Prescriptions Disp Refills    diazePAM (VALIUM) 2 MG tablet 30 tablet 0     Sig: Take 1 tablet by mouth every 8 hours as needed for Anxiety for up to 10 days.  oxyCODONE-acetaminophen (PERCOCET) 7.5-325 MG per tablet 90 tablet 0     Sig: Take 1 tablet by mouth every 8 hours as needed for Pain for up to 30 days.     DULoxetine (CYMBALTA) 60 MG extended release capsule 30 capsule 1     Sig: Take 1 capsule by mouth daily    OLANZapine (ZYPREXA) 5 MG tablet 30 tablet 3     Sig: Take 1 tablet by mouth nightly    meloxicam (MOBIC) 7.5 MG tablet 30 tablet 3     Sig: Take 1 tablet by mouth daily For cancer pain       Last Office Visit:   2/3/2022      Last filled:  2/3/2022    Next Visit Date:  Future Appointments   Date Time Provider Renetta Bauer   3/31/2022  2:30 PM CHRISTO Quintanilla - CNP [facility]

## 2022-03-16 DIAGNOSIS — C34.90 MALIGNANT NEOPLASM OF LUNG, UNSPECIFIED LATERALITY, UNSPECIFIED PART OF LUNG (HCC): ICD-10-CM

## 2022-03-16 DIAGNOSIS — G89.3 NEOPLASM RELATED PAIN: ICD-10-CM

## 2022-03-16 DIAGNOSIS — F41.9 ANXIETY: ICD-10-CM

## 2022-03-16 DIAGNOSIS — Z51.5 PALLIATIVE CARE ENCOUNTER: ICD-10-CM

## 2022-03-16 DIAGNOSIS — G62.9 NEUROPATHY: ICD-10-CM

## 2022-03-16 DIAGNOSIS — R25.2 SPASM: ICD-10-CM

## 2022-03-16 RX ORDER — OXYCODONE AND ACETAMINOPHEN 7.5; 325 MG/1; MG/1
1 TABLET ORAL EVERY 8 HOURS PRN
Qty: 90 TABLET | Refills: 0 | Status: SHIPPED | OUTPATIENT
Start: 2022-03-16 | End: 2022-04-27 | Stop reason: SDUPTHER

## 2022-03-16 RX ORDER — DIAZEPAM 2 MG/1
2 TABLET ORAL EVERY 8 HOURS PRN
Qty: 30 TABLET | Refills: 0 | Status: SHIPPED | OUTPATIENT
Start: 2022-03-16 | End: 2022-04-27 | Stop reason: SDUPTHER

## 2022-03-16 RX ORDER — GABAPENTIN 800 MG/1
800 TABLET ORAL 3 TIMES DAILY
Qty: 90 TABLET | Refills: 3 | Status: SHIPPED | OUTPATIENT
Start: 2022-03-16 | End: 2022-05-26 | Stop reason: SDUPTHER

## 2022-03-16 NOTE — TELEPHONE ENCOUNTER
Pt would like these sent to [facility] instead of [facility], can these meds be resent?

## 2022-03-24 RX ORDER — DULOXETIN HYDROCHLORIDE 30 MG/1
30 CAPSULE, DELAYED RELEASE ORAL DAILY
Qty: 30 CAPSULE | Refills: 0 | OUTPATIENT
Start: 2022-03-24

## 2022-03-31 ENCOUNTER — OFFICE VISIT (OUTPATIENT)
Dept: PALLATIVE CARE | Age: 72
End: 2022-03-31
Payer: MEDICARE

## 2022-03-31 DIAGNOSIS — G89.3 NEOPLASM RELATED PAIN: ICD-10-CM

## 2022-03-31 DIAGNOSIS — F41.9 ANXIETY: Primary | ICD-10-CM

## 2022-03-31 DIAGNOSIS — Z51.5 PALLIATIVE CARE ENCOUNTER: ICD-10-CM

## 2022-03-31 DIAGNOSIS — R06.02 SOB (SHORTNESS OF BREATH): ICD-10-CM

## 2022-03-31 DIAGNOSIS — J44.9 CHRONIC OBSTRUCTIVE PULMONARY DISEASE, UNSPECIFIED COPD TYPE (HCC): ICD-10-CM

## 2022-03-31 PROCEDURE — G8399 PT W/DXA RESULTS DOCUMENT: HCPCS | Performed by: NURSE PRACTITIONER

## 2022-03-31 PROCEDURE — 1090F PRES/ABSN URINE INCON ASSESS: CPT | Performed by: NURSE PRACTITIONER

## 2022-03-31 PROCEDURE — 3017F COLORECTAL CA SCREEN DOC REV: CPT | Performed by: NURSE PRACTITIONER

## 2022-03-31 PROCEDURE — G8427 DOCREV CUR MEDS BY ELIG CLIN: HCPCS | Performed by: NURSE PRACTITIONER

## 2022-03-31 PROCEDURE — 4040F PNEUMOC VAC/ADMIN/RCVD: CPT | Performed by: NURSE PRACTITIONER

## 2022-03-31 PROCEDURE — 1123F ACP DISCUSS/DSCN MKR DOCD: CPT | Performed by: NURSE PRACTITIONER

## 2022-03-31 PROCEDURE — 99214 OFFICE O/P EST MOD 30 MIN: CPT | Performed by: NURSE PRACTITIONER

## 2022-03-31 ASSESSMENT — ENCOUNTER SYMPTOMS
BACK PAIN: 1
ABDOMINAL PAIN: 0
CHEST TIGHTNESS: 0
SINUS PAIN: 1
ANAL BLEEDING: 0
NAUSEA: 0
SORE THROAT: 0
EYE DISCHARGE: 0
SHORTNESS OF BREATH: 1
ABDOMINAL DISTENTION: 0
WHEEZING: 0
STRIDOR: 0
CONSTIPATION: 0
COLOR CHANGE: 0
VOICE CHANGE: 0
DIARRHEA: 1
VOMITING: 0
COUGH: 1
BLOOD IN STOOL: 0
APNEA: 0
SINUS PRESSURE: 1
TROUBLE SWALLOWING: 0
RECTAL PAIN: 0
EYE PAIN: 1
CHOKING: 0

## 2022-03-31 NOTE — PROGRESS NOTES
Subjective:      Patient Id:  Jae Montlavo is a 80 y.o. female who presents today with   Jae Montalvo, was evaluated through a synchronous (real-time) audio-video  encounter. The patient (or guardian if applicable) is aware that this is a billable  service, which includes applicable co-pays. This Virtual Visit was conducted with  patient's (and/or legal guardian's) consent. The visit was conducted pursuant to  the emergency declaration under the [address] [place] [address] and the [employer] Act. Patient identification was verified,  and a caregiver was present when appropriate. The patient was located in a  state where the provider was licensed to provide care. Jae Montalvo, was evaluated through a synchronous (real-time) audio-video  encounter. The patient (or guardian if applicable) is aware that this is a billable  service, which includes applicable co-pays. This Virtual Visit was conducted with  patient's (and/or legal guardian's) consent. The visit was conducted pursuant to  the emergency declaration under the [address] [place] [address] and the [employer] Act. Patient identification was verified,  and a caregiver was present when appropriate. The patient was located in a  state where the provider was licensed to provide care. Symptom management follow up rt lung cancer, COPD, HTN, HLD, hypothyroidism, DDD, anxiety, depression, demeanor calm and relaxed, NAD, no sedation. Radiation complete, now using Keytruda. Neoplasm pain in left neck, collar bone area and pain in thoracic back area is much improved with current regimen. Appetite has improved with less overall pain, weight stabilized. No nausea or abdominal pain.        Anxiety improved with increase of duloxetine, no panic attacks with valium. No increased SOB, chest pain, edema, cough, sputum production,fever, chills, myalgias. She I sad as she had to put her dog down last week. She asks if I could write a letter to her landlord about the benefits of an emotional support dog, in case she gets another dog in the future. She switched to [facility] as her medications are more affordable there. Past Medical History:   Diagnosis Date    Anxiety     Asthma     Cervical radiculopathy at C7 2013    CERVICAL SPONDYLOSIS, WITH COMBINATION OF DISK BULGING AND    Cervical radiculopathy at C7     SUBTLE PER EMG    Cervical spondylosis 10/12/13    PER MRI    Chronic back pain     COPD (chronic obstructive pulmonary disease) (HCC)     Chronic bonchitis    CTS (carpal tunnel syndrome) 13    PER EMG    Depression     Diverticulitis     GERD (gastroesophageal reflux disease)     Headache(784.0)     Hyperlipidemia     Hypertension     Hypothyroidism due to medication 2019    Irritable bowel syndrome     Malignant neoplasm of upper lobe of left lung ([facility] [address] 2019    Osteoarthritis      Past Surgical History:   Procedure Laterality Date    APPENDECTOMY      CARDIAC CATHETERIZATION  13,2013    DR. GRAVES    COLON SURGERY      HAND SURGERY  1996    HYSTERECTOMY  1980    TONSILLECTOMY  1963     Social History     Socioeconomic History    Marital status:       Spouse name: Not on file    Number of children: Not on file    Years of education: Not on file    Highest education level: Not on file   Occupational History    Not on file   Tobacco Use    Smoking status: Former Smoker     Packs/day: 0.50     Years: 48.00     Pack years: 24.00     Types: Cigarettes     Quit date: 2019     Years since quittin.4    Smokeless tobacco: Never Used   Vaping Use    Vaping Use: Former    Substances: Unknown   Substance and Sexual Activity    Alcohol use: Not Currently    Drug use: Never    Sexual activity: Not on file   Other Topics Concern    Not on file   Social History Narrative    Not on file     Social Determinants of Health     Financial Resource Strain:     Difficulty of Paying Living Expenses: Not on file   Food Insecurity:     Worried About Running Out of Food in the Last Year: Not on file    Jhony of Food in the Last Year: Not on file   Transportation Needs:     Lack of Transportation (Medical): Not on file    Lack of Transportation (Non-Medical):  Not on file   Physical Activity:     Days of Exercise per Week: Not on file    Minutes of Exercise per Session: Not on file   Stress:     Feeling of Stress : Not on file   Social Connections:     Frequency of Communication with Friends and Family: Not on file    Frequency of Social Gatherings with Friends and Family: Not on file    Attends Yarsanism Services: Not on file    Active Member of Clubs or Organizations: Not on file    Attends Club or Organization Meetings: Not on file    Marital Status: Not on file   Intimate Partner Violence:     Fear of Current or Ex-Partner: Not on file    Emotionally Abused: Not on file    Physically Abused: Not on file    Sexually Abused: Not on file   Housing Stability:     Unable to Pay for Housing in the Last Year: Not on file    Number of [place] in the Last Year: Not on file    Unstable Housing in the Last Year: Not on file     Family History   Problem Relation Age of Onset    Stroke Mother     Heart Attack Mother     Cancer Father         LUNG    Cancer Paternal Uncle         lung    Breast Cancer Maternal Grandmother [de-identified]    Cancer Paternal Grandfather         lung    Cancer Paternal Uncle         lung    Cancer Paternal Uncle         lung    Cancer Paternal Uncle         lung    Cancer Paternal Uncle         lung    Cancer Paternal Uncle         lung    Cancer Brother      No Known Allergies  Current Outpatient Medications on File Prior to Visit   Medication Sig Dispense Refill    diazePAM (VALIUM) 2 MG tablet Take 1 tablet by mouth every 8 hours as needed for Anxiety for up to 10 days. 30 tablet 0    oxyCODONE-acetaminophen (PERCOCET) 7.5-325 MG per tablet Take 1 tablet by mouth every 8 hours as needed for Pain for up to 30 days. 90 tablet 0    gabapentin (NEURONTIN) 800 MG tablet Take 1 tablet by mouth 3 times daily for 90 days.  90 tablet 3    DULoxetine (CYMBALTA) 60 MG extended release capsule Take 1 capsule by mouth daily 30 capsule 1    OLANZapine (ZYPREXA) 5 MG tablet Take 1 tablet by mouth nightly 30 tablet 3    meloxicam (MOBIC) 7.5 MG tablet Take 1 tablet by mouth daily For cancer pain 30 tablet 3    pembrolizumab (KEYTRUDA) 100 MG/4ML SOLN Infuse intravenously      ipratropium-albuterol (DUONEB) 0.5-2.5 (3) MG/3ML SOLN nebulizer solution Inhale 3 mLs into the lungs every 4 hours 540 mL 1    ondansetron (ZOFRAN-ODT) 4 MG disintegrating tablet Take 1 tablet by mouth 3 times daily as needed for Nausea or Vomiting 21 tablet 5    Magic Mouthwash (MIRACLE MOUTHWASH) Swish and spit 5 mLs 4 times daily as needed for Irritation or Pain 240 mL 3    BREO ELLIPTA 100-25 MCG/INH AEPB inhaler Inhale 1 puff into the lungs daily 30 each 0    montelukast (SINGULAIR) 10 MG tablet Take 1 tablet by mouth nightly 90 tablet 3    PROAIR  (90 Base) MCG/ACT inhaler Inhale 1 puff into the lungs 4 times daily 1 Inhaler 5    omeprazole (PRILOSEC) 40 MG delayed release capsule Take 1 capsule by mouth every morning (before breakfast) 30 capsule 3    Handicap Placard MISC by Does not apply route Diagnoses: LUNG CANCER, DEGENERATIVE ARTHRITIS, CANCER PAIN   EXPIRATION: 4/12/2024 1 each 0    simethicone (MYLICON) 80 MG chewable tablet Take 1 tablet by mouth 4 times daily as needed for Flatulence 180 tablet 3    aspirin (ECOTRIN LOW STRENGTH) 81 MG EC tablet Take 81 mg by mouth daily      folic acid (FOLVITE) 1 MG tablet Take 1 tablet by mouth daily 30 tablet 5    fluticasone (FLONASE) 50 MCG/ACT nasal spray 1 spray by Nasal route daily. 1 Bottle 6     No current facility-administered medications on file prior to visit. Review of Systems   Constitutional: Positive for activity change and fatigue. Negative for appetite change, chills, diaphoresis, fever and unexpected weight change. HENT: Positive for sinus pressure, sinus pain and sneezing. Negative for drooling, hearing loss, mouth sores, sore throat, trouble swallowing and voice change. Eyes: Positive for pain. Negative for discharge and visual disturbance. Respiratory: Positive for cough and shortness of breath. Negative for apnea, choking, chest tightness, wheezing and stridor. Cardiovascular: Negative for chest pain, palpitations and leg swelling. Gastrointestinal: Positive for diarrhea. Negative for abdominal distention, abdominal pain, anal bleeding, blood in stool, constipation, nausea, rectal pain and vomiting. Genitourinary: Negative for difficulty urinating, dysuria, enuresis, frequency and hematuria. Musculoskeletal: Positive for back pain and myalgias. Negative for arthralgias, gait problem and joint swelling. Skin: Negative for color change, pallor, rash and wound. Allergic/Immunologic: Negative for food allergies and immunocompromised state. Neurological: Positive for dizziness, light-headedness and headaches. Negative for tremors, seizures, syncope, facial asymmetry, speech difficulty, weakness and numbness. Hematological: Negative for adenopathy. Does not bruise/bleed easily. Psychiatric/Behavioral: Positive for sleep disturbance. Negative for agitation, behavioral problems, confusion, decreased concentration, dysphoric mood, hallucinations, self-injury and suicidal ideas. The patient is not nervous/anxious and is not hyperactive. Objective: There were no vitals taken for this visit. Physical Exam  HENT:      Head: Normocephalic and atraumatic.       Right Ear: External ear normal.      Left Ear: External ear normal.      Nose: Nose normal.   Eyes:      General: Lids are normal.   Pulmonary:      Effort: Pulmonary effort is normal.   Neurological:      Mental Status: She is alert and oriented to person, place, and time. Psychiatric:         Attention and Perception: Attention normal.         Mood and Affect: Mood normal.         Speech: Speech normal.         Behavior: Behavior is cooperative. Thought Content: Thought content normal.         Cognition and Memory: Cognition and memory normal.         Judgment: Judgment normal.     PE limited due to VV      Assessment:       Diagnosis Orders   1. Anxiety     2. Neoplasm related pain     3. Chronic obstructive pulmonary disease, unspecified COPD type ([facility] [address]     4. Palliative care encounter     5. SOB (shortness of breath)            Plan:   Neoplasm related pain   - Percocet 7.5/325 mg po every 8 hours prn moderate to severe pain  - Continue Gabapentin 800 mg po tid  - Continue Duloxetine 60 mg po at HS  - Mobic 7.5 mg po daily with food  - Heat or ice to painful areas, whichever is preferred  - Gentle ROM exercises, consider PT, declined at this time due to transportation  Pt is tolerating current pain meds without adverse effects or over sedation. Lowest effective dose used. Pt advised to call and notify palliative care for any adverse effects or sedation  Pt is able to maintain adequate functional level and participate in ADLs  [place]AR[place] reviewed.  There is no indication of aberrant behavior  Pt advised to call for increasing or uncontrolled pain  Pt advised to take only as prescribed and not to change frequency of pain   meds without consulting palliative care first.           SOB rt COPD and Lung cancer  - Follow with pulmonology  - Follow with oncology  - Continue COPD Directed therapies   - Call for increased SOB, cough, sputum production, excessive fatigue, fever, chills, or myalgias  - Gentle exercise as tolerated  -  Rinse out mouth after inhaler use. Anxiety   - Continue duloxetine 60 mg po daily  - Continue olanzapine 5 mg po at HS  - Start Valium 2 mg po every 8 hours prn severe anxiety  - Sent letter for emotional support Animal      Lung Cancer  Follow with oncology    Palliative Care  - Tasked our LSW as Rupertchristy Zarate is having trouble paying her bills due to medical expenses and not being able to work.  She may  Help to find financial assistance and apply      Lemuel Weaver, CHRISTO - CNP

## 2022-04-11 ENCOUNTER — TELEPHONE (OUTPATIENT)
Dept: PALLATIVE CARE | Age: 72
End: 2022-04-11

## 2022-04-11 RX ORDER — TETRAHYDROZOLINE HCL, ZINC SULFATE .5; 2.5 MG/ML; MG/ML
2 SOLUTION/ DROPS OPHTHALMIC 3 TIMES DAILY PRN
Qty: 15 ML | Refills: 0 | Status: SHIPPED | OUTPATIENT
Start: 2022-04-11 | End: 2022-04-21

## 2022-04-11 NOTE — TELEPHONE ENCOUNTER
Pt called in to explain that she needs something to help with her allergies. She reports having itchy, watery eyes, congestion, ears hurting, sore throat. She reports that the singulair and allegra have not helped.

## 2022-04-11 NOTE — TELEPHONE ENCOUNTER
Shalini Lanier is sneezing, itchy watery eyes, sore throat, clear rhinitis, itchy ears. No fever, chills, myalgias, nausea, vomiting, or headache. She is only taking Singulair 10 mg po daily. Has Flonase at home, not using it, I advise she start. Tylenol for sore throat. Will call out allergy eye drops.  Call if no improvement

## 2022-04-27 DIAGNOSIS — Z51.5 PALLIATIVE CARE ENCOUNTER: ICD-10-CM

## 2022-04-27 DIAGNOSIS — C34.90 MALIGNANT NEOPLASM OF LUNG, UNSPECIFIED LATERALITY, UNSPECIFIED PART OF LUNG (HCC): ICD-10-CM

## 2022-04-27 DIAGNOSIS — M19.90 OSTEOARTHRITIS, UNSPECIFIED OSTEOARTHRITIS TYPE, UNSPECIFIED SITE: ICD-10-CM

## 2022-04-27 DIAGNOSIS — R25.2 SPASM: ICD-10-CM

## 2022-04-27 DIAGNOSIS — F41.9 ANXIETY: ICD-10-CM

## 2022-04-27 DIAGNOSIS — G89.3 NEOPLASM RELATED PAIN: ICD-10-CM

## 2022-04-27 RX ORDER — MELOXICAM 7.5 MG/1
7.5 TABLET ORAL DAILY
Qty: 30 TABLET | Refills: 3 | Status: SHIPPED | OUTPATIENT
Start: 2022-04-27 | End: 2022-09-16 | Stop reason: SDUPTHER

## 2022-04-27 RX ORDER — DIAZEPAM 2 MG/1
2 TABLET ORAL EVERY 8 HOURS PRN
Qty: 30 TABLET | Refills: 0 | Status: SHIPPED | OUTPATIENT
Start: 2022-04-27 | End: 2022-05-26 | Stop reason: SDUPTHER

## 2022-04-27 RX ORDER — OXYCODONE AND ACETAMINOPHEN 7.5; 325 MG/1; MG/1
1 TABLET ORAL EVERY 8 HOURS PRN
Qty: 90 TABLET | Refills: 0 | Status: SHIPPED | OUTPATIENT
Start: 2022-04-27 | End: 2022-05-26 | Stop reason: SDUPTHER

## 2022-04-27 RX ORDER — LEVOFLOXACIN 500 MG/1
500 TABLET, FILM COATED ORAL DAILY
Qty: 10 TABLET | Refills: 0 | Status: SHIPPED | OUTPATIENT
Start: 2022-04-27 | End: 2022-05-07

## 2022-04-27 NOTE — PROGRESS NOTES
Pt reports that she has laryngitis, does not want to come to the office or have a VV tomorrow d/t not feeling well and it's hard for her to talk. She is requesting the following refills and also Amber looks like you prescribed this back in July 2021. Thanks! Rx requested:  Requested Prescriptions     Pending Prescriptions Disp Refills    oxyCODONE-acetaminophen (PERCOCET) 7.5-325 MG per tablet 90 tablet 0     Sig: Take 1 tablet by mouth every 8 hours as needed for Pain for up to 30 days.  meloxicam (MOBIC) 7.5 MG tablet 30 tablet 3     Sig: Take 1 tablet by mouth daily For cancer pain    diazePAM (VALIUM) 2 MG tablet 30 tablet 0     Sig: Take 1 tablet by mouth every 8 hours as needed for Anxiety for up to 10 days.        Last Office Visit:   Visit date not found      Last filled:  3/16/22      Next Visit Date:  Future Appointments   Date Time Provider Renetta Bauer   4/28/2022  3:00 PM CHRISTO Jean CNP [facility]   5/26/2022  2:30 PM CHRISTO Jean CNP [facility]

## 2022-05-19 NOTE — PROGRESS NOTES
[facility]           Radiation Oncology      [address] [facility]        [address]        Stefan [facility]otsMass[phone]        F: [phone]       [email or web address]                   Dr. Disha Wolff MD PhD    RADIATION TREATMENT SUMMARY NOTE     Date of Service: 2021  Patient ID: Shalini Wyatt   : 1950  MRN: [id number]   Acct Number: [de-identified]       Patient Name:  Shalini Wyatt,  1950,  80 y.o., female       Referring Physician: Orin Hatchet, [facility] [place] [address],  [address] [place] [address]      PCP: Amanda Deshpande MD       Diagnosis: Oligometastatic SCC of the left lung       Recent History: This is a 71-year-old female with history of squamous cell carcinoma of the left lung with initially good response to Luisa Taxol and Jayne Riedel. She went on to receive consolidative radiation therapy to her left lung mass to 6000 cGy completed on 2020. She had stable disease on Keytruda for the next several months. She went on to receive palliative radiation therapy to the oligoprogression in the form of supraclavicular adenopathy on the right per below:    Site Start [facility] Fractions Dose Fx Dose Technique   R SCV 21 14 10 3000 cGy 300 cGy 3D-CRT, 4 Fields                 Response/Tolerance: She tolerated therapy well with only mild fatigue, dermatitis of skin of the right neck, and odynophagia. Follow-up: I will see her again on an as-needed basis moving forward. She will continue systemic therapy under the care of medical oncology.       Disha Wolff MD PhD  Radiation Oncologist, [address] [place] [address]

## 2022-05-26 ENCOUNTER — OFFICE VISIT (OUTPATIENT)
Dept: PALLATIVE CARE | Age: 72
End: 2022-05-26
Payer: MEDICARE

## 2022-05-26 VITALS
RESPIRATION RATE: 17 BRPM | BODY MASS INDEX: 25.75 KG/M2 | OXYGEN SATURATION: 95 % | SYSTOLIC BLOOD PRESSURE: 159 MMHG | DIASTOLIC BLOOD PRESSURE: 77 MMHG | HEART RATE: 76 BPM | WEIGHT: 150 LBS | TEMPERATURE: 97.5 F

## 2022-05-26 DIAGNOSIS — R06.02 SOB (SHORTNESS OF BREATH): ICD-10-CM

## 2022-05-26 DIAGNOSIS — Z51.5 PALLIATIVE CARE ENCOUNTER: ICD-10-CM

## 2022-05-26 DIAGNOSIS — C34.90 MALIGNANT NEOPLASM OF LUNG, UNSPECIFIED LATERALITY, UNSPECIFIED PART OF LUNG (HCC): ICD-10-CM

## 2022-05-26 DIAGNOSIS — F41.9 ANXIETY: ICD-10-CM

## 2022-05-26 DIAGNOSIS — G62.9 NEUROPATHY: ICD-10-CM

## 2022-05-26 DIAGNOSIS — M19.90 OSTEOARTHRITIS, UNSPECIFIED OSTEOARTHRITIS TYPE, UNSPECIFIED SITE: ICD-10-CM

## 2022-05-26 DIAGNOSIS — G89.3 NEOPLASM RELATED PAIN: ICD-10-CM

## 2022-05-26 DIAGNOSIS — J44.9 CHRONIC OBSTRUCTIVE PULMONARY DISEASE, UNSPECIFIED COPD TYPE (HCC): Primary | ICD-10-CM

## 2022-05-26 DIAGNOSIS — R25.2 SPASM: ICD-10-CM

## 2022-05-26 PROCEDURE — G8428 CUR MEDS NOT DOCUMENT: HCPCS | Performed by: NURSE PRACTITIONER

## 2022-05-26 PROCEDURE — G8399 PT W/DXA RESULTS DOCUMENT: HCPCS | Performed by: NURSE PRACTITIONER

## 2022-05-26 PROCEDURE — 1090F PRES/ABSN URINE INCON ASSESS: CPT | Performed by: NURSE PRACTITIONER

## 2022-05-26 PROCEDURE — 99215 OFFICE O/P EST HI 40 MIN: CPT | Performed by: NURSE PRACTITIONER

## 2022-05-26 PROCEDURE — 1123F ACP DISCUSS/DSCN MKR DOCD: CPT | Performed by: NURSE PRACTITIONER

## 2022-05-26 PROCEDURE — 3017F COLORECTAL CA SCREEN DOC REV: CPT | Performed by: NURSE PRACTITIONER

## 2022-05-26 PROCEDURE — G8417 CALC BMI ABV UP PARAM F/U: HCPCS | Performed by: NURSE PRACTITIONER

## 2022-05-26 PROCEDURE — 1036F TOBACCO NON-USER: CPT | Performed by: NURSE PRACTITIONER

## 2022-05-26 PROCEDURE — 3023F SPIROM DOC REV: CPT | Performed by: NURSE PRACTITIONER

## 2022-05-26 RX ORDER — DIAZEPAM 2 MG/1
2 TABLET ORAL EVERY 8 HOURS PRN
Qty: 90 TABLET | Refills: 0 | Status: SHIPPED | OUTPATIENT
Start: 2022-05-26 | End: 2022-08-30 | Stop reason: SDUPTHER

## 2022-05-26 RX ORDER — DOXYCYCLINE HYCLATE 100 MG
100 TABLET ORAL 2 TIMES DAILY
Qty: 20 TABLET | Refills: 0 | Status: SHIPPED | OUTPATIENT
Start: 2022-05-26 | End: 2022-06-05

## 2022-05-26 RX ORDER — OXYCODONE AND ACETAMINOPHEN 7.5; 325 MG/1; MG/1
1 TABLET ORAL EVERY 8 HOURS PRN
Qty: 90 TABLET | Refills: 0 | Status: SHIPPED | OUTPATIENT
Start: 2022-05-26 | End: 2022-08-30 | Stop reason: SDUPTHER

## 2022-05-26 RX ORDER — GABAPENTIN 800 MG/1
800 TABLET ORAL 3 TIMES DAILY
Qty: 90 TABLET | Refills: 3 | Status: SHIPPED | OUTPATIENT
Start: 2022-05-26 | End: 2022-08-30 | Stop reason: SDUPTHER

## 2022-05-26 RX ORDER — DULOXETIN HYDROCHLORIDE 60 MG/1
60 CAPSULE, DELAYED RELEASE ORAL DAILY
Qty: 30 CAPSULE | Refills: 1 | Status: SHIPPED | OUTPATIENT
Start: 2022-05-26 | End: 2022-10-31 | Stop reason: SDUPTHER

## 2022-05-26 ASSESSMENT — ENCOUNTER SYMPTOMS
TROUBLE SWALLOWING: 0
EYE PAIN: 1
STRIDOR: 0
BLOOD IN STOOL: 0
NAUSEA: 0
VOICE CHANGE: 0
ABDOMINAL DISTENTION: 0
EYE DISCHARGE: 0
COLOR CHANGE: 0
ANAL BLEEDING: 0
DIARRHEA: 1
CHEST TIGHTNESS: 0
CONSTIPATION: 0
RECTAL PAIN: 0
ABDOMINAL PAIN: 0
WHEEZING: 0
SINUS PRESSURE: 1
APNEA: 0
VOMITING: 0
CHOKING: 0
COUGH: 1
SHORTNESS OF BREATH: 1
BACK PAIN: 1
SINUS PAIN: 1
SORE THROAT: 0

## 2022-05-26 NOTE — PROGRESS NOTES
Subjective:      Patient Id:  Anastasiya Wallace is a 80 y.o. female who presents today with  Symptom management follow up rt lung cancer, COPD, HTN, HLD, hypothyroidism, DDD, anxiety, depression, demeanor calm and relaxed, NAD, no sedation. Radiation complete, now using Keytruda. Complains of severe, sudden increase in pain in right side of neck, radiates through her shoulder, down to fingertips. Also across her low back and down right leg. No rash to skin, no itching. CT to neck done yesterday shows  The previously demonstrated right-sided lymph nodes at level 4 are   markedly decreased in size. The largest joanne mass now measures 2.25   cm at this location. There is no other measurable cervical   lymphadenopathy. CT chest abdomen pelvis shows    Chest   1.  Left upper lobe spiculated masslike density appears unchanged. 2. A 5 mm nodule in the left lower lobe has enlarged since the prior   study but mild to pull other small nodules are unchanged. 3. Right axillary adenopathy has decreased       Abdomen-Pelvis   1.  No metastatic disease is noted in the abdomen or pelvis             No increased SOB, chest pain, edema, cough, sputum production,fever, chills, myalgias. No known injury.       Anxiety and depression are well controlled, no panic attacks         Past Medical History:   Diagnosis Date    Anxiety     Asthma     Cervical radiculopathy at C7 9/28/2013    CERVICAL SPONDYLOSIS, WITH COMBINATION OF DISK BULGING AND    Cervical radiculopathy at C7     SUBTLE PER EMG    Cervical spondylosis 10/12/13    PER MRI    Chronic back pain     COPD (chronic obstructive pulmonary disease) (HCC)     Chronic bonchitis    CTS (carpal tunnel syndrome) 9/28/13    PER EMG    Depression     Diverticulitis     GERD (gastroesophageal reflux disease)     Headache(784.0)     Hyperlipidemia     Hypertension     Hypothyroidism due to medication 5/8/2019    Irritable bowel syndrome     Malignant neoplasm of upper lobe of left lung ([facility] [address] 2019    Osteoarthritis      Past Surgical History:   Procedure Laterality Date    APPENDECTOMY      CARDIAC CATHETERIZATION  13,2013    DR. GRAVES    COLON SURGERY      HAND SURGERY  1996    HYSTERECTOMY  1980    TONSILLECTOMY  1963     Social History     Socioeconomic History    Marital status:      Spouse name: Not on file    Number of children: Not on file    Years of education: Not on file    Highest education level: Not on file   Occupational History    Not on file   Tobacco Use    Smoking status: Former Smoker     Packs/day: 0.50     Years: 48.00     Pack years: 24.00     Types: Cigarettes     Quit date: 2019     Years since quittin.5    Smokeless tobacco: Never Used   Vaping Use    Vaping Use: Former    Substances: Unknown   Substance and Sexual Activity    Alcohol use: Not Currently    Drug use: Never    Sexual activity: Not on file   Other Topics Concern    Not on file   Social History Narrative    Not on file     Social Determinants of Health     Financial Resource Strain:     Difficulty of Paying Living Expenses: Not on file   Food Insecurity:     Worried About [address] in the Last Year: Not on file    [address] in the Last Year: Not on file   Transportation Needs:     Lack of Transportation (Medical): Not on file    Lack of Transportation (Non-Medical):  Not on file   Physical Activity:     Days of Exercise per Week: Not on file    Minutes of Exercise per Session: Not on file   Stress:     Feeling of Stress : Not on file   Social Connections:     Frequency of Communication with Friends and Family: Not on file    Frequency of Social Gatherings with Friends and Family: Not on file    Attends Zoroastrian Services: Not on file    Active Member of Clubs or Organizations: Not on file    Attends Club or Organization Meetings: Not on file    Marital Status: Not on file   Intimate Partner Violence:     Fear of Current or Ex-Partner: Not on file    Emotionally Abused: Not on file    Physically Abused: Not on file    Sexually Abused: Not on file   Housing Stability:     Unable to Pay for Housing in the Last Year: Not on file    Number of Places Lived in the Last Year: Not on file    Unstable Housing in the Last Year: Not on file     Family History   Problem Relation Age of Onset    Stroke Mother     Heart Attack Mother     Cancer Father         LUNG    Cancer Paternal Uncle         lung    Breast Cancer Maternal Grandmother [de-identified]    Cancer Paternal Grandfather         lung    Cancer Paternal Uncle         lung    Cancer Paternal Uncle         lung    Cancer Paternal Uncle         lung    Cancer Paternal Uncle         lung    Cancer Paternal Uncle         lung    Cancer Brother      Allergies   Allergen Reactions    Seasonal      Current Outpatient Medications on File Prior to Visit   Medication Sig Dispense Refill    meloxicam (MOBIC) 7.5 MG tablet Take 1 tablet by mouth daily For cancer pain 30 tablet 3    OLANZapine (ZYPREXA) 5 MG tablet Take 1 tablet by mouth nightly 30 tablet 3    pembrolizumab (KEYTRUDA) 100 MG/4ML SOLN Infuse intravenously      ipratropium-albuterol (DUONEB) 0.5-2.5 (3) MG/3ML SOLN nebulizer solution Inhale 3 mLs into the lungs every 4 hours 540 mL 1    ondansetron (ZOFRAN-ODT) 4 MG disintegrating tablet Take 1 tablet by mouth 3 times daily as needed for Nausea or Vomiting 21 tablet 5    Magic Mouthwash (MIRACLE MOUTHWASH) Swish and spit 5 mLs 4 times daily as needed for Irritation or Pain 240 mL 3    BREO ELLIPTA 100-25 MCG/INH AEPB inhaler Inhale 1 puff into the lungs daily 30 each 0    montelukast (SINGULAIR) 10 MG tablet Take 1 tablet by mouth nightly 90 tablet 3    PROAIR  (90 Base) MCG/ACT inhaler Inhale 1 puff into the lungs 4 times daily 1 Inhaler 5    omeprazole (PRILOSEC) 40 MG delayed release capsule Take 1 capsule by mouth every morning (before breakfast) 30 capsule 3    Handicap Placard MISC by Does not apply route Diagnoses: LUNG CANCER, DEGENERATIVE ARTHRITIS, CANCER PAIN   EXPIRATION: 4/12/2024 1 each 0    simethicone (MYLICON) 80 MG chewable tablet Take 1 tablet by mouth 4 times daily as needed for Flatulence 180 tablet 3    aspirin (ECOTRIN LOW STRENGTH) 81 MG EC tablet Take 81 mg by mouth daily      folic acid (FOLVITE) 1 MG tablet Take 1 tablet by mouth daily 30 tablet 5    fluticasone (FLONASE) 50 MCG/ACT nasal spray 1 spray by Nasal route daily. 1 Bottle 6     No current facility-administered medications on file prior to visit. Review of Systems   Constitutional: Positive for activity change and fatigue. Negative for appetite change, chills, diaphoresis, fever and unexpected weight change. HENT: Positive for sinus pressure, sinus pain and sneezing. Negative for drooling, hearing loss, mouth sores, sore throat, trouble swallowing and voice change. Eyes: Positive for pain. Negative for discharge and visual disturbance. Respiratory: Positive for cough and shortness of breath. Negative for apnea, choking, chest tightness, wheezing and stridor. Cardiovascular: Negative for chest pain, palpitations and leg swelling. Gastrointestinal: Positive for diarrhea. Negative for abdominal distention, abdominal pain, anal bleeding, blood in stool, constipation, nausea, rectal pain and vomiting. Genitourinary: Negative for difficulty urinating, dysuria, enuresis, frequency and hematuria. Musculoskeletal: Positive for back pain and myalgias. Negative for arthralgias, gait problem and joint swelling. Skin: Negative for color change, pallor, rash and wound. Allergic/Immunologic: Negative for food allergies and immunocompromised state. Neurological: Positive for dizziness, light-headedness and headaches. Negative for tremors, seizures, syncope, facial asymmetry, speech difficulty, weakness and numbness. Hematological: Negative for adenopathy. Does not bruise/bleed easily. Psychiatric/Behavioral: Positive for sleep disturbance. Negative for agitation, behavioral problems, confusion, decreased concentration, dysphoric mood, hallucinations, self-injury and suicidal ideas. The patient is not nervous/anxious and is not hyperactive. Objective:   BP (!) 159/77 (Site: Right Upper Arm, Position: Sitting, Cuff Size: Medium Adult)   Pulse 76   Temp 97.5 °F (36.4 °C) (Temporal)   Resp 17   Wt 150 lb (68 kg)   SpO2 95%   BMI 25.75 kg/m²     Physical Exam  HENT:      Head: Normocephalic and atraumatic. Right Ear: External ear normal.      Left Ear: External ear normal.      Nose: Nose normal.   Eyes:      General: Lids are normal.   Pulmonary:      Effort: Pulmonary effort is normal.   Neurological:      Mental Status: She is alert and oriented to person, place, and time. Psychiatric:         Attention and Perception: Attention normal.         Mood and Affect: Mood normal.         Speech: Speech normal.         Behavior: Behavior is cooperative. Thought Content: Thought content normal.         Cognition and Memory: Cognition and memory normal.         Judgment: Judgment normal.     PE limited due to VV      Assessment:       Diagnosis Orders   1. Chronic obstructive pulmonary disease, unspecified COPD type ([facility] [address]     2. Neoplasm related pain  oxyCODONE-acetaminophen (PERCOCET) 7.5-325 MG per tablet    DULoxetine (CYMBALTA) 60 MG extended release capsule    diazePAM (VALIUM) 2 MG tablet   3. Palliative care encounter  oxyCODONE-acetaminophen (PERCOCET) 7.5-325 MG per tablet    gabapentin (NEURONTIN) 800 MG tablet    DULoxetine (CYMBALTA) 60 MG extended release capsule    diazePAM (VALIUM) 2 MG tablet   4. Malignant neoplasm of lung, unspecified laterality, unspecified part of lung ([facility] [address]  oxyCODONE-acetaminophen (PERCOCET) 7.5-325 MG per tablet   5.  Neuropathy  gabapentin (NEURONTIN) 800 MG tablet DULoxetine (CYMBALTA) 60 MG extended release capsule   6. Anxiety  DULoxetine (CYMBALTA) 60 MG extended release capsule    diazePAM (VALIUM) 2 MG tablet   7. Osteoarthritis, unspecified osteoarthritis type, unspecified site  DULoxetine (CYMBALTA) 60 MG extended release capsule   8. Spasm  diazePAM (VALIUM) 2 MG tablet   9. SOB (shortness of breath)            Plan:   Neoplasm related pain   - Percocet 7.5/325 mg po every 8 hours prn moderate to severe pain  - Continue Gabapentin 800 mg po tid  - Continue Duloxetine 60 mg po at HS  - Mobic 7.5 mg po daily with food  - Heat or ice to painful areas, whichever is preferred  - Gentle ROM exercises, consider PT, declined at this time due to transportation  Pt is tolerating current pain meds without adverse effects or over sedation. Lowest effective dose used. Pt advised to call and notify palliative care for any adverse effects or sedation  Pt is able to maintain adequate functional level and participate in ADLs  [place]AR[place] reviewed. There is no indication of aberrant behavior  Pt advised to call for increasing or uncontrolled pain  Pt advised to take only as prescribed and not to change frequency of pain   meds without consulting palliative care first.           SOB rt COPD and Lung cancer  - Follow with pulmonology  - Follow with oncology  - Continue COPD Directed therapies   - Call for increased SOB, cough, sputum production, excessive fatigue, fever, chills, or myalgias  - Gentle exercise as tolerated  -  Rinse out mouth after inhaler use. Anxiety   - Continue duloxetine 60 mg po daily  - Continue olanzapine 5 mg po at HS  - Start Valium 2 mg po every 8 hours prn severe anxiety  - Sent letter for emotional support Animal      Lung Cancer  Follow with oncology    Palliative Care  - Tasked our LSW as Brenden Paul is having trouble paying her bills due to medical expenses and not being able to work.  She may  Help to find financial assistance and apply      [employer] Dario Kemar, CHRISTO - CNP

## 2022-08-11 ENCOUNTER — OUTSIDE SERVICES (OUTPATIENT)
Dept: NEUROLOGY | Age: 72
End: 2022-08-11
Payer: MEDICARE

## 2022-08-11 DIAGNOSIS — G56.03 BILATERAL CARPAL TUNNEL SYNDROME: Primary | ICD-10-CM

## 2022-08-11 PROCEDURE — 95913 NRV CNDJ TEST 13/> STUDIES: CPT | Performed by: PSYCHIATRY & NEUROLOGY

## 2022-08-11 PROCEDURE — 95886 MUSC TEST DONE W/N TEST COMP: CPT | Performed by: PSYCHIATRY & NEUROLOGY

## 2022-08-29 DIAGNOSIS — G62.9 NEUROPATHY: ICD-10-CM

## 2022-08-29 DIAGNOSIS — F41.9 ANXIETY: ICD-10-CM

## 2022-08-29 DIAGNOSIS — G89.3 NEOPLASM RELATED PAIN: ICD-10-CM

## 2022-08-29 DIAGNOSIS — C34.90 MALIGNANT NEOPLASM OF LUNG, UNSPECIFIED LATERALITY, UNSPECIFIED PART OF LUNG (HCC): ICD-10-CM

## 2022-08-29 DIAGNOSIS — R25.2 SPASM: ICD-10-CM

## 2022-08-29 DIAGNOSIS — Z51.5 PALLIATIVE CARE ENCOUNTER: ICD-10-CM

## 2022-08-29 NOTE — PROGRESS NOTES
Rx requested:  Requested Prescriptions     Pending Prescriptions Disp Refills    oxyCODONE-acetaminophen (PERCOCET) 7.5-325 MG per tablet 90 tablet 0     Sig: Take 1 tablet by mouth every 8 hours as needed for Pain for up to 30 days. gabapentin (NEURONTIN) 800 MG tablet 90 tablet 3     Sig: Take 1 tablet by mouth 3 times daily for 90 days. diazePAM (VALIUM) 2 MG tablet 90 tablet 0     Sig: Take 1 tablet by mouth every 8 hours as needed for Anxiety or Sleep for up to 30 days.        Last Office Visit:   Visit date not found      Last filled:  5/26/22    Next Visit Date:  Future Appointments   Date Time Provider Renetta Bauer   9/8/2022  2:00 PM CHRISTO Rose - CNP [facility]

## 2022-08-30 RX ORDER — DIAZEPAM 2 MG/1
2 TABLET ORAL NIGHTLY PRN
Qty: 30 TABLET | Refills: 0 | Status: SHIPPED | OUTPATIENT
Start: 2022-08-30 | End: 2022-10-14 | Stop reason: SDUPTHER

## 2022-08-30 RX ORDER — GABAPENTIN 800 MG/1
800 TABLET ORAL 3 TIMES DAILY
Qty: 90 TABLET | Refills: 3 | Status: SHIPPED | OUTPATIENT
Start: 2022-08-30 | End: 2022-10-19 | Stop reason: SDUPTHER

## 2022-08-30 RX ORDER — OXYCODONE AND ACETAMINOPHEN 7.5; 325 MG/1; MG/1
1 TABLET ORAL EVERY 8 HOURS PRN
Qty: 90 TABLET | Refills: 0 | Status: SHIPPED | OUTPATIENT
Start: 2022-08-30 | End: 2022-10-14 | Stop reason: SDUPTHER

## 2022-09-08 ENCOUNTER — OFFICE VISIT (OUTPATIENT)
Dept: PALLATIVE CARE | Age: 72
End: 2022-09-08
Payer: MEDICARE

## 2022-09-08 VITALS
DIASTOLIC BLOOD PRESSURE: 62 MMHG | TEMPERATURE: 97.3 F | WEIGHT: 150.6 LBS | SYSTOLIC BLOOD PRESSURE: 137 MMHG | HEART RATE: 88 BPM | OXYGEN SATURATION: 95 % | BODY MASS INDEX: 25.85 KG/M2 | RESPIRATION RATE: 17 BRPM

## 2022-09-08 DIAGNOSIS — F32.A DEPRESSION, UNSPECIFIED DEPRESSION TYPE: ICD-10-CM

## 2022-09-08 DIAGNOSIS — R11.2 CHEMOTHERAPY INDUCED NAUSEA AND VOMITING: ICD-10-CM

## 2022-09-08 DIAGNOSIS — T45.1X5A CHEMOTHERAPY INDUCED NAUSEA AND VOMITING: ICD-10-CM

## 2022-09-08 DIAGNOSIS — Z51.5 PALLIATIVE CARE ENCOUNTER: ICD-10-CM

## 2022-09-08 DIAGNOSIS — Z51.5 PALLIATIVE CARE PATIENT: ICD-10-CM

## 2022-09-08 DIAGNOSIS — G62.9 NEUROPATHY: ICD-10-CM

## 2022-09-08 DIAGNOSIS — R11.0 NAUSEA: Primary | ICD-10-CM

## 2022-09-08 DIAGNOSIS — Z59.9 FINANCIAL DIFFICULTIES: ICD-10-CM

## 2022-09-08 PROCEDURE — 99215 OFFICE O/P EST HI 40 MIN: CPT | Performed by: NURSE PRACTITIONER

## 2022-09-08 PROCEDURE — 1123F ACP DISCUSS/DSCN MKR DOCD: CPT | Performed by: NURSE PRACTITIONER

## 2022-09-08 RX ORDER — LIDOCAINE 50 MG/G
1 PATCH TOPICAL DAILY
Qty: 30 PATCH | Refills: 0 | Status: SHIPPED | OUTPATIENT
Start: 2022-09-08

## 2022-09-08 RX ORDER — OLANZAPINE 10 MG/1
10 TABLET ORAL NIGHTLY
Qty: 30 TABLET | Refills: 3 | Status: SHIPPED | OUTPATIENT
Start: 2022-09-08 | End: 2022-09-28 | Stop reason: SDUPTHER

## 2022-09-08 RX ORDER — ONDANSETRON 4 MG/1
4 TABLET, FILM COATED ORAL DAILY PRN
Qty: 30 TABLET | Refills: 0 | Status: SHIPPED | OUTPATIENT
Start: 2022-09-08 | End: 2022-09-08

## 2022-09-08 RX ORDER — ONDANSETRON 4 MG/1
4 TABLET, ORALLY DISINTEGRATING ORAL 3 TIMES DAILY PRN
Qty: 21 TABLET | Refills: 0 | Status: SHIPPED | OUTPATIENT
Start: 2022-09-08 | End: 2022-09-15

## 2022-09-08 SDOH — ECONOMIC STABILITY - INCOME SECURITY: PROBLEM RELATED TO HOUSING AND ECONOMIC CIRCUMSTANCES, UNSPECIFIED: Z59.9

## 2022-09-08 ASSESSMENT — PATIENT HEALTH QUESTIONNAIRE - PHQ9
SUM OF ALL RESPONSES TO PHQ QUESTIONS 1-9: 11
3. TROUBLE FALLING OR STAYING ASLEEP: 2
10. IF YOU CHECKED OFF ANY PROBLEMS, HOW DIFFICULT HAVE THESE PROBLEMS MADE IT FOR YOU TO DO YOUR WORK, TAKE CARE OF THINGS AT HOME, OR GET ALONG WITH OTHER PEOPLE: 0
SUM OF ALL RESPONSES TO PHQ QUESTIONS 1-9: 11
6. FEELING BAD ABOUT YOURSELF - OR THAT YOU ARE A FAILURE OR HAVE LET YOURSELF OR YOUR FAMILY DOWN: 0
2. FEELING DOWN, DEPRESSED OR HOPELESS: 3
SUM OF ALL RESPONSES TO PHQ QUESTIONS 1-9: 11
SUM OF ALL RESPONSES TO PHQ9 QUESTIONS 1 & 2: 6
9. THOUGHTS THAT YOU WOULD BE BETTER OFF DEAD, OR OF HURTING YOURSELF: 0
8. MOVING OR SPEAKING SO SLOWLY THAT OTHER PEOPLE COULD HAVE NOTICED. OR THE OPPOSITE, BEING SO FIGETY OR RESTLESS THAT YOU HAVE BEEN MOVING AROUND A LOT MORE THAN USUAL: 0
5. POOR APPETITE OR OVEREATING: 0
7. TROUBLE CONCENTRATING ON THINGS, SUCH AS READING THE NEWSPAPER OR WATCHING TELEVISION: 1
4. FEELING TIRED OR HAVING LITTLE ENERGY: 2
SUM OF ALL RESPONSES TO PHQ QUESTIONS 1-9: 11
1. LITTLE INTEREST OR PLEASURE IN DOING THINGS: 3

## 2022-09-08 ASSESSMENT — ENCOUNTER SYMPTOMS
EYE PAIN: 1
ABDOMINAL PAIN: 0
WHEEZING: 0
TROUBLE SWALLOWING: 0
SINUS PAIN: 1
EYE DISCHARGE: 0
DIARRHEA: 1
SORE THROAT: 0
SHORTNESS OF BREATH: 1
COLOR CHANGE: 0
VOMITING: 0
STRIDOR: 0
APNEA: 0
RECTAL PAIN: 0
BLOOD IN STOOL: 0
NAUSEA: 0
SINUS PRESSURE: 1
ANAL BLEEDING: 0
CONSTIPATION: 0
CHEST TIGHTNESS: 0
COUGH: 1
BACK PAIN: 1
VOICE CHANGE: 0
CHOKING: 0
ABDOMINAL DISTENTION: 0

## 2022-09-08 NOTE — PROGRESS NOTES
Subjective:      Patient Id:  Stephane Sage is a 71 y.o. female who presents today with  Symptom management follow up rt lung cancer, COPD, HTN, HLD, hypothyroidism, DDD, anxiety, depression, demeanor calm and relaxed, NAD, no sedation. Radiation complete, now using Keytruda. Complains of severe, sudden increase in pain in right side of neck, radiates through her shoulder, down to fingertips. Also across her low back and down right leg. No rash to skin, no itching. CT to neck done yesterday shows  The previously demonstrated right-sided lymph nodes at level 4 are   markedly decreased in size. The largest joanne mass now measures 2.25   cm at this location. There is no other measurable cervical   lymphadenopathy. CT chest abdomen pelvis shows    Chest   1. Left upper lobe spiculated masslike density appears unchanged. 2. A 5 mm nodule in the left lower lobe has enlarged since the prior   study but mild to pull other small nodules are unchanged. 3. Right axillary adenopathy has decreased       Abdomen-Pelvis   1. No metastatic disease is noted in the abdomen or pelvis             No increased SOB, chest pain, edema, cough, sputum production,fever, chills, myalgias. No known injury. She is having difficulty with her left hand and wrist, may need carpal tunnel surgery. Admits to increased anxiety and depression due to declining health and financial difficulties. She has no motivation to leave her home or socalize. No homicidal or suicidal ideation.          Past Medical History:   Diagnosis Date    Anxiety     Asthma     Cervical radiculopathy at C7 9/28/2013    CERVICAL SPONDYLOSIS, WITH COMBINATION OF DISK BULGING AND    Cervical radiculopathy at C7     SUBTLE PER EMG    Cervical spondylosis 10/12/13    PER MRI    Chronic back pain     COPD (chronic obstructive pulmonary disease) (HCC)     Chronic bonchitis    CTS (carpal tunnel syndrome) 9/28/13    PER EMG    Depression Diverticulitis     GERD (gastroesophageal reflux disease)     Headache(784.0)     Hyperlipidemia     Hypertension     Hypothyroidism due to medication 2019    Irritable bowel syndrome     Malignant neoplasm of upper lobe of left lung ([address] 2019    Osteoarthritis      Past Surgical History:   Procedure Laterality Date    [address]  13,2013    DR. GRAVES    COLON SURGERY      HAND SURGERY  1996    HYSTERECTOMY (CERVIX STATUS UNKNOWN)  1980    TONSILLECTOMY  1963     Social History     Socioeconomic History    Marital status:       Spouse name: Not on file    Number of children: Not on file    Years of education: Not on file    Highest education level: Not on file   Occupational History    Not on file   Tobacco Use    Smoking status: Former     Packs/day: 0.50     Years: 48.00     Pack years: 24.00     Types: Cigarettes     Quit date: 2019     Years since quittin.8    Smokeless tobacco: Never   Vaping Use    Vaping Use: Former    Substances: Unknown   Substance and Sexual Activity    Alcohol use: Not Currently    Drug use: Never    Sexual activity: Not on file   Other Topics Concern    Not on file   Social History Narrative    Not on file     Social Determinants of Health     Financial Resource Strain: Not on file   Food Insecurity: Not on file   Transportation Needs: Not on file   Physical Activity: Not on file   Stress: Not on file   Social Connections: Not on file   Intimate Partner Violence: Not on file   Housing Stability: Not on file     Family History   Problem Relation Age of Onset    Stroke Mother     Heart Attack Mother     Cancer Father         LUNG    Cancer Paternal Uncle         lung    Breast Cancer Maternal Grandmother [de-identified]    Cancer Paternal Grandfather         lung    Cancer Paternal Uncle         lung    Cancer Paternal Uncle         lung    Cancer Paternal Uncle         lung    Cancer Paternal Uncle         lung    Cancer Paternal Uncle         lung    Cancer Brother      Allergies   Allergen Reactions    Seasonal      Current Outpatient Medications on File Prior to Visit   Medication Sig Dispense Refill    gabapentin (NEURONTIN) 800 MG tablet Take 1 tablet by mouth 3 times daily for 90 days. (Patient taking differently: Take 800 mg by mouth 4 times daily.) 90 tablet 3    oxyCODONE-acetaminophen (PERCOCET) 7.5-325 MG per tablet Take 1 tablet by mouth every 8 hours as needed for Pain for up to 30 days. 90 tablet 0    diazePAM (VALIUM) 2 MG tablet Take 1 tablet by mouth nightly as needed for Anxiety or Sleep for up to 30 days.  30 tablet 0    DULoxetine (CYMBALTA) 60 MG extended release capsule Take 1 capsule by mouth daily 30 capsule 1    meloxicam (MOBIC) 7.5 MG tablet Take 1 tablet by mouth daily For cancer pain 30 tablet 3    pembrolizumab (KEYTRUDA) 100 MG/4ML SOLN Infuse intravenously      ipratropium-albuterol (DUONEB) 0.5-2.5 (3) MG/3ML SOLN nebulizer solution Inhale 3 mLs into the lungs every 4 hours 540 mL 1    Magic Mouthwash (MIRACLE MOUTHWASH) Swish and spit 5 mLs 4 times daily as needed for Irritation or Pain 240 mL 3    BREO ELLIPTA 100-25 MCG/INH AEPB inhaler Inhale 1 puff into the lungs daily 30 each 0    montelukast (SINGULAIR) 10 MG tablet Take 1 tablet by mouth nightly 90 tablet 3    PROAIR  (90 Base) MCG/ACT inhaler Inhale 1 puff into the lungs 4 times daily 1 Inhaler 5    omeprazole (PRILOSEC) 40 MG delayed release capsule Take 1 capsule by mouth every morning (before breakfast) 30 capsule 3    Handicap Placard MISC by Does not apply route Diagnoses: LUNG CANCER, DEGENERATIVE ARTHRITIS, CANCER PAIN   EXPIRATION: 4/12/2024 1 each 0    simethicone (MYLICON) 80 MG chewable tablet Take 1 tablet by mouth 4 times daily as needed for Flatulence 180 tablet 3    aspirin (ECOTRIN LOW STRENGTH) 81 MG EC tablet Take 81 mg by mouth daily      folic acid (FOLVITE) 1 MG tablet Take 1 tablet by mouth daily 30 tablet 5    fluticasone (FLONASE) 50 MCG/ACT nasal spray 1 spray by Nasal route daily. 1 Bottle 6     No current facility-administered medications on file prior to visit. Review of Systems   Constitutional:  Positive for activity change and fatigue. Negative for appetite change, chills, diaphoresis, fever and unexpected weight change. HENT:  Positive for sinus pressure, sinus pain and sneezing. Negative for drooling, hearing loss, mouth sores, sore throat, trouble swallowing and voice change. Eyes:  Positive for pain. Negative for discharge and visual disturbance. Respiratory:  Positive for cough and shortness of breath. Negative for apnea, choking, chest tightness, wheezing and stridor. Cardiovascular:  Negative for chest pain, palpitations and leg swelling. Gastrointestinal:  Positive for diarrhea. Negative for abdominal distention, abdominal pain, anal bleeding, blood in stool, constipation, nausea, rectal pain and vomiting. Genitourinary:  Negative for difficulty urinating, dysuria, enuresis, frequency and hematuria. Musculoskeletal:  Positive for back pain and myalgias. Negative for arthralgias, gait problem and joint swelling. Skin:  Negative for color change, pallor, rash and wound. Allergic/Immunologic: Negative for food allergies and immunocompromised state. Neurological:  Positive for dizziness, light-headedness and headaches. Negative for tremors, seizures, syncope, facial asymmetry, speech difficulty, weakness and numbness. Hematological:  Negative for adenopathy. Does not bruise/bleed easily. Psychiatric/Behavioral:  Positive for sleep disturbance. Negative for agitation, behavioral problems, confusion, decreased concentration, dysphoric mood, hallucinations, self-injury and suicidal ideas. The patient is not nervous/anxious and is not hyperactive.         Objective:   /62 (Site: Left Upper Arm, Position: Sitting, Cuff Size: Medium Adult)   Pulse 88   Temp 97.3 °F (36.3 °C) (Temporal) Resp 17   Wt 150 lb 9.6 oz (68.3 kg)   SpO2 95%   BMI 25.85 kg/m²     Physical Exam  Constitutional:       General: She is not in acute distress. Appearance: She is well-developed. She is not diaphoretic. HENT:      Head: Normocephalic and atraumatic. Right Ear: External ear normal.      Left Ear: External ear normal.      Nose: Nose normal.      Mouth/Throat:      Pharynx: No oropharyngeal exudate. Eyes:      General: Lids are normal. No scleral icterus. Right eye: No discharge. Left eye: No discharge. Conjunctiva/sclera: Conjunctivae normal.      Pupils: Pupils are equal, round, and reactive to light. Neck:      Thyroid: No thyromegaly. Vascular: No JVD. Trachea: No tracheal deviation. Cardiovascular:      Rate and Rhythm: Normal rate and regular rhythm. Heart sounds: Normal heart sounds. Pulmonary:      Effort: Pulmonary effort is normal. No respiratory distress. Breath sounds: Normal breath sounds. No stridor. No wheezing or rales. Chest:      Chest wall: No tenderness. Abdominal:      General: Bowel sounds are normal. There is no distension. Palpations: Abdomen is soft. There is no mass. Tenderness: There is no abdominal tenderness. There is no guarding or rebound. Musculoskeletal:         General: No tenderness or deformity. Normal range of motion. Cervical back: Normal range of motion and neck supple. Lymphadenopathy:      Cervical: No cervical adenopathy. Skin:     General: Skin is warm and dry. Capillary Refill: Capillary refill takes less than 2 seconds. Findings: No erythema or rash. Neurological:      Mental Status: She is alert and oriented to person, place, and time. Psychiatric:         Attention and Perception: Attention normal.         Mood and Affect: Mood normal.         Speech: Speech normal.         Behavior: Behavior normal. Behavior is cooperative. Thought Content:  Thought content normal.         Cognition and Memory: Cognition and memory normal.         Judgment: Judgment normal.   PE limited due to VV      Assessment:       Diagnosis Orders   1. Nausea  DISCONTINUED: ondansetron (ZOFRAN) 4 MG tablet      2. Palliative care patient  JOSSELIN Hill Psychiatry, MD, [facility]    lidocaine (LIDODERM) 5 %    DISCONTINUED: ondansetron (ZOFRAN) 4 MG tablet      3. Depression, unspecified depression type  JOSSELIN Hill Psychiatry, MD, [address] [place] [address], Social Work, [place]      4. Neuropathy  lidocaine (LIDODERM) 5 %      5. Palliative care encounter  ondansetron (ZOFRAN-ODT) 4 MG disintegrating tablet      6. Chemotherapy induced nausea and vomiting  ondansetron (ZOFRAN-ODT) 4 MG disintegrating tablet      7. Financial difficulties  [address] [place] [address], Social Work, [place]             Plan:   Neoplasm related pain   - Percocet 7.5/325 mg po every 8 hours prn moderate to severe pain  - Increase Gabapentin 800 mg po tid  - Continue Duloxetine 60 mg po at   - Mobic 7.5 mg po daily with food  - Lidocaine patches to painful areas  - Heat or ice to painful areas, whichever is preferred  - Gentle ROM exercises, consider PT, declined at this time due to transportation  Pt is tolerating current pain meds without adverse effects or over sedation. Lowest effective dose used. Pt advised to call and notify palliative care for any adverse effects or sedation  Pt is able to maintain adequate functional level and participate in ADLs  OARRS reviewed.  There is no indication of aberrant behavior  Pt advised to call for increasing or uncontrolled pain  Pt advised to take only as prescribed and not to change frequency of pain   meds without consulting palliative care first.           SOB rt COPD and Lung cancer  - Follow with pulmonology  - Follow with oncology  - Continue COPD Directed therapies   - Call for increased SOB, cough, sputum production, excessive fatigue, fever, chills, or myalgias  - Gentle exercise as tolerated  -  Rinse out mouth after inhaler use. Anxiety   - Continue duloxetine 60 mg po daily  - Increase olanzapine 10  mg po at HS  - Continue Valium 2 mg po at HS for severe anxiety  - Sent referral to [facility] to evaluate medication regimen for anxiety and depression and to obtain long term emotional support. Negritacristian Lara is in agreement with this      Lung Cancer  Follow with oncology    Palliative Care  - Tasked our LSW as Negrita Lara is having trouble paying her bills due to medical expenses and not being able to work. She may  Help to find financial assistance and apply      CHRISTO Fiore CNP      Total Time 60 mins      Total time includes reviewing labs, reviewing history, medications, and allergies, counseling patient, performing medically appropriate evaluation and examination, discussing care with healthcare providers,family, and documenting in the medical record.

## 2022-09-15 DIAGNOSIS — Z51.5 PALLIATIVE CARE ENCOUNTER: ICD-10-CM

## 2022-09-15 DIAGNOSIS — G89.3 NEOPLASM RELATED PAIN: ICD-10-CM

## 2022-09-15 DIAGNOSIS — M19.90 OSTEOARTHRITIS, UNSPECIFIED OSTEOARTHRITIS TYPE, UNSPECIFIED SITE: ICD-10-CM

## 2022-09-15 DIAGNOSIS — C34.90 MALIGNANT NEOPLASM OF LUNG, UNSPECIFIED LATERALITY, UNSPECIFIED PART OF LUNG (HCC): ICD-10-CM

## 2022-09-15 NOTE — PROGRESS NOTES
Rx requested:  Requested Prescriptions     Pending Prescriptions Disp Refills    meloxicam (MOBIC) 7.5 MG tablet 30 tablet 3     Sig: Take 1 tablet by mouth daily For cancer pain       Last Office Visit:   Visit date not found      Last filled:  4/27/22    Next Visit Date:  No future appointments.

## 2022-09-16 RX ORDER — MELOXICAM 7.5 MG/1
7.5 TABLET ORAL DAILY
Qty: 30 TABLET | Refills: 3 | Status: SHIPPED | OUTPATIENT
Start: 2022-09-16 | End: 2022-10-16

## 2022-09-20 ENCOUNTER — TELEPHONE (OUTPATIENT)
Dept: PALLATIVE CARE | Age: 72
End: 2022-09-20

## 2022-09-28 RX ORDER — OLANZAPINE 10 MG/1
10 TABLET ORAL NIGHTLY
Qty: 30 TABLET | Refills: 3 | Status: SHIPPED | OUTPATIENT
Start: 2022-09-28

## 2022-09-28 NOTE — PROGRESS NOTES
Not for about 10 days, pharmacy sent over via fax    Rx requested:  Requested Prescriptions     Pending Prescriptions Disp Refills    OLANZapine (ZYPREXA) 10 MG tablet 30 tablet 3     Sig: Take 1 tablet by mouth nightly       Last Office Visit:   Visit date not found      Last filled:  9/8/22    Next Visit Date:  Future Appointments   Date Time Provider Renetta Bauer   10/12/2022 10:00 AM CHRISTO Fraire - CNP [place] [phone]

## 2022-10-12 RX ORDER — MONTELUKAST SODIUM 10 MG/1
10 TABLET ORAL NIGHTLY
Qty: 90 TABLET | Refills: 3 | Status: SHIPPED | OUTPATIENT
Start: 2022-10-12

## 2022-10-12 NOTE — PROGRESS NOTES
Rx requested:  Requested Prescriptions     Pending Prescriptions Disp Refills    montelukast (SINGULAIR) 10 MG tablet 90 tablet 3     Sig: Take 1 tablet by mouth nightly       Last Office Visit:   Visit date not found      Last filled:  7/15/21    Next Visit Date:  Future Appointments   Date Time Provider Renetta Bauer   10/12/2022 10:00 AM CHRISTO Ojeda - [place] [phone]

## 2022-10-14 DIAGNOSIS — G89.3 NEOPLASM RELATED PAIN: ICD-10-CM

## 2022-10-14 DIAGNOSIS — Z51.5 PALLIATIVE CARE ENCOUNTER: ICD-10-CM

## 2022-10-14 DIAGNOSIS — C34.90 MALIGNANT NEOPLASM OF LUNG, UNSPECIFIED LATERALITY, UNSPECIFIED PART OF LUNG (HCC): ICD-10-CM

## 2022-10-14 DIAGNOSIS — R25.2 SPASM: ICD-10-CM

## 2022-10-14 DIAGNOSIS — F41.9 ANXIETY: ICD-10-CM

## 2022-10-14 RX ORDER — OXYCODONE AND ACETAMINOPHEN 7.5; 325 MG/1; MG/1
1 TABLET ORAL EVERY 8 HOURS PRN
Qty: 90 TABLET | Refills: 0 | Status: SHIPPED | OUTPATIENT
Start: 2022-10-14 | End: 2022-10-19 | Stop reason: SDUPTHER

## 2022-10-14 RX ORDER — DIAZEPAM 2 MG/1
2 TABLET ORAL NIGHTLY PRN
Qty: 30 TABLET | Refills: 0 | Status: CANCELLED | OUTPATIENT
Start: 2022-10-14 | End: 2022-11-13

## 2022-10-14 RX ORDER — OXYCODONE AND ACETAMINOPHEN 7.5; 325 MG/1; MG/1
1 TABLET ORAL EVERY 8 HOURS PRN
Qty: 90 TABLET | Refills: 0 | Status: CANCELLED | OUTPATIENT
Start: 2022-10-14 | End: 2022-11-13

## 2022-10-14 RX ORDER — DIAZEPAM 2 MG/1
2 TABLET ORAL NIGHTLY PRN
Qty: 30 TABLET | Refills: 0 | Status: SHIPPED | OUTPATIENT
Start: 2022-10-14 | End: 2022-10-19 | Stop reason: SDUPTHER

## 2022-10-14 NOTE — PROGRESS NOTES
Rx requested:  Requested Prescriptions     Pending Prescriptions Disp Refills    oxyCODONE-acetaminophen (PERCOCET) 7.5-325 MG per tablet 90 tablet 0     Sig: Take 1 tablet by mouth every 8 hours as needed for Pain for up to 30 days. diazePAM (VALIUM) 2 MG tablet 30 tablet 0     Sig: Take 1 tablet by mouth nightly as needed for Anxiety or Sleep for up to 30 days.        Last Office Visit:   Visit date not found      Last filled:  8/30/22    Next Visit Date:  Future Appointments   Date Time Provider Renetta Bauer   10/19/2022  1:00 PM CHRISTO See - CNP [place] [phone]

## 2022-10-19 ENCOUNTER — OFFICE VISIT (OUTPATIENT)
Dept: PALLATIVE CARE | Age: 72
End: 2022-10-19
Payer: MEDICARE

## 2022-10-19 VITALS
HEART RATE: 80 BPM | OXYGEN SATURATION: 93 % | TEMPERATURE: 96.7 F | WEIGHT: 151.2 LBS | RESPIRATION RATE: 16 BRPM | SYSTOLIC BLOOD PRESSURE: 149 MMHG | DIASTOLIC BLOOD PRESSURE: 79 MMHG | BODY MASS INDEX: 25.95 KG/M2

## 2022-10-19 DIAGNOSIS — G62.9 NEUROPATHY: ICD-10-CM

## 2022-10-19 DIAGNOSIS — G89.3 NEOPLASM RELATED PAIN: ICD-10-CM

## 2022-10-19 DIAGNOSIS — Z51.5 PALLIATIVE CARE ENCOUNTER: ICD-10-CM

## 2022-10-19 DIAGNOSIS — R25.2 SPASM: ICD-10-CM

## 2022-10-19 DIAGNOSIS — F41.9 ANXIETY: ICD-10-CM

## 2022-10-19 DIAGNOSIS — C34.90 MALIGNANT NEOPLASM OF LUNG, UNSPECIFIED LATERALITY, UNSPECIFIED PART OF LUNG (HCC): ICD-10-CM

## 2022-10-19 PROCEDURE — 1123F ACP DISCUSS/DSCN MKR DOCD: CPT

## 2022-10-19 PROCEDURE — 99214 OFFICE O/P EST MOD 30 MIN: CPT

## 2022-10-19 RX ORDER — DIAZEPAM 2 MG/1
2 TABLET ORAL EVERY 12 HOURS PRN
Qty: 30 TABLET | Refills: 0 | Status: SHIPPED | OUTPATIENT
Start: 2022-10-19 | End: 2022-11-18

## 2022-10-19 RX ORDER — OXYCODONE AND ACETAMINOPHEN 7.5; 325 MG/1; MG/1
1 TABLET ORAL EVERY 6 HOURS PRN
Qty: 120 TABLET | Refills: 0 | Status: SHIPPED | OUTPATIENT
Start: 2022-10-19 | End: 2022-11-18

## 2022-10-19 RX ORDER — GABAPENTIN 800 MG/1
800 TABLET ORAL 3 TIMES DAILY
Qty: 90 TABLET | Refills: 3 | Status: SHIPPED | OUTPATIENT
Start: 2022-10-19 | End: 2023-01-17

## 2022-10-19 ASSESSMENT — ENCOUNTER SYMPTOMS
CHEST TIGHTNESS: 0
SINUS PRESSURE: 1
COUGH: 1
EYE PAIN: 0
CONSTIPATION: 0
SHORTNESS OF BREATH: 0
WHEEZING: 0
CHOKING: 0
DIARRHEA: 1
BLOOD IN STOOL: 0
NAUSEA: 0
VOICE CHANGE: 0
VOMITING: 0
ABDOMINAL PAIN: 0
SORE THROAT: 0
TROUBLE SWALLOWING: 0
EYE DISCHARGE: 0
ABDOMINAL DISTENTION: 0
SINUS PAIN: 1
BACK PAIN: 1
STRIDOR: 0
COLOR CHANGE: 0
ANAL BLEEDING: 0
RECTAL PAIN: 0
APNEA: 0

## 2022-10-19 NOTE — PROGRESS NOTES
Subjective:      Patient Id:  Azar Connelly is a 71 y.o. female who presents today for symptom management follow up rt lung cancer, COPD, HTN, HLD, hypothyroidism, DDD, anxiety, depression, demeanor calm and relaxed, NAD, no sedation. Radiation therapy completed and Keytruda discontinued due to disease progression. She saw Dr. Tonja Fuller today and he is starting her on Gilotrif 30 mg PO daily to treat her non small cell lung cancer. Complains of increased pain in the right side of neck, radiates through shoulders bilaterally, down to fingertips. She also has pain across her low back and down right leg. No rash to skin, no itching. Jerri Shields states that her pain medication is not working as well as it once did and she is experiencing more pain. She did not purchase the lidocaine patches due to cost. She states that even after insurance coverage, the lidocaine patches are more than $100 and she is unable to afford this additional cost with her current budget. No increased SOB, chest pain, edema, cough, sputum production,fever, chills, myalgias. She states she is often not hungry, but she makes herself eat because she knows it is essential. She does have diarrhea occasionally. She is having difficulty with her left hand and wrist due to pain and mobility. Surgery is scheduled for 10/28/2022. During this office visit, she suddenly experienced intense, acute pain to her left wrist that took a few minutes to resolve (no interventions). She verbalized, \"this is why I am having surgery! \". Jerri Shields states that her anxiety has increased drastically due to disease progression and notes that valium 2 mg only at night is not working well for her. She notes she was doing much better when she was taking valium 2 mg TID PRN. She said she has had anxiety since she was a child, but her health prognosis has caused a signficant increase in anxiety symptoms and depression.      She lives alone and has a labrador retriever to keep her company. She states that she has a neighbor and a friend who will help her get groceries. She was able to drive herself to her appointment today. No homicidal or suicidal ideation. Advised her not to drive after taking valium due to possible sedation/drowsiness. She states she ensures she does not drive after taking valium or percocet. Past Medical History:   Diagnosis Date    Anxiety     Asthma     Cervical radiculopathy at C7 2013    CERVICAL SPONDYLOSIS, WITH COMBINATION OF DISK BULGING AND    Cervical radiculopathy at C7     SUBTLE PER EMG    Cervical spondylosis 10/12/13    PER MRI    Chronic back pain     COPD (chronic obstructive pulmonary disease) (HCC)     Chronic bonchitis    CTS (carpal tunnel syndrome) 13    PER EMG    Depression     Diverticulitis     GERD (gastroesophageal reflux disease)     Headache(784.0)     Hyperlipidemia     Hypertension     Hypothyroidism due to medication 2019    Irritable bowel syndrome     Malignant neoplasm of upper lobe of left lung ([address] 2019    Osteoarthritis      Past Surgical History:   Procedure Laterality Date    APPENDECTOMY      CARDIAC CATHETERIZATION  13,2013    DR. GRAVES    COLON SURGERY      HAND SURGERY  1996    HYSTERECTOMY (CERVIX STATUS UNKNOWN)  1980    TONSILLECTOMY  1963     Social History     Socioeconomic History    Marital status:       Spouse name: Not on file    Number of children: Not on file    Years of education: Not on file    Highest education level: Not on file   Occupational History    Not on file   Tobacco Use    Smoking status: Former     Packs/day: 0.50     Years: 48.00     Pack years: 24.00     Types: Cigarettes     Quit date: 2019     Years since quittin.9    Smokeless tobacco: Never   Vaping Use    Vaping Use: Former    Substances: Unknown   Substance and Sexual Activity    Alcohol use: Not Currently    Drug use: Never    Sexual activity: Not on file   Other Topics Concern    Not on file   Social History Narrative    Not on file     Social Determinants of Health     Financial Resource Strain: Not on file   Food Insecurity: Not on file   Transportation Needs: Not on file   Physical Activity: Not on file   Stress: Not on file   Social Connections: Not on file   Intimate Partner Violence: Not on file   Housing Stability: Not on file     Family History   Problem Relation Age of Onset    Stroke Mother     Heart Attack Mother     Cancer Father         LUNG    Cancer Paternal Uncle         lung    Breast Cancer Maternal Grandmother [de-identified]    Cancer Paternal Grandfather         lung    Cancer Paternal Uncle         lung    Cancer Paternal Uncle         lung    Cancer Paternal Uncle         lung    Cancer Paternal Uncle         lung    Cancer Paternal Uncle         lung    Cancer Brother      Allergies   Allergen Reactions    Seasonal      Current Outpatient Medications on File Prior to Visit   Medication Sig Dispense Refill    oxyCODONE-acetaminophen (PERCOCET) 7.5-325 MG per tablet Take 1 tablet by mouth every 8 hours as needed for Pain for up to 30 days. 90 tablet 0    diazePAM (VALIUM) 2 MG tablet Take 1 tablet by mouth nightly as needed for Anxiety or Sleep for up to 30 days. 30 tablet 0    montelukast (SINGULAIR) 10 MG tablet Take 1 tablet by mouth nightly 90 tablet 3    OLANZapine (ZYPREXA) 10 MG tablet Take 1 tablet by mouth nightly 30 tablet 3    gabapentin (NEURONTIN) 800 MG tablet Take 1 tablet by mouth 3 times daily for 90 days.  (Patient taking differently: Take 800 mg by mouth 4 times daily.) 90 tablet 3    Handicap Placard MISC by Does not apply route Diagnoses: LUNG CANCER, DEGENERATIVE ARTHRITIS, CANCER PAIN   EXPIRATION: 4/12/2024 1 each 0    simethicone (MYLICON) 80 MG chewable tablet Take 1 tablet by mouth 4 times daily as needed for Flatulence 180 tablet 3    aspirin 81 MG EC tablet Take 81 mg by mouth daily      folic acid (FOLVITE) 1 MG tablet Take 1 tablet by mouth daily 30 tablet 5    fluticasone (FLONASE) 50 MCG/ACT nasal spray 1 spray by Nasal route daily. 1 Bottle 6    meloxicam (MOBIC) 7.5 MG tablet Take 1 tablet by mouth daily For cancer pain 30 tablet 3    lidocaine (LIDODERM) 5 % Place 1 patch onto the skin daily 12 hours on, 12 hours off. (Patient not taking: Reported on 10/19/2022) 30 patch 0    ondansetron (ZOFRAN-ODT) 4 MG disintegrating tablet Take 1 tablet by mouth 3 times daily as needed for Nausea or Vomiting 21 tablet 0    DULoxetine (CYMBALTA) 60 MG extended release capsule Take 1 capsule by mouth daily 30 capsule 1    ipratropium-albuterol (DUONEB) 0.5-2.5 (3) MG/3ML SOLN nebulizer solution Inhale 3 mLs into the lungs every 4 hours 540 mL 1    Magic Mouthwash (MIRACLE MOUTHWASH) Swish and spit 5 mLs 4 times daily as needed for Irritation or Pain 240 mL 3    BREO ELLIPTA 100-25 MCG/INH AEPB inhaler Inhale 1 puff into the lungs daily 30 each 0    PROAIR  (90 Base) MCG/ACT inhaler Inhale 1 puff into the lungs 4 times daily 1 Inhaler 5    omeprazole (PRILOSEC) 40 MG delayed release capsule Take 1 capsule by mouth every morning (before breakfast) 30 capsule 3     No current facility-administered medications on file prior to visit. Review of Systems   Constitutional:  Positive for activity change and fatigue. Negative for appetite change, chills, diaphoresis, fever and unexpected weight change. HENT:  Positive for sinus pressure, sinus pain and sneezing. Negative for drooling, hearing loss, mouth sores, sore throat, trouble swallowing and voice change. Eyes:  Negative for pain, discharge and visual disturbance. Respiratory:  Positive for cough. Negative for apnea, choking, chest tightness, shortness of breath, wheezing and stridor. Cardiovascular:  Negative for chest pain, palpitations and leg swelling. Gastrointestinal:  Positive for diarrhea.  Negative for abdominal distention, abdominal pain, anal bleeding, blood in stool, constipation, nausea, rectal pain and vomiting. Genitourinary:  Negative for difficulty urinating, dysuria, enuresis, frequency, hematuria and vaginal bleeding. Musculoskeletal:  Positive for arthralgias, back pain and myalgias. Negative for gait problem and joint swelling. Skin:  Negative for color change, pallor, rash and wound. Allergic/Immunologic: Negative for food allergies and immunocompromised state. Neurological:  Positive for dizziness, light-headedness and headaches. Negative for tremors, seizures, syncope, facial asymmetry, speech difficulty, weakness and numbness. Hematological:  Negative for adenopathy. Does not bruise/bleed easily. Psychiatric/Behavioral:  Positive for sleep disturbance. Negative for agitation, behavioral problems, confusion, decreased concentration, dysphoric mood, hallucinations, self-injury and suicidal ideas. The patient is nervous/anxious. The patient is not hyperactive. Objective:   BP (!) 149/79   Pulse 80   Temp (!) 96.7 °F (35.9 °C)   Resp 16   Wt 151 lb 3.2 oz (68.6 kg)   SpO2 93%   BMI 25.95 kg/m²     Physical Exam  Constitutional:       General: She is not in acute distress. Appearance: Normal appearance. She is well-developed. She is not diaphoretic. HENT:      Head: Normocephalic and atraumatic. Right Ear: External ear normal.      Left Ear: External ear normal.      Nose: Nose normal.      Mouth/Throat:      Pharynx: No oropharyngeal exudate. Eyes:      General: Lids are normal. No scleral icterus. Right eye: No discharge. Left eye: No discharge. Conjunctiva/sclera: Conjunctivae normal.      Right eye: No hemorrhage. Left eye: No hemorrhage. Neck:      Thyroid: No thyromegaly. Vascular: No JVD. Trachea: No tracheal deviation. Cardiovascular:      Rate and Rhythm: Normal rate and regular rhythm.       Heart sounds: Normal heart sounds. Pulmonary:      Effort: Pulmonary effort is normal. No respiratory distress. Breath sounds: Normal breath sounds. No stridor. No wheezing or rales. Chest:      Chest wall: No tenderness. Comments: Lymph node enlargement, approximately 2 cm in diameter, radiation therapy of node completed 12/2021. Abdominal:      General: Bowel sounds are normal. There is no distension. Palpations: Abdomen is soft. There is no mass. Tenderness: There is no abdominal tenderness. There is no guarding or rebound. Musculoskeletal:         General: No tenderness or deformity. Normal range of motion. Cervical back: Normal range of motion and neck supple. Lymphadenopathy:      Head:      Right side of head: No submental or submandibular adenopathy. Left side of head: No submental or submandibular adenopathy. Cervical: No cervical adenopathy. Right cervical: No superficial cervical adenopathy. Left cervical: No superficial cervical adenopathy. Upper Body:      Right upper body: Supraclavicular adenopathy present. Left upper body: No supraclavicular adenopathy. Skin:     General: Skin is warm and dry. Capillary Refill: Capillary refill takes less than 2 seconds. Findings: No erythema or rash. Neurological:      Mental Status: She is alert and oriented to person, place, and time. Psychiatric:         Attention and Perception: Attention normal.         Mood and Affect: Mood normal.         Speech: Speech normal.         Behavior: Behavior normal. Behavior is cooperative. Thought Content: Thought content normal.         Cognition and Memory: Cognition and memory normal.         Judgment: Judgment normal.       Assessment:       Diagnosis Orders   1. Neoplasm related pain        2. Palliative care encounter        3.  Malignant neoplasm of lung, unspecified laterality, unspecified part of lung ([facility] [address]               Plan:   Neoplasm related pain   - Increase frequency of Percocet 7.5/325 mg po every 6 hours prn moderate to severe pain  - Continue Gabapentin 800 mg po tid  - Continue Duloxetine 60 mg po at HS  - Mobic 7.5 mg po daily with food  - Heat or ice to painful areas, whichever is preferred  - Gentle ROM exercises, consider PT, declined at this time due to transportation  Pt is tolerating current pain meds without adverse effects or over sedation. Lowest effective dose used. Pt advised to call and notify palliative care for any adverse effects or sedation  Pt is able to maintain adequate functional level and participate in ADLs  OARRS reviewed. There is no indication of aberrant behavior  Pt advised to call for increasing or uncontrolled pain  Pt advised to take only as prescribed and not to change frequency of pain   meds without consulting palliative care first.           SOB rt COPD and Lung cancer  - Follow with pulmonology  - Follow with oncology  - Continue COPD Directed therapies   - Call for increased SOB, cough, sputum production, excessive fatigue, fever, chills, or myalgias  - Gentle exercise as tolerated  -  Rinse out mouth after inhaler use. Anxiety   - Continue duloxetine 60 mg po daily  - Increase olanzapine 10  mg po at HS  - Increase Valium 2 mg po to BID PRN for anxiety    Lung Cancer  Follow with oncology    CHRISTO Tinoco - CNP      Total Time 60 mins      Total time includes reviewing labs, reviewing history, medications, and allergies, counseling patient, performing medically appropriate evaluation and examination, discussing care with healthcare providers,family, and documenting in the medical record.

## 2022-10-31 DIAGNOSIS — Z51.5 PALLIATIVE CARE ENCOUNTER: ICD-10-CM

## 2022-10-31 DIAGNOSIS — G62.9 NEUROPATHY: ICD-10-CM

## 2022-10-31 DIAGNOSIS — F41.9 ANXIETY: ICD-10-CM

## 2022-10-31 DIAGNOSIS — G89.3 NEOPLASM RELATED PAIN: ICD-10-CM

## 2022-10-31 DIAGNOSIS — M19.90 OSTEOARTHRITIS, UNSPECIFIED OSTEOARTHRITIS TYPE, UNSPECIFIED SITE: ICD-10-CM

## 2022-10-31 RX ORDER — DULOXETIN HYDROCHLORIDE 60 MG/1
60 CAPSULE, DELAYED RELEASE ORAL DAILY
Qty: 30 CAPSULE | Refills: 1 | Status: SHIPPED | OUTPATIENT
Start: 2022-10-31 | End: 2022-11-30

## 2022-10-31 NOTE — PROGRESS NOTES
Rx requested:  Requested Prescriptions     Pending Prescriptions Disp Refills    DULoxetine (CYMBALTA) 60 MG extended release capsule 30 capsule 1     Sig: Take 1 capsule by mouth daily       Last Office Visit:   Visit date not found      Last filled:  5/26/22    /Next Visit Date:  Future Appointments   Date Time Provider Renetta Bauer   11/18/2022  [facility]

## 2022-11-18 DIAGNOSIS — C34.90 MALIGNANT NEOPLASM OF LUNG, UNSPECIFIED LATERALITY, UNSPECIFIED PART OF LUNG (HCC): ICD-10-CM

## 2022-11-18 DIAGNOSIS — F41.9 ANXIETY: ICD-10-CM

## 2022-11-18 DIAGNOSIS — Z51.5 PALLIATIVE CARE ENCOUNTER: ICD-10-CM

## 2022-11-18 DIAGNOSIS — R25.2 SPASM: ICD-10-CM

## 2022-11-18 DIAGNOSIS — G89.3 NEOPLASM RELATED PAIN: ICD-10-CM

## 2022-11-18 NOTE — TELEPHONE ENCOUNTER
Pt calling in to request refills on her meds. Forwarding to you because Jaylen Soco is out of the office.

## 2022-11-21 RX ORDER — OXYCODONE AND ACETAMINOPHEN 7.5; 325 MG/1; MG/1
1 TABLET ORAL EVERY 6 HOURS PRN
Qty: 120 TABLET | Refills: 0 | Status: SHIPPED | OUTPATIENT
Start: 2022-11-23 | End: 2022-12-23

## 2022-11-21 RX ORDER — DIAZEPAM 2 MG/1
2 TABLET ORAL EVERY 12 HOURS PRN
Qty: 30 TABLET | Refills: 0 | Status: SHIPPED | OUTPATIENT
Start: 2022-11-23 | End: 2022-12-23

## 2022-12-07 ENCOUNTER — TELEPHONE (OUTPATIENT)
Dept: PALLATIVE CARE | Age: 72
End: 2022-12-07

## 2022-12-07 ENCOUNTER — OFFICE VISIT (OUTPATIENT)
Dept: PALLATIVE CARE | Age: 72
End: 2022-12-07
Payer: MEDICARE

## 2022-12-07 VITALS
OXYGEN SATURATION: 99 % | TEMPERATURE: 97.8 F | RESPIRATION RATE: 16 BRPM | SYSTOLIC BLOOD PRESSURE: 138 MMHG | DIASTOLIC BLOOD PRESSURE: 83 MMHG | BODY MASS INDEX: 23.34 KG/M2 | WEIGHT: 136 LBS | HEART RATE: 80 BPM

## 2022-12-07 DIAGNOSIS — R11.2 CHEMOTHERAPY INDUCED NAUSEA AND VOMITING: ICD-10-CM

## 2022-12-07 DIAGNOSIS — R25.2 SPASM: ICD-10-CM

## 2022-12-07 DIAGNOSIS — T45.1X5A CHEMOTHERAPY INDUCED NAUSEA AND VOMITING: ICD-10-CM

## 2022-12-07 DIAGNOSIS — C34.90 MALIGNANT NEOPLASM OF LUNG, UNSPECIFIED LATERALITY, UNSPECIFIED PART OF LUNG (HCC): ICD-10-CM

## 2022-12-07 DIAGNOSIS — G89.3 NEOPLASM RELATED PAIN: ICD-10-CM

## 2022-12-07 DIAGNOSIS — F41.9 ANXIETY: ICD-10-CM

## 2022-12-07 DIAGNOSIS — Z51.5 PALLIATIVE CARE ENCOUNTER: ICD-10-CM

## 2022-12-07 PROCEDURE — 3074F SYST BP LT 130 MM HG: CPT

## 2022-12-07 PROCEDURE — 3078F DIAST BP <80 MM HG: CPT

## 2022-12-07 PROCEDURE — 1123F ACP DISCUSS/DSCN MKR DOCD: CPT

## 2022-12-07 PROCEDURE — 99214 OFFICE O/P EST MOD 30 MIN: CPT

## 2022-12-07 RX ORDER — DIAZEPAM 2 MG/1
2 TABLET ORAL EVERY 8 HOURS PRN
Qty: 90 TABLET | Refills: 0 | Status: SHIPPED | OUTPATIENT
Start: 2022-12-07 | End: 2023-01-06

## 2022-12-07 RX ORDER — OXYCODONE AND ACETAMINOPHEN 7.5; 325 MG/1; MG/1
1 TABLET ORAL EVERY 6 HOURS PRN
Qty: 120 TABLET | Refills: 0 | Status: SHIPPED | OUTPATIENT
Start: 2022-12-07 | End: 2023-01-06

## 2022-12-07 RX ORDER — ONDANSETRON 4 MG/1
4 TABLET, ORALLY DISINTEGRATING ORAL 3 TIMES DAILY PRN
Qty: 21 TABLET | Refills: 0 | Status: SHIPPED | OUTPATIENT
Start: 2022-12-07 | End: 2022-12-14

## 2022-12-07 ASSESSMENT — ENCOUNTER SYMPTOMS
CONSTIPATION: 0
VOICE CHANGE: 0
CHOKING: 0
BACK PAIN: 1
RECTAL PAIN: 0
NAUSEA: 0
EYE PAIN: 0
SORE THROAT: 0
ABDOMINAL PAIN: 0
EYE DISCHARGE: 0
SINUS PRESSURE: 0
ANAL BLEEDING: 0
VOMITING: 0
CHEST TIGHTNESS: 0
DIARRHEA: 1
APNEA: 0
WHEEZING: 0
SINUS PAIN: 0
SHORTNESS OF BREATH: 0
TROUBLE SWALLOWING: 0
COUGH: 1
STRIDOR: 0
BLOOD IN STOOL: 0
COLOR CHANGE: 0
ABDOMINAL DISTENTION: 0

## 2022-12-07 NOTE — TELEPHONE ENCOUNTER
Pt calling in because her pharmacy will not fill her Valium for the 90 quantity. She was informed to contact the office so the provider can make the adjustment to the medication so she can receive it.

## 2022-12-07 NOTE — PROGRESS NOTES
Subjective:      Patient Id:  Grecia Tatum is a 71 y.o. female who presents today for symptom management follow up rt lung cancer, COPD, HTN, HLD, hypothyroidism, DDD, anxiety, depression, demeanor calm and relaxed, NAD, no sedation. Radiation therapy completed and Keytruda discontinued due to disease progression. She  started Gilotrif in October. She reports she stayed on the drug for 6 weeks and felt terrible due to side effects. She states she had diarrhea, fatigue, loss of appetite, nausea, vomiting, and weakness due to Gilotrif. Dr. Floyd Mistry told her to stop taking the medication last Friday (12/02/2022) and she will see him on 12/09/2022 for follow up. Ángela complains of pain in the right side of neck, radiating through shoulders bilaterally, down to her fingertips. She also experiences pain across her low back and down right leg. No rash to skin, no itching. Mateus Taylor feels her current pain regimen is working well for her. She does have anxiety and feels taking valium has helped her tremendously. No increased SOB, chest pain, edema, cough, sputum production,fever, chills, myalgias. She said her appetite is starting to improve since stopping the Gilotrif. She continues to have some loose stool, but this is minimal and feels her bowels are returning to normal function. She does have diarrhea occasionally. Mateus Taylor said she is drinking plenty of water and juice to stay well hydrated. Mateus Taylor had surgery on her left wrist at the end of October. She states that the shooting pain she was having prior to surgery has resolved. She is following with wound clinic as her surgical wound was not healing well. She showed me the surgical site today and it appears to be healing well at this time. The wound is closed, no redness, swelling, or sign of infection. She lives alone and has a labrador retriever to keep her company. She said her dog is currently driving her crazy due to his high energy level.      She has a neighbor and a friend who will help her get groceries. She said this neighbor took her to [employer] for Thanksgiving dinner this year. She was able to drive herself to her appointment today. No homicidal or suicidal ideation. Advised her not to drive after taking valium due to possible sedation/drowsiness. She states she ensures she does not drive after taking valium or percocet. Refills needed today for percocet and valium    Past Medical History:   Diagnosis Date    Anxiety     Asthma     Cervical radiculopathy at C7 9/28/2013    CERVICAL SPONDYLOSIS, WITH COMBINATION OF DISK BULGING AND    Cervical radiculopathy at C7     SUBTLE PER EMG    Cervical spondylosis 10/12/13    PER MRI    Chronic back pain     COPD (chronic obstructive pulmonary disease) (HCC)     Chronic bonchitis    CTS (carpal tunnel syndrome) 9/28/13    PER EMG    Depression     Diverticulitis     GERD (gastroesophageal reflux disease)     Headache(784.0)     Hyperlipidemia     Hypertension     Hypothyroidism due to medication 5/8/2019    Irritable bowel syndrome     Malignant neoplasm of upper lobe of left lung ([facility] [address] 1/7/2019    Osteoarthritis      Past Surgical History:   Procedure Laterality Date    [address]  5/16/13,11/8/2013    DR. GRAVES    COLON SURGERY      HAND SURGERY  1996    HYSTERECTOMY (CERVIX STATUS UNKNOWN)  1980    TONSILLECTOMY  1963     Social History     Socioeconomic History    Marital status:       Spouse name: Not on file    Number of children: Not on file    Years of education: Not on file    Highest education level: Not on file   Occupational History    Not on file   Tobacco Use    Smoking status: Former     Packs/day: 0.50     Years: 48.00     Pack years: 24.00     Types: Cigarettes     Quit date: 11/1/2019     Years since quitting: 3.1    Smokeless tobacco: Never   Vaping Use    Vaping Use: Former    Substances: Unknown   Substance and Sexual Activity    Alcohol use: Not Currently    Drug use: Never    Sexual activity: Not on file   Other Topics Concern    Not on file   Social History Narrative    Not on file     Social Determinants of Health     Financial Resource Strain: Not on file   Food Insecurity: Not on file   Transportation Needs: Not on file   Physical Activity: Not on file   Stress: Not on file   Social Connections: Not on file   Intimate Partner Violence: Not on file   Housing Stability: Not on file     Family History   Problem Relation Age of Onset    Stroke Mother     Heart Attack Mother     Cancer Father         LUNG    Cancer Paternal Uncle         lung    Breast Cancer Maternal Grandmother [de-identified]    Cancer Paternal Grandfather         lung    Cancer Paternal Uncle         lung    Cancer Paternal Uncle         lung    Cancer Paternal Uncle         lung    Cancer Paternal Uncle         lung    Cancer Paternal Uncle         lung    Cancer Brother      Allergies   Allergen Reactions    Seasonal      Current Outpatient Medications on File Prior to Visit   Medication Sig Dispense Refill    gabapentin (NEURONTIN) 800 MG tablet Take 1 tablet by mouth 3 times daily for 90 days. 90 tablet 3    montelukast (SINGULAIR) 10 MG tablet Take 1 tablet by mouth nightly 90 tablet 3    OLANZapine (ZYPREXA) 10 MG tablet Take 1 tablet by mouth nightly 30 tablet 3    lidocaine (LIDODERM) 5 % Place 1 patch onto the skin daily 12 hours on, 12 hours off. 30 patch 0    Handicap Placard MISC by Does not apply route Diagnoses: LUNG CANCER, DEGENERATIVE ARTHRITIS, CANCER PAIN   EXPIRATION: 4/12/2024 1 each 0    simethicone (MYLICON) 80 MG chewable tablet Take 1 tablet by mouth 4 times daily as needed for Flatulence 180 tablet 3    aspirin 81 MG EC tablet Take 81 mg by mouth daily      folic acid (FOLVITE) 1 MG tablet Take 1 tablet by mouth daily 30 tablet 5    fluticasone (FLONASE) 50 MCG/ACT nasal spray 1 spray by Nasal route daily.  1 Bottle 6    DULoxetine (CYMBALTA) 60 MG extended release capsule Take 1 capsule by mouth daily 30 capsule 1    meloxicam (MOBIC) 7.5 MG tablet Take 1 tablet by mouth daily For cancer pain 30 tablet 3    ipratropium-albuterol (DUONEB) 0.5-2.5 (3) MG/3ML SOLN nebulizer solution Inhale 3 mLs into the lungs every 4 hours 540 mL 1    Magic Mouthwash (MIRACLE MOUTHWASH) Swish and spit 5 mLs 4 times daily as needed for Irritation or Pain 240 mL 3    BREO ELLIPTA 100-25 MCG/INH AEPB inhaler Inhale 1 puff into the lungs daily 30 each 0    PROAIR  (90 Base) MCG/ACT inhaler Inhale 1 puff into the lungs 4 times daily 1 Inhaler 5    omeprazole (PRILOSEC) 40 MG delayed release capsule Take 1 capsule by mouth every morning (before breakfast) 30 capsule 3     No current facility-administered medications on file prior to visit. Review of Systems   Constitutional:  Positive for activity change and fatigue. Negative for appetite change, chills, diaphoresis, fever and unexpected weight change. HENT:  Negative for drooling, hearing loss, mouth sores, sinus pressure, sinus pain, sneezing, sore throat, trouble swallowing and voice change. Eyes:  Negative for pain, discharge and visual disturbance. Respiratory:  Positive for cough. Negative for apnea, choking, chest tightness, shortness of breath, wheezing and stridor. Cardiovascular:  Negative for chest pain, palpitations and leg swelling. Gastrointestinal:  Positive for diarrhea. Negative for abdominal distention, abdominal pain, anal bleeding, blood in stool, constipation, nausea, rectal pain and vomiting. Genitourinary:  Negative for difficulty urinating, dysuria, enuresis, frequency, hematuria and vaginal bleeding. Musculoskeletal:  Positive for arthralgias, back pain and myalgias. Negative for gait problem and joint swelling. Skin:  Negative for color change, pallor, rash and wound. Allergic/Immunologic: Negative for food allergies and immunocompromised state.    Neurological:  Positive for dizziness, light-headedness and headaches. Negative for tremors, seizures, syncope, facial asymmetry, speech difficulty, weakness and numbness. Hematological:  Negative for adenopathy. Does not bruise/bleed easily. Psychiatric/Behavioral:  Positive for sleep disturbance. Negative for agitation, behavioral problems, confusion, decreased concentration, dysphoric mood, hallucinations, self-injury and suicidal ideas. The patient is nervous/anxious. The patient is not hyperactive. Objective:   /83   Pulse 80   Temp 97.8 °F (36.6 °C)   Resp 16   Wt 136 lb (61.7 kg)   SpO2 99%   BMI 23.34 kg/m²     Physical Exam  Constitutional:       General: She is not in acute distress. Appearance: Normal appearance. She is well-developed. She is not diaphoretic. HENT:      Head: Normocephalic and atraumatic. Right Ear: External ear normal.      Left Ear: External ear normal.      Nose: Nose normal.      Mouth/Throat:      Pharynx: No oropharyngeal exudate. Eyes:      General: Lids are normal. No scleral icterus. Right eye: No discharge. Left eye: No discharge. Conjunctiva/sclera: Conjunctivae normal.      Right eye: No hemorrhage. Left eye: No hemorrhage. Neck:      Thyroid: No thyromegaly. Vascular: No JVD. Trachea: No tracheal deviation. Cardiovascular:      Rate and Rhythm: Normal rate and regular rhythm. Heart sounds: Normal heart sounds. Pulmonary:      Effort: Pulmonary effort is normal. No respiratory distress. Breath sounds: Normal breath sounds. No stridor. No wheezing or rales. Chest:      Chest wall: No tenderness. Comments: Lymph node enlargement, approximately 2 cm in diameter, radiation therapy of node completed 12/2021. Abdominal:      General: Bowel sounds are normal. There is no distension. Palpations: Abdomen is soft. There is no mass. Tenderness: There is no abdominal tenderness. There is no guarding or rebound. Musculoskeletal:         General: No tenderness or deformity. Normal range of motion. Cervical back: Normal range of motion and neck supple. Lymphadenopathy:      Head:      Right side of head: No submental or submandibular adenopathy. Left side of head: No submental or submandibular adenopathy. Cervical: No cervical adenopathy. Right cervical: No superficial cervical adenopathy. Left cervical: No superficial cervical adenopathy. Upper Body:      Right upper body: Supraclavicular adenopathy present. Left upper body: No supraclavicular adenopathy. Skin:     General: Skin is warm and dry. Capillary Refill: Capillary refill takes less than 2 seconds. Findings: No erythema or rash. Neurological:      Mental Status: She is alert and oriented to person, place, and time. Psychiatric:         Attention and Perception: Attention normal.         Mood and Affect: Mood normal.         Speech: Speech normal.         Behavior: Behavior normal. Behavior is cooperative. Thought Content: Thought content normal.         Cognition and Memory: Cognition and memory normal.         Judgment: Judgment normal.       Assessment:       Diagnosis Orders   1. Neoplasm related pain  oxyCODONE-acetaminophen (PERCOCET) 7.5-325 MG per tablet    diazePAM (VALIUM) 2 MG tablet      2. Palliative care encounter  oxyCODONE-acetaminophen (PERCOCET) 7.5-325 MG per tablet    diazePAM (VALIUM) 2 MG tablet    ondansetron (ZOFRAN-ODT) 4 MG disintegrating tablet      3. Malignant neoplasm of lung, unspecified laterality, unspecified part of lung ([facility] [address]  oxyCODONE-acetaminophen (PERCOCET) 7.5-325 MG per tablet      4. Anxiety  diazePAM (VALIUM) 2 MG tablet      5. Spasm  diazePAM (VALIUM) 2 MG tablet      6.  Chemotherapy induced nausea and vomiting  ondansetron (ZOFRAN-ODT) 4 MG disintegrating tablet             Plan:   Neoplasm related pain   - Continue taking Percocet 7.5/325 mg po every 6 hours prn moderate to severe pain  - Continue Gabapentin 800 mg po tid  - Continue Duloxetine 60 mg po at HS  - Mobic 7.5 mg po daily with food  - Heat or ice to painful areas, whichever is preferred  - Gentle ROM exercises, consider PT, declined at this time due to transportation  Pt is tolerating current pain meds without adverse effects or over sedation. Lowest effective dose used. Pt advised to call and notify palliative care for any adverse effects or sedation  Pt is able to maintain adequate functional level and participate in ADLs  OARRS reviewed. There is no indication of aberrant behavior  Pt advised to call for increasing or uncontrolled pain  Pt advised to take only as prescribed and not to change frequency of pain   meds without consulting palliative care first.      2. SOB rt COPD and Lung cancer  - Follow with pulmonology  - Follow with oncology  - Continue COPD Directed therapies   - Call for increased SOB, cough, sputum production, excessive fatigue, fever, chills, or myalgias  - Gentle exercise as tolerated  -  Rinse out mouth after inhaler use. 3. Anxiety   - Continue duloxetine 60 mg po daily  - Increase olanzapine 10  mg po at HS  - Increase Valium 2 mg po to BID PRN for anxiety    4. Lung Cancer  Follow with oncology    Ildefonso Middleton, APRN - CNP      Total Time 30 mins      Total time includes reviewing labs, reviewing history, medications, and allergies, counseling patient, performing medically appropriate evaluation and examination, discussing care with healthcare providers,family, and documenting in the medical record.

## 2023-01-05 ENCOUNTER — TELEPHONE (OUTPATIENT)
Dept: PALLATIVE CARE | Age: 73
End: 2023-01-05

## 2023-01-05 RX ORDER — LEVOFLOXACIN 500 MG/1
500 TABLET, FILM COATED ORAL DAILY
Qty: 7 TABLET | Refills: 0 | Status: SHIPPED | OUTPATIENT
Start: 2023-01-05 | End: 2023-01-12

## 2023-01-05 NOTE — TELEPHONE ENCOUNTER
Per Martine Arcos called stating she has an URI and in the past has taken Levaquin for. I called Leta Ramirez and she said that she typically will get a sinus infection this time of year and she has sinus pressure, pain, and drainage as associated with her typical sinus infections. I advised I will order levaquin and asked her to either eat yogurt or take a probiotic 2-3 hours after taking the medication to help her gut renae and prevent diarrhea/c-diff. She states she will do so.

## 2023-01-05 NOTE — TELEPHONE ENCOUNTER
Pt calling in requesting refills on this medication her previous provider Andrea Campbell use to prescribe this to her because of her URI, she went to chemo this morning and they did not give her the treatment because of the URI her wbc count was 2.5 so they are holding off on the chemo until the wbc count goes back up.  Patient said you can give her a call if you have any questions

## 2023-01-10 RX ORDER — OLANZAPINE 10 MG/1
10 TABLET ORAL NIGHTLY
Qty: 30 TABLET | Refills: 3 | Status: SHIPPED | OUTPATIENT
Start: 2023-01-10

## 2023-01-11 ENCOUNTER — OFFICE VISIT (OUTPATIENT)
Dept: PALLATIVE CARE | Age: 73
End: 2023-01-11

## 2023-01-11 VITALS
DIASTOLIC BLOOD PRESSURE: 69 MMHG | WEIGHT: 145.2 LBS | BODY MASS INDEX: 24.92 KG/M2 | SYSTOLIC BLOOD PRESSURE: 144 MMHG | HEART RATE: 91 BPM | TEMPERATURE: 98 F | OXYGEN SATURATION: 96 %

## 2023-01-11 DIAGNOSIS — Z51.5 PALLIATIVE CARE ENCOUNTER: ICD-10-CM

## 2023-01-11 DIAGNOSIS — F41.9 ANXIETY: ICD-10-CM

## 2023-01-11 DIAGNOSIS — G89.3 NEOPLASM RELATED PAIN: ICD-10-CM

## 2023-01-11 DIAGNOSIS — C34.90 MALIGNANT NEOPLASM OF LUNG, UNSPECIFIED LATERALITY, UNSPECIFIED PART OF LUNG (HCC): ICD-10-CM

## 2023-01-11 DIAGNOSIS — R25.2 SPASM: ICD-10-CM

## 2023-01-11 RX ORDER — OXYCODONE AND ACETAMINOPHEN 7.5; 325 MG/1; MG/1
1 TABLET ORAL EVERY 6 HOURS PRN
Qty: 120 TABLET | Refills: 0 | Status: SHIPPED | OUTPATIENT
Start: 2023-01-11 | End: 2023-02-10

## 2023-01-11 RX ORDER — DIAZEPAM 2 MG/1
2 TABLET ORAL EVERY 8 HOURS PRN
Qty: 90 TABLET | Refills: 0 | Status: SHIPPED | OUTPATIENT
Start: 2023-01-11 | End: 2023-02-10

## 2023-01-11 RX ORDER — DOXYCYCLINE HYCLATE 100 MG
100 TABLET ORAL 2 TIMES DAILY
Qty: 14 TABLET | Refills: 0 | Status: CANCELLED | OUTPATIENT
Start: 2023-01-11 | End: 2023-01-21

## 2023-01-11 ASSESSMENT — ENCOUNTER SYMPTOMS
TROUBLE SWALLOWING: 0
EYE PAIN: 0
CONSTIPATION: 0
WHEEZING: 0
SHORTNESS OF BREATH: 0
RECTAL PAIN: 0
VOMITING: 0
CHOKING: 0
BLOOD IN STOOL: 0
DIARRHEA: 1
STRIDOR: 0
NAUSEA: 0
SINUS PAIN: 0
BACK PAIN: 1
COLOR CHANGE: 0
ABDOMINAL DISTENTION: 0
APNEA: 0
SINUS PRESSURE: 0
COUGH: 1
EYE DISCHARGE: 0
ANAL BLEEDING: 0
CHEST TIGHTNESS: 0
VOICE CHANGE: 0
SORE THROAT: 0
ABDOMINAL PAIN: 0

## 2023-01-11 NOTE — PROGRESS NOTES
Subjective:      Patient Id:  Roque Edward is a 71 y.o. female who presents today for symptom management follow up rt lung cancer, COPD, HTN, HLD, hypothyroidism, DDD, anxiety, depression, demeanor calm and relaxed, NAD, no sedation. Radiation therapy completed and Keytruda discontinued due to disease progression. She  started Gilotrif in October and remained on it for 6 weeks. She felt terrible due to side effects, so Dr. Tomas Bush told her to stop taking it. She had diarrhea, fatigue, loss of appetite, nausea, vomiting, and weakness due to Gilotrif. Dr. Tomas Bush started her on Carboplatin and Gemzar C1D1 12/29/2023. Ángela complains of pain in the right side of neck, radiating through shoulders bilaterally, down to her fingertips. She also experiences pain across her low back and down right leg. She has experienced itching as a result of medication from medical oncology. She also reports dry skin in winter which exacerbates the itching. Jose Luis Hdez said she is taking Percocet 7.5/325 mg every 6 hours and said her pain is usually well controlled. She rates her pain 0/10 on the pain scale today. She also takes valium 2 mg TID PRN for anxiety. No increased SOB, chest pain, edema, cough, sputum production,fever, chills, myalgias. She recently had a sinus infection which resolved with antibiotic therapy. She does have diarrhea occasionally, no constipation. Jose Luis Hdez said she is drinking plenty of water and coffee. She is eating 4-5 smaller meals and 2 ensure per day. Her weight has improved by 9 pounds in the past month as a result of stopping the oral chemotherapy. She denies issues with urination. She is ambulating independently today and said she feels much better than her last visit. She drove herself to her visit today. Jose Luis Hdez had surgery on her left wrist at the end of October. She states that the shooting pain she was having prior to surgery has resolved.  The surgical wound appears to have fully healed. She lives alone and has a labrador retriever to keep her company. She states she is going to a feed store to buy a crate for him today after her appointment. She is a current smoker and reports an occasional, intermittent cough, productive with clear sputum. She has a neighbor and a friend who will help her get groceries. Advised her not to drive after taking valium due to possible sedation/drowsiness. She states she ensures she does not drive after taking valium or percocet. Refills needed today for percocet and valium. Martin Or states she has experienced increased stress lately as a result of her decreased WBC (likely as a result from chemotherapy). She said that she does not like to be in public for fear of yara an illness. Dr. Cami Mcintosh held her recently scheduled chemotherapy due to her decreased WBC. Past Medical History:   Diagnosis Date    Anxiety     Asthma     Cervical radiculopathy at C7 9/28/2013    CERVICAL SPONDYLOSIS, WITH COMBINATION OF DISK BULGING AND    Cervical radiculopathy at C7     SUBTLE PER EMG    Cervical spondylosis 10/12/13    PER MRI    Chronic back pain     COPD (chronic obstructive pulmonary disease) (HCC)     Chronic bonchitis    CTS (carpal tunnel syndrome) 9/28/13    PER EMG    Depression     Diverticulitis     GERD (gastroesophageal reflux disease)     Headache(784.0)     Hyperlipidemia     Hypertension     Hypothyroidism due to medication 5/8/2019    Irritable bowel syndrome     Malignant neoplasm of upper lobe of left lung ([facility] [address] 1/7/2019    Osteoarthritis      Past Surgical History:   Procedure Laterality Date    APPENDECTOMY  1980    CARDIAC CATHETERIZATION  5/16/13,11/8/2013    DR. GRAVES    COLON SURGERY      HAND SURGERY  1996    HYSTERECTOMY (CERVIX STATUS UNKNOWN)  1980    TONSILLECTOMY  1963     Social History     Socioeconomic History    Marital status:       Spouse name: Not on file    Number of children: Not on file    Years of education: Not on file    Highest education level: Not on file   Occupational History    Not on file   Tobacco Use    Smoking status: Former     Packs/day: 0.50     Years: 48.00     Pack years: 24.00     Types: Cigarettes     Quit date: 11/1/2019     Years since quitting: 3.1    Smokeless tobacco: Never   Vaping Use    Vaping Use: Former    Substances: Unknown   Substance and Sexual Activity    Alcohol use: Not Currently    Drug use: Never    Sexual activity: Not on file   Other Topics Concern    Not on file   Social History Narrative    Not on file     Social Determinants of Health     Financial Resource Strain: Not on file   Food Insecurity: Not on file   Transportation Needs: Not on file   Physical Activity: Not on file   Stress: Not on file   Social Connections: Not on file   Intimate Partner Violence: Not on file   Housing Stability: Not on file     Family History   Problem Relation Age of Onset    Stroke Mother     Heart Attack Mother     Cancer Father         LUNG    Cancer Paternal Uncle         lung    Breast Cancer Maternal Grandmother [de-identified]    Cancer Paternal Grandfather         lung    Cancer Paternal Uncle         lung    Cancer Paternal Uncle         lung    Cancer Paternal Uncle         lung    Cancer Paternal Uncle         lung    Cancer Paternal Uncle         lung    Cancer Brother      Allergies   Allergen Reactions    Seasonal      Current Outpatient Medications on File Prior to Visit   Medication Sig Dispense Refill    OLANZapine (ZYPREXA) 10 MG tablet Take 1 tablet by mouth nightly 30 tablet 3    levoFLOXacin (LEVAQUIN) 500 MG tablet Take 1 tablet by mouth daily for 7 days 7 tablet 0    gabapentin (NEURONTIN) 800 MG tablet Take 1 tablet by mouth 3 times daily for 90 days. 90 tablet 3    montelukast (SINGULAIR) 10 MG tablet Take 1 tablet by mouth nightly 90 tablet 3    lidocaine (LIDODERM) 5 % Place 1 patch onto the skin daily 12 hours on, 12 hours off. 30 patch 0    Handicap Placard MISC by Does not apply route Diagnoses: LUNG CANCER, DEGENERATIVE ARTHRITIS, CANCER PAIN   EXPIRATION: 4/12/2024 1 each 0    simethicone (MYLICON) 80 MG chewable tablet Take 1 tablet by mouth 4 times daily as needed for Flatulence 180 tablet 3    aspirin 81 MG EC tablet Take 81 mg by mouth daily      folic acid (FOLVITE) 1 MG tablet Take 1 tablet by mouth daily 30 tablet 5    fluticasone (FLONASE) 50 MCG/ACT nasal spray 1 spray by Nasal route daily. 1 Bottle 6    ondansetron (ZOFRAN-ODT) 4 MG disintegrating tablet Take 1 tablet by mouth 3 times daily as needed for Nausea or Vomiting 21 tablet 0    DULoxetine (CYMBALTA) 60 MG extended release capsule Take 1 capsule by mouth daily 30 capsule 1    meloxicam (MOBIC) 7.5 MG tablet Take 1 tablet by mouth daily For cancer pain 30 tablet 3    ipratropium-albuterol (DUONEB) 0.5-2.5 (3) MG/3ML SOLN nebulizer solution Inhale 3 mLs into the lungs every 4 hours 540 mL 1    Magic Mouthwash (MIRACLE MOUTHWASH) Swish and spit 5 mLs 4 times daily as needed for Irritation or Pain 240 mL 3    BREO ELLIPTA 100-25 MCG/INH AEPB inhaler Inhale 1 puff into the lungs daily 30 each 0    PROAIR  (90 Base) MCG/ACT inhaler Inhale 1 puff into the lungs 4 times daily 1 Inhaler 5    omeprazole (PRILOSEC) 40 MG delayed release capsule Take 1 capsule by mouth every morning (before breakfast) 30 capsule 3     No current facility-administered medications on file prior to visit. Review of Systems   Constitutional:  Positive for activity change and fatigue. Negative for appetite change, chills, diaphoresis, fever and unexpected weight change. HENT:  Negative for drooling, hearing loss, mouth sores, sinus pressure, sinus pain, sneezing, sore throat, trouble swallowing and voice change. Eyes:  Negative for pain, discharge and visual disturbance. Respiratory:  Positive for cough.  Negative for apnea, choking, chest tightness, shortness of breath, wheezing and stridor. Cardiovascular:  Negative for chest pain, palpitations and leg swelling. Gastrointestinal:  Positive for diarrhea. Negative for abdominal distention, abdominal pain, anal bleeding, blood in stool, constipation, nausea, rectal pain and vomiting. Genitourinary:  Negative for difficulty urinating, dysuria, enuresis, frequency, hematuria and vaginal bleeding. Musculoskeletal:  Positive for arthralgias, back pain and myalgias. Negative for gait problem and joint swelling. Skin:  Negative for color change, pallor, rash and wound. Allergic/Immunologic: Negative for food allergies and immunocompromised state. Neurological:  Positive for dizziness, light-headedness and headaches. Negative for tremors, seizures, syncope, facial asymmetry, speech difficulty, weakness and numbness. Hematological:  Negative for adenopathy. Does not bruise/bleed easily. Psychiatric/Behavioral:  Positive for sleep disturbance. Negative for agitation, behavioral problems, confusion, decreased concentration, dysphoric mood, hallucinations, self-injury and suicidal ideas. The patient is nervous/anxious. The patient is not hyperactive. Objective:   BP (!) 144/69   Pulse 91   Temp 98 °F (36.7 °C)   Wt 145 lb 3.2 oz (65.9 kg)   SpO2 96%   BMI 24.92 kg/m²     Physical Exam  Constitutional:       General: She is not in acute distress. Appearance: Normal appearance. She is well-developed. She is not diaphoretic. HENT:      Head: Normocephalic and atraumatic. Right Ear: External ear normal.      Left Ear: External ear normal.      Nose: Nose normal.      Mouth/Throat:      Pharynx: No oropharyngeal exudate. Eyes:      General: Lids are normal. No scleral icterus. Right eye: No discharge. Left eye: No discharge. Conjunctiva/sclera: Conjunctivae normal.      Right eye: No hemorrhage. Left eye: No hemorrhage. Neck:      Thyroid: No thyromegaly.       Vascular: No JVD.      Trachea: No tracheal deviation. Cardiovascular:      Rate and Rhythm: Normal rate and regular rhythm. Heart sounds: Normal heart sounds. Pulmonary:      Effort: Pulmonary effort is normal. No respiratory distress. Breath sounds: No stridor. No decreased breath sounds, wheezing or rales. Chest:      Chest wall: No tenderness. Comments: Lymph node enlargement, approximately 2 cm in diameter, radiation therapy of node completed 12/2021. Abdominal:      General: Bowel sounds are normal. There is no distension. Palpations: Abdomen is soft. There is no mass. Tenderness: There is no abdominal tenderness. There is no guarding or rebound. Musculoskeletal:         General: No tenderness or deformity. Normal range of motion. Cervical back: Normal range of motion and neck supple. Lymphadenopathy:      Head:      Right side of head: No submental or submandibular adenopathy. Left side of head: No submental or submandibular adenopathy. Cervical: No cervical adenopathy. Right cervical: No superficial cervical adenopathy. Left cervical: No superficial cervical adenopathy. Upper Body:      Right upper body: Supraclavicular adenopathy present. Left upper body: No supraclavicular adenopathy. Skin:     General: Skin is warm and dry. Capillary Refill: Capillary refill takes less than 2 seconds. Findings: No erythema or rash. Neurological:      Mental Status: She is alert and oriented to person, place, and time. Gait: Gait is intact. Psychiatric:         Attention and Perception: Attention normal.         Mood and Affect: Mood normal.         Speech: Speech normal.         Behavior: Behavior normal. Behavior is cooperative. Thought Content: Thought content normal.         Cognition and Memory: Cognition and memory normal.         Judgment: Judgment normal.       Assessment:       Diagnosis Orders   1.  Neoplasm related pain diazePAM (VALIUM) 2 MG tablet    oxyCODONE-acetaminophen (PERCOCET) 7.5-325 MG per tablet      2. Palliative care encounter  diazePAM (VALIUM) 2 MG tablet    oxyCODONE-acetaminophen (PERCOCET) 7.5-325 MG per tablet      3. Anxiety  diazePAM (VALIUM) 2 MG tablet      4. Spasm  diazePAM (VALIUM) 2 MG tablet      5. Malignant neoplasm of lung, unspecified laterality, unspecified part of lung ([address]  oxyCODONE-acetaminophen (PERCOCET) 7.5-325 MG per tablet             Plan:   Neoplasm related pain   - Continue taking Percocet 7.5/325 mg po every 6 hours prn moderate to severe pain  - Continue Gabapentin 800 mg po tid  - Continue Duloxetine 60 mg po at HS  - Mobic 7.5 mg po daily with food  - Heat or ice to painful areas, whichever is preferred  - Gentle ROM exercises, consider PT, declined at this time due to transportation  Pt is tolerating current pain meds without adverse effects or over sedation. Lowest effective dose used. Pt advised to call and notify palliative care for any adverse effects or sedation  Pt is able to maintain adequate functional level and participate in ADLs  OARRS reviewed. There is no indication of aberrant behavior  Pt advised to call for increasing or uncontrolled pain  Pt advised to take only as prescribed and not to change frequency of pain   meds without consulting palliative care first.      2. SOB rt COPD and Lung cancer  - Follow with pulmonology  - Follow with oncology  - Continue COPD Directed therapies   - Call for increased SOB, cough, sputum production, excessive fatigue, fever, chills, or myalgias  - Gentle exercise as tolerated  -  Rinse out mouth after inhaler use. 3. Anxiety   - Continue duloxetine 60 mg po daily  - Increase olanzapine 10  mg po at HS  - Increase Valium 2 mg po to BID PRN for anxiety    4.  Lung Cancer  Follow with oncology    CHRISTO Restrepo - CNP      Total Time 30 mins      Total time includes reviewing labs, reviewing history, medications, and allergies, counseling patient, performing medically appropriate evaluation and examination, discussing care with healthcare providers,family, and documenting in the medical record.

## 2023-02-21 ENCOUNTER — TELEPHONE (OUTPATIENT)
Dept: PALLATIVE CARE | Age: 73
End: 2023-02-21

## 2023-03-06 DIAGNOSIS — J44.1 COPD EXACERBATION (MULTI): Primary | ICD-10-CM

## 2023-03-06 PROBLEM — R22.1 NECK MASS: Status: ACTIVE | Noted: 2023-03-06

## 2023-03-06 PROBLEM — H93.13 TINNITUS OF BOTH EARS: Status: ACTIVE | Noted: 2023-03-06

## 2023-03-06 PROBLEM — G56.22 ENTRAPMENT OF LEFT ULNAR NERVE AT WRIST: Status: ACTIVE | Noted: 2023-03-06

## 2023-03-06 PROBLEM — G56.22 ULNAR NEUROPATHY OF LEFT UPPER EXTREMITY: Status: ACTIVE | Noted: 2023-03-06

## 2023-03-06 PROBLEM — G89.18 POST-OP PAIN: Status: ACTIVE | Noted: 2023-03-06

## 2023-03-06 PROBLEM — R42 VERTIGO: Status: ACTIVE | Noted: 2023-03-06

## 2023-03-06 PROBLEM — H61.21 IMPACTED CERUMEN OF RIGHT EAR: Status: ACTIVE | Noted: 2023-03-06

## 2023-03-06 PROBLEM — G56.22 CUBITAL TUNNEL SYNDROME ON LEFT: Status: ACTIVE | Noted: 2023-03-06

## 2023-03-06 PROBLEM — C34.90 LUNG CANCER (MULTI): Status: ACTIVE | Noted: 2023-03-06

## 2023-03-06 PROBLEM — L98.9 SKIN LESION OF SCALP: Status: ACTIVE | Noted: 2023-03-06

## 2023-03-06 PROBLEM — M79.602 PAIN OF LEFT UPPER EXTREMITY: Status: ACTIVE | Noted: 2023-03-06

## 2023-03-06 PROBLEM — G56.02 CARPAL TUNNEL SYNDROME OF LEFT WRIST: Status: ACTIVE | Noted: 2023-03-06

## 2023-03-06 PROBLEM — H90.3 BILATERAL SENSORINEURAL HEARING LOSS: Status: ACTIVE | Noted: 2023-03-06

## 2023-03-06 PROBLEM — L24.A9 WOUND DRAINAGE: Status: ACTIVE | Noted: 2023-03-06

## 2023-03-06 PROBLEM — R22.2 SUPRACLAVICULAR MASS: Status: ACTIVE | Noted: 2023-03-06

## 2023-03-06 PROBLEM — H60.391: Status: ACTIVE | Noted: 2023-03-06

## 2023-03-06 PROBLEM — H60.41 CHOLESTEATOMA OF RIGHT EXTERNAL AUDITORY CANAL: Status: ACTIVE | Noted: 2023-03-06

## 2023-03-06 RX ORDER — OLANZAPINE 10 MG/1
1 TABLET ORAL NIGHTLY
COMMUNITY
End: 2023-12-17 | Stop reason: HOSPADM

## 2023-03-06 RX ORDER — LEVALBUTEROL INHALATION SOLUTION 1.25 MG/3ML
3 SOLUTION RESPIRATORY (INHALATION) EVERY 4 HOURS PRN
COMMUNITY
Start: 2019-05-13 | End: 2024-01-26 | Stop reason: HOSPADM

## 2023-03-06 RX ORDER — FOLIC ACID 1 MG/1
1 TABLET ORAL DAILY
COMMUNITY
Start: 2019-05-13 | End: 2024-01-26 | Stop reason: HOSPADM

## 2023-03-06 RX ORDER — NAPROXEN SODIUM 220 MG/1
81 TABLET, FILM COATED ORAL DAILY
COMMUNITY
End: 2024-01-26 | Stop reason: HOSPADM

## 2023-03-06 RX ORDER — LISINOPRIL AND HYDROCHLOROTHIAZIDE 20; 25 MG/1; MG/1
1 TABLET ORAL DAILY
COMMUNITY
Start: 2019-05-13 | End: 2023-12-20 | Stop reason: SDUPTHER

## 2023-03-06 RX ORDER — FLUTICASONE PROPIONATE 50 MCG
1 SPRAY, SUSPENSION (ML) NASAL 2 TIMES DAILY
COMMUNITY
Start: 2019-05-13 | End: 2023-12-20 | Stop reason: WASHOUT

## 2023-03-06 RX ORDER — OXYCODONE AND ACETAMINOPHEN 7.5; 325 MG/1; MG/1
1 TABLET ORAL EVERY 4 HOURS PRN
COMMUNITY
End: 2024-01-26 | Stop reason: SDUPTHER

## 2023-03-06 RX ORDER — ALBUTEROL SULFATE 90 UG/1
2 AEROSOL, METERED RESPIRATORY (INHALATION) 4 TIMES DAILY PRN
COMMUNITY
Start: 2019-05-13 | End: 2023-12-20 | Stop reason: SDUPTHER

## 2023-03-06 RX ORDER — PANTOPRAZOLE SODIUM 40 MG/1
1 TABLET, DELAYED RELEASE ORAL DAILY
COMMUNITY
Start: 2019-05-13 | End: 2024-01-26 | Stop reason: HOSPADM

## 2023-03-06 RX ORDER — GABAPENTIN 800 MG/1
800 TABLET ORAL 3 TIMES DAILY
COMMUNITY
End: 2023-12-17 | Stop reason: HOSPADM

## 2023-03-06 RX ORDER — MONTELUKAST SODIUM 10 MG/1
1 TABLET ORAL NIGHTLY
COMMUNITY
Start: 2019-05-13

## 2023-03-06 RX ORDER — ALPRAZOLAM 1 MG/1
1 TABLET ORAL 3 TIMES DAILY PRN
COMMUNITY
End: 2024-01-26 | Stop reason: HOSPADM

## 2023-03-06 RX ORDER — METOPROLOL SUCCINATE 50 MG/1
50 TABLET, EXTENDED RELEASE ORAL DAILY
COMMUNITY
Start: 2019-05-13 | End: 2024-01-26 | Stop reason: HOSPADM

## 2023-03-06 RX ORDER — BUPROPION HYDROCHLORIDE 300 MG/1
300 TABLET ORAL DAILY
COMMUNITY
Start: 2019-05-13 | End: 2024-01-26 | Stop reason: HOSPADM

## 2023-03-06 RX ORDER — LORATADINE 10 MG/1
1 TABLET ORAL DAILY
COMMUNITY
Start: 2019-05-13 | End: 2023-12-20 | Stop reason: WASHOUT

## 2023-03-06 RX ORDER — FLUTICASONE FUROATE AND VILANTEROL 200; 25 UG/1; UG/1
1 POWDER RESPIRATORY (INHALATION) DAILY
COMMUNITY
End: 2023-12-17 | Stop reason: HOSPADM

## 2023-03-07 ENCOUNTER — CLINICAL SUPPORT (OUTPATIENT)
Dept: PHARMACY | Facility: HOSPITAL | Age: 73
End: 2023-03-07
Payer: MEDICARE

## 2023-03-07 DIAGNOSIS — J44.1 COPD EXACERBATION (MULTI): Primary | ICD-10-CM

## 2023-03-07 NOTE — ASSESSMENT & PLAN NOTE
Continue Breo inhaler daily as prescribed  Continue rescue inhalers as needed  Plan to switch to Trelegy pending [employer] PAP approval  Great work with quitting smoking. Keep it up!  Start to engage with your friends/communities and become more physically active for your overall health

## 2023-03-07 NOTE — PROGRESS NOTES
Pharmacy Post-Discharge Visit - follow-up  Gaby Blue is a 72 y.o. female was referred to Clinical Pharmacy Team to complete a post-discharge medication optimization and monitoring visit.  The patient was referred for their COPD.    Referring Provider: Hawk Comer MD    Subjective   Allergies   Allergen Reactions    Bee Pollen Unknown       [facility] - [place], [place] - [address]  [address]  [address]  [place] [address]  Phone: [phone] Fax: [phone]    [facility]  [address] [facility] [address]  Phone: [phone] Fax: [phone]      Social History     Social History Narrative    Not on file        HPI  Patient reports feeling well today. Reports that her breathing has been better since her hospital discharge due to staying in and quitting smoking. Patient reports she has not been doing much physical activity or social engagements.    COPD ASSESSMENT    Rescue Inhaler Use:  -How many times per week do you use your rescue inhale? Couple times per week    Secondary Prevention (vaccines):  -Influenza: Date [10/25/22]  -Pneumococcal: Date [none since turning 66]  -COVID19: Date [got primary series and one booster, not yet gotten bivalent update]    Sx Management:  -Increased cough? no  -Increased sputum production? no  -Increased SOB? no    Exacerbation Hx:  -When was your last hospitalization for an exacerbation? 1/31/23-2/6/23  -When was the last time you were treated with antibiotics and/or steroids? Feb 2023      Review of Systems    Objective     There were no vitals taken for this visit.     LAB  Lab Results   Component Value Date    BILITOT 0.8 02/05/2023    CALCIUM 8.4 (L) 02/06/2023    CO2 35 (H) 02/06/2023    CL 91 (L) 02/06/2023    CREATININE 0.52 02/06/2023    GLUCOSE 91 02/06/2023    ALKPHOS 70 02/05/2023    K 3.9 02/06/2023    PROT 6.5 02/05/2023     (L) 02/06/2023    AST 20 02/05/2023    ALT 14  02/05/2023    BUN 10 02/06/2023    ANIONGAP 12 02/06/2023    MG 1.54 (L) 02/05/2023    PHOS 2.5 02/05/2023    ALBUMIN 3.5 02/05/2023     Lab Results   Component Value Date    TRIG 140 05/09/2019    CHOL 147 05/09/2019    HDL 54.0 05/09/2019     Lab Results   Component Value Date    HGBA1C 5.5 01/31/2023         Current Outpatient Medications on File Prior to Visit   Medication Sig Dispense Refill    albuterol (ProAir HFA) 90 mcg/actuation inhaler Inhale 2 puffs 4 times a day as needed.      ALPRAZolam (Xanax) 1 mg tablet Take 1 tablet (1 mg) by mouth 3 times a day as needed.      aspirin 81 mg chewable tablet Chew 1 tablet (81 mg) once daily.      buPROPion XL (Wellbutrin XL) 300 mg 24 hr tablet Take 1 tablet (300 mg) by mouth once daily.      fluticasone (Flonase) 50 mcg/actuation nasal spray Administer 1 spray into affected nostril(s) in the morning and 1 spray in the evening.      fluticasone furoate-vilanteroL (Breo Elipta) 200-25 mcg/dose inhaler Inhale 1 puff once daily.      fluticasone-umeclidin-vilanter (TRELEGY-ELLIPTA) 100-62.5-25 mcg blister with device Inhale 1 puff once daily.      folic acid (Folvite) 1 mg tablet Take 1 tablet (1 mg) by mouth once daily.      gabapentin (Neurontin) 800 mg tablet Take 1 tablet (800 mg) by mouth in the morning and 1 tablet (800 mg) in the evening and 1 tablet (800 mg) before bedtime.      levalbuterol (Xopenex) 1.25 mg/3 mL nebulizer solution Inhale 3 mL every 4 hours if needed.      lisinopriL-hydrochlorothiazide 20-25 mg tablet Take 1 tablet by mouth once daily.      loratadine (Claritin) 10 mg tablet Take 1 tablet (10 mg) by mouth once daily.      metoprolol succinate XL (Toprol-XL) 50 mg 24 hr tablet Take 1 tablet (50 mg) by mouth once daily.      montelukast (Singulair) 10 mg tablet Take 1 tablet (10 mg) by mouth once daily.      OLANZapine (ZyPREXA) 10 mg tablet Take 1 tablet (10 mg) by mouth once daily at bedtime.      oxyCODONE-acetaminophen (Percocet) 7.5-325  mg tablet Take 1 tablet by mouth every 4 hours if needed for moderate pain (4 - 6).      pantoprazole (Protonix) 40 mg EC tablet Take 1 tablet (40 mg) by mouth once daily.       No current facility-administered medications on file prior to visit.        DRUG INTERATIONS  - Breo and Trelegy are both on med list as currently using Breo but planning to switch to Trelegy pending  PAP approval    Assessment/Plan   Problem List Items Addressed This Visit          Respiratory    COPD exacerbation (CMS/[facility]) - Primary     Continue Breo inhaler daily as prescribed  Continue rescue inhalers as needed  Plan to switch to Trelegy pending [employer] PAP approval  Great work with quitting smoking. Keep it up!  Start to engage with your friends/communities and become more physically active for your overall health           PAP team should be reaching out by 3/21/23 regarding application.  Clinical Pharmacy Follow-up: 3 weeks    Bridgett Grayson, PharmD     Verbal consent to manage patient's drug therapy was obtained from the patient. They were informed they may decline to participate or withdraw from participation in pharmacy services at any time.

## 2023-03-13 DIAGNOSIS — G89.3 NEOPLASM RELATED PAIN: Primary | ICD-10-CM

## 2023-03-13 DIAGNOSIS — Z51.5 PALLIATIVE CARE ENCOUNTER: ICD-10-CM

## 2023-03-13 DIAGNOSIS — C34.90 MALIGNANT NEOPLASM OF LUNG, UNSPECIFIED LATERALITY, UNSPECIFIED PART OF LUNG (HCC): ICD-10-CM

## 2023-03-13 RX ORDER — OXYCODONE AND ACETAMINOPHEN 7.5; 325 MG/1; MG/1
1 TABLET ORAL EVERY 6 HOURS PRN
Qty: 120 TABLET | Refills: 0 | Status: SHIPPED | OUTPATIENT
Start: 2023-03-13 | End: 2023-04-12

## 2023-03-13 RX ORDER — OXYCODONE AND ACETAMINOPHEN 7.5; 325 MG/1; MG/1
1 TABLET ORAL EVERY 6 HOURS PRN
Qty: 120 TABLET | Refills: 0 | Status: CANCELLED | OUTPATIENT
Start: 2023-03-13 | End: 2023-04-12

## 2023-03-20 ENCOUNTER — TELEPHONE (OUTPATIENT)
Dept: PHARMACY | Facility: HOSPITAL | Age: 73
End: 2023-03-20

## 2023-03-20 ENCOUNTER — TELEMEDICINE (OUTPATIENT)
Dept: PHARMACY | Facility: HOSPITAL | Age: 73
End: 2023-03-20
Payer: MEDICARE

## 2023-03-20 DIAGNOSIS — J44.1 COPD EXACERBATION (MULTI): Primary | ICD-10-CM

## 2023-03-20 NOTE — PROGRESS NOTES
Pharmacy Follow-Up Visit  Gaby Blue is a 72 y.o. female was referred to Clinical Pharmacy Team for their COPD management.       Referring Provider: Hawk Comer MD    Subjective   Allergies   Allergen Reactions    Bee Pollen Unknown       [facility] - [place], [place] - [address]  [address]  [address]  [facility] [address]  Phone: [phone] Fax: [phone]    [facility]  [address] [facility] [address]  Phone: [phone] Fax: [phone]      Social History     Social History Narrative    Not on file        COPD ASSESSMENT    CURRENT PHARMACOTHERAPY  - Breo Ellipta 200-25mcg - 1 puff daily  - ProAir HFA 2 puffs every 4 hours as needed  - Xopenex 1.25mg/3mL - 1 vial via nebulizer every 4-6 hours as needed     Rescue Inhaler Use:   -Was using rescue inhaler daily prior to hospitalization (1/31/23 till 2/6/23). Now uses a few times a week to every other day    Inhaler Technique:  -Patient walked through her steps of inhalation technique --nothing concerning. Patient rinses mouth after Breo use.       -How do you use your maintenance inhaler?  -Daily     Secondary Prevention (vaccines):  -Influenza: Date [10/25/22]  -Pneumococcal: None. Needs to make appointment for 1 dose of PCV20  - COVID-19: Completed primary Pfizer series and received 1 booster but not yet received bivalent booster. Patient states will make appointment.     Sx Management:  -Increased cough? no  -Increased sputum production? no  -Increased SOB? no    Exacerbation Hx:  -When was your last hospitalization for an exacerbation? 1/31/23 -2/6/23  -When was the last time you were treated with antibiotics and/or steroids? 2/2023        Objective     There were no vitals taken for this visit.     LAB  Lab Results   Component Value Date    BILITOT 0.8 02/05/2023    CALCIUM 8.4 (L) 02/06/2023    CO2 35 (H) 02/06/2023    CL 91 (L) 02/06/2023    CREATININE 0.52 02/06/2023     GLUCOSE 91 02/06/2023    ALKPHOS 70 02/05/2023    K 3.9 02/06/2023    PROT 6.5 02/05/2023     (L) 02/06/2023    AST 20 02/05/2023    ALT 14 02/05/2023    BUN 10 02/06/2023    ANIONGAP 12 02/06/2023    MG 1.54 (L) 02/05/2023    PHOS 2.5 02/05/2023    ALBUMIN 3.5 02/05/2023    GFRF >90 02/06/2023     Lab Results   Component Value Date    TRIG 140 05/09/2019    CHOL 147 05/09/2019    HDL 54.0 05/09/2019     Lab Results   Component Value Date    HGBA1C 5.5 01/31/2023         Current Outpatient Medications on File Prior to Visit   Medication Sig Dispense Refill    albuterol (ProAir HFA) 90 mcg/actuation inhaler Inhale 2 puffs 4 times a day as needed.      ALPRAZolam (Xanax) 1 mg tablet Take 1 tablet (1 mg) by mouth 3 times a day as needed.      aspirin 81 mg chewable tablet Chew 1 tablet (81 mg) once daily.      buPROPion XL (Wellbutrin XL) 300 mg 24 hr tablet Take 1 tablet (300 mg) by mouth once daily.      fluticasone (Flonase) 50 mcg/actuation nasal spray Administer 1 spray into affected nostril(s) in the morning and 1 spray in the evening.      fluticasone furoate-vilanteroL (Breo Elipta) 200-25 mcg/dose inhaler Inhale 1 puff once daily.      fluticasone-umeclidin-vilanter (TRELEGY-ELLIPTA) 100-62.5-25 mcg blister with device Inhale 1 puff once daily.      folic acid (Folvite) 1 mg tablet Take 1 tablet (1 mg) by mouth once daily.      gabapentin (Neurontin) 800 mg tablet Take 1 tablet (800 mg) by mouth in the morning and 1 tablet (800 mg) in the evening and 1 tablet (800 mg) before bedtime.      levalbuterol (Xopenex) 1.25 mg/3 mL nebulizer solution Inhale 3 mL every 4 hours if needed.      lisinopriL-hydrochlorothiazide 20-25 mg tablet Take 1 tablet by mouth once daily.      loratadine (Claritin) 10 mg tablet Take 1 tablet (10 mg) by mouth once daily.      metoprolol succinate XL (Toprol-XL) 50 mg 24 hr tablet Take 1 tablet (50 mg) by mouth once daily.      montelukast (Singulair) 10 mg tablet Take 1 tablet  (10 mg) by mouth once daily.      OLANZapine (ZyPREXA) 10 mg tablet Take 1 tablet (10 mg) by mouth once daily at bedtime.      oxyCODONE-acetaminophen (Percocet) 7.5-325 mg tablet Take 1 tablet by mouth every 4 hours if needed for moderate pain (4 - 6).      pantoprazole (Protonix) 40 mg EC tablet Take 1 tablet (40 mg) by mouth once daily.       No current facility-administered medications on file prior to visit.        HISTORICAL PHARMACOTHERAPY  -Patient reports she does not remember; there are no maintenance inhalers found in her past medication history     DRUG INTERATIONS  - No significant drug-drug interactions exist that require adjustment to therapy     Assessment/Plan   Problem List Items Addressed This Visit    None  Continue current COPD therapy (Breo Ellipta) and montelukast 10mg orally daily. Plan is to switch from Breo Ellipta to Trelegy once  PAP approves patient. Informed patient that she should expect a phone call from [facility] in the next few weeks. Advised patient to call Clinical Pharmacist back by 3/28/23 if she has not heard from [facility] by then.   Continue nebulizer and ProAir as needed  Continue to take steps towards smoking cessation. Patient reports she has not smoked. Naps when she feels the urge to smoke. Informed clinical pharmacist she will buy sugar-free hard candy to keep on hand in case she cannot nap and has urge to smoke.   Encouraged patient to set appointment for bivalent COVID booster and for PCV20.        Reba Ramirez, PharmD     Verbal consent to manage patient's drug therapy was obtained from the patient. They were informed they may decline to participate or withdraw from participation in pharmacy services at any time.

## 2023-03-24 ENCOUNTER — OFFICE VISIT (OUTPATIENT)
Dept: RADIATION ONCOLOGY | Age: 73
End: 2023-03-24
Payer: MEDICARE

## 2023-03-24 VITALS
TEMPERATURE: 97.2 F | WEIGHT: 135.8 LBS | DIASTOLIC BLOOD PRESSURE: 61 MMHG | BODY MASS INDEX: 23.31 KG/M2 | HEART RATE: 93 BPM | RESPIRATION RATE: 16 BRPM | OXYGEN SATURATION: 94 % | SYSTOLIC BLOOD PRESSURE: 136 MMHG

## 2023-03-24 DIAGNOSIS — G89.3 NEOPLASM RELATED PAIN: Primary | ICD-10-CM

## 2023-03-24 DIAGNOSIS — M25.562 ACUTE PAIN OF LEFT KNEE: ICD-10-CM

## 2023-03-24 DIAGNOSIS — Z51.5 PALLIATIVE CARE ENCOUNTER: ICD-10-CM

## 2023-03-24 DIAGNOSIS — F41.9 ANXIETY: ICD-10-CM

## 2023-03-24 DIAGNOSIS — R25.2 SPASM: ICD-10-CM

## 2023-03-24 DIAGNOSIS — C34.90 MALIGNANT NEOPLASM OF LUNG, UNSPECIFIED LATERALITY, UNSPECIFIED PART OF LUNG (HCC): ICD-10-CM

## 2023-03-24 DIAGNOSIS — R60.0 EDEMA OF LEFT LOWER LEG: ICD-10-CM

## 2023-03-24 DIAGNOSIS — M79.662 PAIN OF LEFT CALF: ICD-10-CM

## 2023-03-24 PROCEDURE — 1123F ACP DISCUSS/DSCN MKR DOCD: CPT

## 2023-03-24 PROCEDURE — 3078F DIAST BP <80 MM HG: CPT

## 2023-03-24 PROCEDURE — 99214 OFFICE O/P EST MOD 30 MIN: CPT

## 2023-03-24 PROCEDURE — 3075F SYST BP GE 130 - 139MM HG: CPT

## 2023-03-24 RX ORDER — DIAZEPAM 2 MG/1
2 TABLET ORAL EVERY 8 HOURS PRN
Qty: 90 TABLET | Refills: 0 | Status: SHIPPED | OUTPATIENT
Start: 2023-03-24 | End: 2023-04-23

## 2023-03-24 RX ORDER — DOXYCYCLINE HYCLATE 100 MG
100 TABLET ORAL 2 TIMES DAILY
Qty: 14 TABLET | Refills: 0 | Status: SHIPPED | OUTPATIENT
Start: 2023-03-24 | End: 2023-03-31

## 2023-03-24 RX ORDER — PROCHLORPERAZINE MALEATE 10 MG
10 TABLET ORAL EVERY 6 HOURS PRN
COMMUNITY

## 2023-03-24 RX ORDER — PREDNISONE 20 MG/1
40 TABLET ORAL DAILY
Qty: 14 TABLET | Refills: 0 | Status: SHIPPED | OUTPATIENT
Start: 2023-03-24 | End: 2023-03-31

## 2023-03-24 ASSESSMENT — ENCOUNTER SYMPTOMS
SHORTNESS OF BREATH: 0
DIARRHEA: 1
CONSTIPATION: 0
SINUS PAIN: 0
SORE THROAT: 0
APNEA: 0
SINUS PRESSURE: 0
CHEST TIGHTNESS: 0
COUGH: 1
WHEEZING: 0
VOMITING: 0
VOICE CHANGE: 0
RECTAL PAIN: 0
NAUSEA: 0
EYE PAIN: 0
CHOKING: 0
BACK PAIN: 1
COLOR CHANGE: 0
BLOOD IN STOOL: 0
TROUBLE SWALLOWING: 0
ABDOMINAL PAIN: 0
STRIDOR: 0
EYE DISCHARGE: 0
ANAL BLEEDING: 0
ABDOMINAL DISTENTION: 0

## 2023-03-24 NOTE — PROGRESS NOTES
I reviewed the progress note and agree with the resident’s findings and plans as written. Case discussed with resident.    Bjorn Lanier, ZacariasD

## 2023-03-24 NOTE — PROGRESS NOTES
normal.         Speech: Speech normal.         Behavior: Behavior normal. Behavior is cooperative. Thought Content: Thought content normal.         Cognition and Memory: Cognition and memory normal.         Judgment: Judgment normal.       Assessment:       Diagnosis Orders   1. Edema of left lower leg  US DUP LOWER EXTREMITY LEFT ALMAZ      2. Neoplasm related pain  diazePAM (VALIUM) 2 MG tablet      3. Palliative care encounter  diazePAM (VALIUM) 2 MG tablet      4. Anxiety  diazePAM (VALIUM) 2 MG tablet      5. Spasm  diazePAM (VALIUM) 2 MG tablet      6. Pain of left calf  US DUP LOWER EXTREMITY LEFT ALMAZ      7. Acute pain of left knee  XR KNEE LEFT (3 VIEWS)    Patient fell on 3/20/23      8. Malignant neoplasm of lung, unspecified laterality, unspecified part of lung ([facility] [address]               Plan:   Neoplasm related pain   - Continue taking Percocet 7.5/325 mg po every 6 hours prn moderate to severe pain  - Continue Gabapentin 800 mg po tid  - Continue Duloxetine 60 mg po at HS  - Heat or ice to painful areas, whichever is preferred  - Gentle ROM exercises, consider PT, declined at this time due to transportation  Pt is tolerating current pain meds without adverse effects or over sedation. Lowest effective dose used. Pt advised to call and notify palliative care for any adverse effects or sedation  Pt is able to maintain adequate functional level and participate in ADLs  OARRS reviewed.  There is no indication of aberrant behavior  Pt advised to call for increasing or uncontrolled pain  Pt advised to take only as prescribed and not to change frequency of pain   meds without consulting palliative care first.      2. SOB rt COPD and Lung cancer  - Use ER COPD pack for COPD exacerbation  - Follow with pulmonology  - Follow with oncology  - Continue COPD Directed therapies   - Call for increased SOB, cough, sputum production, excessive fatigue, fever, chills, or myalgias  - Gentle exercise as tolerated  - Rinse

## 2023-03-28 ENCOUNTER — APPOINTMENT (OUTPATIENT)
Dept: PHARMACY | Facility: HOSPITAL | Age: 73
End: 2023-03-28
Payer: MEDICARE

## 2023-03-28 ENCOUNTER — HOSPITAL ENCOUNTER (OUTPATIENT)
Dept: GENERAL RADIOLOGY | Age: 73
Discharge: HOME | End: 2023-03-30
Payer: MEDICARE

## 2023-03-28 ENCOUNTER — HOSPITAL ENCOUNTER (OUTPATIENT)
Dept: ULTRASOUND IMAGING | Age: 73
Discharge: HOME | End: 2023-03-30
Payer: MEDICARE

## 2023-03-28 DIAGNOSIS — M79.662 PAIN OF LEFT CALF: ICD-10-CM

## 2023-03-28 DIAGNOSIS — R60.0 EDEMA OF LEFT LOWER LEG: ICD-10-CM

## 2023-03-28 DIAGNOSIS — M25.562 ACUTE PAIN OF LEFT KNEE: ICD-10-CM

## 2023-03-28 PROCEDURE — 93971 EXTREMITY STUDY: CPT

## 2023-03-28 PROCEDURE — 73562 X-RAY EXAM OF KNEE 3: CPT

## 2023-03-28 NOTE — PROGRESS NOTES
I called Toñito Jovanni to notify her that the knee xray and left lower leg doppler results were negative. She states that she is still having a lot of pain, but swelling has decreased. I asked her to elevate her left leg when she notices edema and she states she will do so. She said she is walking when able. I advised she can try Voltaren gel up to 4 times per day as needed for pain to see if this helps. She is agreeable to this.

## 2023-04-03 ENCOUNTER — TELEMEDICINE (OUTPATIENT)
Dept: PHARMACY | Facility: HOSPITAL | Age: 73
End: 2023-04-03
Payer: MEDICARE

## 2023-04-03 DIAGNOSIS — J44.1 COPD EXACERBATION (MULTI): ICD-10-CM

## 2023-04-03 NOTE — PROGRESS NOTES
Pharmacy Platinum Follow-Up   Gaby Blue is a 72 y.o. female was referred to Clinical Pharmacy Team for their COPD management.    Referring Provider: Hawk Comer MD    Subjective   Allergies   Allergen Reactions    Bee Pollen Unknown       [facility] - [place], [place] - [address]  [address]  [address]  [facility] [address]  Phone: [phone] Fax: [phone]    [facility]  [address] [facility] [address]  Phone: [phone] Fax: [phone]      Social History     Social History Narrative    Not on file      Notable Medication changes following discharge:  Start: Trelegy Ellipta 100-62.5-25mcg - 1 puff once daily   Stop: Breo Ellipta     HPI    COPD ASSESSMENT     CURRENT PHARMACOTHERAPY  - ProAir HFA 2 puffs q4h as needed  - Xopenex 1.25mg/3mL - 1 vial via nebulizer q4-6hr as needed   - Trelegy Ellipta 100-62.5-25mcg - 1 puff once daily     Rescue Inhaler Use  - Reports uses it as needed a few times a week to every other day     Inhaler Technique  - Adequate. Patient rinses mouth after each time she uses Trelegy     Maintenance Inhaler Use  - Daily     Secondary Prevention (vaccines)  - Influenza - 10/25/22  - Pneumococcal - 1 dose PCV20 (patient says will get at her next PCP visit)    Symptom Management  - Increased cough? NO  - Increased sputum? NO  - Increased SOB? NO  - Patient reports she is able to walk around more without getting SOB. Reports she is feeling well since she was discharged from hospital and since she has been on Trelegy 3/2023.     Exacerbation Hx  - Last hospitalization for COPD exacerbation - 1/31/23 - 2/6/23  - Last time patient was treated with antibiotics and/or steroids - 2/2023    Objective     There were no vitals taken for this visit.     LAB  Lab Results   Component Value Date    BILITOT 0.8 02/05/2023    CALCIUM 8.4 (L) 02/06/2023    CO2 35 (H) 02/06/2023    CL 91 (L) 02/06/2023    CREATININE  0.52 02/06/2023    GLUCOSE 91 02/06/2023    ALKPHOS 70 02/05/2023    K 3.9 02/06/2023    PROT 6.5 02/05/2023     (L) 02/06/2023    AST 20 02/05/2023    ALT 14 02/05/2023    BUN 10 02/06/2023    ANIONGAP 12 02/06/2023    MG 1.54 (L) 02/05/2023    PHOS 2.5 02/05/2023    ALBUMIN 3.5 02/05/2023    GFRF >90 02/06/2023     Lab Results   Component Value Date    TRIG 140 05/09/2019    CHOL 147 05/09/2019    HDL 54.0 05/09/2019     Lab Results   Component Value Date    HGBA1C 5.5 01/31/2023         Current Outpatient Medications on File Prior to Visit   Medication Sig Dispense Refill    albuterol 90 mcg/actuation inhaler Inhale 2 puffs 4 times a day as needed.      ALPRAZolam (Xanax) 1 mg tablet Take 1 tablet (1 mg) by mouth 3 times a day as needed.      aspirin 81 mg chewable tablet Chew 1 tablet (81 mg) once daily.      buPROPion XL (Wellbutrin XL) 300 mg 24 hr tablet Take 1 tablet (300 mg) by mouth once daily.      fluticasone (Flonase) 50 mcg/actuation nasal spray Administer 1 spray into affected nostril(s) in the morning and 1 spray in the evening.      fluticasone-umeclidin-vilanter (TRELEGY-ELLIPTA) 100-62.5-25 mcg blister with device Inhale 1 puff once daily.      folic acid (Folvite) 1 mg tablet Take 1 tablet (1 mg) by mouth once daily.      gabapentin (Neurontin) 800 mg tablet Take 1 tablet (800 mg) by mouth in the morning and 1 tablet (800 mg) in the evening and 1 tablet (800 mg) before bedtime.      levalbuterol (Xopenex) 1.25 mg/3 mL nebulizer solution Inhale 3 mL every 4 hours if needed.      lisinopriL-hydrochlorothiazide 20-25 mg tablet Take 1 tablet by mouth once daily.      loratadine (Claritin) 10 mg tablet Take 1 tablet (10 mg) by mouth once daily.      metoprolol succinate XL (Toprol-XL) 50 mg 24 hr tablet Take 1 tablet (50 mg) by mouth once daily.      montelukast (Singulair) 10 mg tablet Take 1 tablet (10 mg) by mouth once daily.      OLANZapine (ZyPREXA) 10 mg tablet Take 1 tablet (10 mg) by  mouth once daily at bedtime.      oxyCODONE-acetaminophen (Percocet) 7.5-325 mg tablet Take 1 tablet by mouth every 4 hours if needed for moderate pain (4 - 6).      pantoprazole (ProtoNix) 40 mg EC tablet Take 1 tablet (40 mg) by mouth once daily.      fluticasone furoate-vilanteroL (Breo Elipta) 200-25 mcg/dose inhaler Inhale 1 puff once daily.       No current facility-administered medications on file prior to visit.        HISTORICAL PHARMACOTHERAPY  -Stiolto - patient reports she was given sample by outside pulmonologist but has discontinued since she received Trelegy   - Breo Ellipta - discontinued 3/2023; patient changed to Trelegy     DRUG INTERATIONS  - No significant drug-drug interactions exist that require adjustment to therapy     Assessment/Plan   Problem List Items Addressed This Visit          Respiratory    COPD exacerbation (CMS/[facility])   CONTINUE all current medications as prescribed. Patient reports doing well and able to ambulate more without getting SOB.       Reba Ramirez, PharmD     Verbal consent to manage patient's drug therapy was obtained from the patient. They were informed they may decline to participate or withdraw from participation in pharmacy services at any time.

## 2023-04-21 DIAGNOSIS — Z51.5 PALLIATIVE CARE ENCOUNTER: ICD-10-CM

## 2023-04-21 DIAGNOSIS — C34.12 MALIGNANT NEOPLASM OF UPPER LOBE OF LEFT LUNG (HCC): ICD-10-CM

## 2023-04-21 DIAGNOSIS — G89.3 NEOPLASM RELATED PAIN: ICD-10-CM

## 2023-04-21 DIAGNOSIS — G89.3 CANCER ASSOCIATED PAIN: ICD-10-CM

## 2023-04-21 DIAGNOSIS — C34.90 MALIGNANT NEOPLASM OF LUNG, UNSPECIFIED LATERALITY, UNSPECIFIED PART OF LUNG (HCC): ICD-10-CM

## 2023-04-21 RX ORDER — OLANZAPINE 10 MG/1
10 TABLET ORAL NIGHTLY
Qty: 30 TABLET | Refills: 3 | Status: SHIPPED | OUTPATIENT
Start: 2023-04-21

## 2023-04-21 RX ORDER — OXYCODONE AND ACETAMINOPHEN 7.5; 325 MG/1; MG/1
1 TABLET ORAL EVERY 6 HOURS PRN
Qty: 120 TABLET | Refills: 0 | Status: SHIPPED | OUTPATIENT
Start: 2023-04-21 | End: 2023-04-21

## 2023-04-21 RX ORDER — OXYCODONE AND ACETAMINOPHEN 7.5; 325 MG/1; MG/1
1 TABLET ORAL EVERY 6 HOURS PRN
Qty: 120 TABLET | Refills: 0 | Status: SHIPPED | OUTPATIENT
Start: 2023-04-21 | End: 2023-05-21

## 2023-04-21 NOTE — TELEPHONE ENCOUNTER
Rx requested:  Requested Prescriptions     Pending Prescriptions Disp Refills    OLANZapine (ZYPREXA) 10 MG tablet 30 tablet 3     Sig: Take 1 tablet by mouth nightly    oxyCODONE-acetaminophen (PERCOCET) 7.5-325 MG per tablet 120 tablet 0     Sig: Take 1 tablet by mouth every 6 hours as needed for Pain for up to 30 days.  Intended supply: 30 days Max Daily Amount: 4 tablets       Last Office Visit:   3/24/2023      Last filled:  3/13/23  3/24/23    Next Visit Date:  Future Appointments   Date Time Provider Renetta Bauer   4/24/2023  4:00 PM [facility]

## 2023-04-21 NOTE — PROGRESS NOTES
Patient requests percocet to be transferred to [address] [place] [address] in [facility] ([facility]).

## 2023-04-24 ENCOUNTER — OFFICE VISIT (OUTPATIENT)
Dept: RADIATION ONCOLOGY | Age: 73
End: 2023-04-24
Payer: MEDICARE

## 2023-04-24 VITALS
RESPIRATION RATE: 16 BRPM | DIASTOLIC BLOOD PRESSURE: 58 MMHG | TEMPERATURE: 97 F | BODY MASS INDEX: 24.31 KG/M2 | WEIGHT: 141.6 LBS | SYSTOLIC BLOOD PRESSURE: 123 MMHG | HEART RATE: 91 BPM

## 2023-04-24 DIAGNOSIS — C78.01 SECONDARY MALIGNANT NEOPLASM OF RIGHT LUNG (HCC): Primary | ICD-10-CM

## 2023-04-24 DIAGNOSIS — C34.12 MALIGNANT NEOPLASM OF UPPER LOBE OF LEFT LUNG (HCC): ICD-10-CM

## 2023-04-24 DIAGNOSIS — G89.3 CANCER ASSOCIATED PAIN: ICD-10-CM

## 2023-04-24 DIAGNOSIS — J44.9 CHRONIC OBSTRUCTIVE PULMONARY DISEASE, UNSPECIFIED COPD TYPE (HCC): ICD-10-CM

## 2023-04-24 PROCEDURE — 1123F ACP DISCUSS/DSCN MKR DOCD: CPT

## 2023-04-24 PROCEDURE — 3074F SYST BP LT 130 MM HG: CPT

## 2023-04-24 PROCEDURE — 99214 OFFICE O/P EST MOD 30 MIN: CPT

## 2023-04-24 PROCEDURE — 3078F DIAST BP <80 MM HG: CPT

## 2023-04-24 RX ORDER — FLUTICASONE PROPIONATE 50 MCG
1 SPRAY, SUSPENSION (ML) NASAL DAILY
Qty: 1 EACH | Refills: 6 | Status: SHIPPED | OUTPATIENT
Start: 2023-04-24

## 2023-04-24 RX ORDER — AMOXICILLIN 500 MG/1
500 CAPSULE ORAL 3 TIMES DAILY
Qty: 21 CAPSULE | Refills: 0 | Status: SHIPPED | OUTPATIENT
Start: 2023-04-24 | End: 2023-05-01

## 2023-04-24 RX ORDER — CETIRIZINE HYDROCHLORIDE 10 MG/1
10 TABLET ORAL DAILY
Qty: 30 TABLET | Refills: 0 | Status: SHIPPED | OUTPATIENT
Start: 2023-04-24 | End: 2023-05-24

## 2023-04-24 ASSESSMENT — ENCOUNTER SYMPTOMS
CHOKING: 0
RECTAL PAIN: 0
SINUS PRESSURE: 1
ABDOMINAL DISTENTION: 0
BLOOD IN STOOL: 0
VOMITING: 0
EYE DISCHARGE: 0
DIARRHEA: 0
WHEEZING: 0
CHEST TIGHTNESS: 0
SINUS PAIN: 1
STRIDOR: 0
COLOR CHANGE: 0
CONSTIPATION: 0
NAUSEA: 0
ABDOMINAL PAIN: 0
SORE THROAT: 0
APNEA: 0
COUGH: 1
ANAL BLEEDING: 0
TROUBLE SWALLOWING: 0
SHORTNESS OF BREATH: 0
EYE PAIN: 0
VOICE CHANGE: 0
BACK PAIN: 1

## 2023-04-24 NOTE — PROGRESS NOTES
activity change and fatigue. Negative for appetite change, chills, diaphoresis, fever and unexpected weight change. HENT:  Positive for postnasal drip, sinus pressure, sinus pain and sneezing. Negative for drooling, hearing loss, mouth sores, nosebleeds, sore throat, trouble swallowing and voice change. Eyes:  Negative for pain, discharge and visual disturbance. Respiratory:  Positive for cough. Negative for apnea, choking, chest tightness, shortness of breath, wheezing and stridor. Cardiovascular:  Negative for chest pain, palpitations and leg swelling. Gastrointestinal:  Negative for abdominal distention, abdominal pain, anal bleeding, blood in stool, constipation, diarrhea, nausea, rectal pain and vomiting. Genitourinary:  Negative for difficulty urinating, dysuria, enuresis, frequency, hematuria and vaginal bleeding. Musculoskeletal:  Positive for arthralgias, back pain and myalgias. Negative for gait problem and joint swelling. Skin:  Negative for color change, pallor, rash and wound. Allergic/Immunologic: Positive for environmental allergies. Negative for food allergies and immunocompromised state. Neurological:  Positive for dizziness and headaches. Negative for tremors, seizures, syncope, facial asymmetry, speech difficulty, weakness, light-headedness and numbness. Hematological:  Negative for adenopathy. Does not bruise/bleed easily. Psychiatric/Behavioral:  Negative for agitation, behavioral problems, confusion, decreased concentration, dysphoric mood, hallucinations, self-injury, sleep disturbance and suicidal ideas. The patient is nervous/anxious. The patient is not hyperactive. Objective:   BP (!) 123/58   Pulse 91   Temp 97 °F (36.1 °C)   Resp 16   Wt 141 lb 9.6 oz (64.2 kg)   BMI 24.31 kg/m²     Physical Exam  Constitutional:       General: She is not in acute distress. Appearance: Normal appearance. She is well-developed. She is not diaphoretic.    HENT:

## 2023-04-28 ENCOUNTER — TELEPHONE (OUTPATIENT)
Dept: INTERVENTIONAL RADIOLOGY/VASCULAR | Age: 73
End: 2023-04-28

## 2023-04-28 DIAGNOSIS — C78.02 SECONDARY MALIGNANT NEOPLASM OF LEFT LUNG (HCC): ICD-10-CM

## 2023-04-28 DIAGNOSIS — C34.12 MALIGNANT NEOPLASM OF UPPER LOBE OF LEFT LUNG (HCC): Primary | ICD-10-CM

## 2023-04-28 DIAGNOSIS — C78.01 SECONDARY MALIGNANT NEOPLASM OF RIGHT LUNG (HCC): ICD-10-CM

## 2023-04-28 NOTE — TELEPHONE ENCOUNTER
Patient was given this information via phone conversation - voiced understanding  2. Spoke to [place] [facility]  3.   Spoke to Jovi in Surgery Scheduling - voiced understanding this needs to be in Cath Lab 3    >  YOUR PROCEDURE IS SCHEDULED ON: 5/18/2023 @ 11:00 am  >  You will need to arrive at 9:30 am from home and check in at the Diagnostic Imaging Check In desk.   >  Do not eat or drink after midnight.    >  Make arrangements for transportation, as you should not drive immediately after.  >  Patient to hold Aspirin for 5 days prior to procedure   >  Patient to have PT/INR and repeat CBC drawn 2-4 days prior to procedure - patient may need platelets

## 2023-05-01 DIAGNOSIS — C78.02 SECONDARY MALIGNANT NEOPLASM OF LEFT LUNG (HCC): ICD-10-CM

## 2023-05-01 DIAGNOSIS — C78.01 SECONDARY MALIGNANT NEOPLASM OF RIGHT LUNG (HCC): ICD-10-CM

## 2023-05-01 DIAGNOSIS — R60.0 LOCALIZED EDEMA: ICD-10-CM

## 2023-05-01 DIAGNOSIS — C34.12 MALIGNANT NEOPLASM OF UPPER LOBE OF LEFT LUNG (HCC): Primary | ICD-10-CM

## 2023-05-15 DIAGNOSIS — C78.02 SECONDARY MALIGNANT NEOPLASM OF LEFT LUNG (HCC): ICD-10-CM

## 2023-05-15 DIAGNOSIS — C34.12 MALIGNANT NEOPLASM OF UPPER LOBE OF LEFT LUNG (HCC): ICD-10-CM

## 2023-05-15 DIAGNOSIS — R60.0 LOCALIZED EDEMA: ICD-10-CM

## 2023-05-15 DIAGNOSIS — C78.01 SECONDARY MALIGNANT NEOPLASM OF RIGHT LUNG (HCC): ICD-10-CM

## 2023-05-15 LAB
ERYTHROCYTE [DISTWIDTH] IN BLOOD BY AUTOMATED COUNT: 15.3 % (ref 11.5–14.5)
HCT VFR BLD AUTO: 38.9 % (ref 37–47)
HGB BLD-MCNC: 12.8 G/DL (ref 12–16)
INR PPP: 0.9
MCH RBC QN AUTO: 32.3 PG (ref 27–31.3)
MCHC RBC AUTO-ENTMCNC: 32.9 % (ref 33–37)
MCV RBC AUTO: 98.1 FL (ref 79.4–94.8)
PLATELET # BLD AUTO: 367 K/UL (ref 130–400)
PROTHROMBIN TIME: 12.6 SEC (ref 12.3–14.9)
RBC # BLD AUTO: 3.96 M/UL (ref 4.2–5.4)
WBC # BLD AUTO: 8.8 K/UL (ref 4.8–10.8)

## 2023-05-18 ENCOUNTER — HOSPITAL ENCOUNTER (OUTPATIENT)
Dept: CARDIAC CATH/INVASIVE PROCEDURES | Age: 73
Discharge: HOME | End: 2023-05-18

## 2023-05-18 ENCOUNTER — HOSPITAL ENCOUNTER (OUTPATIENT)
Dept: INTERVENTIONAL RADIOLOGY/VASCULAR | Age: 73
Discharge: HOME | End: 2023-05-20
Payer: MEDICARE

## 2023-05-18 VITALS
SYSTOLIC BLOOD PRESSURE: 149 MMHG | RESPIRATION RATE: 26 BRPM | HEART RATE: 94 BPM | DIASTOLIC BLOOD PRESSURE: 69 MMHG | OXYGEN SATURATION: 91 %

## 2023-05-18 DIAGNOSIS — C78.02 SECONDARY MALIGNANT NEOPLASM OF LEFT LUNG (HCC): ICD-10-CM

## 2023-05-18 DIAGNOSIS — C78.01 SECONDARY MALIGNANT NEOPLASM OF RIGHT LUNG (HCC): ICD-10-CM

## 2023-05-18 DIAGNOSIS — C34.12 MALIGNANT NEOPLASM OF UPPER LOBE OF LEFT LUNG (HCC): ICD-10-CM

## 2023-05-18 PROCEDURE — 77001 FLUOROGUIDE FOR VEIN DEVICE: CPT

## 2023-05-18 PROCEDURE — 76937 US GUIDE VASCULAR ACCESS: CPT

## 2023-05-18 PROCEDURE — 36561 INSERT TUNNELED CV CATH: CPT

## 2023-05-18 PROCEDURE — A4217 STERILE WATER/SALINE, 500 ML: HCPCS | Performed by: RADIOLOGY

## 2023-05-18 PROCEDURE — 2709999900 IR PORT PLACEMENT > 5 YEARS

## 2023-05-18 PROCEDURE — 2500000003 HC RX 250 WO HCPCS: Performed by: RADIOLOGY

## 2023-05-18 PROCEDURE — 6360000004 HC RX CONTRAST MEDICATION: Performed by: RADIOLOGY

## 2023-05-18 PROCEDURE — 2580000003 HC RX 258: Performed by: RADIOLOGY

## 2023-05-18 PROCEDURE — 6360000002 HC RX W HCPCS: Performed by: RADIOLOGY

## 2023-05-18 RX ORDER — LIDOCAINE HYDROCHLORIDE AND EPINEPHRINE BITARTRATE 20; .01 MG/ML; MG/ML
INJECTION, SOLUTION SUBCUTANEOUS PRN
Status: COMPLETED | OUTPATIENT
Start: 2023-05-18 | End: 2023-05-18

## 2023-05-18 RX ORDER — MIDAZOLAM HYDROCHLORIDE 2 MG/2ML
INJECTION, SOLUTION INTRAMUSCULAR; INTRAVENOUS PRN
Status: COMPLETED | OUTPATIENT
Start: 2023-05-18 | End: 2023-05-18

## 2023-05-18 RX ORDER — 0.9 % SODIUM CHLORIDE 0.9 %
INTRAVENOUS SOLUTION INTRAVENOUS CONTINUOUS PRN
Status: COMPLETED | OUTPATIENT
Start: 2023-05-18 | End: 2023-05-18

## 2023-05-18 RX ORDER — FENTANYL CITRATE 0.05 MG/ML
INJECTION, SOLUTION INTRAMUSCULAR; INTRAVENOUS PRN
Status: COMPLETED | OUTPATIENT
Start: 2023-05-18 | End: 2023-05-18

## 2023-05-18 RX ORDER — HEPARIN SODIUM (PORCINE) LOCK FLUSH IV SOLN 100 UNIT/ML 100 UNIT/ML
SOLUTION INTRAVENOUS PRN
Status: COMPLETED | OUTPATIENT
Start: 2023-05-18 | End: 2023-05-18

## 2023-05-18 RX ADMIN — SODIUM CHLORIDE 250 ML: 9 INJECTION, SOLUTION INTRAVENOUS at 11:05

## 2023-05-18 RX ADMIN — LIDOCAINE HYDROCHLORIDE AND EPINEPHRINE 5 ML: 20; 10 INJECTION, SOLUTION INFILTRATION; PERINEURAL at 11:10

## 2023-05-18 RX ADMIN — CEFAZOLIN 1000 MG: 1 INJECTION, POWDER, FOR SOLUTION INTRAMUSCULAR; INTRAVENOUS at 10:17

## 2023-05-18 RX ADMIN — MIDAZOLAM HYDROCHLORIDE 1 MG: 1 INJECTION, SOLUTION INTRAMUSCULAR; INTRAVENOUS at 11:05

## 2023-05-18 RX ADMIN — IOPAMIDOL 10 ML: 612 INJECTION, SOLUTION INTRAVENOUS at 11:16

## 2023-05-18 RX ADMIN — SODIUM CHLORIDE 250 ML: 900 IRRIGANT IRRIGATION at 11:09

## 2023-05-18 RX ADMIN — LIDOCAINE HYDROCHLORIDE AND EPINEPHRINE 20 ML: 20; 10 INJECTION, SOLUTION INFILTRATION; PERINEURAL at 11:31

## 2023-05-18 RX ADMIN — FENTANYL CITRATE 100 MCG: 0.05 INJECTION, SOLUTION INTRAMUSCULAR; INTRAVENOUS at 11:05

## 2023-05-18 RX ADMIN — HEPARIN 5 ML: 100 SYRINGE at 11:38

## 2023-05-18 ASSESSMENT — ENCOUNTER SYMPTOMS
PHOTOPHOBIA: 0
GASTROINTESTINAL NEGATIVE: 1
ABDOMINAL PAIN: 0
WHEEZING: 0
VOMITING: 0
BACK PAIN: 0
EYES NEGATIVE: 1
COUGH: 1
SHORTNESS OF BREATH: 1
DIARRHEA: 0
NAUSEA: 0
CONSTIPATION: 0

## 2023-05-18 ASSESSMENT — PAIN SCALES - GENERAL: PAINLEVEL_OUTOF10: 0

## 2023-05-18 NOTE — DISCHARGE INSTRUCTIONS
Mediport Placement Discharge Instructions    ACTIVITY:   - Rest today with no heavy lifting, pushing/pulling, bending or stretching for at least 24 hours. - Resume activity gradually, avoid lifting anything over 10 POUNDS FOR 2 WEEKS. - No driving for 24 hours. Use caution with seat belts and/or any strap placed across port site as can place pressure along your     incision line or cause displacement. A small pillow or folded towel may be placed between the strap and your body to pad the site. - Do not play contact sports, like football.    - Women should wear a bra during the day. BATHING:   - Glue/skin adhesive is used instead of a bandage. The glue will wear off in time, 5 to 10 days, do NOT scrub when showering. Do NOT use    antibiotic ointment, it can break down the glue. - May shower after 48 hours. - Dry your skin by gently patting the site with a clean towel. - No bath, no hot tub, and/or no swimming for 14 days or until healed and NEVER when a port has needle in place. DISCOMFORT:   - It is normal to have some discomfort at the incision site and where your catheter was tunneled under your skin for 24 to 48 hours. Some bruising    is to be expected for up to a week. Ice pack for 10 minute intervals to site may help. Be sure to not place ice pack directly on skin. MEDICATION:   - May use tylenol, ibuprofen, or previously prescribed pain medication to alleviate pain or discomfort. Do not exceed label usage on the bottle. May resume medications, except any blood thinners resume as per physician. CARE:   - If your port is not used regularly, it will need to be flushed every 4 to 6 weeks so that it does not clot off or become blocked. This can be done at             the clinic that uses your port for treatments and/or blood draws. - If your port becomes blocked or does not give a blood return, it may be necessary to have it evaluated.  You will need a doctor's order for a

## 2023-05-18 NOTE — H&P
CC: Cough     HPI: Patient is a pleasant 41-year-old woman who was referred by oncology for placement of a Mediport. Patient was recently diagnosed with lung cancer in the bilateral lungs. She is in need of long-term IV access for chemotherapy. Patient was referred for Mediport placement. Patient admits to fatigue, coughing, some shortness of breath. She is slightly apprehensive and nervous about today's procedure. Otherwise no complaints. Family History   Problem Relation Age of Onset    Stroke Mother     Heart Attack Mother     Cancer Father         LUNG    Cancer Paternal Uncle         lung    Breast Cancer Maternal Grandmother [de-identified]    Cancer Paternal Grandfather         lung    Cancer Paternal Uncle         lung    Cancer Paternal Uncle         lung    Cancer Paternal Uncle         lung    Cancer Paternal Uncle         lung    Cancer Paternal Uncle         lung    Cancer Brother        Past Surgical History:   Procedure Laterality Date    APPENDECTOMY  01/01/1980    CARDIAC CATHETERIZATION  5/16/13,11/8/2013    DR. GRAVES    COLON SURGERY      HAND SURGERY  01/01/1996    HYSTERECTOMY (CERVIX STATUS UNKNOWN)  01/01/1980    MEDIPORT INSERTION, SINGLE Right 05/18/2023    Inserted by Dr. Wendi Saxena from [address] ([place] [id number] exp 01/31/2024).     TONSILLECTOMY  01/01/1963        Past Medical History:   Diagnosis Date    Anxiety     Asthma     Cervical radiculopathy at C7 9/28/2013    CERVICAL SPONDYLOSIS, WITH COMBINATION OF DISK BULGING AND    Cervical radiculopathy at C7     SUBTLE PER EMG    Cervical spondylosis 10/12/13    PER MRI    Chronic back pain     COPD (chronic obstructive pulmonary disease) (HCC)     Chronic bonchitis    CTS (carpal tunnel syndrome) 9/28/13    PER EMG    Depression     Diverticulitis     GERD (gastroesophageal reflux disease)     Headache(784.0)     Hyperlipidemia     Hypertension     Hypothyroidism due to medication 5/8/2019    Irritable bowel

## 2023-05-18 NOTE — FLOWSHEET NOTE
1205 - Patient brought back to CT holding via cart. A&Ox4, denies pain or SOB. VSS, on RA. Patient reports she \"usually wears 3L NC\" \"most of the time. \" SPO2 90-91%, denies complaints. Incision sites with skin glue covering - no bleeding / drainage / swelling noted. Patient given pepsi per request, tolerating well - reports feeling well enough to go home. Stepdaughter brought back to cart side to sit with patient. Refused food / snack at this time. 1215 - IV out, monitors off, patient getting dressing independently. Procedural site remains WNL. 1220 - Patient taken to entrance via [address], where she was met by her stepdaughter who will drive her home.  Electronically signed by Hermelindo Crane RN on 5/18/2023 at 1:35 PM

## 2023-05-18 NOTE — FLOWSHEET NOTE
7346 - Patient brought back to CT holding, steady independent gait. A&Ox4. Out of clothes and into gown, settled on cart. VS obtained and stable, on RA. Allergies, home medications and history reviewed. Patient confirms she has been NPO since prior to MN, no medications taken today except an inhaler, and ASA has been on hold x5 day. Procedure explained consent signed. Patient reports having a DNR, but opting to rescind during perioperative period. 200 - Dr. Alma Pino paged - patient ready to be seen. 26 - Dr. Alma Pino at cart side to review procedure and sign consent. 1042 - Patient taken to Cath Lab #3 via cart for procedure.  Electronically signed by Martin Baig RN on 5/18/2023 at 10:42 AM

## 2023-05-18 NOTE — OR NURSING
SEDATION GIVEN    1050 Pt brought to Cath lab room 3 via cart. Pt transferred with minimal assistance onto procedure table in supine position. Connected to monitors and O2 at 2L NC/end tidal CO2. Consent confirmed. 1 NH tech scanned R IJ to assess patency of vein for mediport placement. NH prepped R neck and chest with large tinted chloraprep. 1055 [facility] full body drape in sterile fashion. 1100 Dr. Claudette Cumming notified patient ready on table. 1105 Timeout completed. Sedation given by LR RN per Dr. Claudette Cumming verbal order, see eMar.    1110 Using ultrasound guidance, Dr. Claudette Cumming numbed site with 2% lidocaine with epinephrine, see eMar.    1111 Using ultrasound guidance, Dr. Claudette Cumming obtained R IJ access with 5 F MP Introducer kit. 1116 Introducer flushed with contrast, images obtained. 1120 Non stiff angled Glidewire inserted through sheath. 1129 Site dilated and guidewire from [employer] used to maintain access. 1130 PVCs notes on monitor. Using fluoro guidance, Dr. Claudette Cumming introduced 8F sheath from [employer] into [facility] Dr. Claudette Cumming marked site for mediport pocket and tunnel and additional lidocaine given at sites. Pt tolerating well. PVCs resolved. [address] Dr. Claudette Cumming made R chest incision creating the opening of mediport pocket. Dry gauze used to stop any bleeding. 1133 Fit assessment of mediport completed and removed once fit obtained. Metsa 36 Dr Claudette Cumming connected Mediport (rinsed in ATB solution) to catheter and all rinsed with antibiotic solution again. 1135 Dr. Claudette Cumming inserted BARD 8F mediport from [employer] ([place] [id number] exp 01/31/2024). 1136 Using fluoro, catheter length measured from IJ to tip at 18.5 cm. Dr. Claudette Cumming cut mediport catheter total length to 26 cm.    5108 Tunneler attached to the catheter and secured.  Then Dr. Claudette Cumming created subcutaneous tunnel from port pocket to puncture site with

## 2023-05-23 RX ORDER — CETIRIZINE HYDROCHLORIDE 10 MG/1
10 TABLET ORAL DAILY
Qty: 30 TABLET | Refills: 0 | Status: SHIPPED | OUTPATIENT
Start: 2023-05-23 | End: 2023-06-22

## 2023-05-23 NOTE — TELEPHONE ENCOUNTER
You see her today    Rx requested:  Requested Prescriptions     Pending Prescriptions Disp Refills    cetirizine (ZYRTEC) 10 MG tablet 30 tablet 0     Sig: Take 1 tablet by mouth daily       Last Office Visit:   Visit date not found      Last filled:  4/24/23    Next Visit Date:  Future Appointments   Date Time Provider Renetta Bauer   5/23/2023 10:00 AM Susan Godinez, APRN - CNP [place] [id number]

## 2023-06-21 RX ORDER — FLUTICASONE PROPIONATE 50 MCG
1 SPRAY, SUSPENSION (ML) NASAL DAILY
Qty: 1 EACH | Refills: 6 | Status: SHIPPED | OUTPATIENT
Start: 2023-06-21

## 2023-07-05 ENCOUNTER — OFFICE VISIT (OUTPATIENT)
Dept: PALLATIVE CARE | Age: 73
End: 2023-07-05
Payer: MEDICARE

## 2023-07-05 DIAGNOSIS — Z71.89 GOALS OF CARE, COUNSELING/DISCUSSION: ICD-10-CM

## 2023-07-05 DIAGNOSIS — G89.3 CANCER RELATED PAIN: ICD-10-CM

## 2023-07-05 DIAGNOSIS — C34.12 MALIGNANT NEOPLASM OF UPPER LOBE OF LEFT LUNG (HCC): ICD-10-CM

## 2023-07-05 DIAGNOSIS — C80.1 ANXIETY ASSOCIATED WITH CANCER DIAGNOSIS (HCC): Primary | ICD-10-CM

## 2023-07-05 DIAGNOSIS — C80.1 NEUROPATHY ASSOCIATED WITH CANCER (HCC): ICD-10-CM

## 2023-07-05 DIAGNOSIS — Z51.5 PALLIATIVE CARE ENCOUNTER: ICD-10-CM

## 2023-07-05 DIAGNOSIS — Z71.89 ADVANCED CARE PLANNING/COUNSELING DISCUSSION: ICD-10-CM

## 2023-07-05 DIAGNOSIS — G63 NEUROPATHY ASSOCIATED WITH CANCER (HCC): ICD-10-CM

## 2023-07-05 DIAGNOSIS — F41.1 ANXIETY ASSOCIATED WITH CANCER DIAGNOSIS (HCC): Primary | ICD-10-CM

## 2023-07-05 DIAGNOSIS — F32.A DEPRESSION, UNSPECIFIED DEPRESSION TYPE: ICD-10-CM

## 2023-07-05 DIAGNOSIS — M54.12 CERVICAL RADICULOPATHY AT C7: ICD-10-CM

## 2023-07-05 PROCEDURE — 1123F ACP DISCUSS/DSCN MKR DOCD: CPT

## 2023-07-05 PROCEDURE — 99214 OFFICE O/P EST MOD 30 MIN: CPT

## 2023-07-05 ASSESSMENT — ENCOUNTER SYMPTOMS
TROUBLE SWALLOWING: 0
COUGH: 1
CONSTIPATION: 0
VOICE CHANGE: 0
BACK PAIN: 1
RECTAL PAIN: 0
DIARRHEA: 0
ABDOMINAL DISTENTION: 0
SHORTNESS OF BREATH: 0
VOMITING: 0
COLOR CHANGE: 0
NAUSEA: 0

## 2023-07-05 NOTE — PROGRESS NOTES
Subjective:      Patient Id: Seen Yaneth Melgoza at the clinic at the  cancer center , for Palliative Care consult for symptom management, advance care planning, and goals of care discussion. She was accompanied to the appointment by: self. Chief Complaint   Patient presents with    Medication Refill    Follow-up      HPI       Alexander Eduardo is a 68 y.o. female referred to palliative care by  for symptom management, advance care planning, and goals of care conversation. Yaneth Melgoza has complex medical history that includert lung cancer, COPD, HTN, HLD, hypothyroidism, DDD, anxiety, and depression. Radiation therapy completed and Keytruda discontinued due to disease progression. She  started Gilotrif in October and remained on it for 6 weeks. She had side effects of diarrhea, fatigue, loss of appetite, nausea, vomiting, and weakness due to Gilotrif. Therefore it was stopped and Dr. Carter Martino started her on Carboplatin and Gemzar C1D1. General:  She was started on Zoloft last encounter and feels it has improved her symptoms but not all the way. She reports that she needs a refill of the \"white pill that I take in the evening\". I am not able to determine which pill she is talking about. I encouraged when she gets home to call with medication list to clarify what she is taking about. Pain: She reports pain in her back, radiating shoulders bilaterally, down to fingertips. Rates the pain at a   3 on a scale of 0 to 10. Alleviating factors include heat intermittently. Pain is worse in the evening  time of day. Current pain medications include oxycodone 10/325mg every 6 hours and gabapentin 800mg TID. No sedation, constipation, or adverse effects on current regimen. Functional Status: Patient denies any falls recently. She is ambulatory with steady gait. She does wear O2 at night at home. She does not have any O2 during visit. However, she is still smoking outside, roughly a pack a day. She smokes outdoors only.

## 2023-07-07 RX ORDER — SERTRALINE HYDROCHLORIDE 25 MG/1
25 TABLET, FILM COATED ORAL NIGHTLY
COMMUNITY
Start: 2023-06-14 | End: 2023-07-07 | Stop reason: SDUPTHER

## 2023-07-19 DIAGNOSIS — C34.12 MALIGNANT NEOPLASM OF UPPER LOBE OF LEFT LUNG (HCC): ICD-10-CM

## 2023-07-19 DIAGNOSIS — Z51.5 PALLIATIVE CARE ENCOUNTER: ICD-10-CM

## 2023-07-19 DIAGNOSIS — G89.3 NEOPLASM RELATED PAIN: ICD-10-CM

## 2023-07-19 DIAGNOSIS — F41.9 ANXIETY: ICD-10-CM

## 2023-07-19 DIAGNOSIS — G89.3 CANCER ASSOCIATED PAIN: ICD-10-CM

## 2023-07-19 DIAGNOSIS — R11.0 NAUSEA: Primary | ICD-10-CM

## 2023-07-19 RX ORDER — OLANZAPINE 10 MG/1
10 TABLET ORAL NIGHTLY
Qty: 30 TABLET | Refills: 0 | Status: SHIPPED | OUTPATIENT
Start: 2023-07-19

## 2023-07-19 RX ORDER — DIAZEPAM 2 MG/1
2 TABLET ORAL EVERY 8 HOURS PRN
Qty: 90 TABLET | Refills: 0 | Status: SHIPPED | OUTPATIENT
Start: 2023-07-19 | End: 2023-08-18

## 2023-07-19 RX ORDER — DOXYCYCLINE HYCLATE 100 MG
100 TABLET ORAL PRN
COMMUNITY

## 2023-07-19 RX ORDER — CETIRIZINE HYDROCHLORIDE 10 MG/1
10 TABLET ORAL DAILY
COMMUNITY

## 2023-07-19 RX ORDER — OXYCODONE AND ACETAMINOPHEN 10; 325 MG/1; MG/1
1 TABLET ORAL EVERY 6 HOURS PRN
Qty: 120 TABLET | Refills: 0 | Status: SHIPPED | OUTPATIENT
Start: 2023-07-19 | End: 2023-08-18

## 2023-07-19 NOTE — TELEPHONE ENCOUNTER
Analilia PTO    Rx requested:  Requested Prescriptions     Pending Prescriptions Disp Refills    oxyCODONE-acetaminophen (PERCOCET)  MG per tablet 120 tablet 0     Sig: Take 1 tablet by mouth every 6 hours as needed for Pain for up to 30 days. Intended supply: 30 days Max Daily Amount: 4 tablets    diazePAM (VALIUM) 2 MG tablet 90 tablet 0     Sig: Take 1 tablet by mouth every 8 hours as needed for Anxiety or Sleep for up to 30 days.     OLANZapine (ZYPREXA) 10 MG tablet 30 tablet 3     Sig: Take 1 tablet by mouth nightly       Last Office Visit:   Visit date not found      Last filled:  6/14/23 4/21/23    Next Visit Date:  Future Appointments   Date Time Provider [address]   8/8/2023 10:00 AM CHRISTO Olson - CNP [facility]

## 2023-08-08 ENCOUNTER — OFFICE VISIT (OUTPATIENT)
Dept: PALLATIVE CARE | Age: 73
End: 2023-08-08
Payer: MEDICARE

## 2023-08-08 VITALS
WEIGHT: 142 LBS | TEMPERATURE: 97 F | SYSTOLIC BLOOD PRESSURE: 157 MMHG | BODY MASS INDEX: 24.37 KG/M2 | HEART RATE: 78 BPM | DIASTOLIC BLOOD PRESSURE: 90 MMHG

## 2023-08-08 DIAGNOSIS — Z51.5 PALLIATIVE CARE ENCOUNTER: ICD-10-CM

## 2023-08-08 DIAGNOSIS — F41.9 ANXIETY: ICD-10-CM

## 2023-08-08 DIAGNOSIS — J44.1 CHRONIC OBSTRUCTIVE PULMONARY DISEASE WITH ACUTE EXACERBATION (HCC): Primary | ICD-10-CM

## 2023-08-08 DIAGNOSIS — G89.3 CANCER ASSOCIATED PAIN: ICD-10-CM

## 2023-08-08 DIAGNOSIS — C34.12 MALIGNANT NEOPLASM OF UPPER LOBE OF LEFT LUNG (HCC): ICD-10-CM

## 2023-08-08 DIAGNOSIS — G89.3 NEOPLASM RELATED PAIN: ICD-10-CM

## 2023-08-08 PROCEDURE — 99215 OFFICE O/P EST HI 40 MIN: CPT

## 2023-08-08 PROCEDURE — 3074F SYST BP LT 130 MM HG: CPT

## 2023-08-08 PROCEDURE — 1123F ACP DISCUSS/DSCN MKR DOCD: CPT

## 2023-08-08 PROCEDURE — 3078F DIAST BP <80 MM HG: CPT

## 2023-08-08 RX ORDER — DOXYCYCLINE HYCLATE 100 MG
100 TABLET ORAL 2 TIMES DAILY
Qty: 20 TABLET | Refills: 0 | Status: SHIPPED | OUTPATIENT
Start: 2023-08-08 | End: 2023-08-18

## 2023-08-08 RX ORDER — DIAZEPAM 2 MG/1
2 TABLET ORAL EVERY 8 HOURS PRN
Qty: 90 TABLET | Refills: 0 | Status: SHIPPED | OUTPATIENT
Start: 2023-08-08 | End: 2023-09-07

## 2023-08-08 RX ORDER — OXYCODONE AND ACETAMINOPHEN 10; 325 MG/1; MG/1
1 TABLET ORAL EVERY 6 HOURS PRN
Qty: 120 TABLET | Refills: 0 | Status: SHIPPED | OUTPATIENT
Start: 2023-08-08 | End: 2023-09-07

## 2023-08-08 RX ORDER — METHYLPREDNISOLONE 4 MG/1
TABLET ORAL
Qty: 1 KIT | Refills: 0 | Status: SHIPPED | OUTPATIENT
Start: 2023-08-08 | End: 2023-08-14

## 2023-08-08 ASSESSMENT — ENCOUNTER SYMPTOMS
NAUSEA: 0
RECTAL PAIN: 0
COUGH: 1
SHORTNESS OF BREATH: 0
CONSTIPATION: 0
VOICE CHANGE: 0
ABDOMINAL DISTENTION: 0
BACK PAIN: 1
VOMITING: 0
DIARRHEA: 0
TROUBLE SWALLOWING: 0
COLOR CHANGE: 0

## 2023-08-08 NOTE — PROGRESS NOTES
Patient has HPOA, documents listed in chart  B. Patient has made the decision to be a FULL CODE. 8. Goals of care, counseling/discussion   A. Disease process and goals of treatment were discussed in basic terms. Ángela's goal is to optimize available comfort care measures with current treatment plan. We discussed the palliative care philosophy in light of those goals. We discussed all care options contingent on treatment response and QOL. Much active listening, presence, and emotional support were given. 9. Palliative care encounter  A. Explained the role of palliative care in treating patient. Discussed symptom management related to chronic disease/condition. Provided emotional support and active listening. Patient understands and is agreeable to current plan. Medications Discontinued During This Encounter   Medication Reason    PROAIR  (90 Base) MCG/ACT inhaler LIST CLEANUP    ipratropium-albuterol (DUONEB) 0.5-2.5 (3) MG/3ML SOLN nebulizer solution LIST CLEANUP    folic acid (FOLVITE) 1 MG tablet LIST CLEANUP    tiotropium-olodaterol (STIOLTO) 2.5-2.5 MCG/ACT AERS LIST CLEANUP    oxyCODONE-acetaminophen (PERCOCET)  MG per tablet REORDER    diazePAM (VALIUM) 2 MG tablet REORDER         Discussed with patient/surrogate: POC, Treatment risks/benefits, and alternatives. All questions were answered. Additionally, a printed copy of the CDC medications/opioid safety informational sheet was reviewed and given to patient for reference. Opioid contract signed:Yes    Due to acuity, symptomatology and high-risk medication management, I advised patient to No follow-ups on file. Thanks for the opportunity you have allowed us to provide palliative care to [facility]. We will be in touch as care progresses. Please feel free to reach out to us should you have any questions or requests.     Total Time 38          Total time includes reviewing labs and tests, reviewing history, medications, and allergies,

## 2023-08-30 RX ORDER — HEPARIN 100 UNIT/ML
500 SYRINGE INTRAVENOUS PRN
OUTPATIENT
Start: 2023-08-31

## 2023-08-30 RX ORDER — SODIUM CHLORIDE 0.9 % (FLUSH) 0.9 %
5-40 SYRINGE (ML) INJECTION PRN
OUTPATIENT
Start: 2023-08-31

## 2023-08-31 ENCOUNTER — HOSPITAL ENCOUNTER (OUTPATIENT)
Dept: INFUSION THERAPY | Age: 73
Setting detail: INFUSION SERIES
Discharge: HOME | End: 2023-08-31
Payer: MEDICARE

## 2023-08-31 VITALS
TEMPERATURE: 97.8 F | SYSTOLIC BLOOD PRESSURE: 140 MMHG | DIASTOLIC BLOOD PRESSURE: 59 MMHG | RESPIRATION RATE: 16 BRPM | HEART RATE: 86 BPM

## 2023-08-31 DIAGNOSIS — C34.12 MALIGNANT NEOPLASM OF UPPER LOBE OF LEFT LUNG (HCC): Primary | ICD-10-CM

## 2023-08-31 LAB
BASOPHILS # BLD: 0 K/UL (ref 0–0.2)
BASOPHILS NFR BLD: 0.5 %
EOSINOPHIL # BLD: 0 K/UL (ref 0–0.7)
EOSINOPHIL NFR BLD: 0 %
ERYTHROCYTE [DISTWIDTH] IN BLOOD BY AUTOMATED COUNT: 19.4 % (ref 11.5–14.5)
HCT VFR BLD AUTO: 29.9 % (ref 37–47)
HGB BLD-MCNC: 9.9 G/DL (ref 12–16)
LYMPHOCYTES # BLD: 0.5 K/UL (ref 1–4.8)
LYMPHOCYTES NFR BLD: 13.9 %
MCH RBC QN AUTO: 33.2 PG (ref 27–31.3)
MCHC RBC AUTO-ENTMCNC: 33.1 % (ref 33–37)
MCV RBC AUTO: 100.4 FL (ref 79.4–94.8)
MONOCYTES # BLD: 0.6 K/UL (ref 0.2–0.8)
MONOCYTES NFR BLD: 15.6 %
NEUTROPHILS # BLD: 2.7 K/UL (ref 1.4–6.5)
NEUTS SEG NFR BLD: 70 %
PLATELET # BLD AUTO: 72 K/UL (ref 130–400)
RBC # BLD AUTO: 2.98 M/UL (ref 4.2–5.4)
WBC # BLD AUTO: 3.9 K/UL (ref 4.8–10.8)

## 2023-08-31 PROCEDURE — 96413 CHEMO IV INFUSION 1 HR: CPT

## 2023-08-31 PROCEDURE — 96375 TX/PRO/DX INJ NEW DRUG ADDON: CPT

## 2023-08-31 PROCEDURE — 2580000003 HC RX 258: Performed by: INTERNAL MEDICINE

## 2023-08-31 PROCEDURE — 85025 COMPLETE CBC W/AUTO DIFF WBC: CPT

## 2023-08-31 PROCEDURE — 2580000003 HC RX 258

## 2023-08-31 PROCEDURE — 6360000002 HC RX W HCPCS: Performed by: INTERNAL MEDICINE

## 2023-08-31 RX ORDER — DEXTROSE MONOHYDRATE 50 MG/ML
5-250 INJECTION, SOLUTION INTRAVENOUS PRN
Status: CANCELLED | OUTPATIENT
Start: 2023-08-31

## 2023-08-31 RX ORDER — EPINEPHRINE 1 MG/ML
0.3 INJECTION, SOLUTION, CONCENTRATE INTRAVENOUS PRN
Status: CANCELLED | OUTPATIENT
Start: 2023-08-31

## 2023-08-31 RX ORDER — MEPERIDINE HYDROCHLORIDE 25 MG/ML
12.5 INJECTION INTRAMUSCULAR; INTRAVENOUS; SUBCUTANEOUS PRN
Status: CANCELLED | OUTPATIENT
Start: 2023-08-31

## 2023-08-31 RX ORDER — SODIUM CHLORIDE 0.9 % (FLUSH) 0.9 %
5-40 SYRINGE (ML) INJECTION PRN
Status: CANCELLED | OUTPATIENT
Start: 2023-08-31

## 2023-08-31 RX ORDER — ALBUTEROL SULFATE 90 UG/1
4 AEROSOL, METERED RESPIRATORY (INHALATION) PRN
Status: CANCELLED | OUTPATIENT
Start: 2023-08-31

## 2023-08-31 RX ORDER — SODIUM CHLORIDE 9 MG/ML
5-250 INJECTION, SOLUTION INTRAVENOUS PRN
Status: DISCONTINUED | OUTPATIENT
Start: 2023-08-31 | End: 2023-09-01 | Stop reason: HOSPADM

## 2023-08-31 RX ORDER — DIPHENHYDRAMINE HYDROCHLORIDE 50 MG/ML
50 INJECTION INTRAMUSCULAR; INTRAVENOUS
Status: CANCELLED | OUTPATIENT
Start: 2023-08-31

## 2023-08-31 RX ORDER — SODIUM CHLORIDE 9 MG/ML
INJECTION, SOLUTION INTRAVENOUS CONTINUOUS
Status: CANCELLED | OUTPATIENT
Start: 2023-08-31

## 2023-08-31 RX ORDER — SODIUM CHLORIDE 9 MG/ML
INJECTION, SOLUTION INTRAVENOUS
Status: COMPLETED
Start: 2023-08-31 | End: 2023-08-31

## 2023-08-31 RX ORDER — SODIUM CHLORIDE 9 MG/ML
5-250 INJECTION, SOLUTION INTRAVENOUS PRN
Status: CANCELLED | OUTPATIENT
Start: 2023-08-31

## 2023-08-31 RX ORDER — ACETAMINOPHEN 325 MG/1
650 TABLET ORAL
Status: CANCELLED | OUTPATIENT
Start: 2023-08-31

## 2023-08-31 RX ORDER — HEPARIN 100 UNIT/ML
500 SYRINGE INTRAVENOUS PRN
Status: DISCONTINUED | OUTPATIENT
Start: 2023-08-31 | End: 2023-09-01 | Stop reason: HOSPADM

## 2023-08-31 RX ORDER — ONDANSETRON 2 MG/ML
8 INJECTION INTRAMUSCULAR; INTRAVENOUS
Status: CANCELLED | OUTPATIENT
Start: 2023-08-31

## 2023-08-31 RX ORDER — FAMOTIDINE 10 MG/ML
20 INJECTION, SOLUTION INTRAVENOUS
Status: CANCELLED | OUTPATIENT
Start: 2023-08-31

## 2023-08-31 RX ADMIN — ONDANSETRON 8 MG: 2 INJECTION INTRAMUSCULAR; INTRAVENOUS at 10:03

## 2023-08-31 RX ADMIN — GEMCITABINE HYDROCHLORIDE 1230 MG: 200 INJECTION, POWDER, LYOPHILIZED, FOR SOLUTION INTRAVENOUS at 10:54

## 2023-08-31 RX ADMIN — HEPARIN 500 UNITS: 100 SYRINGE at 11:38

## 2023-08-31 RX ADMIN — SODIUM CHLORIDE 250 ML: 9 INJECTION, SOLUTION INTRAVENOUS at 09:26

## 2023-08-31 NOTE — PROGRESS NOTES
Treatment complete. Patient tolerated well. Left unit by wheelchair with transporter assistance. All equipment used in the care for this patient has been cleaned.

## 2023-09-12 ENCOUNTER — OFFICE VISIT (OUTPATIENT)
Dept: PALLATIVE CARE | Age: 73
End: 2023-09-12
Payer: MEDICARE

## 2023-09-12 VITALS
DIASTOLIC BLOOD PRESSURE: 60 MMHG | RESPIRATION RATE: 18 BRPM | WEIGHT: 139.4 LBS | SYSTOLIC BLOOD PRESSURE: 140 MMHG | HEART RATE: 85 BPM | OXYGEN SATURATION: 94 % | BODY MASS INDEX: 23.93 KG/M2 | TEMPERATURE: 98.2 F

## 2023-09-12 DIAGNOSIS — Z71.89 GOALS OF CARE, COUNSELING/DISCUSSION: ICD-10-CM

## 2023-09-12 DIAGNOSIS — C34.12 MALIGNANT NEOPLASM OF UPPER LOBE OF LEFT LUNG (HCC): ICD-10-CM

## 2023-09-12 DIAGNOSIS — J44.1 CHRONIC OBSTRUCTIVE PULMONARY DISEASE WITH ACUTE EXACERBATION (HCC): ICD-10-CM

## 2023-09-12 DIAGNOSIS — M46.92 CERVICAL SPONDYLITIS (HCC): ICD-10-CM

## 2023-09-12 DIAGNOSIS — F32.A DEPRESSION, UNSPECIFIED DEPRESSION TYPE: ICD-10-CM

## 2023-09-12 DIAGNOSIS — F41.9 ANXIETY: Primary | ICD-10-CM

## 2023-09-12 DIAGNOSIS — Z71.89 ADVANCED CARE PLANNING/COUNSELING DISCUSSION: ICD-10-CM

## 2023-09-12 DIAGNOSIS — E11.618 ARTHRITIS ASSOCIATED WITH DIABETES (HCC): ICD-10-CM

## 2023-09-12 DIAGNOSIS — Z51.5 PALLIATIVE CARE ENCOUNTER: ICD-10-CM

## 2023-09-12 DIAGNOSIS — G89.3 NEOPLASM RELATED PAIN: ICD-10-CM

## 2023-09-12 PROCEDURE — 99214 OFFICE O/P EST MOD 30 MIN: CPT

## 2023-09-12 PROCEDURE — 3074F SYST BP LT 130 MM HG: CPT

## 2023-09-12 PROCEDURE — 3078F DIAST BP <80 MM HG: CPT

## 2023-09-12 PROCEDURE — 1123F ACP DISCUSS/DSCN MKR DOCD: CPT

## 2023-09-12 RX ORDER — OXYCODONE AND ACETAMINOPHEN 10; 325 MG/1; MG/1
1 TABLET ORAL EVERY 6 HOURS PRN
Qty: 120 TABLET | Refills: 0 | Status: SHIPPED | OUTPATIENT
Start: 2023-09-12 | End: 2023-10-12

## 2023-09-12 RX ORDER — DOXYCYCLINE HYCLATE 100 MG/1
100 CAPSULE ORAL 2 TIMES DAILY
Qty: 20 CAPSULE | Refills: 0 | Status: SHIPPED | OUTPATIENT
Start: 2023-09-12 | End: 2023-09-22

## 2023-09-12 RX ORDER — METHYLPREDNISOLONE 4 MG/1
TABLET ORAL
Qty: 1 KIT | Refills: 0 | Status: SHIPPED | OUTPATIENT
Start: 2023-09-12

## 2023-09-12 ASSESSMENT — ENCOUNTER SYMPTOMS
VOICE CHANGE: 0
ABDOMINAL DISTENTION: 0
RECTAL PAIN: 0
CONSTIPATION: 0
TROUBLE SWALLOWING: 0
COLOR CHANGE: 0
VOMITING: 0
COUGH: 1
NAUSEA: 0
BACK PAIN: 1
SHORTNESS OF BREATH: 0
DIARRHEA: 0

## 2023-09-12 NOTE — PROGRESS NOTES
Subjective:      Patient Id: Seen Zhou Ling at the clinic at the  cancer center , for Palliative Care consult for symptom management, advance care planning, and goals of care discussion. She was accompanied to the appointment by: self. Chief Complaint   Patient presents with    Follow-up      HPI       Sade Reyes is a 68 y.o. female referred to palliative care by  for symptom management, advance care planning, and goals of care conversation. Zhou Ling has complex medical history that includert lung cancer, COPD, HTN, HLD, hypothyroidism, DDD, anxiety, and depression. Radiation therapy completed and Keytruda discontinued due to disease progression. She started Gilotrif in October and remained on it for 6 weeks. She had side effects of diarrhea, fatigue, loss of appetite, nausea, vomiting, and weakness due to Gilotrif. Therefore it was stopped and Dr. Daisy Turner started her on Carboplatin and Gemzar C1D1. General:  Follows up in 3 weeks. 2 cycles of chemo to go to tomorrow. And      Pain: She reports pain iin hips and legs today. Rates the pain at a   8 on a scale of 0 to 10. Alleviating factors include heat intermittently. Pain is worse in the evening  time of day. Current pain medications include oxycodone 10/325mg every 6 hours and gabapentin 800mg TID. No sedation, constipation, or adverse effects on current regimen. Voltaren gel. Functional Status: Patient denies any falls recently. She is ambulatory with steady gait. She does wear O2 at night at home. She does not have any O2 during visit. Patient is no longer smoking. She smokes outdoors only. Appetite: Patient reports appetite is \"hit or miss\" however patient has gained 1lb since previous visit. She is 139lbs. Mood: Feels depressed. No HI/SI. Denies wanting to see counselor. Sleep: Patient has poor sleep habits. Patient unsure why she doesn't sleep a night but states that it is chronic.  Valium     Skin: denies any wounds, rashes or skin

## 2023-09-13 ENCOUNTER — HOSPITAL ENCOUNTER (OUTPATIENT)
Dept: INFUSION THERAPY | Age: 73
Setting detail: INFUSION SERIES
Discharge: HOME | End: 2023-09-13
Payer: MEDICARE

## 2023-09-13 VITALS
DIASTOLIC BLOOD PRESSURE: 55 MMHG | SYSTOLIC BLOOD PRESSURE: 139 MMHG | HEART RATE: 82 BPM | RESPIRATION RATE: 18 BRPM | TEMPERATURE: 97.6 F

## 2023-09-13 DIAGNOSIS — C78.01 SECONDARY MALIGNANT NEOPLASM OF RIGHT LUNG (HCC): Primary | ICD-10-CM

## 2023-09-13 DIAGNOSIS — C78.02 SECONDARY MALIGNANT NEOPLASM OF LEFT LUNG (HCC): ICD-10-CM

## 2023-09-13 DIAGNOSIS — C34.12 MALIGNANT NEOPLASM OF UPPER LOBE OF LEFT LUNG (HCC): ICD-10-CM

## 2023-09-13 LAB
ALBUMIN SERPL-MCNC: 3.2 G/DL (ref 3.5–4.6)
ALP SERPL-CCNC: 76 U/L (ref 40–130)
ALT SERPL-CCNC: 8 U/L (ref 0–33)
ANION GAP SERPL CALCULATED.3IONS-SCNC: 11 MEQ/L (ref 9–15)
AST SERPL-CCNC: 19 U/L (ref 0–35)
BILIRUB SERPL-MCNC: 0.5 MG/DL (ref 0.2–0.7)
BUN SERPL-MCNC: 6 MG/DL (ref 8–23)
CALCIUM SERPL-MCNC: 8.3 MG/DL (ref 8.5–9.9)
CHLORIDE SERPL-SCNC: 98 MEQ/L (ref 95–107)
CO2 SERPL-SCNC: 25 MEQ/L (ref 20–31)
CREAT SERPL-MCNC: 0.39 MG/DL (ref 0.5–0.9)
ERYTHROCYTE [DISTWIDTH] IN BLOOD BY AUTOMATED COUNT: 20.6 % (ref 11.5–14.5)
GLOBULIN SER CALC-MCNC: 2.4 G/DL (ref 2.3–3.5)
GLUCOSE SERPL-MCNC: 115 MG/DL (ref 70–99)
HCT VFR BLD AUTO: 24.5 % (ref 37–47)
HGB BLD-MCNC: 8.1 G/DL (ref 12–16)
MCH RBC QN AUTO: 33.5 PG (ref 27–31.3)
MCHC RBC AUTO-ENTMCNC: 33.1 % (ref 33–37)
MCV RBC AUTO: 101.3 FL (ref 79.4–94.8)
PLATELET # BLD AUTO: 173 K/UL (ref 130–400)
POTASSIUM SERPL-SCNC: 2.9 MEQ/L (ref 3.4–4.9)
PROT SERPL-MCNC: 5.6 G/DL (ref 6.3–8)
RBC # BLD AUTO: 2.42 M/UL (ref 4.2–5.4)
SODIUM SERPL-SCNC: 134 MEQ/L (ref 135–144)
WBC # BLD AUTO: 3.1 K/UL (ref 4.8–10.8)

## 2023-09-13 PROCEDURE — 6360000002 HC RX W HCPCS: Performed by: INTERNAL MEDICINE

## 2023-09-13 PROCEDURE — 96413 CHEMO IV INFUSION 1 HR: CPT

## 2023-09-13 PROCEDURE — 96417 CHEMO IV INFUS EACH ADDL SEQ: CPT

## 2023-09-13 PROCEDURE — 2580000003 HC RX 258: Performed by: INTERNAL MEDICINE

## 2023-09-13 PROCEDURE — 80053 COMPREHEN METABOLIC PANEL: CPT

## 2023-09-13 PROCEDURE — 36591 DRAW BLOOD OFF VENOUS DEVICE: CPT

## 2023-09-13 PROCEDURE — 2580000003 HC RX 258

## 2023-09-13 PROCEDURE — 85027 COMPLETE CBC AUTOMATED: CPT

## 2023-09-13 PROCEDURE — 96375 TX/PRO/DX INJ NEW DRUG ADDON: CPT

## 2023-09-13 RX ORDER — SODIUM CHLORIDE 0.9 % (FLUSH) 0.9 %
5-40 SYRINGE (ML) INJECTION PRN
Status: CANCELLED | OUTPATIENT
Start: 2023-09-13

## 2023-09-13 RX ORDER — SODIUM CHLORIDE 9 MG/ML
INJECTION, SOLUTION INTRAVENOUS
Status: DISCONTINUED
Start: 2023-09-13 | End: 2023-09-13 | Stop reason: WASHOUT

## 2023-09-13 RX ORDER — HEPARIN 100 UNIT/ML
500 SYRINGE INTRAVENOUS PRN
Status: DISCONTINUED | OUTPATIENT
Start: 2023-09-13 | End: 2023-09-14 | Stop reason: HOSPADM

## 2023-09-13 RX ORDER — HEPARIN 100 UNIT/ML
500 SYRINGE INTRAVENOUS PRN
Status: CANCELLED | OUTPATIENT
Start: 2023-09-13

## 2023-09-13 RX ORDER — SODIUM CHLORIDE 9 MG/ML
INJECTION, SOLUTION INTRAVENOUS
Status: COMPLETED
Start: 2023-09-13 | End: 2023-09-13

## 2023-09-13 RX ORDER — SODIUM CHLORIDE 0.9 % (FLUSH) 0.9 %
5-40 SYRINGE (ML) INJECTION PRN
Status: DISCONTINUED | OUTPATIENT
Start: 2023-09-13 | End: 2023-09-14 | Stop reason: HOSPADM

## 2023-09-13 RX ADMIN — SODIUM CHLORIDE 250 ML: 9 INJECTION, SOLUTION INTRAVENOUS at 10:58

## 2023-09-13 RX ADMIN — DEXAMETHASONE SODIUM PHOSPHATE 10 MG: 4 INJECTION, SOLUTION INTRA-ARTICULAR; INTRALESIONAL; INTRAMUSCULAR; INTRAVENOUS; SOFT TISSUE at 12:33

## 2023-09-13 RX ADMIN — CARBOPLATIN 184 MG: 10 INJECTION, SOLUTION INTRAVENOUS at 14:41

## 2023-09-13 RX ADMIN — SODIUM CHLORIDE, PRESERVATIVE FREE 10 ML: 5 INJECTION INTRAVENOUS at 15:26

## 2023-09-13 RX ADMIN — HEPARIN 500 UNITS: 100 SYRINGE at 15:27

## 2023-09-13 RX ADMIN — GEMCITABINE HYDROCHLORIDE 1066 MG: 1 INJECTION, POWDER, LYOPHILIZED, FOR SOLUTION INTRAVENOUS at 13:59

## 2023-09-13 RX ADMIN — PALONOSETRON 0.25 MG: 0.05 INJECTION, SOLUTION INTRAVENOUS at 12:13

## 2023-09-13 RX ADMIN — ONDANSETRON 8 MG: 2 INJECTION INTRAMUSCULAR; INTRAVENOUS at 12:59

## 2023-09-13 ASSESSMENT — PAIN SCALES - GENERAL: PAINLEVEL_OUTOF10: 5

## 2023-09-13 ASSESSMENT — PAIN DESCRIPTION - ORIENTATION: ORIENTATION: RIGHT;LEFT

## 2023-09-13 ASSESSMENT — PAIN DESCRIPTION - DESCRIPTORS: DESCRIPTORS: ACHING

## 2023-09-13 ASSESSMENT — PAIN DESCRIPTION - LOCATION: LOCATION: HAND;KNEE

## 2023-09-13 NOTE — PROGRESS NOTES
This nurse escorted pt to car. Pt stated no c/o. All equipment used in the care for this patient has been cleaned.

## 2023-09-13 NOTE — PROGRESS NOTES
Lab results sent at 1005. Called and spoke with Liv Elmore at [facility] to confirm receiving. She will take labs to Doctor.

## 2023-09-13 NOTE — PROGRESS NOTES
Spoke with Yovani Scruggs at [facility] to inquire about labs, which are needed. Labs drawn and to sent. Call light in reach. Pt states no c/o at this time.

## 2023-09-13 NOTE — PROGRESS NOTES
Pt to [facility] via w/c with transport of Krishna Hoffman. Pt states c/o fatigue, and achy from arthritis. Scant amount of productive white phlegm per patient. Moist cough. Call light in reach.

## 2023-09-13 NOTE — PROGRESS NOTES
Premeds completed. Lunch arrives. Observation now in progress. Call light in reach. Pt states no c/o. No problems noted. No distress noted.

## 2023-09-13 NOTE — PROGRESS NOTES
Issues with the computer system caused delay of treatment. Calls with pharmacy and IT. Pharmacy put orders in [place] for today since treatment plan/appt date amiss. Premeds in progress. Brian Pimentel

## 2023-09-14 ENCOUNTER — HOSPITAL ENCOUNTER (OUTPATIENT)
Dept: INFUSION THERAPY | Age: 73
Setting detail: INFUSION SERIES
Discharge: HOME | End: 2023-09-14
Payer: MEDICARE

## 2023-09-14 VITALS
HEART RATE: 73 BPM | RESPIRATION RATE: 18 BRPM | TEMPERATURE: 97.5 F | DIASTOLIC BLOOD PRESSURE: 49 MMHG | SYSTOLIC BLOOD PRESSURE: 120 MMHG

## 2023-09-14 DIAGNOSIS — C34.12 MALIGNANT NEOPLASM OF UPPER LOBE OF LEFT LUNG (HCC): Primary | ICD-10-CM

## 2023-09-14 PROCEDURE — 96372 THER/PROPH/DIAG INJ SC/IM: CPT

## 2023-09-14 PROCEDURE — 6360000002 HC RX W HCPCS: Performed by: INTERNAL MEDICINE

## 2023-09-14 RX ADMIN — FILGRASTIM-SNDZ 300 MCG: 300 INJECTION, SOLUTION INTRAVENOUS; SUBCUTANEOUS at 15:02

## 2023-09-14 ASSESSMENT — PAIN SCALES - GENERAL: PAINLEVEL_OUTOF10: 8

## 2023-09-14 ASSESSMENT — PAIN DESCRIPTION - ORIENTATION: ORIENTATION: RIGHT

## 2023-09-14 ASSESSMENT — PAIN DESCRIPTION - DESCRIPTORS: DESCRIPTORS: ACHING

## 2023-09-14 ASSESSMENT — PAIN DESCRIPTION - LOCATION: LOCATION: HIP

## 2023-09-14 NOTE — FLOWSHEET NOTE
Injection given in her right arm. Tolerated well. Left the unit via wheelchair. All equipment used in the care for this patient has been cleaned.

## 2023-09-14 NOTE — FLOWSHEET NOTE
Patient to the floor via wheelchair for her injection. Vital signs taken. Complains of right hip discomfort which she is picking up a new prescription for after this. Call light within reach.

## 2023-09-20 ENCOUNTER — HOSPITAL ENCOUNTER (OUTPATIENT)
Dept: INFUSION THERAPY | Age: 73
Setting detail: INFUSION SERIES
Discharge: HOME | End: 2023-09-20
Payer: MEDICARE

## 2023-09-20 VITALS
HEART RATE: 76 BPM | RESPIRATION RATE: 18 BRPM | SYSTOLIC BLOOD PRESSURE: 153 MMHG | DIASTOLIC BLOOD PRESSURE: 74 MMHG | TEMPERATURE: 98.2 F

## 2023-09-20 DIAGNOSIS — C78.02 SECONDARY MALIGNANT NEOPLASM OF LEFT LUNG (HCC): ICD-10-CM

## 2023-09-20 DIAGNOSIS — C34.12 MALIGNANT NEOPLASM OF UPPER LOBE OF LEFT LUNG (HCC): Primary | ICD-10-CM

## 2023-09-20 DIAGNOSIS — C78.01 SECONDARY MALIGNANT NEOPLASM OF RIGHT LUNG (HCC): ICD-10-CM

## 2023-09-20 LAB
ALBUMIN SERPL-MCNC: 3.5 G/DL (ref 3.5–4.6)
ALP SERPL-CCNC: 93 U/L (ref 40–130)
ALT SERPL-CCNC: 12 U/L (ref 0–33)
ANION GAP SERPL CALCULATED.3IONS-SCNC: 6 MEQ/L (ref 9–15)
AST SERPL-CCNC: 27 U/L (ref 0–35)
BASOPHILS # BLD: 0 K/UL (ref 0–0.2)
BASOPHILS NFR BLD: 0.5 %
BILIRUB SERPL-MCNC: 0.7 MG/DL (ref 0.2–0.7)
BUN SERPL-MCNC: 6 MG/DL (ref 8–23)
CALCIUM SERPL-MCNC: 8.7 MG/DL (ref 8.5–9.9)
CHLORIDE SERPL-SCNC: 97 MEQ/L (ref 95–107)
CO2 SERPL-SCNC: 31 MEQ/L (ref 20–31)
CREAT SERPL-MCNC: 0.44 MG/DL (ref 0.5–0.9)
EOSINOPHIL # BLD: 0 K/UL (ref 0–0.7)
EOSINOPHIL NFR BLD: 0 %
ERYTHROCYTE [DISTWIDTH] IN BLOOD BY AUTOMATED COUNT: 17.4 % (ref 11.5–14.5)
GLOBULIN SER CALC-MCNC: 2.5 G/DL (ref 2.3–3.5)
GLUCOSE SERPL-MCNC: 94 MG/DL (ref 70–99)
HCT VFR BLD AUTO: 27.9 % (ref 37–47)
HGB BLD-MCNC: 8.5 G/DL (ref 12–16)
LYMPHOCYTES # BLD: 0.6 K/UL (ref 1–4.8)
LYMPHOCYTES NFR BLD: 14.4 %
MACROCYTES BLD QL SMEAR: ABNORMAL
MCH RBC QN AUTO: 32.4 PG (ref 27–31.3)
MCHC RBC AUTO-ENTMCNC: 30.5 % (ref 33–37)
MCV RBC AUTO: 106.5 FL (ref 79.4–94.8)
MONOCYTES # BLD: 0.5 K/UL (ref 0.2–0.8)
MONOCYTES NFR BLD: 13.6 %
NEUTROPHILS # BLD: 2.7 K/UL (ref 1.4–6.5)
NEUTS SEG NFR BLD: 67.9 %
OVALOCYTES BLD QL SMEAR: ABNORMAL
PLATELET # BLD AUTO: 173 K/UL (ref 130–400)
PLATELET BLD QL SMEAR: ADEQUATE
POTASSIUM SERPL-SCNC: 3.1 MEQ/L (ref 3.4–4.9)
PROT SERPL-MCNC: 6 G/DL (ref 6.3–8)
RBC # BLD AUTO: 2.62 M/UL (ref 4.2–5.4)
SLIDE REVIEW: ABNORMAL
SODIUM SERPL-SCNC: 134 MEQ/L (ref 135–144)
WBC # BLD AUTO: 3.9 K/UL (ref 4.8–10.8)

## 2023-09-20 PROCEDURE — 85025 COMPLETE CBC W/AUTO DIFF WBC: CPT

## 2023-09-20 PROCEDURE — 2580000003 HC RX 258: Performed by: INTERNAL MEDICINE

## 2023-09-20 PROCEDURE — 2580000003 HC RX 258

## 2023-09-20 PROCEDURE — 96375 TX/PRO/DX INJ NEW DRUG ADDON: CPT

## 2023-09-20 PROCEDURE — 80053 COMPREHEN METABOLIC PANEL: CPT

## 2023-09-20 PROCEDURE — 96413 CHEMO IV INFUSION 1 HR: CPT

## 2023-09-20 PROCEDURE — 6360000002 HC RX W HCPCS: Performed by: INTERNAL MEDICINE

## 2023-09-20 RX ORDER — SODIUM CHLORIDE 9 MG/ML
INJECTION, SOLUTION INTRAVENOUS
Status: COMPLETED
Start: 2023-09-20 | End: 2023-09-20

## 2023-09-20 RX ORDER — SODIUM CHLORIDE 0.9 % (FLUSH) 0.9 %
5-40 SYRINGE (ML) INJECTION PRN
Status: DISCONTINUED | OUTPATIENT
Start: 2023-09-20 | End: 2023-09-21 | Stop reason: HOSPADM

## 2023-09-20 RX ORDER — HEPARIN 100 UNIT/ML
500 SYRINGE INTRAVENOUS PRN
OUTPATIENT
Start: 2023-09-20

## 2023-09-20 RX ORDER — HEPARIN 100 UNIT/ML
500 SYRINGE INTRAVENOUS PRN
Status: DISCONTINUED | OUTPATIENT
Start: 2023-09-20 | End: 2023-09-21 | Stop reason: HOSPADM

## 2023-09-20 RX ORDER — SODIUM CHLORIDE 0.9 % (FLUSH) 0.9 %
5-40 SYRINGE (ML) INJECTION PRN
OUTPATIENT
Start: 2023-09-20

## 2023-09-20 RX ADMIN — ONDANSETRON 8 MG: 2 INJECTION INTRAMUSCULAR; INTRAVENOUS at 11:12

## 2023-09-20 RX ADMIN — HEPARIN 500 UNITS: 100 SYRINGE at 12:50

## 2023-09-20 RX ADMIN — SODIUM CHLORIDE, PRESERVATIVE FREE 10 ML: 5 INJECTION INTRAVENOUS at 12:50

## 2023-09-20 RX ADMIN — SODIUM CHLORIDE 250 ML: 9 INJECTION, SOLUTION INTRAVENOUS at 11:00

## 2023-09-20 RX ADMIN — GEMCITABINE HYDROCHLORIDE 1066 MG: 1 INJECTION, POWDER, LYOPHILIZED, FOR SOLUTION INTRAVENOUS at 12:06

## 2023-09-20 NOTE — PROGRESS NOTES
Patient left unit by wheelchair with staff assistance. All equipment used in the care for this patient has been cleaned.

## 2023-09-20 NOTE — PROGRESS NOTES
Patient arrived on unit by wheelchair with transporter assistance, alert, oriented, resting in chair. Vital signs obtained.

## 2023-10-03 ENCOUNTER — PHARMACY VISIT (OUTPATIENT)
Dept: PHARMACY | Facility: CLINIC | Age: 73
End: 2023-10-03
Payer: COMMERCIAL

## 2023-10-10 ENCOUNTER — OFFICE VISIT (OUTPATIENT)
Dept: PALLATIVE CARE | Age: 73
End: 2023-10-10
Payer: MEDICARE

## 2023-10-10 ENCOUNTER — PHARMACY VISIT (OUTPATIENT)
Dept: PHARMACY | Facility: CLINIC | Age: 73
End: 2023-10-10
Payer: COMMERCIAL

## 2023-10-10 VITALS
SYSTOLIC BLOOD PRESSURE: 177 MMHG | HEART RATE: 80 BPM | TEMPERATURE: 97.8 F | RESPIRATION RATE: 19 BRPM | DIASTOLIC BLOOD PRESSURE: 72 MMHG | BODY MASS INDEX: 22.69 KG/M2 | OXYGEN SATURATION: 95 % | WEIGHT: 132.2 LBS

## 2023-10-10 DIAGNOSIS — F32.A DEPRESSION, UNSPECIFIED DEPRESSION TYPE: ICD-10-CM

## 2023-10-10 DIAGNOSIS — G89.3 NEOPLASM RELATED PAIN: Primary | ICD-10-CM

## 2023-10-10 DIAGNOSIS — Z71.89 GOALS OF CARE, COUNSELING/DISCUSSION: ICD-10-CM

## 2023-10-10 DIAGNOSIS — Z51.5 PALLIATIVE CARE ENCOUNTER: ICD-10-CM

## 2023-10-10 DIAGNOSIS — C34.12 MALIGNANT NEOPLASM OF UPPER LOBE OF LEFT LUNG (HCC): ICD-10-CM

## 2023-10-10 DIAGNOSIS — R63.4 UNINTENTIONAL WEIGHT LOSS: ICD-10-CM

## 2023-10-10 DIAGNOSIS — Z71.89 ADVANCED CARE PLANNING/COUNSELING DISCUSSION: ICD-10-CM

## 2023-10-10 DIAGNOSIS — F41.9 ANXIETY: ICD-10-CM

## 2023-10-10 PROCEDURE — 99214 OFFICE O/P EST MOD 30 MIN: CPT

## 2023-10-10 PROCEDURE — 1123F ACP DISCUSS/DSCN MKR DOCD: CPT

## 2023-10-10 PROCEDURE — 3074F SYST BP LT 130 MM HG: CPT

## 2023-10-10 PROCEDURE — 3078F DIAST BP <80 MM HG: CPT

## 2023-10-10 RX ORDER — NALOXONE HYDROCHLORIDE 4 MG/.1ML
1 SPRAY NASAL PRN
Qty: 1 EACH | Refills: 5 | Status: SHIPPED | OUTPATIENT
Start: 2023-10-10

## 2023-10-10 RX ORDER — OXYCODONE AND ACETAMINOPHEN 10; 325 MG/1; MG/1
1 TABLET ORAL EVERY 4 HOURS PRN
Qty: 120 TABLET | Refills: 0 | Status: SHIPPED | OUTPATIENT
Start: 2023-10-10 | End: 2023-11-09

## 2023-10-10 ASSESSMENT — ENCOUNTER SYMPTOMS
COUGH: 1
DIARRHEA: 0
ABDOMINAL DISTENTION: 0
SHORTNESS OF BREATH: 0
TROUBLE SWALLOWING: 0
NAUSEA: 0
RECTAL PAIN: 0
BACK PAIN: 1
VOMITING: 0
COLOR CHANGE: 0
CONSTIPATION: 0
VOICE CHANGE: 0

## 2023-10-10 ASSESSMENT — PATIENT HEALTH QUESTIONNAIRE - PHQ9
SUM OF ALL RESPONSES TO PHQ9 QUESTIONS 1 & 2: 6
SUM OF ALL RESPONSES TO PHQ QUESTIONS 1-9: 8
7. TROUBLE CONCENTRATING ON THINGS, SUCH AS READING THE NEWSPAPER OR WATCHING TELEVISION: 0
6. FEELING BAD ABOUT YOURSELF - OR THAT YOU ARE A FAILURE OR HAVE LET YOURSELF OR YOUR FAMILY DOWN: 0
5. POOR APPETITE OR OVEREATING: 0
SUM OF ALL RESPONSES TO PHQ QUESTIONS 1-9: 8
1. LITTLE INTEREST OR PLEASURE IN DOING THINGS: 3
2. FEELING DOWN, DEPRESSED OR HOPELESS: 3
SUM OF ALL RESPONSES TO PHQ QUESTIONS 1-9: 8
SUM OF ALL RESPONSES TO PHQ QUESTIONS 1-9: 8
3. TROUBLE FALLING OR STAYING ASLEEP: 1
10. IF YOU CHECKED OFF ANY PROBLEMS, HOW DIFFICULT HAVE THESE PROBLEMS MADE IT FOR YOU TO DO YOUR WORK, TAKE CARE OF THINGS AT HOME, OR GET ALONG WITH OTHER PEOPLE: 0
9. THOUGHTS THAT YOU WOULD BE BETTER OFF DEAD, OR OF HURTING YOURSELF: 0
4. FEELING TIRED OR HAVING LITTLE ENERGY: 1
8. MOVING OR SPEAKING SO SLOWLY THAT OTHER PEOPLE COULD HAVE NOTICED. OR THE OPPOSITE, BEING SO FIGETY OR RESTLESS THAT YOU HAVE BEEN MOVING AROUND A LOT MORE THAN USUAL: 0

## 2023-10-10 NOTE — PROGRESS NOTES
Subjective:      Patient Id: Seen Yeni Dick at the clinic at the  cancer center , for Palliative Care symptom management, advance care planning, and goals of care discussion. She was accompanied to the appointment by: self. Chief Complaint   Patient presents with    Pain     9/10 back, hips, BLE, \"all over\". Pain medication helps \"a little\", \"not much\"    Follow-up    Fatigue     \"Wore out\", up and down throughout the night but not as bad as previously      Pain  Associated symptoms include coughing, fatigue, neck pain and weakness. Pertinent negatives include no nausea, rash or vomiting. Fatigue  Associated symptoms include coughing, fatigue, neck pain and weakness. Pertinent negatives include no nausea, rash or vomiting. Miri Sanchez is a 68 y.o. female referred to palliative care by  for symptom management, advance care planning, and goals of care conversation. Yeni Dick has complex medical history that includert lung cancer, COPD, HTN, HLD, hypothyroidism, DDD, anxiety, and depression. Radiation therapy completed and Keytruda discontinued due to disease progression. She started Gilotrif in October and remained on it for 6 weeks. She had side effects of diarrhea, fatigue, loss of appetite, nausea, vomiting, and weakness due to Gilotrif. Therefore it was stopped and Dr. Pa Arreguin started her on Carboplatin and Gemzar C1D1. General: Patient is alert and oriented     Pain: She reports pain iin hips and legs today. Rates the pain at a Pain Score:   9 8 on a scale of 0 to 10. Alleviating factors include heat intermittently. Pain is worse in the evening  time of day. Current pain medications include oxycodone 10/325mg every 6 hours and gabapentin 800mg TID. No sedation, constipation, or adverse effects on current regimen. Voltaren gel. Functional Status: Patient denies any falls recently. She is ambulatory with steady gait. She does wear O2 at night at home. She does not have any O2 during visit.

## 2023-10-11 ENCOUNTER — HOSPITAL ENCOUNTER (OUTPATIENT)
Dept: INFUSION THERAPY | Age: 73
Setting detail: INFUSION SERIES
Discharge: HOME | End: 2023-10-11
Payer: MEDICARE

## 2023-10-11 VITALS
TEMPERATURE: 97.8 F | HEART RATE: 80 BPM | SYSTOLIC BLOOD PRESSURE: 134 MMHG | DIASTOLIC BLOOD PRESSURE: 66 MMHG | RESPIRATION RATE: 18 BRPM

## 2023-10-11 DIAGNOSIS — C78.02 SECONDARY MALIGNANT NEOPLASM OF LEFT LUNG (HCC): ICD-10-CM

## 2023-10-11 DIAGNOSIS — C78.01 SECONDARY MALIGNANT NEOPLASM OF RIGHT LUNG (HCC): ICD-10-CM

## 2023-10-11 DIAGNOSIS — C34.12 MALIGNANT NEOPLASM OF UPPER LOBE OF LEFT LUNG (HCC): Primary | ICD-10-CM

## 2023-10-11 LAB
ALBUMIN SERPL-MCNC: 3.2 G/DL (ref 3.5–4.6)
ALP SERPL-CCNC: 79 U/L (ref 40–130)
ALT SERPL-CCNC: 8 U/L (ref 0–33)
ANION GAP SERPL CALCULATED.3IONS-SCNC: 9 MEQ/L (ref 9–15)
ANISOCYTOSIS BLD QL SMEAR: ABNORMAL
AST SERPL-CCNC: 23 U/L (ref 0–35)
BASOPHILS # BLD: 0.1 K/UL (ref 0–0.2)
BASOPHILS NFR BLD: 2 %
BILIRUB SERPL-MCNC: 0.3 MG/DL (ref 0.2–0.7)
BUN SERPL-MCNC: 17 MG/DL (ref 8–23)
BURR CELLS: ABNORMAL
CALCIUM SERPL-MCNC: 8.4 MG/DL (ref 8.5–9.9)
CHLORIDE SERPL-SCNC: 99 MEQ/L (ref 95–107)
CO2 SERPL-SCNC: 25 MEQ/L (ref 20–31)
CREAT SERPL-MCNC: 0.38 MG/DL (ref 0.5–0.9)
DACRYOCYTES BLD QL SMEAR: ABNORMAL
EOSINOPHIL # BLD: 0 K/UL (ref 0–0.7)
EOSINOPHIL NFR BLD: 2.2 %
ERYTHROCYTE [DISTWIDTH] IN BLOOD BY AUTOMATED COUNT: 17.9 % (ref 11.5–14.5)
GLOBULIN SER CALC-MCNC: 2.4 G/DL (ref 2.3–3.5)
GLUCOSE SERPL-MCNC: 84 MG/DL (ref 70–99)
HCT VFR BLD AUTO: 29.9 % (ref 37–47)
HGB BLD-MCNC: 8.9 G/DL (ref 12–16)
LYMPHOCYTES # BLD: 0.7 K/UL (ref 1–4.8)
LYMPHOCYTES NFR BLD: 10 %
MACROCYTES BLD QL SMEAR: ABNORMAL
MCH RBC QN AUTO: 32.5 PG (ref 27–31.3)
MCHC RBC AUTO-ENTMCNC: 29.8 % (ref 33–37)
MCV RBC AUTO: 109.1 FL (ref 79.4–94.8)
MONOCYTES # BLD: 0.6 K/UL (ref 0.2–0.8)
MONOCYTES NFR BLD: 7.9 %
NEUTROPHILS # BLD: 5.8 K/UL (ref 1.4–6.5)
NEUTS SEG NFR BLD: 80 %
OVALOCYTES BLD QL SMEAR: ABNORMAL
PLATELET # BLD AUTO: 218 K/UL (ref 130–400)
PLATELET BLD QL SMEAR: ADEQUATE
POIKILOCYTOSIS BLD QL SMEAR: ABNORMAL
POTASSIUM SERPL-SCNC: 3.4 MEQ/L (ref 3.4–4.9)
PROT SERPL-MCNC: 5.6 G/DL (ref 6.3–8)
RBC # BLD AUTO: 2.74 M/UL (ref 4.2–5.4)
SLIDE REVIEW: ABNORMAL
SODIUM SERPL-SCNC: 133 MEQ/L (ref 135–144)
WBC # BLD AUTO: 7.2 K/UL (ref 4.8–10.8)

## 2023-10-11 PROCEDURE — 6360000002 HC RX W HCPCS: Performed by: INTERNAL MEDICINE

## 2023-10-11 PROCEDURE — 2580000003 HC RX 258: Performed by: INTERNAL MEDICINE

## 2023-10-11 PROCEDURE — 96417 CHEMO IV INFUS EACH ADDL SEQ: CPT

## 2023-10-11 PROCEDURE — 85025 COMPLETE CBC W/AUTO DIFF WBC: CPT

## 2023-10-11 PROCEDURE — 2580000003 HC RX 258

## 2023-10-11 PROCEDURE — 96413 CHEMO IV INFUSION 1 HR: CPT

## 2023-10-11 PROCEDURE — 36591 DRAW BLOOD OFF VENOUS DEVICE: CPT

## 2023-10-11 PROCEDURE — 96375 TX/PRO/DX INJ NEW DRUG ADDON: CPT

## 2023-10-11 PROCEDURE — 80053 COMPREHEN METABOLIC PANEL: CPT

## 2023-10-11 RX ORDER — HEPARIN 100 UNIT/ML
500 SYRINGE INTRAVENOUS PRN
Status: CANCELLED | OUTPATIENT
Start: 2023-10-11

## 2023-10-11 RX ORDER — SODIUM CHLORIDE 9 MG/ML
INJECTION, SOLUTION INTRAVENOUS
Status: COMPLETED
Start: 2023-10-11 | End: 2023-10-11

## 2023-10-11 RX ORDER — HEPARIN 100 UNIT/ML
500 SYRINGE INTRAVENOUS PRN
Status: DISCONTINUED | OUTPATIENT
Start: 2023-10-11 | End: 2023-10-12 | Stop reason: HOSPADM

## 2023-10-11 RX ORDER — SODIUM CHLORIDE 0.9 % (FLUSH) 0.9 %
5-40 SYRINGE (ML) INJECTION PRN
Status: CANCELLED | OUTPATIENT
Start: 2023-10-11

## 2023-10-11 RX ORDER — SODIUM CHLORIDE 0.9 % (FLUSH) 0.9 %
5-40 SYRINGE (ML) INJECTION PRN
Status: DISCONTINUED | OUTPATIENT
Start: 2023-10-11 | End: 2023-10-12 | Stop reason: HOSPADM

## 2023-10-11 RX ADMIN — HEPARIN 500 UNITS: 100 SYRINGE at 13:39

## 2023-10-11 RX ADMIN — SODIUM CHLORIDE, PRESERVATIVE FREE 10 ML: 5 INJECTION INTRAVENOUS at 13:38

## 2023-10-11 RX ADMIN — GEMCITABINE HYDROCHLORIDE 984 MG: 1 INJECTION, POWDER, LYOPHILIZED, FOR SOLUTION INTRAVENOUS at 12:07

## 2023-10-11 RX ADMIN — DEXAMETHASONE SODIUM PHOSPHATE 10 MG: 10 INJECTION INTRAMUSCULAR; INTRAVENOUS at 11:09

## 2023-10-11 RX ADMIN — SODIUM CHLORIDE 250 ML: 9 INJECTION, SOLUTION INTRAVENOUS at 10:20

## 2023-10-11 RX ADMIN — PALONOSETRON 0.25 MG: 0.05 INJECTION, SOLUTION INTRAVENOUS at 10:50

## 2023-10-11 RX ADMIN — CARBOPLATIN 170 MG: 10 INJECTION INTRAVENOUS at 12:53

## 2023-10-11 ASSESSMENT — PAIN SCALES - GENERAL: PAINLEVEL_OUTOF10: 8

## 2023-10-11 NOTE — PROGRESS NOTES
Patient arrived on unit by wheelchair with transporter assistance. Alert, oriented, transferred to bed. Vital signs obtained, labs collected from mediport and sent to lab.

## 2023-10-12 ENCOUNTER — HOSPITAL ENCOUNTER (OUTPATIENT)
Dept: INFUSION THERAPY | Age: 73
Setting detail: INFUSION SERIES
Discharge: HOME | End: 2023-10-12
Payer: MEDICARE

## 2023-10-12 VITALS
RESPIRATION RATE: 18 BRPM | DIASTOLIC BLOOD PRESSURE: 56 MMHG | HEART RATE: 98 BPM | SYSTOLIC BLOOD PRESSURE: 121 MMHG | TEMPERATURE: 97.5 F

## 2023-10-12 DIAGNOSIS — C34.12 MALIGNANT NEOPLASM OF UPPER LOBE OF LEFT LUNG (HCC): Primary | ICD-10-CM

## 2023-10-12 PROCEDURE — 6360000002 HC RX W HCPCS: Performed by: INTERNAL MEDICINE

## 2023-10-12 PROCEDURE — 96372 THER/PROPH/DIAG INJ SC/IM: CPT

## 2023-10-12 RX ADMIN — FILGRASTIM-SNDZ 300 MCG: 300 INJECTION, SOLUTION INTRAVENOUS; SUBCUTANEOUS at 14:08

## 2023-10-12 ASSESSMENT — PAIN DESCRIPTION - ORIENTATION: ORIENTATION: RIGHT

## 2023-10-12 ASSESSMENT — PAIN SCALES - GENERAL: PAINLEVEL_OUTOF10: 7

## 2023-10-12 ASSESSMENT — PAIN DESCRIPTION - LOCATION: LOCATION: HIP

## 2023-10-12 ASSESSMENT — PAIN DESCRIPTION - DESCRIPTORS: DESCRIPTORS: THROBBING

## 2023-10-18 ENCOUNTER — HOSPITAL ENCOUNTER (OUTPATIENT)
Dept: INFUSION THERAPY | Age: 73
Setting detail: INFUSION SERIES
Discharge: HOME | End: 2023-10-18
Payer: MEDICARE

## 2023-10-18 ENCOUNTER — PHARMACY VISIT (OUTPATIENT)
Dept: PHARMACY | Facility: CLINIC | Age: 73
End: 2023-10-18
Payer: COMMERCIAL

## 2023-10-18 VITALS
TEMPERATURE: 97.7 F | DIASTOLIC BLOOD PRESSURE: 58 MMHG | RESPIRATION RATE: 18 BRPM | SYSTOLIC BLOOD PRESSURE: 158 MMHG | HEART RATE: 81 BPM

## 2023-10-18 DIAGNOSIS — C34.12 MALIGNANT NEOPLASM OF UPPER LOBE OF LEFT LUNG (HCC): Primary | ICD-10-CM

## 2023-10-18 DIAGNOSIS — C78.01 SECONDARY MALIGNANT NEOPLASM OF RIGHT LUNG (HCC): ICD-10-CM

## 2023-10-18 DIAGNOSIS — C78.02 SECONDARY MALIGNANT NEOPLASM OF LEFT LUNG (HCC): ICD-10-CM

## 2023-10-18 LAB
BASOPHILS # BLD: 0 K/UL (ref 0–0.2)
BASOPHILS NFR BLD: 0.3 %
EOSINOPHIL # BLD: 0 K/UL (ref 0–0.7)
EOSINOPHIL NFR BLD: 0 %
ERYTHROCYTE [DISTWIDTH] IN BLOOD BY AUTOMATED COUNT: 16.5 % (ref 11.5–14.5)
HCT VFR BLD AUTO: 30.2 % (ref 37–47)
HGB BLD-MCNC: 9.3 G/DL (ref 12–16)
LYMPHOCYTES # BLD: 1.4 K/UL (ref 1–4.8)
LYMPHOCYTES NFR BLD: 16.9 %
MCH RBC QN AUTO: 32.7 PG (ref 27–31.3)
MCHC RBC AUTO-ENTMCNC: 30.8 % (ref 33–37)
MCV RBC AUTO: 106.3 FL (ref 79.4–94.8)
MONOCYTES # BLD: 0.7 K/UL (ref 0.2–0.8)
MONOCYTES NFR BLD: 9.1 %
NEUTROPHILS # BLD: 5.8 K/UL (ref 1.4–6.5)
NEUTS SEG NFR BLD: 72.6 %
PLATELET # BLD AUTO: 104 K/UL (ref 130–400)
RBC # BLD AUTO: 2.84 M/UL (ref 4.2–5.4)
WBC # BLD AUTO: 8 K/UL (ref 4.8–10.8)

## 2023-10-18 PROCEDURE — 2580000003 HC RX 258: Performed by: INTERNAL MEDICINE

## 2023-10-18 PROCEDURE — 96375 TX/PRO/DX INJ NEW DRUG ADDON: CPT

## 2023-10-18 PROCEDURE — 6360000002 HC RX W HCPCS: Performed by: INTERNAL MEDICINE

## 2023-10-18 PROCEDURE — 36591 DRAW BLOOD OFF VENOUS DEVICE: CPT

## 2023-10-18 PROCEDURE — 2580000003 HC RX 258

## 2023-10-18 PROCEDURE — 96413 CHEMO IV INFUSION 1 HR: CPT

## 2023-10-18 PROCEDURE — 85025 COMPLETE CBC W/AUTO DIFF WBC: CPT

## 2023-10-18 RX ORDER — SODIUM CHLORIDE 0.9 % (FLUSH) 0.9 %
5-40 SYRINGE (ML) INJECTION PRN
Status: DISCONTINUED | OUTPATIENT
Start: 2023-10-18 | End: 2023-10-19 | Stop reason: HOSPADM

## 2023-10-18 RX ORDER — HEPARIN 100 UNIT/ML
500 SYRINGE INTRAVENOUS PRN
Status: DISCONTINUED | OUTPATIENT
Start: 2023-10-18 | End: 2023-10-19 | Stop reason: HOSPADM

## 2023-10-18 RX ORDER — HEPARIN 100 UNIT/ML
500 SYRINGE INTRAVENOUS PRN
OUTPATIENT
Start: 2023-10-18

## 2023-10-18 RX ORDER — SODIUM CHLORIDE 9 MG/ML
INJECTION, SOLUTION INTRAVENOUS
Status: COMPLETED
Start: 2023-10-18 | End: 2023-10-18

## 2023-10-18 RX ORDER — SODIUM CHLORIDE 0.9 % (FLUSH) 0.9 %
5-40 SYRINGE (ML) INJECTION PRN
OUTPATIENT
Start: 2023-10-18

## 2023-10-18 RX ADMIN — SODIUM CHLORIDE 250 ML: 9 INJECTION, SOLUTION INTRAVENOUS at 09:51

## 2023-10-18 RX ADMIN — GEMCITABINE HYDROCHLORIDE 984 MG: 1 INJECTION, POWDER, LYOPHILIZED, FOR SOLUTION INTRAVENOUS at 10:53

## 2023-10-18 RX ADMIN — SODIUM CHLORIDE, PRESERVATIVE FREE 10 ML: 5 INJECTION INTRAVENOUS at 11:53

## 2023-10-18 RX ADMIN — HEPARIN 500 UNITS: 100 SYRINGE at 11:53

## 2023-10-18 RX ADMIN — ONDANSETRON 8 MG: 2 INJECTION INTRAMUSCULAR; INTRAVENOUS at 09:56

## 2023-10-18 ASSESSMENT — PAIN DESCRIPTION - DESCRIPTORS: DESCRIPTORS: THROBBING

## 2023-10-18 ASSESSMENT — PAIN DESCRIPTION - LOCATION: LOCATION: HIP

## 2023-10-18 ASSESSMENT — PAIN DESCRIPTION - ORIENTATION: ORIENTATION: RIGHT

## 2023-10-18 ASSESSMENT — PAIN SCALES - GENERAL: PAINLEVEL_OUTOF10: 8

## 2023-10-18 NOTE — FLOWSHEET NOTE
Patient to the floor via wheelchair for her Cycle 13 day 8 chemo treatment. Vital signs taken. Complains of right hip pain which is not new. Dr. Daisy Turner is aware. Call light within reach.

## 2023-11-01 ENCOUNTER — HOSPITAL ENCOUNTER (OUTPATIENT)
Dept: INFUSION THERAPY | Age: 73
Setting detail: INFUSION SERIES
Discharge: HOME | End: 2023-11-01
Payer: MEDICARE

## 2023-11-01 VITALS
OXYGEN SATURATION: 96 % | TEMPERATURE: 98.5 F | SYSTOLIC BLOOD PRESSURE: 155 MMHG | RESPIRATION RATE: 18 BRPM | DIASTOLIC BLOOD PRESSURE: 65 MMHG | HEART RATE: 96 BPM

## 2023-11-01 DIAGNOSIS — C78.01 SECONDARY MALIGNANT NEOPLASM OF RIGHT LUNG (HCC): Primary | ICD-10-CM

## 2023-11-01 DIAGNOSIS — C78.02 SECONDARY MALIGNANT NEOPLASM OF LEFT LUNG (HCC): ICD-10-CM

## 2023-11-01 DIAGNOSIS — C34.12 MALIGNANT NEOPLASM OF UPPER LOBE OF LEFT LUNG (HCC): ICD-10-CM

## 2023-11-01 LAB
ALBUMIN SERPL-MCNC: 3.3 G/DL (ref 3.5–4.6)
ALP SERPL-CCNC: 70 U/L (ref 40–130)
ALT SERPL-CCNC: 6 U/L (ref 0–33)
ANION GAP SERPL CALCULATED.3IONS-SCNC: 10 MEQ/L (ref 9–15)
AST SERPL-CCNC: 17 U/L (ref 0–35)
BASOPHILS # BLD: 0 K/UL (ref 0–0.2)
BASOPHILS NFR BLD: 0.4 %
BILIRUB SERPL-MCNC: 0.5 MG/DL (ref 0.2–0.7)
BUN SERPL-MCNC: 9 MG/DL (ref 8–23)
CALCIUM SERPL-MCNC: 8.6 MG/DL (ref 8.5–9.9)
CHLORIDE SERPL-SCNC: 102 MEQ/L (ref 95–107)
CO2 SERPL-SCNC: 27 MEQ/L (ref 20–31)
CREAT SERPL-MCNC: 0.54 MG/DL (ref 0.5–0.9)
EOSINOPHIL # BLD: 0 K/UL (ref 0–0.7)
EOSINOPHIL NFR BLD: 0 %
ERYTHROCYTE [DISTWIDTH] IN BLOOD BY AUTOMATED COUNT: 18.1 % (ref 11.5–14.5)
GLOBULIN SER CALC-MCNC: 2.6 G/DL (ref 2.3–3.5)
GLUCOSE SERPL-MCNC: 134 MG/DL (ref 70–99)
HCT VFR BLD AUTO: 27.7 % (ref 37–47)
HGB BLD-MCNC: 8.3 G/DL (ref 12–16)
LYMPHOCYTES # BLD: 0.8 K/UL (ref 1–4.8)
LYMPHOCYTES NFR BLD: 14.2 %
MCH RBC QN AUTO: 32.3 PG (ref 27–31.3)
MCHC RBC AUTO-ENTMCNC: 30 % (ref 33–37)
MCV RBC AUTO: 107.8 FL (ref 79.4–94.8)
MONOCYTES # BLD: 0.9 K/UL (ref 0.2–0.8)
MONOCYTES NFR BLD: 16.2 %
NEUTROPHILS # BLD: 3.8 K/UL (ref 1.4–6.5)
NEUTS SEG NFR BLD: 68.8 %
PLATELET # BLD AUTO: 134 K/UL (ref 130–400)
POTASSIUM SERPL-SCNC: 3 MEQ/L (ref 3.4–4.9)
PROT SERPL-MCNC: 5.9 G/DL (ref 6.3–8)
RBC # BLD AUTO: 2.57 M/UL (ref 4.2–5.4)
SODIUM SERPL-SCNC: 139 MEQ/L (ref 135–144)
WBC # BLD AUTO: 5.4 K/UL (ref 4.8–10.8)

## 2023-11-01 PROCEDURE — 96375 TX/PRO/DX INJ NEW DRUG ADDON: CPT

## 2023-11-01 PROCEDURE — 6360000002 HC RX W HCPCS: Performed by: INTERNAL MEDICINE

## 2023-11-01 PROCEDURE — 85025 COMPLETE CBC W/AUTO DIFF WBC: CPT

## 2023-11-01 PROCEDURE — 2580000003 HC RX 258

## 2023-11-01 PROCEDURE — 36591 DRAW BLOOD OFF VENOUS DEVICE: CPT

## 2023-11-01 PROCEDURE — 2580000003 HC RX 258: Performed by: INTERNAL MEDICINE

## 2023-11-01 PROCEDURE — 80053 COMPREHEN METABOLIC PANEL: CPT

## 2023-11-01 PROCEDURE — 96413 CHEMO IV INFUSION 1 HR: CPT

## 2023-11-01 PROCEDURE — 96417 CHEMO IV INFUS EACH ADDL SEQ: CPT

## 2023-11-01 PROCEDURE — 6360000002 HC RX W HCPCS

## 2023-11-01 RX ORDER — HEPARIN 100 UNIT/ML
500 SYRINGE INTRAVENOUS PRN
Status: DISCONTINUED | OUTPATIENT
Start: 2023-11-01 | End: 2023-11-02 | Stop reason: HOSPADM

## 2023-11-01 RX ORDER — HEPARIN 100 UNIT/ML
500 SYRINGE INTRAVENOUS PRN
Status: CANCELLED | OUTPATIENT
Start: 2023-11-01

## 2023-11-01 RX ORDER — SODIUM CHLORIDE 9 MG/ML
INJECTION, SOLUTION INTRAVENOUS
Status: COMPLETED
Start: 2023-11-01 | End: 2023-11-01

## 2023-11-01 RX ORDER — SODIUM CHLORIDE 0.9 % (FLUSH) 0.9 %
5-40 SYRINGE (ML) INJECTION PRN
Status: DISCONTINUED | OUTPATIENT
Start: 2023-11-01 | End: 2023-11-02 | Stop reason: HOSPADM

## 2023-11-01 RX ORDER — SODIUM CHLORIDE 0.9 % (FLUSH) 0.9 %
5-40 SYRINGE (ML) INJECTION PRN
Status: CANCELLED | OUTPATIENT
Start: 2023-11-01

## 2023-11-01 RX ADMIN — PALONOSETRON 0.25 MG: 0.05 INJECTION, SOLUTION INTRAVENOUS at 10:41

## 2023-11-01 RX ADMIN — SODIUM CHLORIDE 250 ML: 9 INJECTION, SOLUTION INTRAVENOUS at 10:29

## 2023-11-01 RX ADMIN — HEPARIN 500 UNITS: 100 SYRINGE at 13:49

## 2023-11-01 RX ADMIN — CARBOPLATIN 170 MG: 10 INJECTION, SOLUTION INTRAVENOUS at 13:00

## 2023-11-01 RX ADMIN — SODIUM CHLORIDE, PRESERVATIVE FREE 10 ML: 5 INJECTION INTRAVENOUS at 13:49

## 2023-11-01 RX ADMIN — GEMCITABINE HYDROCHLORIDE 984 MG: 1 INJECTION, POWDER, LYOPHILIZED, FOR SOLUTION INTRAVENOUS at 12:10

## 2023-11-01 RX ADMIN — DEXAMETHASONE SODIUM PHOSPHATE 10 MG: 10 INJECTION, SOLUTION INTRAMUSCULAR; INTRAVENOUS at 11:06

## 2023-11-01 ASSESSMENT — PAIN DESCRIPTION - DESCRIPTORS: DESCRIPTORS: THROBBING

## 2023-11-01 ASSESSMENT — PAIN SCALES - GENERAL: PAINLEVEL_OUTOF10: 7

## 2023-11-01 ASSESSMENT — PAIN DESCRIPTION - LOCATION: LOCATION: HIP

## 2023-11-01 ASSESSMENT — PAIN DESCRIPTION - ORIENTATION: ORIENTATION: RIGHT

## 2023-11-01 NOTE — FLOWSHEET NOTE
Patient to the floor via wheelchair for her Cycle 14 Day 1 chemo treatment. Vital signs taken. No distress noted. Call light within reach.

## 2023-11-01 NOTE — FLOWSHEET NOTE
Infusion is complete. Tolerated well. Left the unit  via wheelchair. All equipment used in the care for this patient has been cleaned.

## 2023-11-02 ENCOUNTER — HOSPITAL ENCOUNTER (OUTPATIENT)
Dept: INFUSION THERAPY | Age: 73
Setting detail: INFUSION SERIES
Discharge: HOME | End: 2023-11-02
Payer: MEDICARE

## 2023-11-02 VITALS — RESPIRATION RATE: 18 BRPM | TEMPERATURE: 98.2 F

## 2023-11-02 DIAGNOSIS — C34.12 MALIGNANT NEOPLASM OF UPPER LOBE OF LEFT LUNG (HCC): Primary | ICD-10-CM

## 2023-11-02 PROCEDURE — 6360000002 HC RX W HCPCS

## 2023-11-02 PROCEDURE — 96372 THER/PROPH/DIAG INJ SC/IM: CPT

## 2023-11-02 RX ADMIN — FILGRASTIM-SNDZ 300 MCG: 300 INJECTION, SOLUTION INTRAVENOUS; SUBCUTANEOUS at 14:53

## 2023-11-02 ASSESSMENT — PAIN DESCRIPTION - LOCATION: LOCATION: HIP

## 2023-11-02 ASSESSMENT — PAIN DESCRIPTION - DESCRIPTORS: DESCRIPTORS: THROBBING

## 2023-11-02 ASSESSMENT — PAIN DESCRIPTION - ORIENTATION: ORIENTATION: RIGHT

## 2023-11-02 NOTE — FLOWSHEET NOTE
Patient to the floor via wheelchair for her cycle 14 day 2 chemo treatment. Vital signs taken. No distress noted. Call light within reach.

## 2023-11-07 DIAGNOSIS — J44.1 CHRONIC OBSTRUCTIVE PULMONARY DISEASE WITH ACUTE EXACERBATION (HCC): ICD-10-CM

## 2023-11-07 DIAGNOSIS — C34.12 MALIGNANT NEOPLASM OF UPPER LOBE OF LEFT LUNG (HCC): ICD-10-CM

## 2023-11-07 DIAGNOSIS — G89.3 NEOPLASM RELATED PAIN: ICD-10-CM

## 2023-11-07 RX ORDER — METHYLPREDNISOLONE 4 MG/1
TABLET ORAL
Qty: 1 KIT | Refills: 0 | Status: CANCELLED | OUTPATIENT
Start: 2023-11-07

## 2023-11-07 RX ORDER — DOXYCYCLINE HYCLATE 100 MG
100 TABLET ORAL PRN
Status: CANCELLED | OUTPATIENT
Start: 2023-11-07

## 2023-11-07 RX ORDER — OXYCODONE AND ACETAMINOPHEN 10; 325 MG/1; MG/1
1 TABLET ORAL EVERY 4 HOURS PRN
Qty: 180 TABLET | Refills: 0 | Status: SHIPPED | OUTPATIENT
Start: 2023-11-07 | End: 2023-12-07

## 2023-11-07 NOTE — TELEPHONE ENCOUNTER
Patient calling in to request a refill on her medications. Patient also rescheduled her appt that was for today at the  due to her being ill.      L.O.V:10/10/23  N.O.V.:11/21/23  Last Rx Fill:10/10/2023

## 2023-11-08 ENCOUNTER — HOSPITAL ENCOUNTER (OUTPATIENT)
Dept: INFUSION THERAPY | Age: 73
Setting detail: INFUSION SERIES
Discharge: HOME | End: 2023-11-08
Payer: MEDICARE

## 2023-11-08 VITALS
SYSTOLIC BLOOD PRESSURE: 145 MMHG | RESPIRATION RATE: 18 BRPM | HEART RATE: 95 BPM | DIASTOLIC BLOOD PRESSURE: 75 MMHG | TEMPERATURE: 97.6 F | OXYGEN SATURATION: 98 %

## 2023-11-08 DIAGNOSIS — C78.02 SECONDARY MALIGNANT NEOPLASM OF LEFT LUNG (HCC): ICD-10-CM

## 2023-11-08 DIAGNOSIS — C78.01 SECONDARY MALIGNANT NEOPLASM OF RIGHT LUNG (HCC): ICD-10-CM

## 2023-11-08 DIAGNOSIS — C34.12 MALIGNANT NEOPLASM OF UPPER LOBE OF LEFT LUNG (HCC): Primary | ICD-10-CM

## 2023-11-08 LAB
ALBUMIN SERPL-MCNC: 3.2 G/DL (ref 3.5–4.6)
ALP SERPL-CCNC: 81 U/L (ref 40–130)
ALT SERPL-CCNC: 13 U/L (ref 0–33)
ANION GAP SERPL CALCULATED.3IONS-SCNC: 7 MEQ/L (ref 9–15)
ANISOCYTOSIS BLD QL SMEAR: ABNORMAL
AST SERPL-CCNC: 31 U/L (ref 0–35)
BASOPHILS # BLD: 0.1 K/UL (ref 0–0.2)
BASOPHILS NFR BLD: 2 %
BILIRUB SERPL-MCNC: 0.4 MG/DL (ref 0.2–0.7)
BUN SERPL-MCNC: 10 MG/DL (ref 8–23)
CALCIUM SERPL-MCNC: 8.5 MG/DL (ref 8.5–9.9)
CHLORIDE SERPL-SCNC: 99 MEQ/L (ref 95–107)
CO2 SERPL-SCNC: 28 MEQ/L (ref 20–31)
CREAT SERPL-MCNC: 0.58 MG/DL (ref 0.5–0.9)
EOSINOPHIL # BLD: 0 K/UL (ref 0–0.7)
EOSINOPHIL NFR BLD: 0 %
ERYTHROCYTE [DISTWIDTH] IN BLOOD BY AUTOMATED COUNT: 16.7 % (ref 11.5–14.5)
GLOBULIN SER CALC-MCNC: 2.5 G/DL (ref 2.3–3.5)
GLUCOSE SERPL-MCNC: 122 MG/DL (ref 70–99)
HCT VFR BLD AUTO: 26.7 % (ref 37–47)
HGB BLD-MCNC: 8 G/DL (ref 12–16)
HYPOCHROMIA BLD QL SMEAR: ABNORMAL
LYMPHOCYTES # BLD: 0.9 K/UL (ref 1–4.8)
LYMPHOCYTES NFR BLD: 19 %
MACROCYTES BLD QL SMEAR: ABNORMAL
MCH RBC QN AUTO: 32.7 PG (ref 27–31.3)
MCHC RBC AUTO-ENTMCNC: 30 % (ref 33–37)
MCV RBC AUTO: 109 FL (ref 79.4–94.8)
MONOCYTES # BLD: 0.2 K/UL (ref 0.2–0.8)
MONOCYTES NFR BLD: 4.8 %
NEUTROPHILS # BLD: 3.4 K/UL (ref 1.4–6.5)
NEUTS SEG NFR BLD: 74 %
OVALOCYTES BLD QL SMEAR: ABNORMAL
PLATELET # BLD AUTO: 94 K/UL (ref 130–400)
PLATELET BLD QL SMEAR: ABNORMAL
POIKILOCYTOSIS BLD QL SMEAR: ABNORMAL
POTASSIUM SERPL-SCNC: 3.7 MEQ/L (ref 3.4–4.9)
PROT SERPL-MCNC: 5.7 G/DL (ref 6.3–8)
RBC # BLD AUTO: 2.45 M/UL (ref 4.2–5.4)
SLIDE REVIEW: ABNORMAL
SODIUM SERPL-SCNC: 134 MEQ/L (ref 135–144)
WBC # BLD AUTO: 4.6 K/UL (ref 4.8–10.8)

## 2023-11-08 PROCEDURE — 2580000003 HC RX 258: Performed by: INTERNAL MEDICINE

## 2023-11-08 PROCEDURE — 2580000003 HC RX 258

## 2023-11-08 PROCEDURE — 36591 DRAW BLOOD OFF VENOUS DEVICE: CPT

## 2023-11-08 PROCEDURE — 96375 TX/PRO/DX INJ NEW DRUG ADDON: CPT

## 2023-11-08 PROCEDURE — 85025 COMPLETE CBC W/AUTO DIFF WBC: CPT

## 2023-11-08 PROCEDURE — 6360000002 HC RX W HCPCS: Performed by: INTERNAL MEDICINE

## 2023-11-08 PROCEDURE — 6360000002 HC RX W HCPCS

## 2023-11-08 PROCEDURE — 96413 CHEMO IV INFUSION 1 HR: CPT

## 2023-11-08 PROCEDURE — 80053 COMPREHEN METABOLIC PANEL: CPT

## 2023-11-08 RX ORDER — SODIUM CHLORIDE 0.9 % (FLUSH) 0.9 %
5-40 SYRINGE (ML) INJECTION PRN
OUTPATIENT
Start: 2023-11-08

## 2023-11-08 RX ORDER — HEPARIN 100 UNIT/ML
500 SYRINGE INTRAVENOUS PRN
OUTPATIENT
Start: 2023-11-08

## 2023-11-08 RX ORDER — HEPARIN 100 UNIT/ML
500 SYRINGE INTRAVENOUS PRN
Status: DISCONTINUED | OUTPATIENT
Start: 2023-11-08 | End: 2023-11-09 | Stop reason: HOSPADM

## 2023-11-08 RX ORDER — SODIUM CHLORIDE 9 MG/ML
INJECTION, SOLUTION INTRAVENOUS
Status: COMPLETED
Start: 2023-11-08 | End: 2023-11-08

## 2023-11-08 RX ORDER — SODIUM CHLORIDE 0.9 % (FLUSH) 0.9 %
5-40 SYRINGE (ML) INJECTION PRN
Status: DISCONTINUED | OUTPATIENT
Start: 2023-11-08 | End: 2023-11-09 | Stop reason: HOSPADM

## 2023-11-08 RX ADMIN — ONDANSETRON 8 MG: 2 INJECTION INTRAMUSCULAR; INTRAVENOUS at 11:37

## 2023-11-08 RX ADMIN — HEPARIN 500 UNITS: 100 SYRINGE at 13:17

## 2023-11-08 RX ADMIN — SODIUM CHLORIDE 250 ML: 9 INJECTION, SOLUTION INTRAVENOUS at 10:49

## 2023-11-08 RX ADMIN — GEMCITABINE HYDROCHLORIDE 984 MG: 1 INJECTION, POWDER, LYOPHILIZED, FOR SOLUTION INTRAVENOUS at 12:25

## 2023-11-08 RX ADMIN — SODIUM CHLORIDE, PRESERVATIVE FREE 10 ML: 5 INJECTION INTRAVENOUS at 13:17

## 2023-11-08 ASSESSMENT — PAIN SCALES - GENERAL: PAINLEVEL_OUTOF10: 7

## 2023-11-08 ASSESSMENT — PAIN DESCRIPTION - DESCRIPTORS: DESCRIPTORS: THROBBING

## 2023-11-08 ASSESSMENT — PAIN DESCRIPTION - LOCATION: LOCATION: HIP

## 2023-11-08 NOTE — FLOWSHEET NOTE
Patient to the floor via wheelchair for her Cycle 14 day 8 chemo treatment. Vital signs taken. States that she is not feeling well. Feels congested. No cough noted. She does use oxygen at home. On room air she was 85%. Placed on 3 liters NC and her sat came up to 98%. Call light within reach. Will notify [address] after blood work comes back.

## 2023-11-09 ENCOUNTER — TELEPHONE (OUTPATIENT)
Dept: PALLATIVE CARE | Age: 73
End: 2023-11-09

## 2023-11-09 NOTE — TELEPHONE ENCOUNTER
Attempted to contact patient at [phone]. Patient's voicemail box is full and she is not taking messages at this time. Returned call was made due to patient calling in reporting that \"when she takes her oxygen off her pulse ox drops to the 70s-80s\". Patient is still smoking. She is supposed to be on continuous oxygen. Patient called in for refill request for Antibiotic and Steroids. Patient needs to be advised safety risks of not having oxygen on and advised on smoking cessation and safety concerns with smoking and oxygen use. I am unable to get ahold of this patient to advise/discuss concerns. I will not dispense oxygen supplies while patient is still smoking. Patient dispense report pulled. Despite orders recently for COPD ER Kostas antibiotics placed, it does not appear patient has dispensed or filled any antibiotics recently. Patient should have Doxycycline Hyclate 100mg capsule BID x 10 days at [facility] pharmacy. This was prescribed on 9/12/23.

## 2023-11-09 NOTE — TELEPHONE ENCOUNTER
Laurenjuan c Ganrobb called in earlier today to request an antibiotic and steroid. She explains that when she takes her 02 off to go to the bathroom her 02 drops into the 70-80s. I asked if she was having any other symptoms, she said slight congestion and headache. She is afebrile, no cough. She also is requesting an 02 concentrator. Her portable 02 is too heavy. She also wants you to order 02 extension tubing.

## 2023-11-13 ENCOUNTER — PHARMACY VISIT (OUTPATIENT)
Dept: PHARMACY | Facility: CLINIC | Age: 73
End: 2023-11-13
Payer: COMMERCIAL

## 2023-11-13 PROCEDURE — RXMED WILLOW AMBULATORY MEDICATION CHARGE

## 2023-11-15 RX ORDER — MONTELUKAST SODIUM 10 MG/1
10 TABLET ORAL NIGHTLY
Qty: 90 TABLET | Refills: 3 | Status: SHIPPED | OUTPATIENT
Start: 2023-11-15

## 2023-11-29 ENCOUNTER — HOSPITAL ENCOUNTER (OUTPATIENT)
Dept: INFUSION THERAPY | Age: 73
Setting detail: INFUSION SERIES
Discharge: HOME | End: 2023-11-29
Payer: MEDICARE

## 2023-11-29 ENCOUNTER — OFFICE VISIT (OUTPATIENT)
Dept: PALLATIVE CARE | Age: 73
End: 2023-11-29

## 2023-11-29 VITALS
OXYGEN SATURATION: 96 % | TEMPERATURE: 98.4 F | DIASTOLIC BLOOD PRESSURE: 63 MMHG | RESPIRATION RATE: 20 BRPM | SYSTOLIC BLOOD PRESSURE: 174 MMHG | HEART RATE: 88 BPM

## 2023-11-29 DIAGNOSIS — D50.8 OTHER IRON DEFICIENCY ANEMIAS: ICD-10-CM

## 2023-11-29 DIAGNOSIS — R06.02 SHORTNESS OF BREATH: ICD-10-CM

## 2023-11-29 DIAGNOSIS — C34.12 MALIGNANT NEOPLASM OF UPPER LOBE OF LEFT LUNG (HCC): Primary | ICD-10-CM

## 2023-11-29 DIAGNOSIS — F41.9 ANXIETY: ICD-10-CM

## 2023-11-29 DIAGNOSIS — C78.01 SECONDARY MALIGNANT NEOPLASM OF RIGHT LUNG (HCC): ICD-10-CM

## 2023-11-29 DIAGNOSIS — C78.02 SECONDARY MALIGNANT NEOPLASM OF LEFT LUNG (HCC): ICD-10-CM

## 2023-11-29 DIAGNOSIS — Z51.5 PALLIATIVE CARE ENCOUNTER: ICD-10-CM

## 2023-11-29 DIAGNOSIS — G89.3 NEOPLASM RELATED PAIN: ICD-10-CM

## 2023-11-29 DIAGNOSIS — R63.4 UNINTENTIONAL WEIGHT LOSS: ICD-10-CM

## 2023-11-29 DIAGNOSIS — Z71.89 GOALS OF CARE, COUNSELING/DISCUSSION: ICD-10-CM

## 2023-11-29 DIAGNOSIS — J44.9 CHRONIC OBSTRUCTIVE PULMONARY DISEASE, UNSPECIFIED COPD TYPE (HCC): Primary | ICD-10-CM

## 2023-11-29 DIAGNOSIS — C34.12 MALIGNANT NEOPLASM OF UPPER LOBE OF LEFT LUNG (HCC): ICD-10-CM

## 2023-11-29 LAB
ALBUMIN SERPL-MCNC: 3.2 G/DL (ref 3.5–4.6)
ALP SERPL-CCNC: 71 U/L (ref 40–130)
ALT SERPL-CCNC: <5 U/L (ref 0–33)
ANION GAP SERPL CALCULATED.3IONS-SCNC: 9 MEQ/L (ref 9–15)
ANISOCYTOSIS BLD QL SMEAR: ABNORMAL
AST SERPL-CCNC: 14 U/L (ref 0–35)
BASOPHILS # BLD: 0 K/UL (ref 0–0.2)
BASOPHILS NFR BLD: 0.4 %
BILIRUB SERPL-MCNC: 0.3 MG/DL (ref 0.2–0.7)
BLOOD GROUP ANTIBODIES SERPL: NORMAL
BUN SERPL-MCNC: 17 MG/DL (ref 8–23)
CALCIUM SERPL-MCNC: 8.8 MG/DL (ref 8.5–9.9)
CHLORIDE SERPL-SCNC: 99 MEQ/L (ref 95–107)
CO2 SERPL-SCNC: 33 MEQ/L (ref 20–31)
CREAT SERPL-MCNC: 0.67 MG/DL (ref 0.5–0.9)
EOSINOPHIL # BLD: 0 K/UL (ref 0–0.7)
EOSINOPHIL NFR BLD: 0 %
ERYTHROCYTE [DISTWIDTH] IN BLOOD BY AUTOMATED COUNT: 16.5 % (ref 11.5–14.5)
GLOBULIN SER CALC-MCNC: 2.5 G/DL (ref 2.3–3.5)
GLUCOSE SERPL-MCNC: 153 MG/DL (ref 70–99)
HCT VFR BLD AUTO: 24.4 % (ref 37–47)
HGB BLD-MCNC: 7 G/DL (ref 12–16)
HYPOCHROMIA BLD QL SMEAR: ABNORMAL
LYMPHOCYTES # BLD: 0.8 K/UL (ref 1–4.8)
LYMPHOCYTES NFR BLD: 13 %
MCH RBC QN AUTO: 32.1 PG (ref 27–31.3)
MCHC RBC AUTO-ENTMCNC: 28.7 % (ref 33–37)
MCV RBC AUTO: 111.9 FL (ref 79.4–94.8)
MONOCYTES # BLD: 0.3 K/UL (ref 0.2–0.8)
MONOCYTES NFR BLD: 4.8 %
NEUTROPHILS # BLD: 4.5 K/UL (ref 1.4–6.5)
NEUTS SEG NFR BLD: 81 %
PLATELET # BLD AUTO: 208 K/UL (ref 130–400)
PLATELET BLD QL SMEAR: ABNORMAL
POIKILOCYTOSIS BLD QL SMEAR: ABNORMAL
POLYCHROMASIA BLD QL SMEAR: ABNORMAL
POTASSIUM SERPL-SCNC: 4.2 MEQ/L (ref 3.4–4.9)
PROT SERPL-MCNC: 5.7 G/DL (ref 6.3–8)
RBC # BLD AUTO: 2.18 M/UL (ref 4.2–5.4)
SLIDE REVIEW: ABNORMAL
SMUDGE CELLS BLD QL SMEAR: 3.8
SODIUM SERPL-SCNC: 141 MEQ/L (ref 135–144)
STOMATOCYTES BLD QL SMEAR: ABNORMAL
VARIANT LYMPHS NFR BLD: 1 %
WBC # BLD AUTO: 5.6 K/UL (ref 4.8–10.8)

## 2023-11-29 PROCEDURE — 6360000002 HC RX W HCPCS

## 2023-11-29 PROCEDURE — 80053 COMPREHEN METABOLIC PANEL: CPT

## 2023-11-29 PROCEDURE — 86901 BLOOD TYPING SEROLOGIC RH(D): CPT

## 2023-11-29 PROCEDURE — 36591 DRAW BLOOD OFF VENOUS DEVICE: CPT

## 2023-11-29 PROCEDURE — 83540 ASSAY OF IRON: CPT

## 2023-11-29 PROCEDURE — 84466 ASSAY OF TRANSFERRIN: CPT

## 2023-11-29 PROCEDURE — 82728 ASSAY OF FERRITIN: CPT

## 2023-11-29 PROCEDURE — 96413 CHEMO IV INFUSION 1 HR: CPT

## 2023-11-29 PROCEDURE — 96417 CHEMO IV INFUS EACH ADDL SEQ: CPT

## 2023-11-29 PROCEDURE — 2580000003 HC RX 258

## 2023-11-29 PROCEDURE — 36430 TRANSFUSION BLD/BLD COMPNT: CPT

## 2023-11-29 PROCEDURE — 86922 COMPATIBILITY TEST ANTIGLOB: CPT

## 2023-11-29 PROCEDURE — 6360000002 HC RX W HCPCS: Performed by: INTERNAL MEDICINE

## 2023-11-29 PROCEDURE — P9016 RBC LEUKOCYTES REDUCED: HCPCS

## 2023-11-29 PROCEDURE — 2580000003 HC RX 258: Performed by: INTERNAL MEDICINE

## 2023-11-29 PROCEDURE — 86870 RBC ANTIBODY IDENTIFICATION: CPT

## 2023-11-29 PROCEDURE — 86850 RBC ANTIBODY SCREEN: CPT

## 2023-11-29 PROCEDURE — 96375 TX/PRO/DX INJ NEW DRUG ADDON: CPT

## 2023-11-29 PROCEDURE — 86900 BLOOD TYPING SEROLOGIC ABO: CPT

## 2023-11-29 PROCEDURE — 85025 COMPLETE CBC W/AUTO DIFF WBC: CPT

## 2023-11-29 PROCEDURE — 86880 COOMBS TEST DIRECT: CPT

## 2023-11-29 RX ORDER — SODIUM CHLORIDE 9 MG/ML
INJECTION, SOLUTION INTRAVENOUS
Status: COMPLETED
Start: 2023-11-29 | End: 2023-11-29

## 2023-11-29 RX ORDER — HEPARIN 100 UNIT/ML
500 SYRINGE INTRAVENOUS PRN
Status: DISCONTINUED | OUTPATIENT
Start: 2023-11-29 | End: 2023-11-30 | Stop reason: HOSPADM

## 2023-11-29 RX ORDER — SODIUM CHLORIDE 0.9 % (FLUSH) 0.9 %
5-40 SYRINGE (ML) INJECTION PRN
Status: CANCELLED | OUTPATIENT
Start: 2023-11-29

## 2023-11-29 RX ORDER — ACETAMINOPHEN 325 MG/1
650 TABLET ORAL ONCE
Status: CANCELLED | OUTPATIENT
Start: 2023-11-30 | End: 2023-11-30

## 2023-11-29 RX ORDER — HEPARIN 100 UNIT/ML
500 SYRINGE INTRAVENOUS PRN
Status: CANCELLED | OUTPATIENT
Start: 2023-11-29

## 2023-11-29 RX ORDER — SODIUM CHLORIDE 0.9 % (FLUSH) 0.9 %
5-40 SYRINGE (ML) INJECTION PRN
Status: DISCONTINUED | OUTPATIENT
Start: 2023-11-29 | End: 2023-11-30 | Stop reason: HOSPADM

## 2023-11-29 RX ORDER — DIPHENHYDRAMINE HCL 25 MG
25 TABLET ORAL ONCE
Status: CANCELLED | OUTPATIENT
Start: 2023-11-30 | End: 2023-11-30

## 2023-11-29 RX ADMIN — PALONOSETRON 0.25 MG: 0.05 INJECTION, SOLUTION INTRAVENOUS at 14:32

## 2023-11-29 RX ADMIN — SODIUM CHLORIDE, PRESERVATIVE FREE 10 ML: 5 INJECTION INTRAVENOUS at 17:01

## 2023-11-29 RX ADMIN — CARBOPLATIN 150 MG: 10 INJECTION, SOLUTION INTRAVENOUS at 16:24

## 2023-11-29 RX ADMIN — HEPARIN 500 UNITS: 100 SYRINGE at 17:04

## 2023-11-29 RX ADMIN — GEMCITABINE HYDROCHLORIDE 984 MG: 1 INJECTION, POWDER, LYOPHILIZED, FOR SOLUTION INTRAVENOUS at 15:45

## 2023-11-29 RX ADMIN — DEXAMETHASONE SODIUM PHOSPHATE 10 MG: 10 INJECTION INTRAMUSCULAR; INTRAVENOUS at 14:51

## 2023-11-29 RX ADMIN — SODIUM CHLORIDE 250 ML: 9 INJECTION, SOLUTION INTRAVENOUS at 14:15

## 2023-11-29 ASSESSMENT — ENCOUNTER SYMPTOMS
NAUSEA: 0
BACK PAIN: 1
ABDOMINAL DISTENTION: 0
CONSTIPATION: 0
DIARRHEA: 0
SHORTNESS OF BREATH: 0
COUGH: 1
COLOR CHANGE: 0
VOICE CHANGE: 0
RECTAL PAIN: 0
VOMITING: 0
TROUBLE SWALLOWING: 0

## 2023-11-29 NOTE — FLOWSHEET NOTE
Patient to the floor via wheelchair for her cycle 15 day 1 chemo treatment. Vital signs taken. Denies any pain at this time. Call light within reach.

## 2023-11-29 NOTE — PROGRESS NOTES
congestion per patient   Patient is requiring her PRN oxygen continuous   She reports her last tobacco use was 4 days ago. Denies fever/chills/nausea/vomiting. Cont Mucinex, inhaler and breathing tx. 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 PDMP reviewed. Anxiety   Depression   Patient denies seeing psychiatrist or counselor  Patient denies wanting referral to [facility]  Patient has intermittent anxiety attacks daily  Continue to take Zoloft 50mg PO daily. Patient has attempted Ativan, Atarax and Xanax without any relief. Continue Valium 2mg BID PRN, this is an improvement. Previously, prior to this provider's involvement patient was taking 3-4 a day consistently. Will continue to titrate off Valium. Patient does not take Valium with Percocet. Monitor for signs and symptoms of delirium as due to advanced age, history of cognitive difficulty, acute illness, and multiple co morbidities patient is high risk. Offer continuous behavior redirection and orientation, avoid delirium inducing medication, expose to natural sunlight as much as possible, calm surroundings as much as possible. 5. Unintentional Weight Loss   A. Have trial and failed obtained weight with ensure  B. Start Marinol 2.5mg BID  B. Provided samples to patient and coupons for Ensure today. 6. Advanced care planning/counseling discussion  Patient has HPOA, documents listed in chart  Patient has made the decision to be a FULL CODE. 7. Goals of care, counseling/discussion   A. Disease process and goals of treatment were discussed in basic terms. Ángela's goal is to optimize available comfort care measures with current treatment plan. We discussed the palliative care philosophy in light of those goals. We discussed all care options contingent on treatment response and QOL.  Much active listening, presence, and

## 2023-11-30 ENCOUNTER — HOSPITAL ENCOUNTER (OUTPATIENT)
Dept: GENERAL RADIOLOGY | Age: 73
Discharge: HOME | End: 2023-12-02
Payer: MEDICARE

## 2023-11-30 ENCOUNTER — HOSPITAL ENCOUNTER (OUTPATIENT)
Dept: INFUSION THERAPY | Age: 73
Setting detail: INFUSION SERIES
Discharge: HOME | End: 2023-11-30
Payer: MEDICARE

## 2023-11-30 ENCOUNTER — TELEPHONE (OUTPATIENT)
Dept: PALLATIVE CARE | Age: 73
End: 2023-11-30

## 2023-11-30 VITALS
SYSTOLIC BLOOD PRESSURE: 172 MMHG | TEMPERATURE: 98.6 F | HEART RATE: 91 BPM | RESPIRATION RATE: 18 BRPM | OXYGEN SATURATION: 95 % | DIASTOLIC BLOOD PRESSURE: 74 MMHG

## 2023-11-30 DIAGNOSIS — D50.8 OTHER IRON DEFICIENCY ANEMIAS: ICD-10-CM

## 2023-11-30 DIAGNOSIS — C78.01 SECONDARY MALIGNANT NEOPLASM OF RIGHT LUNG (HCC): ICD-10-CM

## 2023-11-30 DIAGNOSIS — C34.12 MALIGNANT NEOPLASM OF UPPER LOBE OF LEFT LUNG (HCC): Primary | ICD-10-CM

## 2023-11-30 DIAGNOSIS — J44.9 CHRONIC OBSTRUCTIVE PULMONARY DISEASE, UNSPECIFIED COPD TYPE (HCC): ICD-10-CM

## 2023-11-30 DIAGNOSIS — R06.02 SHORTNESS OF BREATH: ICD-10-CM

## 2023-11-30 DIAGNOSIS — C78.02 SECONDARY MALIGNANT NEOPLASM OF LEFT LUNG (HCC): ICD-10-CM

## 2023-11-30 LAB
ABO + RH BLD: NORMAL
BLD GP AB SCN SERPL QL: NORMAL
BLOOD BANK DISPENSE STATUS: NORMAL
BLOOD BANK PRODUCT CODE: NORMAL
BPU ID: NORMAL
DAT POLY-SP REAG RBC QL: NORMAL
DESCRIPTION BLOOD BANK: NORMAL
FERRITIN: 289 NG/ML (ref 13–150)
IRON SATURATION: 17 % (ref 20–55)
IRON: 33 UG/DL (ref 37–145)
TOTAL IRON BINDING CAPACITY: 192 UG/DL (ref 250–450)
UNSATURATED IRON BINDING CAPACITY: 159 UG/DL (ref 112–347)

## 2023-11-30 PROCEDURE — 36430 TRANSFUSION BLD/BLD COMPNT: CPT

## 2023-11-30 PROCEDURE — 2580000003 HC RX 258

## 2023-11-30 PROCEDURE — P9016 RBC LEUKOCYTES REDUCED: HCPCS

## 2023-11-30 PROCEDURE — 6360000002 HC RX W HCPCS: Performed by: INTERNAL MEDICINE

## 2023-11-30 PROCEDURE — 96372 THER/PROPH/DIAG INJ SC/IM: CPT

## 2023-11-30 PROCEDURE — 2580000003 HC RX 258: Performed by: INTERNAL MEDICINE

## 2023-11-30 PROCEDURE — 6370000000 HC RX 637 (ALT 250 FOR IP)

## 2023-11-30 PROCEDURE — 71046 X-RAY EXAM CHEST 2 VIEWS: CPT

## 2023-11-30 PROCEDURE — 6360000002 HC RX W HCPCS

## 2023-11-30 RX ORDER — HEPARIN 100 UNIT/ML
500 SYRINGE INTRAVENOUS PRN
OUTPATIENT
Start: 2023-11-30

## 2023-11-30 RX ORDER — HEPARIN 100 UNIT/ML
500 SYRINGE INTRAVENOUS PRN
Status: DISCONTINUED | OUTPATIENT
Start: 2023-11-30 | End: 2023-12-01 | Stop reason: HOSPADM

## 2023-11-30 RX ORDER — ACETAMINOPHEN 325 MG/1
650 TABLET ORAL ONCE
Status: COMPLETED | OUTPATIENT
Start: 2023-11-30 | End: 2023-11-30

## 2023-11-30 RX ORDER — SODIUM CHLORIDE 0.9 % (FLUSH) 0.9 %
5-40 SYRINGE (ML) INJECTION PRN
OUTPATIENT
Start: 2023-11-30

## 2023-11-30 RX ORDER — DIPHENHYDRAMINE HCL 25 MG
25 TABLET ORAL ONCE
Status: CANCELLED | OUTPATIENT
Start: 2023-11-30 | End: 2023-11-30

## 2023-11-30 RX ORDER — ACETAMINOPHEN 325 MG/1
650 TABLET ORAL ONCE
Status: CANCELLED | OUTPATIENT
Start: 2023-11-30 | End: 2023-11-30

## 2023-11-30 RX ORDER — SODIUM CHLORIDE 9 MG/ML
INJECTION, SOLUTION INTRAVENOUS
Status: COMPLETED
Start: 2023-11-30 | End: 2023-11-30

## 2023-11-30 RX ORDER — DIPHENHYDRAMINE HCL 25 MG
25 TABLET ORAL ONCE
Status: COMPLETED | OUTPATIENT
Start: 2023-11-30 | End: 2023-11-30

## 2023-11-30 RX ORDER — SODIUM CHLORIDE 0.9 % (FLUSH) 0.9 %
5-40 SYRINGE (ML) INJECTION PRN
Status: DISCONTINUED | OUTPATIENT
Start: 2023-11-30 | End: 2023-12-01 | Stop reason: HOSPADM

## 2023-11-30 RX ADMIN — HEPARIN 500 UNITS: 100 SYRINGE at 15:20

## 2023-11-30 RX ADMIN — DIPHENHYDRAMINE HCL 25 MG: 25 TABLET ORAL at 12:15

## 2023-11-30 RX ADMIN — FILGRASTIM-SNDZ 300 MCG: 300 INJECTION, SOLUTION INTRAVENOUS; SUBCUTANEOUS at 15:20

## 2023-11-30 RX ADMIN — SODIUM CHLORIDE, PRESERVATIVE FREE 20 ML: 5 INJECTION INTRAVENOUS at 15:20

## 2023-11-30 RX ADMIN — SODIUM CHLORIDE 250 ML: 9 INJECTION, SOLUTION INTRAVENOUS at 12:23

## 2023-11-30 RX ADMIN — ACETAMINOPHEN 650 MG: 325 TABLET ORAL at 12:14

## 2023-11-30 NOTE — FLOWSHEET NOTE
Patient arrived to unit via wheelchair for blood infusion and injection. Patient vitals completed. Call light within reach.

## 2023-11-30 NOTE — TELEPHONE ENCOUNTER
I called Lyubov Medellin this morning, the call went to voicemail. I left her a voicemail requesting her to call me back. I do see that she is scheduled to have a blood transfusion today at 10:30 along with Zarzio injection. There is also a chest x-ray scheduled today at 1430 with a note that she is supposed to be transported from outpatient infusion to [facility]. 1055: Ángela called back. She just got her chest x-ray and is now going to outpatient infusion. I explained to her that the Percocet is not due until 12/7/23. She said \"ok that's fine\". I explained to her that we need an updated medication list of exactly what she is taking. I requested when she gets home to call me back and that she can read off the bottles of medications that she has. She explains that she will be wiped out after today but will call me within the next few days to do this.

## 2023-11-30 NOTE — FLOWSHEET NOTE
Patient observation complete. No signs or symptoms of distress. Patient has no complaints at this time. Tolerating treatment well.

## 2023-12-01 LAB — TRANSFERRIN SERPL-MCNC: 172 MG/DL (ref 200–360)

## 2023-12-06 ENCOUNTER — TELEPHONE (OUTPATIENT)
Dept: PALLATIVE CARE | Age: 73
End: 2023-12-06

## 2023-12-06 ENCOUNTER — HOSPITAL ENCOUNTER (OUTPATIENT)
Dept: INFUSION THERAPY | Age: 73
Setting detail: INFUSION SERIES
Discharge: HOME | End: 2023-12-06

## 2023-12-06 DIAGNOSIS — R63.4 UNINTENTIONAL WEIGHT LOSS: Primary | ICD-10-CM

## 2023-12-06 DIAGNOSIS — C34.90 MALIGNANT NEOPLASM OF LUNG, UNSPECIFIED LATERALITY, UNSPECIFIED PART OF LUNG (HCC): ICD-10-CM

## 2023-12-06 RX ORDER — DRONABINOL 2.5 MG/1
2.5 CAPSULE ORAL
Qty: 60 CAPSULE | Refills: 0 | Status: SHIPPED | OUTPATIENT
Start: 2023-12-06 | End: 2024-01-05

## 2023-12-06 RX ORDER — PROCHLORPERAZINE MALEATE 10 MG
10 TABLET ORAL EVERY 6 HOURS PRN
COMMUNITY
Start: 2023-10-25

## 2023-12-06 NOTE — FLOWSHEET NOTE
Spoke with Maria Antonia Bernstein at [facility]. She is not going to get treatment today due to labs. She will see Osmani Hernandez on the 11th to see if we can resume treatment.

## 2023-12-09 ENCOUNTER — APPOINTMENT (OUTPATIENT)
Dept: RADIOLOGY | Facility: HOSPITAL | Age: 73
DRG: 189 | End: 2023-12-09
Payer: MEDICARE

## 2023-12-09 ENCOUNTER — HOSPITAL ENCOUNTER (INPATIENT)
Facility: HOSPITAL | Age: 73
LOS: 8 days | Discharge: HOME | DRG: 189 | End: 2023-12-17
Attending: STUDENT IN AN ORGANIZED HEALTH CARE EDUCATION/TRAINING PROGRAM | Admitting: HOSPITALIST
Payer: MEDICARE

## 2023-12-09 DIAGNOSIS — I10 UNCONTROLLED HYPERTENSION: ICD-10-CM

## 2023-12-09 DIAGNOSIS — J18.9 PNEUMONIA DUE TO INFECTIOUS ORGANISM, UNSPECIFIED LATERALITY, UNSPECIFIED PART OF LUNG: ICD-10-CM

## 2023-12-09 DIAGNOSIS — D69.6 THROMBOCYTOPENIA (CMS-HCC): ICD-10-CM

## 2023-12-09 DIAGNOSIS — J44.1 COPD EXACERBATION (MULTI): Primary | ICD-10-CM

## 2023-12-09 DIAGNOSIS — F51.01 PRIMARY INSOMNIA: ICD-10-CM

## 2023-12-09 DIAGNOSIS — D64.9 ANEMIA, UNSPECIFIED TYPE: ICD-10-CM

## 2023-12-09 DIAGNOSIS — C34.12 MALIGNANT NEOPLASM OF UPPER LOBE OF LEFT LUNG (MULTI): ICD-10-CM

## 2023-12-09 DIAGNOSIS — Z72.0 TOBACCO ABUSE: ICD-10-CM

## 2023-12-09 DIAGNOSIS — J96.02 ACUTE HYPERCAPNIC RESPIRATORY FAILURE (MULTI): ICD-10-CM

## 2023-12-09 LAB
ALBUMIN SERPL BCP-MCNC: 3.6 G/DL (ref 3.4–5)
ALP SERPL-CCNC: 63 U/L (ref 33–136)
ALT SERPL W P-5'-P-CCNC: 12 U/L (ref 7–45)
ANION GAP BLDA CALCULATED.4IONS-SCNC: 7 MMO/L (ref 10–25)
ANION GAP SERPL CALC-SCNC: 9 MMOL/L (ref 10–20)
APTT PPP: 32 SECONDS (ref 27–38)
ARTERIAL PATENCY WRIST A: POSITIVE
AST SERPL W P-5'-P-CCNC: 24 U/L (ref 9–39)
BASE EXCESS BLDA CALC-SCNC: 7.1 MMOL/L (ref -2–3)
BASOPHILS # BLD MANUAL: 0 X10*3/UL (ref 0–0.1)
BASOPHILS NFR BLD MANUAL: 0 %
BILIRUB SERPL-MCNC: 1 MG/DL (ref 0–1.2)
BNP SERPL-MCNC: 453 PG/ML (ref 0–99)
BODY TEMPERATURE: ABNORMAL
BUN SERPL-MCNC: 22 MG/DL (ref 6–23)
CA-I BLDA-SCNC: 1.24 MMOL/L (ref 1.1–1.33)
CALCIUM SERPL-MCNC: 8.8 MG/DL (ref 8.6–10.3)
CARDIAC TROPONIN I PNL SERPL HS: 23 NG/L (ref 0–13)
CHLORIDE BLDA-SCNC: 101 MMOL/L (ref 98–107)
CHLORIDE SERPL-SCNC: 100 MMOL/L (ref 98–107)
CO2 SERPL-SCNC: 36 MMOL/L (ref 21–32)
CREAT SERPL-MCNC: 0.84 MG/DL (ref 0.5–1.05)
CRITICAL CALL TIME: 2047
CRITICAL CALLED BY: ABNORMAL
CRITICAL CALLED TO: ABNORMAL
CRITICAL READ BACK: ABNORMAL
DACRYOCYTES BLD QL SMEAR: ABNORMAL
EOSINOPHIL # BLD MANUAL: 0 X10*3/UL (ref 0–0.4)
EOSINOPHIL NFR BLD MANUAL: 0 %
ERYTHROCYTE [DISTWIDTH] IN BLOOD BY AUTOMATED COUNT: 15.6 % (ref 11.5–14.5)
FLUAV RNA RESP QL NAA+PROBE: NOT DETECTED
FLUBV RNA RESP QL NAA+PROBE: NOT DETECTED
GFR SERPL CREATININE-BSD FRML MDRD: 74 ML/MIN/1.73M*2
GLUCOSE BLDA-MCNC: 138 MG/DL (ref 74–99)
GLUCOSE SERPL-MCNC: 133 MG/DL (ref 74–99)
HCO3 BLDA-SCNC: 34.8 MMOL/L (ref 22–26)
HCT VFR BLD AUTO: 22.3 % (ref 36–46)
HCT VFR BLD EST: 21 % (ref 36–46)
HGB BLD-MCNC: 6.8 G/DL (ref 12–16)
HGB BLDA-MCNC: 7 G/DL (ref 12–16)
IMM GRANULOCYTES # BLD AUTO: 0.03 X10*3/UL (ref 0–0.5)
IMM GRANULOCYTES NFR BLD AUTO: 0.4 % (ref 0–0.9)
INHALED O2 CONCENTRATION: 100 %
INR PPP: 1 (ref 0.9–1.1)
LACTATE BLDA-SCNC: 0.7 MMOL/L (ref 0.4–2)
LACTATE SERPL-SCNC: 1.3 MMOL/L (ref 0.4–2)
LYMPHOCYTES # BLD MANUAL: 0.99 X10*3/UL (ref 0.8–3)
LYMPHOCYTES NFR BLD MANUAL: 14 %
MCH RBC QN AUTO: 32.7 PG (ref 26–34)
MCHC RBC AUTO-ENTMCNC: 30.5 G/DL (ref 32–36)
MCV RBC AUTO: 107 FL (ref 80–100)
MONOCYTES # BLD MANUAL: 0.43 X10*3/UL (ref 0.05–0.8)
MONOCYTES NFR BLD MANUAL: 6 %
NEUTS SEG # BLD MANUAL: 5.68 X10*3/UL (ref 1.6–5)
NEUTS SEG NFR BLD MANUAL: 80 %
NRBC BLD-RTO: 0 /100 WBCS (ref 0–0)
OVALOCYTES BLD QL SMEAR: ABNORMAL
OXYHGB MFR BLDA: 89.8 % (ref 94–98)
PCO2 BLDA: 74 MM HG (ref 38–42)
PH BLDA: 7.28 PH (ref 7.38–7.42)
PLATELET # BLD AUTO: 36 X10*3/UL (ref 150–450)
PO2 BLDA: 67 MM HG (ref 85–95)
POLYCHROMASIA BLD QL SMEAR: ABNORMAL
POTASSIUM BLDA-SCNC: 4.1 MMOL/L (ref 3.5–5.3)
POTASSIUM SERPL-SCNC: 4 MMOL/L (ref 3.5–5.3)
PROT SERPL-MCNC: 6.3 G/DL (ref 6.4–8.2)
PROTHROMBIN TIME: 11.3 SECONDS (ref 9.8–12.8)
RBC # BLD AUTO: 2.08 X10*6/UL (ref 4–5.2)
RBC MORPH BLD: ABNORMAL
SAO2 % BLDA: 92 % (ref 94–100)
SARS-COV-2 RNA RESP QL NAA+PROBE: NOT DETECTED
SODIUM BLDA-SCNC: 139 MMOL/L (ref 136–145)
SODIUM SERPL-SCNC: 141 MMOL/L (ref 136–145)
SPECIMEN DRAWN FROM PATIENT: ABNORMAL
TOTAL CELLS COUNTED BLD: 100
WBC # BLD AUTO: 7.1 X10*3/UL (ref 4.4–11.3)

## 2023-12-09 PROCEDURE — 36415 COLL VENOUS BLD VENIPUNCTURE: CPT | Performed by: STUDENT IN AN ORGANIZED HEALTH CARE EDUCATION/TRAINING PROGRAM

## 2023-12-09 PROCEDURE — 80053 COMPREHEN METABOLIC PANEL: CPT | Performed by: STUDENT IN AN ORGANIZED HEALTH CARE EDUCATION/TRAINING PROGRAM

## 2023-12-09 PROCEDURE — 2020000001 HC ICU ROOM DAILY

## 2023-12-09 PROCEDURE — 82435 ASSAY OF BLOOD CHLORIDE: CPT | Performed by: STUDENT IN AN ORGANIZED HEALTH CARE EDUCATION/TRAINING PROGRAM

## 2023-12-09 PROCEDURE — 2500000002 HC RX 250 W HCPCS SELF ADMINISTERED DRUGS (ALT 637 FOR MEDICARE OP, ALT 636 FOR OP/ED): Performed by: STUDENT IN AN ORGANIZED HEALTH CARE EDUCATION/TRAINING PROGRAM

## 2023-12-09 PROCEDURE — 71275 CT ANGIOGRAPHY CHEST: CPT | Mod: FOREIGN READ | Performed by: RADIOLOGY

## 2023-12-09 PROCEDURE — 85610 PROTHROMBIN TIME: CPT | Performed by: STUDENT IN AN ORGANIZED HEALTH CARE EDUCATION/TRAINING PROGRAM

## 2023-12-09 PROCEDURE — 86850 RBC ANTIBODY SCREEN: CPT | Performed by: STUDENT IN AN ORGANIZED HEALTH CARE EDUCATION/TRAINING PROGRAM

## 2023-12-09 PROCEDURE — 86920 COMPATIBILITY TEST SPIN: CPT

## 2023-12-09 PROCEDURE — 71275 CT ANGIOGRAPHY CHEST: CPT

## 2023-12-09 PROCEDURE — 71045 X-RAY EXAM CHEST 1 VIEW: CPT | Mod: FOREIGN READ | Performed by: RADIOLOGY

## 2023-12-09 PROCEDURE — 5A09357 ASSISTANCE WITH RESPIRATORY VENTILATION, LESS THAN 24 CONSECUTIVE HOURS, CONTINUOUS POSITIVE AIRWAY PRESSURE: ICD-10-PCS | Performed by: INTERNAL MEDICINE

## 2023-12-09 PROCEDURE — 2500000004 HC RX 250 GENERAL PHARMACY W/ HCPCS (ALT 636 FOR OP/ED): Performed by: STUDENT IN AN ORGANIZED HEALTH CARE EDUCATION/TRAINING PROGRAM

## 2023-12-09 PROCEDURE — 2550000001 HC RX 255 CONTRASTS: Performed by: STUDENT IN AN ORGANIZED HEALTH CARE EDUCATION/TRAINING PROGRAM

## 2023-12-09 PROCEDURE — 85060 BLOOD SMEAR INTERPRETATION: CPT | Performed by: PATHOLOGY

## 2023-12-09 PROCEDURE — 94640 AIRWAY INHALATION TREATMENT: CPT

## 2023-12-09 PROCEDURE — 83880 ASSAY OF NATRIURETIC PEPTIDE: CPT | Performed by: STUDENT IN AN ORGANIZED HEALTH CARE EDUCATION/TRAINING PROGRAM

## 2023-12-09 PROCEDURE — 85730 THROMBOPLASTIN TIME PARTIAL: CPT | Performed by: STUDENT IN AN ORGANIZED HEALTH CARE EDUCATION/TRAINING PROGRAM

## 2023-12-09 PROCEDURE — 83550 IRON BINDING TEST: CPT | Performed by: HOSPITALIST

## 2023-12-09 PROCEDURE — 94660 CPAP INITIATION&MGMT: CPT

## 2023-12-09 PROCEDURE — 84484 ASSAY OF TROPONIN QUANT: CPT | Performed by: STUDENT IN AN ORGANIZED HEALTH CARE EDUCATION/TRAINING PROGRAM

## 2023-12-09 PROCEDURE — 83605 ASSAY OF LACTIC ACID: CPT | Performed by: STUDENT IN AN ORGANIZED HEALTH CARE EDUCATION/TRAINING PROGRAM

## 2023-12-09 PROCEDURE — 87075 CULTR BACTERIA EXCEPT BLOOD: CPT | Mod: ELYLAB | Performed by: STUDENT IN AN ORGANIZED HEALTH CARE EDUCATION/TRAINING PROGRAM

## 2023-12-09 PROCEDURE — 87636 SARSCOV2 & INF A&B AMP PRB: CPT | Performed by: STUDENT IN AN ORGANIZED HEALTH CARE EDUCATION/TRAINING PROGRAM

## 2023-12-09 PROCEDURE — 71045 X-RAY EXAM CHEST 1 VIEW: CPT | Mod: FY,FR

## 2023-12-09 PROCEDURE — 82728 ASSAY OF FERRITIN: CPT | Mod: ELYLAB | Performed by: HOSPITALIST

## 2023-12-09 PROCEDURE — 99291 CRITICAL CARE FIRST HOUR: CPT | Mod: 25,27 | Performed by: STUDENT IN AN ORGANIZED HEALTH CARE EDUCATION/TRAINING PROGRAM

## 2023-12-09 PROCEDURE — 85027 COMPLETE CBC AUTOMATED: CPT | Performed by: STUDENT IN AN ORGANIZED HEALTH CARE EDUCATION/TRAINING PROGRAM

## 2023-12-09 PROCEDURE — 85007 BL SMEAR W/DIFF WBC COUNT: CPT | Performed by: STUDENT IN AN ORGANIZED HEALTH CARE EDUCATION/TRAINING PROGRAM

## 2023-12-09 RX ORDER — IPRATROPIUM BROMIDE AND ALBUTEROL SULFATE 2.5; .5 MG/3ML; MG/3ML
3 SOLUTION RESPIRATORY (INHALATION) EVERY 20 MIN
Status: COMPLETED | OUTPATIENT
Start: 2023-12-09 | End: 2023-12-09

## 2023-12-09 RX ORDER — IPRATROPIUM BROMIDE AND ALBUTEROL SULFATE 2.5; .5 MG/3ML; MG/3ML
3 SOLUTION RESPIRATORY (INHALATION)
Status: DISCONTINUED | OUTPATIENT
Start: 2023-12-10 | End: 2023-12-10

## 2023-12-09 RX ORDER — IPRATROPIUM BROMIDE AND ALBUTEROL SULFATE 2.5; .5 MG/3ML; MG/3ML
3 SOLUTION RESPIRATORY (INHALATION) EVERY 4 HOURS PRN
Status: DISCONTINUED | OUTPATIENT
Start: 2023-12-09 | End: 2023-12-17 | Stop reason: HOSPADM

## 2023-12-09 RX ORDER — LEVOFLOXACIN 5 MG/ML
500 INJECTION, SOLUTION INTRAVENOUS EVERY 24 HOURS
Status: DISCONTINUED | OUTPATIENT
Start: 2023-12-10 | End: 2023-12-10

## 2023-12-09 RX ADMIN — IPRATROPIUM BROMIDE AND ALBUTEROL SULFATE 3 ML: 2.5; .5 SOLUTION RESPIRATORY (INHALATION) at 21:07

## 2023-12-09 RX ADMIN — IPRATROPIUM BROMIDE AND ALBUTEROL SULFATE 3 ML: 2.5; .5 SOLUTION RESPIRATORY (INHALATION) at 21:09

## 2023-12-09 RX ADMIN — IPRATROPIUM BROMIDE AND ALBUTEROL SULFATE 3 ML: 2.5; .5 SOLUTION RESPIRATORY (INHALATION) at 21:08

## 2023-12-09 RX ADMIN — IOHEXOL 75 ML: 350 INJECTION, SOLUTION INTRAVENOUS at 22:13

## 2023-12-09 RX ADMIN — VANCOMYCIN HYDROCHLORIDE 1.5 G: 1.5 INJECTION, POWDER, LYOPHILIZED, FOR SOLUTION INTRAVENOUS at 23:37

## 2023-12-09 ASSESSMENT — PAIN - FUNCTIONAL ASSESSMENT: PAIN_FUNCTIONAL_ASSESSMENT: 0-10

## 2023-12-09 ASSESSMENT — PAIN SCALES - GENERAL
PAINLEVEL_OUTOF10: 0 - NO PAIN

## 2023-12-09 ASSESSMENT — COLUMBIA-SUICIDE SEVERITY RATING SCALE - C-SSRS
6. HAVE YOU EVER DONE ANYTHING, STARTED TO DO ANYTHING, OR PREPARED TO DO ANYTHING TO END YOUR LIFE?: NO
2. HAVE YOU ACTUALLY HAD ANY THOUGHTS OF KILLING YOURSELF?: NO
1. IN THE PAST MONTH, HAVE YOU WISHED YOU WERE DEAD OR WISHED YOU COULD GO TO SLEEP AND NOT WAKE UP?: NO

## 2023-12-09 ASSESSMENT — LIFESTYLE VARIABLES
HAVE PEOPLE ANNOYED YOU BY CRITICIZING YOUR DRINKING: NO
EVER FELT BAD OR GUILTY ABOUT YOUR DRINKING: NO
HAVE YOU EVER FELT YOU SHOULD CUT DOWN ON YOUR DRINKING: NO
EVER HAD A DRINK FIRST THING IN THE MORNING TO STEADY YOUR NERVES TO GET RID OF A HANGOVER: NO
REASON UNABLE TO ASSESS: NO

## 2023-12-10 ENCOUNTER — APPOINTMENT (OUTPATIENT)
Dept: CARDIOLOGY | Facility: HOSPITAL | Age: 73
DRG: 189 | End: 2023-12-10
Payer: MEDICARE

## 2023-12-10 LAB
ABO GROUP (TYPE) IN BLOOD: NORMAL
ABO GROUP (TYPE) IN BLOOD: NORMAL
ALBUMIN SERPL BCP-MCNC: 3.2 G/DL (ref 3.4–5)
ALP SERPL-CCNC: 49 U/L (ref 33–136)
ALT SERPL W P-5'-P-CCNC: 10 U/L (ref 7–45)
ANION GAP BLDA CALCULATED.4IONS-SCNC: 8 MMO/L (ref 10–25)
ANION GAP SERPL CALC-SCNC: 10 MMOL/L (ref 10–20)
ANTIBODY SCREEN: NORMAL
APPEARANCE UR: ABNORMAL
ARTERIAL PATENCY WRIST A: POSITIVE
AST SERPL W P-5'-P-CCNC: 18 U/L (ref 9–39)
BASE EXCESS BLDA CALC-SCNC: 6.5 MMOL/L (ref -2–3)
BASOPHILS # BLD AUTO: 0 X10*3/UL (ref 0–0.1)
BASOPHILS NFR BLD AUTO: 0 %
BILIRUB SERPL-MCNC: 1 MG/DL (ref 0–1.2)
BILIRUB UR STRIP.AUTO-MCNC: NEGATIVE MG/DL
BLOOD EXPIRATION DATE: NORMAL
BODY TEMPERATURE: ABNORMAL
BUN SERPL-MCNC: 23 MG/DL (ref 6–23)
CA-I BLDA-SCNC: 1.18 MMOL/L (ref 1.1–1.33)
CALCIUM SERPL-MCNC: 8.8 MG/DL (ref 8.6–10.3)
CHLORIDE BLDA-SCNC: 99 MMOL/L (ref 98–107)
CHLORIDE SERPL-SCNC: 96 MMOL/L (ref 98–107)
CO2 SERPL-SCNC: 35 MMOL/L (ref 21–32)
COLOR UR: YELLOW
CREAT SERPL-MCNC: 0.95 MG/DL (ref 0.5–1.05)
DISPENSE STATUS: NORMAL
EOSINOPHIL # BLD AUTO: 0 X10*3/UL (ref 0–0.4)
EOSINOPHIL NFR BLD AUTO: 0 %
ERYTHROCYTE [DISTWIDTH] IN BLOOD BY AUTOMATED COUNT: 16.7 % (ref 11.5–14.5)
FERRITIN SERPL-MCNC: 534 NG/ML (ref 8–150)
GFR SERPL CREATININE-BSD FRML MDRD: 64 ML/MIN/1.73M*2
GLUCOSE BLD MANUAL STRIP-MCNC: 107 MG/DL (ref 74–99)
GLUCOSE BLD MANUAL STRIP-MCNC: 119 MG/DL (ref 74–99)
GLUCOSE BLD MANUAL STRIP-MCNC: 128 MG/DL (ref 74–99)
GLUCOSE BLDA-MCNC: 147 MG/DL (ref 74–99)
GLUCOSE SERPL-MCNC: 109 MG/DL (ref 74–99)
GLUCOSE UR STRIP.AUTO-MCNC: NEGATIVE MG/DL
HCO3 BLDA-SCNC: 33.1 MMOL/L (ref 22–26)
HCT VFR BLD AUTO: 23.5 % (ref 36–46)
HCT VFR BLD EST: 24 % (ref 36–46)
HGB BLD-MCNC: 7.2 G/DL (ref 12–16)
HGB BLDA-MCNC: 7.9 G/DL (ref 12–16)
HOLD SPECIMEN: NORMAL
HOLD SPECIMEN: NORMAL
IMM GRANULOCYTES # BLD AUTO: 0.13 X10*3/UL (ref 0–0.5)
IMM GRANULOCYTES NFR BLD AUTO: 2.2 % (ref 0–0.9)
INHALED O2 CONCENTRATION: 50 %
IRON SATN MFR SERPL: 23 % (ref 25–45)
IRON SERPL-MCNC: 63 UG/DL (ref 35–150)
KETONES UR STRIP.AUTO-MCNC: NEGATIVE MG/DL
LACTATE BLDA-SCNC: 0.8 MMOL/L (ref 0.4–2)
LEUKOCYTE ESTERASE UR QL STRIP.AUTO: NEGATIVE
LYMPHOCYTES # BLD AUTO: 0.29 X10*3/UL (ref 0.8–3)
LYMPHOCYTES NFR BLD AUTO: 4.8 %
MAGNESIUM SERPL-MCNC: 1.76 MG/DL (ref 1.6–2.4)
MCH RBC QN AUTO: 31.6 PG (ref 26–34)
MCHC RBC AUTO-ENTMCNC: 30.6 G/DL (ref 32–36)
MCV RBC AUTO: 103 FL (ref 80–100)
MONOCYTES # BLD AUTO: 0.18 X10*3/UL (ref 0.05–0.8)
MONOCYTES NFR BLD AUTO: 3 %
MRSA DNA SPEC QL NAA+PROBE: NOT DETECTED
MUCOUS THREADS #/AREA URNS AUTO: ABNORMAL /LPF
NEUTROPHILS # BLD AUTO: 5.39 X10*3/UL (ref 1.6–5.5)
NEUTROPHILS NFR BLD AUTO: 90 %
NITRITE UR QL STRIP.AUTO: NEGATIVE
NRBC BLD-RTO: 0.3 /100 WBCS (ref 0–0)
OVALOCYTES BLD QL SMEAR: NORMAL
OXYHGB MFR BLDA: 97.2 % (ref 94–98)
PCO2 BLDA: 60 MM HG (ref 38–42)
PH BLDA: 7.35 PH (ref 7.38–7.42)
PH UR STRIP.AUTO: 5 [PH]
PHOSPHATE SERPL-MCNC: 4.4 MG/DL (ref 2.5–4.9)
PLATELET # BLD AUTO: 31 X10*3/UL (ref 150–450)
PO2 BLDA: 101 MM HG (ref 85–95)
POLYCHROMASIA BLD QL SMEAR: NORMAL
POTASSIUM BLDA-SCNC: 4.6 MMOL/L (ref 3.5–5.3)
POTASSIUM SERPL-SCNC: 4.5 MMOL/L (ref 3.5–5.3)
PRODUCT BLOOD TYPE: 9500
PRODUCT CODE: NORMAL
PROT SERPL-MCNC: 5.9 G/DL (ref 6.4–8.2)
PROT UR STRIP.AUTO-MCNC: ABNORMAL MG/DL
RBC # BLD AUTO: 2.28 X10*6/UL (ref 4–5.2)
RBC # UR STRIP.AUTO: ABNORMAL /UL
RBC #/AREA URNS AUTO: >20 /HPF
RBC MORPH BLD: NORMAL
RH FACTOR (ANTIGEN D): NORMAL
RH FACTOR (ANTIGEN D): NORMAL
SAO2 % BLDA: 100 % (ref 94–100)
SODIUM BLDA-SCNC: 135 MMOL/L (ref 136–145)
SODIUM SERPL-SCNC: 136 MMOL/L (ref 136–145)
SP GR UR STRIP.AUTO: 1.03
SPECIMEN DRAWN FROM PATIENT: ABNORMAL
SQUAMOUS #/AREA URNS AUTO: ABNORMAL /HPF
TIBC SERPL-MCNC: 275 UG/DL (ref 240–445)
UIBC SERPL-MCNC: 212 UG/DL (ref 110–370)
UNIT ABO: NORMAL
UNIT NUMBER: NORMAL
UNIT RH: NORMAL
UNIT VOLUME: 350
UROBILINOGEN UR STRIP.AUTO-MCNC: <2 MG/DL
VANCOMYCIN SERPL-MCNC: 23.5 UG/ML (ref 5–20)
WBC # BLD AUTO: 6 X10*3/UL (ref 4.4–11.3)
WBC #/AREA URNS AUTO: ABNORMAL /HPF
XM INTEP: NORMAL

## 2023-12-10 PROCEDURE — 81001 URINALYSIS AUTO W/SCOPE: CPT | Performed by: STUDENT IN AN ORGANIZED HEALTH CARE EDUCATION/TRAINING PROGRAM

## 2023-12-10 PROCEDURE — 36415 COLL VENOUS BLD VENIPUNCTURE: CPT | Performed by: EMERGENCY MEDICINE

## 2023-12-10 PROCEDURE — 82947 ASSAY GLUCOSE BLOOD QUANT: CPT

## 2023-12-10 PROCEDURE — 82805 BLOOD GASES W/O2 SATURATION: CPT | Performed by: HOSPITALIST

## 2023-12-10 PROCEDURE — 96372 THER/PROPH/DIAG INJ SC/IM: CPT

## 2023-12-10 PROCEDURE — 87899 AGENT NOS ASSAY W/OPTIC: CPT | Mod: ELYLAB | Performed by: HOSPITALIST

## 2023-12-10 PROCEDURE — 80202 ASSAY OF VANCOMYCIN: CPT | Performed by: HOSPITALIST

## 2023-12-10 PROCEDURE — 2500000004 HC RX 250 GENERAL PHARMACY W/ HCPCS (ALT 636 FOR OP/ED): Performed by: HOSPITALIST

## 2023-12-10 PROCEDURE — 87449 NOS EACH ORGANISM AG IA: CPT | Mod: ELYLAB | Performed by: HOSPITALIST

## 2023-12-10 PROCEDURE — 2500000004 HC RX 250 GENERAL PHARMACY W/ HCPCS (ALT 636 FOR OP/ED): Performed by: EMERGENCY MEDICINE

## 2023-12-10 PROCEDURE — 36415 COLL VENOUS BLD VENIPUNCTURE: CPT | Performed by: HOSPITALIST

## 2023-12-10 PROCEDURE — 1200000002 HC GENERAL ROOM WITH TELEMETRY DAILY

## 2023-12-10 PROCEDURE — 83735 ASSAY OF MAGNESIUM: CPT | Performed by: HOSPITALIST

## 2023-12-10 PROCEDURE — P9040 RBC LEUKOREDUCED IRRADIATED: HCPCS

## 2023-12-10 PROCEDURE — 84100 ASSAY OF PHOSPHORUS: CPT | Performed by: HOSPITALIST

## 2023-12-10 PROCEDURE — 2500000004 HC RX 250 GENERAL PHARMACY W/ HCPCS (ALT 636 FOR OP/ED): Performed by: STUDENT IN AN ORGANIZED HEALTH CARE EDUCATION/TRAINING PROGRAM

## 2023-12-10 PROCEDURE — 87081 CULTURE SCREEN ONLY: CPT | Mod: ELYLAB | Performed by: HOSPITALIST

## 2023-12-10 PROCEDURE — 94640 AIRWAY INHALATION TREATMENT: CPT

## 2023-12-10 PROCEDURE — 87640 STAPH A DNA AMP PROBE: CPT | Performed by: STUDENT IN AN ORGANIZED HEALTH CARE EDUCATION/TRAINING PROGRAM

## 2023-12-10 PROCEDURE — 94660 CPAP INITIATION&MGMT: CPT

## 2023-12-10 PROCEDURE — 2500000001 HC RX 250 WO HCPCS SELF ADMINISTERED DRUGS (ALT 637 FOR MEDICARE OP)

## 2023-12-10 PROCEDURE — 2500000004 HC RX 250 GENERAL PHARMACY W/ HCPCS (ALT 636 FOR OP/ED)

## 2023-12-10 PROCEDURE — 36430 TRANSFUSION BLD/BLD COMPNT: CPT

## 2023-12-10 PROCEDURE — 2500000001 HC RX 250 WO HCPCS SELF ADMINISTERED DRUGS (ALT 637 FOR MEDICARE OP): Performed by: EMERGENCY MEDICINE

## 2023-12-10 PROCEDURE — 2500000002 HC RX 250 W HCPCS SELF ADMINISTERED DRUGS (ALT 637 FOR MEDICARE OP, ALT 636 FOR OP/ED): Performed by: HOSPITALIST

## 2023-12-10 PROCEDURE — 93005 ELECTROCARDIOGRAM TRACING: CPT

## 2023-12-10 PROCEDURE — 85025 COMPLETE CBC W/AUTO DIFF WBC: CPT | Performed by: HOSPITALIST

## 2023-12-10 PROCEDURE — 84295 ASSAY OF SERUM SODIUM: CPT | Performed by: HOSPITALIST

## 2023-12-10 PROCEDURE — 85018 HEMOGLOBIN: CPT | Performed by: HOSPITALIST

## 2023-12-10 PROCEDURE — 99291 CRITICAL CARE FIRST HOUR: CPT | Performed by: HOSPITALIST

## 2023-12-10 RX ORDER — GABAPENTIN 800 MG/1
800 TABLET ORAL 3 TIMES DAILY
Status: DISCONTINUED | OUTPATIENT
Start: 2023-12-10 | End: 2023-12-10

## 2023-12-10 RX ORDER — FOLIC ACID 1 MG/1
1 TABLET ORAL DAILY
Status: DISCONTINUED | OUTPATIENT
Start: 2023-12-10 | End: 2023-12-17 | Stop reason: HOSPADM

## 2023-12-10 RX ORDER — PHENOBARBITAL SODIUM 65 MG/ML
32.5 INJECTION, SOLUTION INTRAMUSCULAR; INTRAVENOUS EVERY 8 HOURS
Status: DISCONTINUED | OUTPATIENT
Start: 2023-12-12 | End: 2023-12-10

## 2023-12-10 RX ORDER — PANTOPRAZOLE SODIUM 40 MG/1
40 TABLET, DELAYED RELEASE ORAL DAILY
Status: DISCONTINUED | OUTPATIENT
Start: 2023-12-10 | End: 2023-12-17 | Stop reason: HOSPADM

## 2023-12-10 RX ORDER — ALBUTEROL SULFATE 90 UG/1
2 AEROSOL, METERED RESPIRATORY (INHALATION) 4 TIMES DAILY PRN
Status: DISCONTINUED | OUTPATIENT
Start: 2023-12-10 | End: 2023-12-17 | Stop reason: HOSPADM

## 2023-12-10 RX ORDER — MULTIVIT-MIN/IRON FUM/FOLIC AC 7.5 MG-4
1 TABLET ORAL DAILY
Status: DISCONTINUED | OUTPATIENT
Start: 2023-12-10 | End: 2023-12-10

## 2023-12-10 RX ORDER — FOLIC ACID 1 MG/1
1 TABLET ORAL DAILY
Status: DISCONTINUED | OUTPATIENT
Start: 2023-12-10 | End: 2023-12-10

## 2023-12-10 RX ORDER — FUROSEMIDE 10 MG/ML
20 INJECTION INTRAMUSCULAR; INTRAVENOUS ONCE
Status: COMPLETED | OUTPATIENT
Start: 2023-12-10 | End: 2023-12-10

## 2023-12-10 RX ORDER — PHENOBARBITAL SODIUM 65 MG/ML
130 INJECTION, SOLUTION INTRAMUSCULAR; INTRAVENOUS
Status: DISCONTINUED | OUTPATIENT
Start: 2023-12-10 | End: 2023-12-10

## 2023-12-10 RX ORDER — OLANZAPINE 2.5 MG/1
10 TABLET ORAL NIGHTLY
Status: DISCONTINUED | OUTPATIENT
Start: 2023-12-10 | End: 2023-12-14

## 2023-12-10 RX ORDER — ALPRAZOLAM 0.5 MG/1
1 TABLET ORAL 3 TIMES DAILY PRN
Status: DISCONTINUED | OUTPATIENT
Start: 2023-12-10 | End: 2023-12-13

## 2023-12-10 RX ORDER — PHENOBARBITAL SODIUM 65 MG/ML
65 INJECTION, SOLUTION INTRAMUSCULAR; INTRAVENOUS EVERY 8 HOURS
Status: DISCONTINUED | OUTPATIENT
Start: 2023-12-10 | End: 2023-12-10

## 2023-12-10 RX ORDER — NAPROXEN SODIUM 220 MG/1
81 TABLET, FILM COATED ORAL DAILY
Status: DISCONTINUED | OUTPATIENT
Start: 2023-12-10 | End: 2023-12-17 | Stop reason: HOSPADM

## 2023-12-10 RX ORDER — DEXTROSE MONOHYDRATE 100 MG/ML
0.3 INJECTION, SOLUTION INTRAVENOUS ONCE AS NEEDED
Status: DISCONTINUED | OUTPATIENT
Start: 2023-12-10 | End: 2023-12-17 | Stop reason: HOSPADM

## 2023-12-10 RX ORDER — BUPROPION HYDROCHLORIDE 150 MG/1
300 TABLET ORAL DAILY
Status: DISCONTINUED | OUTPATIENT
Start: 2023-12-10 | End: 2023-12-17 | Stop reason: HOSPADM

## 2023-12-10 RX ORDER — IPRATROPIUM BROMIDE AND ALBUTEROL SULFATE 2.5; .5 MG/3ML; MG/3ML
3 SOLUTION RESPIRATORY (INHALATION) 3 TIMES DAILY
Status: DISCONTINUED | OUTPATIENT
Start: 2023-12-11 | End: 2023-12-17 | Stop reason: HOSPADM

## 2023-12-10 RX ORDER — ENOXAPARIN SODIUM 100 MG/ML
40 INJECTION SUBCUTANEOUS EVERY 24 HOURS
Status: DISCONTINUED | OUTPATIENT
Start: 2023-12-10 | End: 2023-12-17 | Stop reason: HOSPADM

## 2023-12-10 RX ORDER — INSULIN LISPRO 100 [IU]/ML
0-5 INJECTION, SOLUTION INTRAVENOUS; SUBCUTANEOUS
Status: DISCONTINUED | OUTPATIENT
Start: 2023-12-10 | End: 2023-12-17 | Stop reason: HOSPADM

## 2023-12-10 RX ORDER — OXYCODONE AND ACETAMINOPHEN 5; 325 MG/1; MG/1
1 TABLET ORAL EVERY 4 HOURS PRN
Status: DISCONTINUED | OUTPATIENT
Start: 2023-12-10 | End: 2023-12-16

## 2023-12-10 RX ORDER — METOPROLOL TARTRATE 1 MG/ML
5 INJECTION, SOLUTION INTRAVENOUS EVERY 6 HOURS PRN
Status: DISCONTINUED | OUTPATIENT
Start: 2023-12-10 | End: 2023-12-10

## 2023-12-10 RX ORDER — DEXTROSE 50 % IN WATER (D50W) INTRAVENOUS SYRINGE
25
Status: DISCONTINUED | OUTPATIENT
Start: 2023-12-10 | End: 2023-12-17 | Stop reason: HOSPADM

## 2023-12-10 RX ORDER — MONTELUKAST SODIUM 10 MG/1
10 TABLET ORAL DAILY
Status: DISCONTINUED | OUTPATIENT
Start: 2023-12-10 | End: 2023-12-17 | Stop reason: HOSPADM

## 2023-12-10 RX ORDER — GABAPENTIN 400 MG/1
400 CAPSULE ORAL 3 TIMES DAILY
Status: DISCONTINUED | OUTPATIENT
Start: 2023-12-10 | End: 2023-12-17 | Stop reason: HOSPADM

## 2023-12-10 RX ORDER — METOPROLOL SUCCINATE 50 MG/1
50 TABLET, EXTENDED RELEASE ORAL DAILY
Status: DISCONTINUED | OUTPATIENT
Start: 2023-12-10 | End: 2023-12-15

## 2023-12-10 RX ORDER — DOXYCYCLINE HYCLATE 100 MG
100 TABLET ORAL EVERY 12 HOURS SCHEDULED
Status: DISCONTINUED | OUTPATIENT
Start: 2023-12-10 | End: 2023-12-11

## 2023-12-10 RX ORDER — FUROSEMIDE 10 MG/ML
40 INJECTION INTRAMUSCULAR; INTRAVENOUS ONCE
Status: COMPLETED | OUTPATIENT
Start: 2023-12-10 | End: 2023-12-10

## 2023-12-10 RX ORDER — DRONABINOL 2.5 MG/1
2.5 CAPSULE ORAL
COMMUNITY
Start: 2023-12-06 | End: 2023-12-20 | Stop reason: WASHOUT

## 2023-12-10 RX ADMIN — OLANZAPINE 10 MG: 2.5 TABLET, FILM COATED ORAL at 20:15

## 2023-12-10 RX ADMIN — METHYLPREDNISOLONE SODIUM SUCCINATE 40 MG: 40 INJECTION, POWDER, FOR SOLUTION INTRAMUSCULAR; INTRAVENOUS at 09:32

## 2023-12-10 RX ADMIN — METOPROLOL SUCCINATE 50 MG: 50 TABLET, EXTENDED RELEASE ORAL at 09:31

## 2023-12-10 RX ADMIN — PIPERACILLIN SODIUM AND TAZOBACTAM SODIUM 3.38 G: 3; .375 INJECTION, SOLUTION INTRAVENOUS at 06:10

## 2023-12-10 RX ADMIN — SODIUM CHLORIDE 10 ML: 9 INJECTION, SOLUTION INTRAVENOUS at 06:10

## 2023-12-10 RX ADMIN — LEVOFLOXACIN 500 MG: 500 INJECTION, SOLUTION INTRAVENOUS at 02:09

## 2023-12-10 RX ADMIN — GABAPENTIN 400 MG: 400 CAPSULE ORAL at 09:34

## 2023-12-10 RX ADMIN — ENOXAPARIN SODIUM 40 MG: 40 INJECTION SUBCUTANEOUS at 09:32

## 2023-12-10 RX ADMIN — FOLIC ACID 1 MG: 1 TABLET ORAL at 09:30

## 2023-12-10 RX ADMIN — PANTOPRAZOLE SODIUM 40 MG: 40 TABLET, DELAYED RELEASE ORAL at 09:34

## 2023-12-10 RX ADMIN — DOXYCYCLINE HYCLATE 100 MG: 100 TABLET, FILM COATED ORAL at 20:16

## 2023-12-10 RX ADMIN — FUROSEMIDE 20 MG: 10 INJECTION, SOLUTION INTRAMUSCULAR; INTRAVENOUS at 03:32

## 2023-12-10 RX ADMIN — FUROSEMIDE 40 MG: 10 INJECTION, SOLUTION INTRAMUSCULAR; INTRAVENOUS at 14:32

## 2023-12-10 RX ADMIN — IPRATROPIUM BROMIDE AND ALBUTEROL SULFATE 3 ML: 2.5; .5 SOLUTION RESPIRATORY (INHALATION) at 13:34

## 2023-12-10 RX ADMIN — GABAPENTIN 400 MG: 400 CAPSULE ORAL at 20:16

## 2023-12-10 RX ADMIN — BUPROPION HYDROCHLORIDE 300 MG: 150 TABLET, EXTENDED RELEASE ORAL at 09:31

## 2023-12-10 RX ADMIN — DOXYCYCLINE HYCLATE 100 MG: 100 TABLET, FILM COATED ORAL at 09:31

## 2023-12-10 RX ADMIN — PIPERACILLIN AND TAZOBACTAM 4.5 G: 4; .5 INJECTION, POWDER, FOR SOLUTION INTRAVENOUS at 01:37

## 2023-12-10 RX ADMIN — IPRATROPIUM BROMIDE AND ALBUTEROL SULFATE 3 ML: 2.5; .5 SOLUTION RESPIRATORY (INHALATION) at 01:00

## 2023-12-10 RX ADMIN — ASPIRIN 81 MG: 81 TABLET, CHEWABLE ORAL at 09:31

## 2023-12-10 RX ADMIN — IPRATROPIUM BROMIDE AND ALBUTEROL SULFATE 3 ML: 2.5; .5 SOLUTION RESPIRATORY (INHALATION) at 07:26

## 2023-12-10 RX ADMIN — IPRATROPIUM BROMIDE AND ALBUTEROL SULFATE 3 ML: 2.5; .5 SOLUTION RESPIRATORY (INHALATION) at 19:30

## 2023-12-10 RX ADMIN — LISINOPRIL: 20 TABLET ORAL at 09:31

## 2023-12-10 RX ADMIN — MONTELUKAST SODIUM 10 MG: 10 TABLET, FILM COATED ORAL at 09:31

## 2023-12-10 RX ADMIN — GABAPENTIN 400 MG: 400 CAPSULE ORAL at 14:31

## 2023-12-10 SDOH — SOCIAL STABILITY: SOCIAL INSECURITY: HAS ANYONE EVER THREATENED TO HURT YOUR FAMILY OR YOUR PETS?: NO

## 2023-12-10 SDOH — SOCIAL STABILITY: SOCIAL INSECURITY: ABUSE: ADULT

## 2023-12-10 SDOH — SOCIAL STABILITY: SOCIAL INSECURITY: DOES ANYONE TRY TO KEEP YOU FROM HAVING/CONTACTING OTHER FRIENDS OR DOING THINGS OUTSIDE YOUR HOME?: NO

## 2023-12-10 SDOH — SOCIAL STABILITY: SOCIAL INSECURITY: ARE THERE ANY APPARENT SIGNS OF INJURIES/BEHAVIORS THAT COULD BE RELATED TO ABUSE/NEGLECT?: NO

## 2023-12-10 SDOH — SOCIAL STABILITY: SOCIAL INSECURITY: HAVE YOU HAD THOUGHTS OF HARMING ANYONE ELSE?: NO

## 2023-12-10 SDOH — SOCIAL STABILITY: SOCIAL INSECURITY: DO YOU FEEL UNSAFE GOING BACK TO THE PLACE WHERE YOU ARE LIVING?: NO

## 2023-12-10 SDOH — SOCIAL STABILITY: SOCIAL INSECURITY: DO YOU FEEL ANYONE HAS EXPLOITED OR TAKEN ADVANTAGE OF YOU FINANCIALLY OR OF YOUR PERSONAL PROPERTY?: NO

## 2023-12-10 SDOH — SOCIAL STABILITY: SOCIAL INSECURITY: WERE YOU ABLE TO COMPLETE ALL THE BEHAVIORAL HEALTH SCREENINGS?: YES

## 2023-12-10 SDOH — SOCIAL STABILITY: SOCIAL INSECURITY: ARE YOU OR HAVE YOU BEEN THREATENED OR ABUSED PHYSICALLY, EMOTIONALLY, OR SEXUALLY BY ANYONE?: NO

## 2023-12-10 ASSESSMENT — ACTIVITIES OF DAILY LIVING (ADL)
LACK_OF_TRANSPORTATION: NO
ASSISTIVE_DEVICE: WALKER;CANE
WALKS IN HOME: INDEPENDENT
BATHING: INDEPENDENT
GROOMING: INDEPENDENT
LACK_OF_TRANSPORTATION: NO
DRESSING YOURSELF: INDEPENDENT
HEARING - RIGHT EAR: FUNCTIONAL
HEARING - LEFT EAR: FUNCTIONAL
PATIENT'S MEMORY ADEQUATE TO SAFELY COMPLETE DAILY ACTIVITIES?: YES
JUDGMENT_ADEQUATE_SAFELY_COMPLETE_DAILY_ACTIVITIES: YES
FEEDING YOURSELF: INDEPENDENT
TOILETING: INDEPENDENT
ADEQUATE_TO_COMPLETE_ADL: YES

## 2023-12-10 ASSESSMENT — COGNITIVE AND FUNCTIONAL STATUS - GENERAL
CLIMB 3 TO 5 STEPS WITH RAILING: A LITTLE
PERSONAL GROOMING: A LITTLE
TOILETING: A LITTLE
WALKING IN HOSPITAL ROOM: A LITTLE
STANDING UP FROM CHAIR USING ARMS: A LITTLE
WALKING IN HOSPITAL ROOM: A LITTLE
DRESSING REGULAR UPPER BODY CLOTHING: A LITTLE
HELP NEEDED FOR BATHING: A LITTLE
EATING MEALS: A LITTLE
EATING MEALS: A LITTLE
TURNING FROM BACK TO SIDE WHILE IN FLAT BAD: A LITTLE
STANDING UP FROM CHAIR USING ARMS: A LITTLE
MOBILITY SCORE: 18
HELP NEEDED FOR BATHING: A LITTLE
DRESSING REGULAR LOWER BODY CLOTHING: A LITTLE
CLIMB 3 TO 5 STEPS WITH RAILING: A LITTLE
PATIENT BASELINE BEDBOUND: NO
MOVING TO AND FROM BED TO CHAIR: A LITTLE
MOVING TO AND FROM BED TO CHAIR: A LITTLE
TOILETING: A LITTLE
MOBILITY SCORE: 18
MOVING FROM LYING ON BACK TO SITTING ON SIDE OF FLAT BED WITH BEDRAILS: A LITTLE
DRESSING REGULAR UPPER BODY CLOTHING: A LITTLE
DRESSING REGULAR LOWER BODY CLOTHING: A LITTLE
PERSONAL GROOMING: A LITTLE
MOVING FROM LYING ON BACK TO SITTING ON SIDE OF FLAT BED WITH BEDRAILS: A LITTLE
DAILY ACTIVITIY SCORE: 18
TURNING FROM BACK TO SIDE WHILE IN FLAT BAD: A LITTLE
DAILY ACTIVITIY SCORE: 18

## 2023-12-10 ASSESSMENT — PAIN SCALES - GENERAL
PAINLEVEL_OUTOF10: 0 - NO PAIN

## 2023-12-10 ASSESSMENT — PATIENT HEALTH QUESTIONNAIRE - PHQ9
2. FEELING DOWN, DEPRESSED OR HOPELESS: NOT AT ALL
1. LITTLE INTEREST OR PLEASURE IN DOING THINGS: NOT AT ALL
SUM OF ALL RESPONSES TO PHQ9 QUESTIONS 1 & 2: 0

## 2023-12-10 ASSESSMENT — LIFESTYLE VARIABLES
AUDIT-C TOTAL SCORE: 0
SUBSTANCE_ABUSE_PAST_12_MONTHS: NO
HOW OFTEN DO YOU HAVE A DRINK CONTAINING ALCOHOL: NEVER
HOW MANY STANDARD DRINKS CONTAINING ALCOHOL DO YOU HAVE ON A TYPICAL DAY: PATIENT DOES NOT DRINK
SKIP TO QUESTIONS 9-10: 1
HOW OFTEN DO YOU HAVE 6 OR MORE DRINKS ON ONE OCCASION: NEVER
PRESCIPTION_ABUSE_PAST_12_MONTHS: NO
AUDIT-C TOTAL SCORE: 0

## 2023-12-10 ASSESSMENT — PAIN - FUNCTIONAL ASSESSMENT
PAIN_FUNCTIONAL_ASSESSMENT: 0-10

## 2023-12-10 NOTE — ED TRIAGE NOTES
PmH of COPd on 4-5 L and lung cancer on chemo, present sob 80s on RA, 98 on NRB, placed on bipap. Pre tx includes breathing treatments, 125 solumedrol and 2g magnesium IV.

## 2023-12-10 NOTE — CONSULTS
Vancomycin Dosing by Pharmacy- Cessation of Therapy    Consult to pharmacy for vancomycin dosing has been discontinued by the prescriber, pharmacy will sign off at this time.    Please call pharmacy if there are further questions or re-enter a consult if vancomycin is resumed.     Kristine E Steinert, [facility]

## 2023-12-10 NOTE — PROGRESS NOTES
[facility] Critical Care Medicine       Date:  12/10/2023  Patient:  Gaby Blue  YOB: 1950  MRN:  [id number]   Admit Date:  12/9/2023  ========================================================================================================    Chief Complaint   Patient presents with    Shortness of Breath         History of Present Illness:  Gaby Blue is a 73 y.o. 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  Difficult to obtain other information given bipap/respiratory status.  Shortness of breath associated with respiratory cough with yellow sputum.  Shortness of breath persistent and increasing over the last couple days. No improvement with rest or nebulizers. Worse with activity. Did deny fevers, chest pain, abnormal bleeding, or any GI/ symptoms.     ED course:Patient's COVID and influenza testing were negative.  Normal coags.  Normal serum lactate.  Metabolic panel with normal renal function, no major electrolyte derangements.  Bicarb elevated likely due to chronic compensation. Troponin 23 which I suspect is likely mildly increased due to demand during period of hypoxia and increased respiratory effort. HGB was 6.8 she was given 1 unit of PRBCs in ED, started on zosyn, vanco and levaquin in suspicion of pneumonia.  CXR showed bilateral pleural effusions and new left lobe mass. Pt started on BIPAP and sent to ICU for further management and monitoring.            Interval ICU Events:  12/10: Admitted to the ICU initially on BIPAP, was given 20mg IV Lasix with improvement with SOB. She is hemodynamically stable, now off BIPaP and 4L NC at 96% SPO2. HR is NSR with no ectopy. She states her SOB has improved, denies chest pain, abd pain, n/v/d.     Medical History:  Past Medical History:   Diagnosis Date    Arthritis     Bilateral sensorineural hearing loss 03/06/2023    Chronic hypoxic respiratory failure, on home oxygen therapy (CMS/HCC)     COPD (chronic obstructive pulmonary disease) (CMS/HCC)     Diverticulitis     History of radiation therapy     History of transfusion     Hypertension     Non-small cell lung cancer (NSCLC) (CMS/HCC)     Secondary malignant neoplasm of right lung (CMS/HCC)     Ulnar neuropathy of left upper extremity 03/06/2023     Past Surgical History:   Procedure Laterality Date    APPENDECTOMY      CARDIAC CATHETERIZATION      COLON SURGERY      CT ANGIO NECK  09/26/2022    CT NECK ANGIO W AND WO IV CONTRAST    CT ANGIO NECK  12/17/2021    CT NECK ANGIO W AND WO IV CONTRAST    EXCISION BENIGN SKIN LESION SCALP / NECK / HANDS / FEET / GENITALIA      scalp    HAND SURGERY      HYSTERECTOMY  03/29/2018    Hysterectomy    MECKEL DIVERTICULUM EXCISION      TONSILLECTOMY  03/29/2018    Tonsillectomy     Medications Prior to Admission   Medication Sig Dispense Refill Last Dose    dronabinol (Marinol) 2.5 mg capsule Take 1 capsule (2.5 mg) by mouth 2 times a day before meals.       oxygen (O2) gas therapy Inhale 3 L/min continuously.       albuterol 90 mcg/actuation inhaler Inhale 2 puffs 4 times a day as needed.       ALPRAZolam (Xanax) 1 mg tablet Take 1 tablet (1 mg) by mouth 3 times a day as needed.       aspirin 81 mg chewable tablet Chew 1 tablet (81 mg) once daily.       buPROPion XL (Wellbutrin XL) 300 mg 24 hr tablet Take 1 tablet (300 mg) by mouth once daily.       fluticasone (Flonase) 50 mcg/actuation nasal spray Administer 1 spray into  affected nostril(s) in the morning and 1 spray in the evening.       fluticasone furoate-vilanteroL (Breo Elipta) 200-25 mcg/dose inhaler Inhale 1 puff once daily.       fluticasone-umeclidin-vilanter (TRELEGY-ELLIPTA) 100-62.5-25 mcg blister with device Inhale 1 puff once daily.       fluticasone-umeclidin-vilanter (TRELEGY-ELLIPTA) 100-62.5-25 mcg blister with device INHALE 1 PUFF BY MOUTH ONCE DAILY 60 each 11     folic acid (Folvite) 1 mg tablet Take 1 tablet (1 mg) by mouth once daily.       gabapentin (Neurontin) 800 mg tablet Take 1 tablet (800 mg) by mouth in the morning and 1 tablet (800 mg) in the evening and 1 tablet (800 mg) before bedtime.       levalbuterol (Xopenex) 1.25 mg/3 mL nebulizer solution Inhale 3 mL every 4 hours if needed.       lisinopriL-hydrochlorothiazide 20-25 mg tablet Take 1 tablet by mouth once daily.       loratadine (Claritin) 10 mg tablet Take 1 tablet (10 mg) by mouth once daily.       metoprolol succinate XL (Toprol-XL) 50 mg 24 hr tablet Take 1 tablet (50 mg) by mouth once daily.       montelukast (Singulair) 10 mg tablet Take 1 tablet (10 mg) by mouth once daily.       OLANZapine (ZyPREXA) 10 mg tablet Take 1 tablet (10 mg) by mouth once daily at bedtime.       oxyCODONE-acetaminophen (Percocet) 7.5-325 mg tablet Take 1 tablet by mouth every 4 hours if needed for moderate pain (4 - 6).       pantoprazole (ProtoNix) 40 mg EC tablet Take 1 tablet (40 mg) by mouth once daily.        Bee pollen  Social History     Tobacco Use    Smoking status: Former     Packs/day: 0.50     Years: 48.00     Additional pack years: 0.00     Total pack years: 24.00     Types: Cigarettes     Quit date: 2019     Years since quittin.1    Smokeless tobacco: Never   Vaping Use    Vaping Use: Never used   Substance Use Topics    Alcohol use: Not Currently    Drug use: Never     Family History   Problem Relation Name Age of Onset    COPD Mother      Cancer Father      Other (Lupus erythematosus)  Other family history     Heart disease Other family history        Hospital Medications:         Current Facility-Administered Medications:     albuterol 90 mcg/actuation inhaler 2 puff, 2 puff, inhalation, 4x daily PRN, Ro Rogers APRN-CNP    ALPRAZolam (Xanax) tablet 1 mg, 1 mg, oral, TID PRN, Ro Rogers APRN-CNP    aspirin chewable tablet 81 mg, 81 mg, oral, Daily, Ro Rogers APRN-CNP, 81 mg at 12/10/23 0931    buPROPion XL (Wellbutrin XL) 24 hr tablet 300 mg, 300 mg, oral, Daily, Ro Rogers, APRN-CNP, 300 mg at 12/10/23 0931    dextrose 10 % in water (D10W) infusion, 0.3 g/kg/hr, intravenous, Once PRN, Ro Rogers APRN-CNP    dextrose 50 % injection 25 g, 25 g, intravenous, q15 min PRN, Ro Rogers APRN-CNP    doxycycline (Vibra-Tabs) tablet 100 mg, 100 mg, oral, q12h RENAE, Jd Mohamud, DO, 100 mg at 12/10/23 0931    enoxaparin (Lovenox) syringe 40 mg, 40 mg, subcutaneous, q24h, Ro Rogers APRN-CNP, 40 mg at 12/10/23 0932    folic acid (Folvite) tablet 1 mg, 1 mg, oral, Daily, Ro Rogers APRN-CNP, 1 mg at 12/10/23 0930    gabapentin (Neurontin) capsule 400 mg, 400 mg, oral, TID, Ro Rogers, APRN-CNP, 400 mg at 12/10/23 0934    glucagon (Glucagen) injection 1 mg, 1 mg, intramuscular, q15 min PRN, Ro Rogers APRN-CNP    insulin lispro (HumaLOG) injection 0-5 Units, 0-5 Units, subcutaneous, TID with meals, Ro Rogers APRN-CNP    ipratropium-albuteroL (Duo-Neb) 0.5-2.5 mg/3 mL nebulizer solution 3 mL, 3 mL, nebulization, q6h, Citlalli E AUGUSTA Anne, 3 mL at 12/10/23 0726    ipratropium-albuteroL (Duo-Neb) 0.5-2.5 mg/3 mL nebulizer solution 3 mL, 3 mL, nebulization, q4h PRN, [place]A Tran    lisinopril 20 mg, hydroCHLOROthiazide 25 mg for Zestoretic/Prinizide, , oral, Daily, AUGUSTA Sagastume, Given at 12/10/23 0931    methylPREDNISolone sod succinate (PF) (SOLU-Medrol) 40 mg/mL injection 40 mg, 40 mg, intravenous,  "Daily, Jd Mohamud DO, 40 mg at 12/10/23 0932    metoprolol succinate XL (Toprol-XL) 24 hr tablet 50 mg, 50 mg, oral, Daily, PAMELA Sagastume-CNP, 50 mg at 12/10/23 0931    montelukast (Singulair) tablet 10 mg, 10 mg, oral, Daily, PAMELA Sagastume-CNP, 10 mg at 12/10/23 0931    OLANZapine (ZyPREXA) tablet 10 mg, 10 mg, oral, Nightly, PAMELA Sagastume-CNP    oxyCODONE-acetaminophen (Percocet) 5-325 mg per tablet 1 tablet, 1 tablet, oral, q4h PRN, [place]UGUSTA Sagastume    oxygen (O2) therapy, , inhalation, Continuous PRN - O2/gases, Bjorn Coreas MD, 5 L/min at 12/10/23 0800    pantoprazole (ProtoNix) EC tablet 40 mg, 40 mg, oral, Daily, PAMELA Sagastume-CNP, 40 mg at 12/10/23 0934    Review of Systems:  14 point review of systems was completed and negative except for those specially mention in my HPI    Physical Exam:    Heart Rate:  []   Temp:  [35.8 °C (96.4 °F)-36.6 °C (97.8 °F)]   Resp:  [3-39]   BP: (117-200)/(51-92)   Height:  [155 cm (5' 1.02\")-165.1 cm (5' 5\")]   Weight:  [61.1 kg (134 lb 11.2 oz)-64.9 kg (143 lb)]   SpO2:  [90 %-100 %]     Physical Exam  Vitals and nursing note reviewed.   Constitutional:       General: She is awake.      Appearance: Normal appearance. She is cachectic.   HENT:      Head: Normocephalic and atraumatic.   Cardiovascular:      Rate and Rhythm: Normal rate and regular rhythm.      Pulses: Normal pulses.   Pulmonary:      Effort: Pulmonary effort is normal.      Breath sounds: Normal breath sounds.   Abdominal:      General: Abdomen is flat. Bowel sounds are normal.      Palpations: Abdomen is soft.   Musculoskeletal:      Cervical back: Full passive range of motion without pain.      Right lower leg: No edema.      Left lower leg: No edema.   Neurological:      Mental Status: She is alert and oriented to person, place, and time.         Objective:    I have reviewed all medications, laboratory results, and imaging pertinent for " today's encounter.    FiO2 (%):  [40 %-50 %] 50 %      Intake/Output Summary (Last 24 hours) at 12/10/2023 1217  Last data filed at 12/10/2023 0515  Gross per 24 hour   Intake 504.17 ml   Output --   Net 504.17 ml         Assessment/Plan:    I am currently managing this critically ill patient for the following problems:    Acute on chronic respiratory failure with hypoxia and hypercapnia  Pneumonia  Bilateral pleural effusions  COPD exacerbation  Acute on chronic anemia  Thrombocytopenia  NSCLC left lung with secondary malignant neoplasm to right lung  HTN  Chronic pain  Anasarca     Neuro/Psych/Pain Ctrl/Sedation:  Chronic pain  -On Percocet, OARRS reviewed no issues  -Continue Percocet  -Monitor for opioid induced constipation    Respiratory/ENT:  Acute on chronic respiratory failure with hypoxia and hypercapnia  Bilateral pleural effusions  COPD exacerbation  -Normally on 3-4 L nasal cannula continuously, wheezing, a lot of mucus production  -Antibiotic coverage below  -Titrate FiO2 to maintain SpO2 greater than 92% and pO2 greater than 70  -Effusions may be malignant, right greater than left may need thoracentesis  -Pulmonary hygiene  -Scheduled DuoNebs, albuterol as needed  -Solu-Medrol 40 mg IV daily  -Continue BiPAP therapy, follow-up on ABGs       Cardiovascular:  HTN  -Okay to continue home antihypertensive    GI:  Anasarca  -Likely in setting of malignancy  -Monitor renal function  -Strict intake and output  -Keep potassium 4 magnesium greater than 2  -Furosemide 40 mg IV x 1 now        Renal/Volume Status (Intra & Extravascular):  -No hx of DM  -Monitor for stress hyperglycemia       Endocrine  Pneumonia  -CAP, immunosuppressed  -Check urine for Legionella and strep- pending   -Given risk factors will treat with doxycycline   -Follow up on cx's  -Check MRSA screen, de-escalate antibiotics as appropriate    Infectious Disease:  NSCLC left lung with secondary malignant neoplasm to right lung  Acute on  chronic anemia  Thrombocytopenia  -Hemoglobin 6.8, baseline 8-9, last hemoglobin on 11/29 was 7.0  -Platelet count 36, intermittently low but typically low 100s to 90s  -2 unit PRBC given in ED, no signs of active bleeding  -Monitor CBC  -Hold pharmacological antiplatelet or anticoagulant ppx until platelets improve  -Last chemo (gemcitabine, carboplatin ) infusion 2 weeks ago, will need to follow-up with [facility] oncology        OBGYN/MSK:  PT/OT if needed     Ethics/Code Status:  Full code     :  DVT Prophylaxis: SCD's, lovenox subcu  GI Prophylaxis: home PPI  Bowel Regimen: Miralax  Diet: Regular      CVC: NA  Ivy: NA  Nagel: NA  Restraints: NA  Dispo: ICU                      Critical Care Time:  45    Plan Discussed with Dr. Oneida SANTIAGO, CNP  Critical Care Medicine   [facility]

## 2023-12-10 NOTE — H&P
[facility] Critical Care Medicine       Date:  12/10/2023  Patient:  Gaby Blue  YOB: 1950  MRN:  [id number]   Admit Date:  12/9/2023  ========================================================================================================    Chief Complaint   Patient presents with    Shortness of Breath         History of Present Illness:  Gaby Blue is a 73 y.o. 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  Difficult to obtain other information given bipap/respiratory status.  Shortness of breath associated with respiratory cough with yellow sputum.  Shortness of breath persistent and increasing over the last couple days.  No improvement with rest or nebulizers. Worse with activity.  Did deny fevers, chest pain, abnormal bleeding, or any GI/ symptoms.      Interval ICU Events:  Admitted to ICU    Medical History:  Past Medical History:   Diagnosis Date    Arthritis     Bilateral sensorineural hearing loss 03/06/2023    Chronic hypoxic respiratory failure, on home oxygen therapy (CMS/HCC)     COPD (chronic obstructive pulmonary disease) (CMS/HCC)     Diverticulitis     History of radiation therapy     History of transfusion     Hypertension     Non-small cell lung cancer (NSCLC) (CMS/HCC)     Secondary malignant neoplasm of right lung (CMS/HCC)     Ulnar neuropathy of left upper extremity 03/06/2023     Past Surgical History:   Procedure Laterality  Date    APPENDECTOMY      CARDIAC CATHETERIZATION      COLON SURGERY      CT ANGIO NECK  2022    CT NECK ANGIO W AND WO IV CONTRAST    CT ANGIO NECK  2021    CT NECK ANGIO W AND WO IV CONTRAST    EXCISION BENIGN SKIN LESION SCALP / NECK / HANDS / FEET / GENITALIA      scalp    HAND SURGERY      HYSTERECTOMY  2018    Hysterectomy    MECKEL DIVERTICULUM EXCISION      TONSILLECTOMY  2018    Tonsillectomy     (Not in a hospital admission)    Bee pollen  Social History     Tobacco Use    Smoking status: Former     Packs/day: 0.50     Years: 48.00     Additional pack years: 0.00     Total pack years: 24.00     Types: Cigarettes     Quit date: 2019     Years since quittin.1    Smokeless tobacco: Never   Vaping Use    Vaping Use: Never used   Substance Use Topics    Alcohol use: Not Currently    Drug use: Never     Family History   Problem Relation Name Age of Onset    COPD Mother      Cancer Father      Other (Lupus erythematosus) Other family history     Heart disease Other family history        Hospital Medications:           Current Facility-Administered Medications:     ipratropium-albuteroL (Duo-Neb) 0.5-2.5 mg/3 mL nebulizer solution 3 mL, 3 mL, nebulization, q6h, Citlalli Anne APRN-CNP    ipratropium-albuteroL (Duo-Neb) 0.5-2.5 mg/3 mL nebulizer solution 3 mL, 3 mL, nebulization, q4h PRN, Citlalli Anne APRN-CNP    levoFLOXacin (Levaquin)  mg, 500 mg, intravenous, q24h, Citlalli Anne, APRN-CNP    methylPREDNISolone sod succinate (PF) (SOLU-Medrol) 40 mg/mL injection 40 mg, 40 mg, intravenous, q24h, Citlalli Anne, APRN-CNP    oxygen (O2) therapy, , inhalation, Continuous PRN - O2/gases, Bjorn Coreas MD    piperacillin-tazobactam (Zosyn) in sodium chloride 0.9 % 100 mL IV 4.5 g, 4.5 g, intravenous, Once, Bjorn Coreas MD    piperacillin-tazobactam-dextrose (Zosyn) IV 3.375 g, 3.375 g, intravenous, q6h, Citlalli Anne APRN-CNP    vancomycin (Vancocin)  in dextrose 5 % water (D5W) 500 mL IV 1.5 g, 1.5 g, intravenous, Once, Bjorn Coreas MD, Last Rate: 333.3 mL/hr at 12/09/23 2337, 1.5 g at 12/09/23 2337    Current Outpatient Medications:     albuterol 90 mcg/actuation inhaler, Inhale 2 puffs 4 times a day as needed., Disp: , Rfl:     ALPRAZolam (Xanax) 1 mg tablet, Take 1 tablet (1 mg) by mouth 3 times a day as needed., Disp: , Rfl:     aspirin 81 mg chewable tablet, Chew 1 tablet (81 mg) once daily., Disp: , Rfl:     buPROPion XL (Wellbutrin XL) 300 mg 24 hr tablet, Take 1 tablet (300 mg) by mouth once daily., Disp: , Rfl:     fluticasone (Flonase) 50 mcg/actuation nasal spray, Administer 1 spray into affected nostril(s) in the morning and 1 spray in the evening., Disp: , Rfl:     fluticasone furoate-vilanteroL (Breo Elipta) 200-25 mcg/dose inhaler, Inhale 1 puff once daily., Disp: , Rfl:     fluticasone-umeclidin-vilanter (TRELEGY-ELLIPTA) 100-62.5-25 mcg blister with device, Inhale 1 puff once daily., Disp: , Rfl:     fluticasone-umeclidin-vilanter (TRELEGY-ELLIPTA) 100-62.5-25 mcg blister with device, INHALE 1 PUFF BY MOUTH ONCE DAILY, Disp: 60 each, Rfl: 11    folic acid (Folvite) 1 mg tablet, Take 1 tablet (1 mg) by mouth once daily., Disp: , Rfl:     gabapentin (Neurontin) 800 mg tablet, Take 1 tablet (800 mg) by mouth in the morning and 1 tablet (800 mg) in the evening and 1 tablet (800 mg) before bedtime., Disp: , Rfl:     levalbuterol (Xopenex) 1.25 mg/3 mL nebulizer solution, Inhale 3 mL every 4 hours if needed., Disp: , Rfl:     lisinopriL-hydrochlorothiazide 20-25 mg tablet, Take 1 tablet by mouth once daily., Disp: , Rfl:     loratadine (Claritin) 10 mg tablet, Take 1 tablet (10 mg) by mouth once daily., Disp: , Rfl:     metoprolol succinate XL (Toprol-XL) 50 mg 24 hr tablet, Take 1 tablet (50 mg) by mouth once daily., Disp: , Rfl:     montelukast (Singulair) 10 mg tablet, Take 1 tablet (10 mg) by mouth once daily., Disp: , Rfl:     OLANZapine  "(ZyPREXA) 10 mg tablet, Take 1 tablet (10 mg) by mouth once daily at bedtime., Disp: , Rfl:     oxyCODONE-acetaminophen (Percocet) 7.5-325 mg tablet, Take 1 tablet by mouth every 4 hours if needed for moderate pain (4 - 6)., Disp: , Rfl:     pantoprazole (ProtoNix) 40 mg EC tablet, Take 1 tablet (40 mg) by mouth once daily., Disp: , Rfl:     Review of Systems:  14 point review of systems was completed and negative except for those specially mention in my HPI    Physical Exam:    Heart Rate:  [100-120]   Temp:  [36.4 °C (97.6 °F)]   Resp:  [17-22]   BP: (166-200)/(79-92)   Height:  [165.1 cm (5' 5\")]   Weight:  [64.9 kg (143 lb)]   SpO2:  [91 %-98 %]     Physical Exam  Constitutional:       Appearance: She is normal weight.   HENT:      Head: Normocephalic.      Nose: Nose normal.      Mouth/Throat:      Mouth: Mucous membranes are dry.   Eyes:      Extraocular Movements: Extraocular movements intact.      Pupils: Pupils are equal, round, and reactive to light.   Cardiovascular:      Rate and Rhythm: Normal rate and regular rhythm.      Comments: Right chest mediport  Pulmonary:      Breath sounds: Wheezing and rales present.      Comments: On bipap, respirations equal and unlabored  Abdominal:      General: Abdomen is flat. Bowel sounds are normal.      Palpations: Abdomen is soft.   Musculoskeletal:         General: Normal range of motion.      Cervical back: Normal range of motion.   Skin:     General: Skin is warm and dry.   Neurological:      General: No focal deficit present.      Mental Status: She is alert.   Psychiatric:         Mood and Affect: Mood normal.         Objective:    I have reviewed all medications, laboratory results, and imaging pertinent for today's encounter.    FiO2 (%):  [40 %] 40 %    No intake or output data in the 24 hours ending 12/10/23 0009    CT angio chest for pulmonary embolism  Narrative: STUDY:  CT Angiogram of the Chest; 12/9/2023 at 10:14 p.m.  INDICATION:  Lung cancer, on " chemotherapy.  Shortness of breath.  Hypoxia.  COMPARISON:  One-view CXR 12/9/2023.  CT CAP 8/14/2023.  CTA chest 1/31/2023.  ACCESSION NUMBER(S):  [id number]  ORDERING CLINICIAN:  VANDANA VELEZ  TECHNIQUE:  CTA of the chest was performed with intravenous contrast.   Images are reviewed and processed at a workstation according to the CT  angiogram protocol with 3-D and/or MIP post processing imaging  generated.  Omnipaque 350 75 mL was administered intravenously.  Automated mA/kV exposure control was utilized and patient examination  was performed in strict accordance with principles of ALARA.  FINDINGS:  Patient motion limits assessment.  There is no evidence of central or  segmental pulmonary embolus.  Subsegmental emboli cannot be excluded.   The thoracic aorta is normal in course and caliber without dissection  or aneurysm.  The heart is normal in size without pericardial effusion.    Mediastinal adenopathy noted.  Subcarinal lymph node image 124 series  401 measures 1.5 cm short axis dimension, previously 0.6 cm maximally.   While this may be reactive, metastatic disease is a concern.   Enlarged RIGHT hilar lymph node also noted measuring 1.9 cm short axis  dimension, previously 1.1 cm.    Small bilateral pleural effusions have developed, RIGHT greater than  LEFT.  These may be malignant.  RIGHT upper lobe mass lesion is  increased in size, now measuring 2.6 cm maximally, previously 2.2 cm.   Somewhat spiculated mass lesion LEFT upper lobe again noted.  It  appears to measure approximately 2 cm maximally, however, patient  motion limits confident measurement of the lesion.  Adjacent scarring  noted, likely postradiation change.  Emphysematous changes are  present.  Atelectatic changes noted.  There is no pneumothorax.   Diffuse subcutaneous edema is present, suggestive of anasarca.   Upper abdomen demonstrates no acute pathology.  There are no acute fractures.  No suspicious bony lesions.   Senescent  changes of the thoracic spine are present.  Impression: 1. Limited exam due to patient motion.  There is no evidence of  central or segmental pulmonary embolus.  Subsegmental emboli cannot be  excluded.  2. Bilateral upper lobe mass lesions again noted.  RIGHT upper lobe  lesion is increased in size.  LEFT upper lobe lesion appears grossly  stable.  Mediastinal adenopathy also noted, increased compared with  prior exam.  Bilateral pleural effusions have developed, RIGHT greater  than LEFT.  These may be malignant.  3. Emphysematous changes are present.  4. Diffuse subcutaneous edema, suggestive of anasarca.  Signed by Stiven Ahumada II, MD  XR chest 1 view  Narrative: INDICATION:  Shortness of breath.  COMPARISON:  6/14/2023  TECHNIQUE: AP chest, 21:51 hours.  FINDINGS:  The cardiac silhouette is enlarged.  There is soft tissue fullness of  the right suprahilar area of approximately 3 cm.  Increased  interstitial markings are present in the right infrahilar area.   Homogeneous haziness is noted at the lung bases.  A spiculated 2 cm  mass is present in the left upper lobe.  No pneumothorax is seen.  A right internal jugular [id number] is present with the tip in a  region which would correspond to the distal superior vena cava.  No acute osseous changes.  Impression: *Right infrahilar infiltrate.  Underlying chronic interstitial  fibrotic interstitial fibrotic changes are seen.  *Left upper lobe mass, new since the prior study.  *Soft tissue fullness of the right suprahilar area.  A mass in  this region is not ruled out.  *Bilateral pleural effusions.  *Cardiomegaly.  Signed by Gaston Gordon MD    .las  Last Echo 02/01/2023:  EF 50-55%  Impaired relaxation of LVDF  Trivial mitral regurgitation    Assessment/Plan:    I am currently managing this critically ill patient for the following problems:  Acute on chronic respiratory failure with hypoxia and hypercapnia  Pneumonia  Bilateral pleural  effusions  COPD exacerbation  Acute on chronic anemia  Thrombocytopenia  NSCLC left lung with secondary malignant neoplasm to right lung  HTN  Chronic pain  Anasarca    Neuro/Psych/Pain Ctrl/Sedation:  Chronic pain  -On Percocet, OARRS reviewed no issues  -Continue Percocet  -Monitor for opioid induced constipation    Respiratory/ENT:  Acute on chronic respiratory failure with hypoxia and hypercapnia  Bilateral pleural effusions  COPD exacerbation  -Normally on 3-4 L nasal cannula continuously, wheezing, a lot of mucus production  -Antibiotic coverage below  -Titrate FiO2 to maintain SpO2 greater than 92% and pO2 greater than 70  -Effusions may be malignant, right greater than left may need thoracentesis  -Pulmonary hygiene  -Scheduled DuoNebs, albuterol as needed  -Solu-Medrol 40 mg IV daily  -Continue BiPAP therapy, follow-up on ABGs      Cardiovascular:  HTN  -Okay to continue home antihypertensives    GI:  -Ok for Pepcid for indigestion if needed    Renal/Volume Status (Intra & Extravascular)/Electrolytes:  Anasarca  -Likely in setting of malignancy  -Monitor renal function  -Strict intake and output  -Keep potassium 4 magnesium greater than 2  -Furosemide 40 mg IV x 1 now    Endocrine  -No hx of DM  -Monitor for stress hyperglycemia    Infectious Disease:  Pneumonia  -CAP, immunosuppressed  -Check urine for Legionella and strep  -Given risk factors will treat with vancomycin, Zosyn and Levaquin  -Follow up on cx's  -Check MRSA screen, de-escalate antibiotics as appropriate    Heme/Onc:  NSCLC left lung with secondary malignant neoplasm to right lung  Acute on chronic anemia  Thrombocytopenia  -Hemoglobin 6.8, baseline 8-9, last hemoglobin on 11/29 was 7.0  -Platelet count 36, intermittently low but typically low 100s to 90s  -2 unit PRBC given in ED, no signs of active bleeding  -Monitor CBC  -Hold pharmacological antiplatelet or anticoagulant ppx until platelets improve  -Last chemo (gemcitabine, carboplatin )  infusion 2 weeks ago, will need to follow-up with [facility] oncology      OBGYN/MSK:  PT/OT if needed    Ethics/Code Status:  Full code    :  DVT Prophylaxis: SCD's  GI Prophylaxis: NA  Bowel Regimen: Miralax  Diet: Regular   CVC: NA  [place]: NA  Nagel: NA  Restraints: NA  Dispo: ICU    Critical Care Time:  40    Citlalli Anne, APRN-CNP

## 2023-12-10 NOTE — PROGRESS NOTES
"Vancomycin Dosing by Pharmacy- INITIAL    Gaby Blue is a 73 y.o. year old female who Pharmacy has been consulted for vancomycin dosing for sepsis/pneumonia. Based on the patient's indication and renal status this patient will be dosed based on a goal AUC of 500-600.     Renal function is currently stable.    Visit Vitals  /76   Pulse 98   Temp 36.4 °C (97.6 °F) (Tympanic)   Resp 22        Lab Results   Component Value Date    CREATININE 0.84 12/09/2023    CREATININE 0.53 08/23/2023    CREATININE 0.61 08/02/2023    CREATININE 0.59 06/21/2023    CREATININE 0.52 02/06/2023        Patient weight is No results found for: \"PTWEIGHT\"    No results found for: \"CULTURE\"     No intake/output data recorded.  [unfilled]    Lab Results   Component Value Date    PATIENTTEMP  12/09/2023      Comment:      NOTE: Patient Results are Not Corrected for Temperature    PATIENTTEMP 37.0 02/05/2023    PATIENTTEMP 37.0 02/02/2023    PATIENTTEMP 37.0 02/02/2023          Assessment/Plan     Patient has already been given a loading dose of 1500 mg on 12/9/23 @2337 in ER.  Will initiate vancomycin maintenance,  1500 mg every 24 hours.    This dosing regimen is predicted by [employer] to result in the following pharmacokinetic parameters:  Regimen: 1500 mg IV every 24 hours.  Start time: 11:37 on 12/10/2023  Exposure target: AUC24 (range)400-600 mg/L.hr   AUC24,ss: 554 mg/L.hr  Probability of AUC24 > 400: 83 %  Ctrough,ss: 15.5 mg/L  Probability of Ctrough,ss > 20: 29 %  Probability of nephrotoxicity (Lodise CARLOTA 2009): 11 %      Follow-up level will be ordered on 12/10/23 at 1st am lab draw and 1000 unless clinically indicated sooner.  Will continue to monitor renal function daily while on vancomycin and order serum creatinine at least every 48 hours if not already ordered.  Follow for continued vancomycin needs, clinical response, and signs/symptoms of toxicity.       LOKESH RUBY, PharmD       " Itraconazole Counseling:  I discussed with the patient the risks of itraconazole including but not limited to liver damage, nausea/vomiting, neuropathy, and severe allergy.  The patient understands that this medication is best absorbed when taken with acidic beverages such as non-diet cola or ginger ale.  The patient understands that monitoring is required including baseline LFTs and repeat LFTs at intervals.  The patient understands that they are to contact us or the primary physician if concerning signs are noted.

## 2023-12-10 NOTE — ED PROVIDER NOTES
HPI   Chief Complaint   Patient presents with    Shortness of Breath       This is a 73-year-old female with history of lung cancer status post radiation and currently on chemotherapy treatment, history of COPD who is presenting from home via EMS for increasing shortness of breath.  Patient reports over the last few days she has had gradually worsening shortness of breath.  Patient was found to be hypoxic by EMS with O2 saturations in the 80s.  Prior to arrival to the emergency department patient was treated with Solu-Medrol IV, magnesium IV and DuoNeb treatment.  Patient arrives to the Emergency Department on nonrebreather.  History somewhat limited due to acuity of condition.  Patient reports her last chemotherapy was approximately 2 weeks ago and they are currently holding due to low platelets.  Patient denies any fevers or chills.      History provided by:  Patient and EMS personnel  History limited by:  Acuity of condition                      No data recorded                Patient History   Past Medical History:   Diagnosis Date    Personal history of other diseases of the circulatory system     History of hypertension    Personal history of other diseases of the musculoskeletal system and connective tissue     History of arthritis    Personal history of other diseases of the respiratory system     History of asthma    Personal history of other diseases of the respiratory system     History of bronchitis    Personal history of other diseases of the respiratory system     History of chronic obstructive lung disease    Personal history of other diseases of urinary system     History of bladder problems    Personal history of other malignant neoplasm of bronchus and lung     History of malignant neoplasm of lung    Personal history of other specified conditions     History of snoring     Past Surgical History:   Procedure Laterality Date    CT ANGIO NECK  9/26/2022    CT NECK ANGIO W AND WO IV CONTRAST    CT  ANGIO NECK  12/17/2021    CT NECK ANGIO W AND WO IV CONTRAST    HYSTERECTOMY  03/29/2018    Hysterectomy    OTHER SURGICAL HISTORY  03/29/2018    Duodenum Diverticulectomy    OTHER SURGICAL HISTORY  05/13/2019    Appendectomy    OTHER SURGICAL HISTORY  05/13/2019    Colon surgery    OTHER SURGICAL HISTORY  05/13/2019    Hand surgery    OTHER SURGICAL HISTORY  05/13/2019    Cardiac catheterization    OTHER SURGICAL HISTORY  06/03/2019    Scalp lesion excision    TONSILLECTOMY  03/29/2018    Tonsillectomy     Family History   Problem Relation Name Age of Onset    COPD Mother      Cancer Father      Other (Lupus erythematosus) Other family history      Social History     Tobacco Use    Smoking status: Not on file    Smokeless tobacco: Not on file   Substance Use Topics    Alcohol use: Not on file    Drug use: Not on file       Physical Exam   ED Triage Vitals   Temp Heart Rate Resp BP   -- 12/09/23 2042 12/09/23 2042 12/09/23 2042    (!) 120 17 (!) 200/79      SpO2 Temp src Heart Rate Source Patient Position   12/09/23 2042 -- 12/09/23 2042 12/09/23 2042   98 %  Monitor Sitting      BP Location FiO2 (%)     12/09/23 2042 12/09/23 2109     Left arm 40 %       Physical Exam  Vitals and nursing note reviewed.   Constitutional:       General: She is not in acute distress.     Appearance: She is ill-appearing.   HENT:      Head: Atraumatic.      Mouth/Throat:      Mouth: Mucous membranes are moist.      Pharynx: Oropharynx is clear.   Eyes:      Extraocular Movements: Extraocular movements intact.      Conjunctiva/sclera: Conjunctivae normal.      Pupils: Pupils are equal, round, and reactive to light.   Cardiovascular:      Rate and Rhythm: Regular rhythm. Tachycardia present.      Pulses: Normal pulses.   Pulmonary:      Effort: Tachypnea present.      Breath sounds: Wheezing and rhonchi present.   Abdominal:      General: There is no distension.      Palpations: Abdomen is soft.      Tenderness: There is no abdominal  tenderness. There is no guarding or rebound.   Musculoskeletal:         General: No deformity.      Cervical back: Neck supple.   Skin:     General: Skin is warm and dry.   Neurological:      Mental Status: She is alert and oriented to person, place, and time. Mental status is at baseline.      Cranial Nerves: No cranial nerve deficit.      Sensory: No sensory deficit.      Motor: No weakness.   Psychiatric:         Mood and Affect: Mood normal.         Behavior: Behavior normal.         ED Course & MDM   Diagnoses as of 12/09/23 2303   COPD exacerbation (CMS/[facility])   Pneumonia due to infectious organism, unspecified laterality, unspecified part of lung   Anemia, unspecified type   Thrombocytopenia (CMS/[facility])   Acute hypercapnic respiratory failure (CMS/[facility])     Labs Reviewed   BLOOD GAS ARTERIAL FULL PANEL - Abnormal       Result Value    POCT pH, Arterial 7.28 (*)     POCT pCO2, Arterial 74 (*)     POCT pO2, Arterial 67 (*)     POCT SO2, Arterial 92 (*)     POCT Oxy Hemoglobin, Arterial 89.8 (*)     POCT Hematocrit Calculated, Arterial 21.0 (*)     POCT Sodium, Arterial 139      POCT Potassium, Arterial 4.1      POCT Chloride, Arterial 101      POCT Ionized Calcium, Arterial 1.24      POCT Glucose, Arterial 138 (*)     POCT Lactate, Arterial 0.7      POCT Base Excess, Arterial 7.1 (*)     POCT HCO3 Calculated, Arterial 34.8 (*)     POCT Hemoglobin, Arterial 7.0 (*)     POCT Anion Gap, Arterial 7 (*)     Patient Temperature        FiO2 100      Critical Called By       Critical Called To JIMENA      Critical Call Time 2047.0000      Critical Read Back Y      Site of Arterial Puncture Radial Right      Ten's Test Positive     CBC WITH AUTO DIFFERENTIAL - Abnormal    WBC 7.1      nRBC 0.0      RBC 2.08 (*)     Hemoglobin 6.8 (*)     Hematocrit 22.3 (*)      (*)     MCH 32.7      MCHC 30.5 (*)     RDW 15.6 (*)     Platelets 36 (*)     Immature Granulocytes %, Automated 0.4      Immature Granulocytes  Absolute, Automated 0.03      Narrative:     The previously reported component Neutrophils % is no longer being reported.  The previously reported component Lymphocytes % is no longer being reported.  The previously reported component Monocytes % is no longer being reported.  The previously   reported component Eosinophils % is no longer being reported.  The previously reported component Basophils % is no longer being reported.  The previously reported component Absolute Neutrophils is no longer being reported.  The previously reported   component Absolute Lymphocytes is no longer being reported.  The previously reported component Absolute Monocytes is no longer being reported.  The previously reported component Absolute Eosinophils is no longer being reported.  The previously reported   component Absolute Basophils is no longer being reported.   COMPREHENSIVE METABOLIC PANEL - Abnormal    Glucose 133 (*)     Sodium 141      Potassium 4.0      Chloride 100      Bicarbonate 36 (*)     Anion Gap 9 (*)     Urea Nitrogen 22      Creatinine 0.84      eGFR 74      Calcium 8.8      Albumin 3.6      Alkaline Phosphatase 63      Total Protein 6.3 (*)     AST 24      Bilirubin, Total 1.0      ALT 12     TROPONIN I, HIGH SENSITIVITY - Abnormal    Troponin I, High Sensitivity 23 (*)     Narrative:     Less than 99th percentile of normal range cutoff-  Female and children under 18 years old <14 ng/L; Male <21 ng/L: Negative  Repeat testing should be performed if clinically indicated.     Female and children under 18 years old 14-50 ng/L; Male 21-50 ng/L:  Consistent with possible cardiac damage and possible increased clinical   risk. Serial measurements may help to assess extent of myocardial damage.     >50 ng/L: Consistent with cardiac damage, increased clinical risk and  myocardial infarction. Serial measurements may help assess extent of   myocardial damage.      NOTE: Children less than 1 year old may have higher baseline  troponin   levels and results should be interpreted in conjunction with the overall   clinical context.     NOTE: Troponin I testing is performed using a different   testing methodology at [facility] than at other   system hospitals. Direct result comparisons should only   be made within the same method.   B-TYPE NATRIURETIC PEPTIDE - Abnormal     (*)     Narrative:        <100 pg/mL - Heart failure unlikely  100-299 pg/mL - Intermediate probability of acute heart                  failure exacerbation. Correlate with clinical                  context and patient history.    >=300 pg/mL - Heart Failure likely. Correlate with clinical                  context and patient history.    BNP testing is performed using different testing methodology at [facility] than at other [facility] hospitals. Direct result comparisons should only be made within the same method.      MANUAL DIFFERENTIAL - Abnormal    Neutrophils %, Manual 80.0      Lymphocytes %, Manual 14.0      Monocytes %, Manual 6.0      Eosinophils %, Manual 0.0      Basophils %, Manual 0.0      Seg Neutrophils Absolute, Manual 5.68 (*)     Lymphocytes Absolute, Manual 0.99      Monocytes Absolute, Manual 0.43      Eosinophils Absolute, Manual 0.00      Basophils Absolute, Manual 0.00      Total Cells Counted 100      RBC Morphology See Below      Polychromasia Mild      Ovalocytes Few      Teardrop Cells Few     LACTATE - Normal    Lactate 1.3      Narrative:     Venipuncture immediately after or during the administration of Metamizole may lead to falsely low results. Testing should be performed immediately  prior to Metamizole dosing.   PROTIME-INR - Normal    Protime 11.3      INR 1.0     APTT - Normal    aPTT 32      Narrative:     The APTT is no longer used for monitoring Unfractionated Heparin Therapy. For monitoring Heparin Therapy, use the Heparin Assay.   SARS-COV-2 AND INFLUENZA A/B PCR - Normal    Flu A Result Not Detected       Flu B Result Not Detected      Coronavirus 2019, PCR Not Detected      Narrative:     This assay has received FDA Emergency Use Authorization (EUA) and  is only authorized for the duration of time that circumstances exist to justify the authorization of the emergency use of in vitro diagnostic tests for the detection of SARS-CoV-2 virus and/or diagnosis of COVID-19 infection under section 564(b)(1) of the Act, 21 U.S.C. 360bbb-3(b)(1). Testing for SARS-CoV-2 is only recommended for patients who meet current clinical and/or epidemiological criteria as defined by federal, state, or local public health directives. This assay is an in vitro diagnostic nucleic acid amplification test for the qualitative detection of SARS-CoV-2, Influenza A, and Influenza B from nasopharyngeal specimens and has been validated for use at [facility]. Negative results do not preclude COVID-19 infections or Influenza A/B infections, and should not be used as the sole basis for diagnosis, treatment, or other management decisions. If Influenza A/B and RSV PCR results are negative, testing for Parainfluenza virus, Adenovirus and Metapneumovirus is routinely performed for [facility] [place]ology and intensive care inpatients, and is available on other patients by placing an add-on request.    BLOOD CULTURE   BLOOD CULTURE   MRSA SURVEILLANCE FOR VANCOMYCIN DE-ESCALATION, PCR   TYPE AND SCREEN   URINALYSIS WITH REFLEX MICROSCOPIC AND CULTURE   PREPARE RBC   PATH REVIEW-CBC DIFFERENTIAL     CT angio chest for pulmonary embolism   Final Result   1. Limited exam due to patient motion.  There is no evidence of   central or segmental pulmonary embolus.  Subsegmental emboli cannot be   excluded.   2. Bilateral upper lobe mass lesions again noted.  RIGHT upper lobe   lesion is increased in size.  LEFT upper lobe lesion appears grossly   stable.  Mediastinal adenopathy also noted, increased compared with   prior exam.  Bilateral  pleural effusions have developed, RIGHT greater   than LEFT.  These may be malignant.   3. Emphysematous changes are present.   4. Diffuse subcutaneous edema, suggestive of anasarca.   Signed by Stiven Ahumada II, MD      XR chest 1 view   Final Result   *Right infrahilar infiltrate.  Underlying chronic interstitial   fibrotic interstitial fibrotic changes are seen.   *Left upper lobe mass, new since the prior study.   *Soft tissue fullness of the right suprahilar area.  A mass in   this region is not ruled out.   *Bilateral pleural effusions.   *Cardiomegaly.   Signed by Gaston Gordon MD          Medical Decision Making  73-year-old female brought in by EMS from home for worsening shortness of breath.  History of lung cancer, COPD and chronic respiratory failure on 3 to 4 L at baseline, currently on chemotherapy.  On initial evaluation patient with increased work of breathing, significant wheezing and rhonchi throughout.  Blood gas was obtained demonstrating pH of 7.28 with pCO2 of 74 and pO2 of 67.  Decision was made to place patient on noninvasive positive pressure ventilation with BiPAP for respiratory support given the hypercapnic respiratory failure.  Lab work and imaging obtained including CT PE study to rule out acute PE given patient is high risk with current lung cancer on treatment.  Patient responding well to BiPAP.  Patient's lab work demonstrating significant anemia with hemoglobin of 6.8.  Patient reports her last transfusion was about a week ago.  Patient's platelets down to 36.  Patient reports that she has had thrombocytopenia due to her chemotherapy and her platelets last week were in the 60s.  She states they held her transfusion this week due to her low platelets.  Patient without any active bleeding currently.  Patient's COVID and influenza testing were negative.  Normal coags.  Normal serum lactate.  Metabolic panel with normal renal function, no major electrolyte derangements.  Bicarb  elevated likely due to chronic compensation.  Troponin 23 which I suspect is likely mildly increased due to demand during period of hypoxia and increased respiratory effort.    Patient consented for 1 unit packed red cell transfusion for anemia.  CT PE study negative for acute PE to the segmental level.    Problems Addressed:  Acute hypercapnic respiratory failure (CMS/HCC): chronic illness or injury with severe exacerbation, progression, or side effects of treatment  Anemia, unspecified type: chronic illness or injury with severe exacerbation, progression, or side effects of treatment  COPD exacerbation (CMS/HCC): acute illness or injury  Pneumonia due to infectious organism, unspecified laterality, unspecified part of lung: acute illness or injury    Amount and/or Complexity of Data Reviewed  External Data Reviewed: notes.     Details: Reviewed recent palliative care notes from [facility].  Patient currently remains full code.  Labs: ordered. Decision-making details documented in ED Course.  Radiology: ordered. Decision-making details documented in ED Course.  ECG/medicine tests: ordered and independent interpretation performed. Decision-making details documented in ED Course.     Details: Twelve-lead ECG obtained at 2101 and by my interpretation demonstrates sinus tachycardia with frequent PVCs, no acute ST elevation, significant respiratory variation and artifact, rate of 124.  Discussion of management or test interpretation with external provider(s): Case discussed with ICU for admission and further management.        Procedure  Critical Care    Performed by: Bjorn Coreas MD  Authorized by: Bjorn Coreas MD    Critical care provider statement:     Critical care time (minutes):  40    Critical care time was exclusive of:  Separately billable procedures and treating other patients    Critical care was necessary to treat or prevent imminent or life-threatening deterioration of the following conditions:   Respiratory failure    Critical care was time spent personally by me on the following activities:  Ordering and performing treatments and interventions, ordering and review of laboratory studies, ordering and review of radiographic studies, pulse oximetry, evaluation of patient's response to treatment, examination of patient and re-evaluation of patient's condition    Care discussed with: admitting provider         Bjorn Coreas MD  12/09/23 1384

## 2023-12-11 ENCOUNTER — APPOINTMENT (OUTPATIENT)
Dept: RADIOLOGY | Facility: HOSPITAL | Age: 73
DRG: 189 | End: 2023-12-11
Payer: MEDICARE

## 2023-12-11 LAB
ANION GAP BLDA CALCULATED.4IONS-SCNC: 5 MMO/L (ref 10–25)
APPARATUS: ABNORMAL
ARTERIAL PATENCY WRIST A: POSITIVE
BASE EXCESS BLDA CALC-SCNC: 7.7 MMOL/L (ref -2–3)
BODY TEMPERATURE: ABNORMAL
CA-I BLDA-SCNC: 1.21 MMOL/L (ref 1.1–1.33)
CHLORIDE BLDA-SCNC: 97 MMOL/L (ref 98–107)
CRITICAL CALL TIME: 1402
CRITICAL CALLED BY: ABNORMAL
CRITICAL CALLED TO: ABNORMAL
CRITICAL READ BACK: ABNORMAL
ERYTHROCYTE [DISTWIDTH] IN BLOOD BY AUTOMATED COUNT: 17.8 % (ref 11.5–14.5)
FLOW: 15 LPM
GLUCOSE BLD MANUAL STRIP-MCNC: 102 MG/DL (ref 74–99)
GLUCOSE BLD MANUAL STRIP-MCNC: 109 MG/DL (ref 74–99)
GLUCOSE BLD MANUAL STRIP-MCNC: 119 MG/DL (ref 74–99)
GLUCOSE BLDA-MCNC: 129 MG/DL (ref 74–99)
HCO3 BLDA-SCNC: 36.3 MMOL/L (ref 22–26)
HCT VFR BLD AUTO: 24.4 % (ref 36–46)
HCT VFR BLD EST: 25 % (ref 36–46)
HGB BLD-MCNC: 7.6 G/DL (ref 12–16)
HGB BLDA-MCNC: 8.4 G/DL (ref 12–16)
HOLD SPECIMEN: NORMAL
INHALED O2 CONCENTRATION: 99 %
LACTATE BLDA-SCNC: 0.6 MMOL/L (ref 0.4–2)
LEGIONELLA AG UR QL: NEGATIVE
MCH RBC QN AUTO: 32.6 PG (ref 26–34)
MCHC RBC AUTO-ENTMCNC: 31.1 G/DL (ref 32–36)
MCV RBC AUTO: 105 FL (ref 80–100)
NRBC BLD-RTO: 0 /100 WBCS (ref 0–0)
OXYHGB MFR BLDA: 95.8 % (ref 94–98)
PATH REVIEW-CBC DIFFERENTIAL: NORMAL
PCO2 BLDA: 81 MM HG (ref 38–42)
PH BLDA: 7.26 PH (ref 7.38–7.42)
PLATELET # BLD AUTO: 57 X10*3/UL (ref 150–450)
PO2 BLDA: 232 MM HG (ref 85–95)
POTASSIUM BLDA-SCNC: 4.4 MMOL/L (ref 3.5–5.3)
PROCALCITONIN SERPL-MCNC: 0.17 NG/ML
RBC # BLD AUTO: 2.33 X10*6/UL (ref 4–5.2)
S PNEUM AG UR QL: NEGATIVE
SAO2 % BLDA: 99 % (ref 94–100)
SODIUM BLDA-SCNC: 134 MMOL/L (ref 136–145)
SPECIMEN DRAWN FROM PATIENT: ABNORMAL
VENTILATOR MODE: ABNORMAL
WBC # BLD AUTO: 6.6 X10*3/UL (ref 4.4–11.3)

## 2023-12-11 PROCEDURE — 71045 X-RAY EXAM CHEST 1 VIEW: CPT | Performed by: RADIOLOGY

## 2023-12-11 PROCEDURE — 71045 X-RAY EXAM CHEST 1 VIEW: CPT | Mod: FY

## 2023-12-11 PROCEDURE — 2500000001 HC RX 250 WO HCPCS SELF ADMINISTERED DRUGS (ALT 637 FOR MEDICARE OP): Performed by: NURSE PRACTITIONER

## 2023-12-11 PROCEDURE — 82435 ASSAY OF BLOOD CHLORIDE: CPT | Performed by: STUDENT IN AN ORGANIZED HEALTH CARE EDUCATION/TRAINING PROGRAM

## 2023-12-11 PROCEDURE — 2500000004 HC RX 250 GENERAL PHARMACY W/ HCPCS (ALT 636 FOR OP/ED)

## 2023-12-11 PROCEDURE — 82947 ASSAY GLUCOSE BLOOD QUANT: CPT

## 2023-12-11 PROCEDURE — 85027 COMPLETE CBC AUTOMATED: CPT | Performed by: STUDENT IN AN ORGANIZED HEALTH CARE EDUCATION/TRAINING PROGRAM

## 2023-12-11 PROCEDURE — 84145 PROCALCITONIN (PCT): CPT | Mod: ELYLAB | Performed by: STUDENT IN AN ORGANIZED HEALTH CARE EDUCATION/TRAINING PROGRAM

## 2023-12-11 PROCEDURE — 36415 COLL VENOUS BLD VENIPUNCTURE: CPT | Performed by: STUDENT IN AN ORGANIZED HEALTH CARE EDUCATION/TRAINING PROGRAM

## 2023-12-11 PROCEDURE — 2500000002 HC RX 250 W HCPCS SELF ADMINISTERED DRUGS (ALT 637 FOR MEDICARE OP, ALT 636 FOR OP/ED): Performed by: STUDENT IN AN ORGANIZED HEALTH CARE EDUCATION/TRAINING PROGRAM

## 2023-12-11 PROCEDURE — 99232 SBSQ HOSP IP/OBS MODERATE 35: CPT | Performed by: STUDENT IN AN ORGANIZED HEALTH CARE EDUCATION/TRAINING PROGRAM

## 2023-12-11 PROCEDURE — 99291 CRITICAL CARE FIRST HOUR: CPT | Performed by: NURSE PRACTITIONER

## 2023-12-11 PROCEDURE — 2500000002 HC RX 250 W HCPCS SELF ADMINISTERED DRUGS (ALT 637 FOR MEDICARE OP, ALT 636 FOR OP/ED)

## 2023-12-11 PROCEDURE — 94640 AIRWAY INHALATION TREATMENT: CPT

## 2023-12-11 PROCEDURE — 2500000004 HC RX 250 GENERAL PHARMACY W/ HCPCS (ALT 636 FOR OP/ED): Performed by: NURSE PRACTITIONER

## 2023-12-11 PROCEDURE — 5A0935A ASSISTANCE WITH RESPIRATORY VENTILATION, LESS THAN 24 CONSECUTIVE HOURS, HIGH NASAL FLOW/VELOCITY: ICD-10-PCS | Performed by: INTERNAL MEDICINE

## 2023-12-11 PROCEDURE — 2020000001 HC ICU ROOM DAILY

## 2023-12-11 PROCEDURE — 94660 CPAP INITIATION&MGMT: CPT

## 2023-12-11 PROCEDURE — 96372 THER/PROPH/DIAG INJ SC/IM: CPT | Performed by: NURSE PRACTITIONER

## 2023-12-11 PROCEDURE — 2500000002 HC RX 250 W HCPCS SELF ADMINISTERED DRUGS (ALT 637 FOR MEDICARE OP, ALT 636 FOR OP/ED): Performed by: NURSE PRACTITIONER

## 2023-12-11 RX ORDER — OLANZAPINE 10 MG/1
10 TABLET, ORALLY DISINTEGRATING ORAL NIGHTLY
Status: DISCONTINUED | OUTPATIENT
Start: 2023-12-11 | End: 2023-12-12

## 2023-12-11 RX ORDER — METOPROLOL TARTRATE 1 MG/ML
5 INJECTION, SOLUTION INTRAVENOUS EVERY 6 HOURS PRN
Status: DISCONTINUED | OUTPATIENT
Start: 2023-12-11 | End: 2023-12-17 | Stop reason: HOSPADM

## 2023-12-11 RX ORDER — PANTOPRAZOLE SODIUM 40 MG/10ML
40 INJECTION, POWDER, LYOPHILIZED, FOR SOLUTION INTRAVENOUS DAILY
Status: DISCONTINUED | OUTPATIENT
Start: 2023-12-12 | End: 2023-12-13

## 2023-12-11 RX ORDER — FUROSEMIDE 10 MG/ML
20 INJECTION INTRAMUSCULAR; INTRAVENOUS ONCE
Status: COMPLETED | OUTPATIENT
Start: 2023-12-11 | End: 2023-12-11

## 2023-12-11 RX ADMIN — OLANZAPINE 10 MG: 10 TABLET, ORALLY DISINTEGRATING ORAL at 21:00

## 2023-12-11 RX ADMIN — GABAPENTIN 400 MG: 400 CAPSULE ORAL at 09:52

## 2023-12-11 RX ADMIN — SODIUM CHLORIDE 10 ML: 9 INJECTION, SOLUTION INTRAVENOUS at 22:49

## 2023-12-11 RX ADMIN — PANTOPRAZOLE SODIUM 40 MG: 40 TABLET, DELAYED RELEASE ORAL at 06:53

## 2023-12-11 RX ADMIN — FUROSEMIDE 20 MG: 10 INJECTION, SOLUTION INTRAMUSCULAR; INTRAVENOUS at 16:26

## 2023-12-11 RX ADMIN — ASPIRIN 81 MG: 81 TABLET, CHEWABLE ORAL at 09:51

## 2023-12-11 RX ADMIN — METHYLPREDNISOLONE SODIUM SUCCINATE 40 MG: 40 INJECTION, POWDER, FOR SOLUTION INTRAMUSCULAR; INTRAVENOUS at 09:52

## 2023-12-11 RX ADMIN — BUPROPION HYDROCHLORIDE 300 MG: 150 TABLET, EXTENDED RELEASE ORAL at 09:51

## 2023-12-11 RX ADMIN — IPRATROPIUM BROMIDE AND ALBUTEROL SULFATE 3 ML: 2.5; .5 SOLUTION RESPIRATORY (INHALATION) at 13:35

## 2023-12-11 RX ADMIN — FOLIC ACID 1 MG: 1 TABLET ORAL at 09:51

## 2023-12-11 RX ADMIN — LISINOPRIL: 20 TABLET ORAL at 09:51

## 2023-12-11 RX ADMIN — METOPROLOL SUCCINATE 50 MG: 50 TABLET, EXTENDED RELEASE ORAL at 09:51

## 2023-12-11 RX ADMIN — MONTELUKAST SODIUM 10 MG: 10 TABLET, FILM COATED ORAL at 09:52

## 2023-12-11 RX ADMIN — IPRATROPIUM BROMIDE AND ALBUTEROL SULFATE 3 ML: 2.5; .5 SOLUTION RESPIRATORY (INHALATION) at 20:10

## 2023-12-11 RX ADMIN — DOXYCYCLINE HYCLATE 100 MG: 100 TABLET, FILM COATED ORAL at 09:52

## 2023-12-11 RX ADMIN — IPRATROPIUM BROMIDE AND ALBUTEROL SULFATE 3 ML: 2.5; .5 SOLUTION RESPIRATORY (INHALATION) at 10:34

## 2023-12-11 RX ADMIN — PIPERACILLIN SODIUM AND TAZOBACTAM SODIUM 3.38 G: 3; .375 INJECTION, SOLUTION INTRAVENOUS at 16:27

## 2023-12-11 RX ADMIN — PIPERACILLIN SODIUM AND TAZOBACTAM SODIUM 3.38 G: 3; .375 INJECTION, SOLUTION INTRAVENOUS at 22:48

## 2023-12-11 RX ADMIN — ENOXAPARIN SODIUM 40 MG: 40 INJECTION SUBCUTANEOUS at 09:52

## 2023-12-11 ASSESSMENT — COGNITIVE AND FUNCTIONAL STATUS - GENERAL
MOVING FROM LYING ON BACK TO SITTING ON SIDE OF FLAT BED WITH BEDRAILS: A LITTLE
DRESSING REGULAR LOWER BODY CLOTHING: A LITTLE
CLIMB 3 TO 5 STEPS WITH RAILING: A LITTLE
HELP NEEDED FOR BATHING: A LITTLE
TURNING FROM BACK TO SIDE WHILE IN FLAT BAD: A LITTLE
MOBILITY SCORE: 18
DRESSING REGULAR UPPER BODY CLOTHING: A LITTLE
PERSONAL GROOMING: A LITTLE
EATING MEALS: A LITTLE
TOILETING: A LITTLE
DAILY ACTIVITIY SCORE: 18
WALKING IN HOSPITAL ROOM: A LITTLE
STANDING UP FROM CHAIR USING ARMS: A LITTLE
MOVING TO AND FROM BED TO CHAIR: A LITTLE

## 2023-12-11 ASSESSMENT — PAIN SCALES - GENERAL
PAINLEVEL_OUTOF10: 0 - NO PAIN

## 2023-12-11 ASSESSMENT — PAIN - FUNCTIONAL ASSESSMENT
PAIN_FUNCTIONAL_ASSESSMENT: 0-10
PAIN_FUNCTIONAL_ASSESSMENT: 0-10

## 2023-12-11 NOTE — CONSULTS
"Nutrition Initial Assessment:   Nutrition Assessment         Patient is a 73 y.o. female presenting with:  COPD exacerbation      Nutrition History:  Food and Nutrient History: Pt with pt who reports that appetite has been poor for the past 2 weeks due to increased difficulty breathing and undergoing cancer treatment. Pt denies N/V at this time, does report constipation - had to miss last chemo due to blood count. Pt states she is drinking 2-3 Ensure daily. Pt states her UBW was 180 lbs - has not been at that wt for some time - reports 7-8 lb wt loss in the past 1-2 weeks. Pt denies chewing or swallowing difficulty. Pt is agreeable to adding supplements with meals this admission. Pt denies nutrition questions or concerns at this time. Will continue to monitor.  Food Allergies/Intolerances:  None  GI Symptoms: Constipation  Oral Problems: None       Anthropometrics:  Height: 155 cm (5' 1.02\")   Weight: 63.3 kg (139 lb 8.8 oz)   BMI (Calculated): 26.35  IBW/kg (Dietitian Calculated): 47.7 kg          Weight History:   Wt Readings from Last 10 Encounters:   12/11/23 63.3 kg (139 lb 8.8 oz)   03/03/23 64.4 kg (142 lb)   10/26/22 69 kg (152 lb 1.9 oz)   10/07/22 68 kg (150 lb)   07/26/22 68 kg (150 lb)   10/12/20 60.9 kg (134 lb 4.2 oz)      Weight Change %:  Weight History / % Weight Change: pt reports 7-8 lb wt loss in the past 2 weeks  Significant Weight Loss: Yes  Interpretation of Weight Loss: >2% in 1 week    Nutrition Focused Physical Exam Findings:    Subcutaneous Fat Loss:   Orbital Fat Pads: Mild-Moderate (slight dark circles and slight hollowing)  Buccal Fat Pads: Mild-Moderate (flat cheeks, minimal bounce)  Muscle Wasting:  Temporalis: Mild-Moderate (slight depression)  Pectoralis (Clavicular Region): Mild-Moderate (some protrusion of clavicle)  Deltoid/Trapezius: Mild-Moderate (slight protrusion of acromion process)  Interosseous: Defer  Trapezius/Infraspinatus/Supraspinatus (Scapular Region): " Defer  Quadriceps: Defer  Gastrocnemius: Defer  Edema:  Edema: none    Nutrition Significant Labs:  BMP Trend:   Results from last 7 days   Lab Units 12/10/23  0720 12/09/23  2102   GLUCOSE mg/dL 109* 133*   CALCIUM mg/dL 8.8 8.8   SODIUM mmol/L 136 141   POTASSIUM mmol/L 4.5 4.0   CO2 mmol/L 35* 36*   CHLORIDE mmol/L 96* 100   BUN mg/dL 23 22   CREATININE mg/dL 0.95 0.84        Nutrition Specific Medications:      I/O:   Last BM Date: 12/08/23;          Dietary Orders (From admission, onward)       Start     Ordered    12/10/23 0807  May Participate in Room Service  Once        Question:  .  Answer:  Yes    12/10/23 0806    12/09/23 2317  Adult diet Regular  Diet effective now        Question:  Diet type  Answer:  Regular    12/09/23 2316                     Estimated Needs:   Total Energy Estimated Needs (kCal): 1900 kCal  Method for Estimating Needs: ABW  Total Protein Estimated Needs (g): 76 g  Method for Estimating Needs: ABW  Total Fluid Estimated Needs (mL): 1900 mL  Method for Estimating Needs: ABW        Nutrition Diagnosis   Malnutrition Diagnosis  Patient has Malnutrition Diagnosis: Yes  Diagnosis Status: New  Malnutrition Diagnosis: Severe malnutrition related to acute disease or injury  As Evidenced by: >2% wt loss in 1 week; moderate muscle wasting and fat loss; <50% estimated energy needs in >5 days            Nutrition Interventions/Recommendations         Nutrition Prescription:  Individualized Nutrition Prescription Provided for : Continue regular diet, recommend Ensure Plus with meals        Nutrition Interventions:   Food and/or Nutrient Delivery Interventions  Interventions: Medical food supplement  Medical Food Supplement: Commercial beverage        Nutrition Education:      Not appropriate at this time    Nutrition Monitoring and Evaluation   Food/Nutrient Related History Monitoring  Monitoring and Evaluation Plan: Energy intake, Amount of food  Energy Intake: Estimated energy  intake  Criteria: meets >75% of estimated nutrition needs  Amount of Food: Estimated amout of food, Medical food intake  Criteria: >50% intake of meals and supplements    Body Composition/Growth/Weight History  Monitoring and Evaluation Plan: Weight  Weight: Measured weight  Criteria: maintain current wt                 Time Spent/Follow-up Reminder:   Time Spent (min): 60 minutes  Last Date of Nutrition Visit: 12/11/23  Nutrition Follow-Up Needed?: 3-5 days

## 2023-12-11 NOTE — CARE PLAN
Problem: Pain - Adult  Goal: Verbalizes/displays adequate comfort level or baseline comfort level  Outcome: Progressing     Problem: Safety - Adult  Goal: Free from fall injury  Outcome: Progressing     Problem: Skin  Goal: Decreased wound size/increased tissue granulation at next dressing change  Outcome: Progressing  Goal: Participates in plan/prevention/treatment measures  Outcome: Progressing  Goal: Prevent/manage excess moisture  Outcome: Progressing  Goal: Prevent/minimize sheer/friction injuries  Outcome: Progressing  Goal: Promote/optimize nutrition  Outcome: Progressing  Goal: Promote skin healing  Outcome: Progressing

## 2023-12-11 NOTE — PROGRESS NOTES
"Gaby Blue is a 73 y.o. female on day 2 of admission presenting with COPD exacerbation (CMS/HCC).    Subjective   Patient seen and examined.  She says that she feels better compared to yesterday but still does not feel like She is back to her baseline.  Feels lethargic and a little short of breath.  On 3 days of oxygen which is her baseline.       Objective     Physical Exam  Constitutional:       Appearance: She is ill-appearing.   HENT:      Head: Normocephalic and atraumatic.   Cardiovascular:      Rate and Rhythm: Normal rate and regular rhythm.   Pulmonary:      Comments: Decreased breathing sounds bilaterally with scattered rales and rhonchi  Abdominal:      Palpations: Abdomen is soft.      Tenderness: There is no abdominal tenderness.   Neurological:      Mental Status: She is alert. Mental status is at baseline.   Psychiatric:         Mood and Affect: Mood normal.         Last Recorded Vitals  Blood pressure 143/65, pulse 61, temperature 35.8 °C (96.4 °F), resp. rate 15, height 1.55 m (5' 1.02\"), weight 63.3 kg (139 lb 8.8 oz), SpO2 95 %.  Intake/Output last 3 Shifts:  I/O last 3 completed shifts:  In: 1004.2 (15.9 mL/kg) [P.O.:450; Blood:354.2; IV Piggyback:200]  Out: 550 (8.7 mL/kg) [Urine:550 (0.2 mL/kg/hr)]  Weight: 63.3 kg     Relevant Results                             Assessment/Plan   Principal Problem:    COPD exacerbation (CMS/HCC)    Case pleasant 73-year-old female who was admitted to the ICU for acute on chronic respiratory failure with hypoxia and hypercapnia, pneumonia, bilateral pleural effusions, COPD exacerbation, acute on chronic anemia, thrombocytopenia, non-small cell lung cancer of left lung with secondary cancer of right lung, hypertension, chronic pain anasarca    She is currently on 3 to 4 L which is her baseline  Does have a lot of wheezing and rales  Will continue doxycycline, urine Legionella and strep pneumo were normal  MRSA was negative as well  Check a procalcitonin level " if negative will discontinue antibiotics  Continue Solu-Medrol, albuterol inhaler and DuoNebs  Supportive care, symptomatic treatment  Percocet for pain control  Regular blood pressure medications  Last hemoglobin was 7.2, blood work was not ordered for today we will check a CBC  She is status posttransfusion of 2 unit of packed red blood cells  Platelet count was 31, we will recheck  Will hold aspirin for now  DVT prophylaxis with SCDs  Continue regular blood pressure medications  Was transferred out of the floor yesterday       Reviewed CBC sent today it seems like platelet count and hemoglobin is improved    Philipp Latham MD

## 2023-12-11 NOTE — NURSING NOTE
1345: Respiratory about to do treatment, pt was in the 70s saturation. RR in the 40s. Bipap placed right away, oxygen came up to 94%. Dr. Latham and Dr. Amezquita aware. New orders received.

## 2023-12-11 NOTE — NURSING NOTE
"Patients bed alarm going off, upon entering room patient was found lying on her right side on the floor on the right side of bed. With 2 assist patient was able to sit up and be assisted back to bed with full weight bearing. When patient was asked what happened she stated \" I rolled over on my right side and kept on rolling, I did not fall.\" Patient denies hitting her head and states she has no pain. Neuro assessment completed and wnl, patient has full range of motion and patient denies any weakness. Patients skin assessment unchanged form admission assessment, no contusions, abrasions, or swelling noted. Vital signs: T 36.5,  hr 92,  b/p 143/89,  pulse ox 95% on 5 liters nasal cannula, respirations 19, blood sugar 107. Patient stated she did not want anyone called about her falling. NP Daysi Brown seen and examined patient, nursing Supervisor Mell Coreas informed of fall. Bed alarm continued, 3 side rails up, call light in reach. Reminded patient to call for staff assist before getting up from bed. Patient verbalized clear understanding.  "

## 2023-12-11 NOTE — NURSING NOTE
RESPIRATORY NOTE:  PT. IN RESPIRATORY DISTRESS, PLACED PT. ON BIPAP APPROX. 5-10 MINS WHEN ABG OBTAINED, RESULTS CRITICAL, HOWEVER WOB IMPROVED QUICKLY WHEN BIPAP APPLIED AND SPO2 INCREASED TO HIGH 90'S. NOREEN BAIG MADE AWARE

## 2023-12-11 NOTE — NURSING NOTE
11:27: notified Herman Latham that pt is coughing after eating and oxygen dropped to 83% on 4L NC.  Respiratory was already at bedside doing treatment. Placed her on oximizer, oxygen came back up. New orders received: Keep pt NPO until SLP eval completed.

## 2023-12-11 NOTE — PROGRESS NOTES
[facility] Critical Care Medicine       Date:  12/11/2023  Patient:  Gaby Blue  YOB: 1950  MRN:  [id number]   Admit Date:  12/9/2023  ========================================================================================================    Chief Complaint   Patient presents with    Shortness of Breath         History of Present Illness:  Gaby Blue is a 73 y.o. 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 Shortness of breath associated with respiratory cough with yellow sputum.  Shortness of breath persistent and increasing over the last couple days. No improvement with rest or nebulizers. Worse with activity. Did deny fevers, chest pain, abnormal bleeding, or any GI/ symptoms.   ED course:Patient's COVID and influenza testing were negative.  Normal coags.  Normal serum lactate.  Metabolic panel with normal renal function, no major electrolyte derangements.  Bicarb elevated likely due to chronic compensation. Troponin 23 which I suspect is likely mildly increased due to demand during period of hypoxia and increased respiratory effort. HGB was 6.8 she was given 1 unit of PRBCs in ED, started on zosyn, vanco and levaquin in suspicion of pneumonia.  CXR showed bilateral pleural effusions and new left lobe mass. Pt started on BIPAP and sent to ICU for further management and monitoring.     12/10: Patient was transferred out of ICU to [facility].   12/11: Per  hospitalist, patient potentially aspirated on breakfast this am. Desat to 70% on RA, placed on BiPAP 16/5 50%, ABG  7.26/81/232/36.3. Transferred to ICU for further management of respiratory distress.          Interval ICU Events:  12/10: Admitted to the ICU initially on BIPAP, was given 20mg IV Lasix with improvement with SOB. She is hemodynamically stable, now off BIPaP and 4L NC at 96% SPO2. HR is NSR with no ectopy. She states her SOB has improved, denies chest pain, abd pain, n/v/d.   12/11: Admitted to ICU on BiPAP, A/Ox3, resting comfortably, denies SOB or increased WOB, HDS SpO2 99%. LS- bilateral crackles/rhonchi. NPO until SLP evaluation.     Medical History:  Past Medical History:   Diagnosis Date    Arthritis     Bilateral sensorineural hearing loss 03/06/2023    Chronic hypoxic respiratory failure, on home oxygen therapy (CMS/HCC)     COPD (chronic obstructive pulmonary disease) (CMS/HCC)     Diverticulitis     History of radiation therapy     History of transfusion     Hypertension     Non-small cell lung cancer (NSCLC) (CMS/HCC)     Secondary malignant neoplasm of right lung (CMS/HCC)     Ulnar neuropathy of left upper extremity 03/06/2023     Past Surgical History:   Procedure Laterality Date    APPENDECTOMY      CARDIAC CATHETERIZATION      COLON SURGERY      CT ANGIO NECK  09/26/2022    CT NECK ANGIO W AND WO IV CONTRAST    CT ANGIO NECK  12/17/2021    CT NECK ANGIO W AND WO IV CONTRAST    EXCISION BENIGN SKIN LESION SCALP / NECK / HANDS / FEET / GENITALIA      scalp    HAND SURGERY      HYSTERECTOMY  03/29/2018    Hysterectomy    MECKEL DIVERTICULUM EXCISION      TONSILLECTOMY  03/29/2018    Tonsillectomy     Medications Prior to Admission   Medication Sig Dispense Refill Last Dose    dronabinol (Marinol) 2.5 mg capsule Take 1 capsule (2.5 mg) by mouth 2 times a day before meals.       oxygen (O2) gas therapy Inhale 3 L/min continuously.       albuterol 90 mcg/actuation inhaler Inhale 2 puffs 4  times a day as needed.       ALPRAZolam (Xanax) 1 mg tablet Take 1 tablet (1 mg) by mouth 3 times a day as needed.       aspirin 81 mg chewable tablet Chew 1 tablet (81 mg) once daily.       buPROPion XL (Wellbutrin XL) 300 mg 24 hr tablet Take 1 tablet (300 mg) by mouth once daily.       fluticasone (Flonase) 50 mcg/actuation nasal spray Administer 1 spray into affected nostril(s) in the morning and 1 spray in the evening.       fluticasone furoate-vilanteroL (Breo Elipta) 200-25 mcg/dose inhaler Inhale 1 puff once daily.       fluticasone-umeclidin-vilanter (TRELEGY-ELLIPTA) 100-62.5-25 mcg blister with device Inhale 1 puff once daily.       fluticasone-umeclidin-vilanter (TRELEGY-ELLIPTA) 100-62.5-25 mcg blister with device INHALE 1 PUFF BY MOUTH ONCE DAILY 60 each 11     folic acid (Folvite) 1 mg tablet Take 1 tablet (1 mg) by mouth once daily.       gabapentin (Neurontin) 800 mg tablet Take 1 tablet (800 mg) by mouth in the morning and 1 tablet (800 mg) in the evening and 1 tablet (800 mg) before bedtime.       levalbuterol (Xopenex) 1.25 mg/3 mL nebulizer solution Inhale 3 mL every 4 hours if needed.       lisinopriL-hydrochlorothiazide 20-25 mg tablet Take 1 tablet by mouth once daily.       loratadine (Claritin) 10 mg tablet Take 1 tablet (10 mg) by mouth once daily.       metoprolol succinate XL (Toprol-XL) 50 mg 24 hr tablet Take 1 tablet (50 mg) by mouth once daily.       montelukast (Singulair) 10 mg tablet Take 1 tablet (10 mg) by mouth once daily.       OLANZapine (ZyPREXA) 10 mg tablet Take 1 tablet (10 mg) by mouth once daily at bedtime.       oxyCODONE-acetaminophen (Percocet) 7.5-325 mg tablet Take 1 tablet by mouth every 4 hours if needed for moderate pain (4 - 6).       pantoprazole (ProtoNix) 40 mg EC tablet Take 1 tablet (40 mg) by mouth once daily.        Bee pollen  Social History     Tobacco Use    Smoking status: Former     Packs/day: 0.50     Years: 48.00     Additional pack years: 0.00      Total pack years: 24.00     Types: Cigarettes     Quit date: 2019     Years since quittin.1    Smokeless tobacco: Never   Vaping Use    Vaping Use: Never used   Substance Use Topics    Alcohol use: Not Currently    Drug use: Never     Family History   Problem Relation Name Age of Onset    COPD Mother      Cancer Father      Other (Lupus erythematosus) Other family history     Heart disease Other family history        Hospital Medications:         Current Facility-Administered Medications:     albuterol 90 mcg/actuation inhaler 2 puff, 2 puff, inhalation, 4x daily PRN, AUGUSTA Barton    ALPRAZolam (Xanax) tablet 1 mg, 1 mg, oral, TID PRN, AUGUSTA Barton    [Held by provider] aspirin chewable tablet 81 mg, 81 mg, oral, Daily, AUGUSTA Barton, 81 mg at 23    buPROPion XL (Wellbutrin XL) 24 hr tablet 300 mg, 300 mg, oral, Daily, AUGUSTA Barton, 300 mg at 23    dextrose 10 % in water (D10W) infusion, 0.3 g/kg/hr, intravenous, Once PRN, AUGUSTA Barton    dextrose 50 % injection 25 g, 25 g, intravenous, q15 min PRN, AUGUSTA Barton    enoxaparin (Lovenox) syringe 40 mg, 40 mg, subcutaneous, q24h, AUGUSTA Barton, 40 mg at 23    folic acid (Folvite) tablet 1 mg, 1 mg, oral, Daily, AUGUSTA Barton, 1 mg at 23    gabapentin (Neurontin) capsule 400 mg, 400 mg, oral, TID, AUGUSTA Barton, 400 mg at 23    glucagon (Glucagen) injection 1 mg, 1 mg, intramuscular, q15 min PRN, AUGUSTA Barton    insulin lispro (HumaLOG) injection 0-5 Units, 0-5 Units, subcutaneous, TID with meals, AUGUSTA Barton    ipratropium-albuteroL (Duo-Neb) 0.5-2.5 mg/3 mL nebulizer solution 3 mL, 3 mL, nebulization, q4h PRN, Danyelle Mariscal, APRN-CNP, 3 mL at 23 1034    ipratropium-albuteroL (Duo-Neb) 0.5-2.5 mg/3 mL nebulizer solution 3 mL, 3 mL, nebulization, TID, Danyelle Mariscal,  "APRN-CNP, 3 mL at 12/11/23 1335    lisinopril 20 mg, hydroCHLOROthiazide 25 mg for Zestoretic/Prinizide, , oral, Daily, AUGUSTA Barton, Given at 12/11/23 0951    methylPREDNISolone sod succinate (PF) (SOLU-Medrol) 40 mg/mL injection 40 mg, 40 mg, intravenous, Daily, AUGUSTA Barton, 40 mg at 12/11/23 0952    metoprolol succinate XL (Toprol-XL) 24 hr tablet 50 mg, 50 mg, oral, Daily, AUGUSTA Barton, 50 mg at 12/11/23 0951    montelukast (Singulair) tablet 10 mg, 10 mg, oral, Daily, AUGUSTA Barton, 10 mg at 12/11/23 0952    OLANZapine (ZyPREXA) tablet 10 mg, 10 mg, oral, Nightly, AUGUSTA Barton, 10 mg at 12/10/23 2015    oxyCODONE-acetaminophen (Percocet) 5-325 mg per tablet 1 tablet, 1 tablet, oral, q4h PRN, AUGUSTA Barton    oxygen (O2) therapy, , inhalation, Continuous PRN - O2/gases, AUGUSTA Barton, Rate Verify at 12/11/23 0300    pantoprazole (ProtoNix) EC tablet 40 mg, 40 mg, oral, Daily, AUGUSTA Barton, 40 mg at 12/11/23 0653    piperacillin-tazobactam-dextrose (Zosyn) IV 3.375 g, 3.375 g, intravenous, q6h, AUGUSTA Barton    Review of Systems:  14 point review of systems was completed and negative except for those specially mention in my HPI    Physical Exam:    Heart Rate:  [61-84]   Temp:  [35.8 °C (96.4 °F)-36.6 °C (97.9 °F)]   Resp:  [15-32]   BP: (112-160)/(59-78)   Height:  [155 cm (5' 1.02\")]   Weight:  [63.3 kg (139 lb 8.8 oz)]   SpO2:  [85 %-100 %]     Physical Exam  Vitals and nursing note reviewed.   Constitutional:       General: She is awake.      Appearance: Normal appearance. She is cachectic.   HENT:      Head: Normocephalic and atraumatic.   Cardiovascular:      Rate and Rhythm: Normal rate and regular rhythm.      Pulses: Normal pulses.   Pulmonary:      Effort: Pulmonary effort is normal.      Breath sounds: Normal breath sounds.   Abdominal:      General: Abdomen is flat. Bowel sounds are normal.      " Palpations: Abdomen is soft.   Musculoskeletal:      Cervical back: Full passive range of motion without pain.      Right lower leg: No edema.      Left lower leg: No edema.   Neurological:      Mental Status: She is alert and oriented to person, place, and time.         Objective:    I have reviewed all medications, laboratory results, and imaging pertinent for today's encounter.    FiO2 (%):  [40 %] 40 %      Intake/Output Summary (Last 24 hours) at 12/11/2023 1514  Last data filed at 12/11/2023 1229  Gross per 24 hour   Intake 800 ml   Output 850 ml   Net -50 ml           Assessment/Plan:    I am currently managing this critically ill patient for the following problems:    Acute on chronic respiratory failure with hypoxia and hypercapnia  Pneumonia  Bilateral pleural effusions  COPD exacerbation  Acute on chronic anemia  Thrombocytopenia  NSCLC left lung with secondary malignant neoplasm to right lung  HTN  Chronic pain  Anasarca     Neuro/Psych/Pain Ctrl/Sedation:  Chronic pain  -On Percocet, OARRS reviewed no issues, on hold for NPO  -Monitor for opioid induced constipation    Respiratory/ENT:  Aspiration Pneumonia  12/11 transfer to ICU requiring NIPPV  Acute on chronic respiratory failure with hypoxia and hypercapnia  Bilateral pleural effusions  COPD exacerbation  -Normally on 3-4 L nasal cannula continuously, wheezing, a lot of mucus production  -Antibiotic coverage below  -Titrate FiO2 to maintain SpO2 greater than 92% and pO2 greater than 70  -Effusions may be malignant, right greater than left may need thoracentesis  -Pulmonary hygiene, IS  -Scheduled DuoNebs, albuterol as needed  -Solu-Medrol 40 mg IV daily, end 12/15  -Continue BiPAP therapy  -Consider repeat ABG   -Empiric zosyn (12/11-12/16)    Cardiovascular:  HTN  -Okay to continue home antihypertensive    GI:  NPO 2/2 concern for aspiration  SLP evaluation ordered    Renal/Volume Status (Intra & Extravascular):  Anasarca  -Likely in setting of  malignancy  -Monitor renal function  -Strict intake and output  -Keep potassium 4 magnesium greater than 2  -Furosemide 20 mg IV x 1 now    Endocrine  -No hx of DM  -Monitor for stress hyperglycemia  -POC glucose 109    Hem/Onc:  NSCLC left lung with secondary malignant neoplasm to right lung  Acute on chronic anemia s/p PRBC x2, Hemoglobin baseline 8-9  Thrombocytopenia 36-> 57  -Monitor CBC  -Hold pharmacological antiplatelet or anticoagulant ppx until platelets improve  -Last chemo (gemcitabine, carboplatin ) infusion 2 weeks ago, will need to follow-up with [facility] oncology     OBGYN/MSK:  PT/OT      Ethics/Code Status:  Full code     :  DVT Prophylaxis: Lovenox  GI Prophylaxis: Protonix IV  Bowel Regimen: PRN suppository  Diet: NPO, SLP eval    CVC: NA  Ivy: NA  Nagel: NA  Restraints: NA  Dispo: ICU                Plan Discussed with Dr. YUKI Mariscal APRN, CNP  Critical Care Medicine   [facility]

## 2023-12-11 NOTE — CARE PLAN
The patient's goals for the shift include      The clinical goals for the shift include feel less short of breath      Problem: Safety - Adult  Goal: Free from fall injury  Outcome: Not Progressing  Flowsheets (Taken 12/11/2023 0012)  Free from fall injury:   Instruct family/caregiver on patient safety   Based on caregiver fall risk screen, instruct family/caregiver to ask for assistance with transferring infant if caregiver noted to have fall risk factors  Note: Patient rolled out of bed onto the floor on 12/10/23.     Problem: Fall/Injury  Goal: Not fall by end of shift  Outcome: Not Progressing       Problem: Safety - Adult  Goal: Free from fall injury  Outcome: Not Progressing  Flowsheets (Taken 12/11/2023 0012)  Free from fall injury:   Instruct family/caregiver on patient safety   Based on caregiver fall risk screen, instruct family/caregiver to ask for assistance with transferring infant if caregiver noted to have fall risk factors  Note: Patient rolled out of bed onto the floor on 12/10/23.     Problem: Fall/Injury  Goal: Not fall by end of shift  Outcome: Not Progressing

## 2023-12-11 NOTE — CARE PLAN
Problem: Pain - Adult  Goal: Verbalizes/displays adequate comfort level or baseline comfort level  12/11/2023 1242 by Maricruz Mcduffie RN  Outcome: Progressing  12/11/2023 1239 by Maricruz Mcduffie RN  Outcome: Progressing     Problem: Safety - Adult  Goal: Free from fall injury  12/11/2023 1242 by Maricruz Mcduffie RN  Outcome: Progressing  12/11/2023 1239 by Maricruz Mcduffie RN  Outcome: Progressing     Problem: Skin  Goal: Decreased wound size/increased tissue granulation at next dressing change  12/11/2023 1242 by Maricruz Mcduffie RN  Outcome: Progressing  12/11/2023 1239 by Maricruz Mcduffie RN  Outcome: Progressing  Goal: Participates in plan/prevention/treatment measures  12/11/2023 1242 by Maricruz Mcduffie RN  Outcome: Progressing  12/11/2023 1239 by Maricruz Mcduffie RN  Outcome: Progressing  Goal: Prevent/manage excess moisture  12/11/2023 1242 by Maricruz Mcduffie RN  Outcome: Progressing  12/11/2023 1239 by Maricruz Mcduffie RN  Outcome: Progressing  Goal: Prevent/minimize sheer/friction injuries  12/11/2023 1242 by Maricruz Mcduffie RN  Outcome: Progressing  12/11/2023 1239 by Maricruz Mcduffie RN  Outcome: Progressing  Goal: Promote/optimize nutrition  12/11/2023 1242 by Maricruz Mcduffie RN  Outcome: Progressing  12/11/2023 1239 by Maricruz Mcduffie RN  Outcome: Progressing  Goal: Promote skin healing  12/11/2023 1242 by Maricruz Mcduffie RN  Outcome: Progressing  Flowsheets (Taken 12/11/2023 1242)  Promote skin healing: Turn/reposition every 2 hours/use positioning/transfer devices  12/11/2023 1239 by Maricruz Mcduffie RN  Outcome: Progressing

## 2023-12-12 ENCOUNTER — APPOINTMENT (OUTPATIENT)
Dept: RADIOLOGY | Facility: HOSPITAL | Age: 73
DRG: 189 | End: 2023-12-12
Payer: MEDICARE

## 2023-12-12 LAB
ANION GAP BLDA CALCULATED.4IONS-SCNC: 6 MMO/L (ref 10–25)
ANION GAP SERPL CALC-SCNC: 12 MMOL/L (ref 10–20)
APPARATUS: ABNORMAL
ARTERIAL PATENCY WRIST A: POSITIVE
BASE EXCESS BLDA CALC-SCNC: 11.1 MMOL/L (ref -2–3)
BASOPHILS # BLD AUTO: 0.01 X10*3/UL (ref 0–0.1)
BASOPHILS NFR BLD AUTO: 0.2 %
BODY TEMPERATURE: ABNORMAL
BUN SERPL-MCNC: 29 MG/DL (ref 6–23)
CA-I BLDA-SCNC: 1.2 MMOL/L (ref 1.1–1.33)
CALCIUM SERPL-MCNC: 9.2 MG/DL (ref 8.6–10.3)
CHLORIDE BLDA-SCNC: 96 MMOL/L (ref 98–107)
CHLORIDE SERPL-SCNC: 94 MMOL/L (ref 98–107)
CO2 SERPL-SCNC: 36 MMOL/L (ref 21–32)
CREAT SERPL-MCNC: 1.22 MG/DL (ref 0.5–1.05)
CRITICAL CALL TIME: 941
CRITICAL CALLED BY: ABNORMAL
CRITICAL CALLED TO: ABNORMAL
CRITICAL READ BACK: ABNORMAL
EOSINOPHIL # BLD AUTO: 0 X10*3/UL (ref 0–0.4)
EOSINOPHIL NFR BLD AUTO: 0 %
EPAP CMH2O: 5 CM H2O
ERYTHROCYTE [DISTWIDTH] IN BLOOD BY AUTOMATED COUNT: 17.7 % (ref 11.5–14.5)
GFR SERPL CREATININE-BSD FRML MDRD: 47 ML/MIN/1.73M*2
GLUCOSE BLD MANUAL STRIP-MCNC: 85 MG/DL (ref 74–99)
GLUCOSE BLD MANUAL STRIP-MCNC: 85 MG/DL (ref 74–99)
GLUCOSE BLD MANUAL STRIP-MCNC: 88 MG/DL (ref 74–99)
GLUCOSE BLD MANUAL STRIP-MCNC: 88 MG/DL (ref 74–99)
GLUCOSE BLDA-MCNC: 80 MG/DL (ref 74–99)
GLUCOSE SERPL-MCNC: 74 MG/DL (ref 74–99)
HCO3 BLDA-SCNC: 38.3 MMOL/L (ref 22–26)
HCT VFR BLD AUTO: 27.1 % (ref 36–46)
HCT VFR BLD EST: 23 % (ref 36–46)
HGB BLD-MCNC: 8.4 G/DL (ref 12–16)
HGB BLDA-MCNC: 7.6 G/DL (ref 12–16)
IMM GRANULOCYTES # BLD AUTO: 0.03 X10*3/UL (ref 0–0.5)
IMM GRANULOCYTES NFR BLD AUTO: 0.5 % (ref 0–0.9)
INHALED O2 CONCENTRATION: 50 %
IPAP CMH2O: 16 CM H2O
LACTATE BLDA-SCNC: 0.6 MMOL/L (ref 0.4–2)
LYMPHOCYTES # BLD AUTO: 0.72 X10*3/UL (ref 0.8–3)
LYMPHOCYTES NFR BLD AUTO: 11 %
MCH RBC QN AUTO: 32.4 PG (ref 26–34)
MCHC RBC AUTO-ENTMCNC: 31 G/DL (ref 32–36)
MCV RBC AUTO: 105 FL (ref 80–100)
MONOCYTES # BLD AUTO: 0.97 X10*3/UL (ref 0.05–0.8)
MONOCYTES NFR BLD AUTO: 14.9 %
NEUTROPHILS # BLD AUTO: 4.8 X10*3/UL (ref 1.6–5.5)
NEUTROPHILS NFR BLD AUTO: 73.4 %
NRBC BLD-RTO: 0 /100 WBCS (ref 0–0)
OXYHGB MFR BLDA: 95.5 % (ref 94–98)
PCO2 BLDA: 71 MM HG (ref 38–42)
PH BLDA: 7.34 PH (ref 7.38–7.42)
PHOSPHATE SERPL-MCNC: 4.5 MG/DL (ref 2.5–4.9)
PLATELET # BLD AUTO: 61 X10*3/UL (ref 150–450)
PO2 BLDA: 85 MM HG (ref 85–95)
POTASSIUM BLDA-SCNC: 3.8 MMOL/L (ref 3.5–5.3)
POTASSIUM SERPL-SCNC: 4 MMOL/L (ref 3.5–5.3)
RBC # BLD AUTO: 2.59 X10*6/UL (ref 4–5.2)
SAO2 % BLDA: 100 % (ref 94–100)
SODIUM BLDA-SCNC: 136 MMOL/L (ref 136–145)
SODIUM SERPL-SCNC: 138 MMOL/L (ref 136–145)
SPECIMEN DRAWN FROM PATIENT: ABNORMAL
STAPHYLOCOCCUS SPEC CULT: NORMAL
VENTILATOR MODE: ABNORMAL
WBC # BLD AUTO: 6.5 X10*3/UL (ref 4.4–11.3)

## 2023-12-12 PROCEDURE — 2500000004 HC RX 250 GENERAL PHARMACY W/ HCPCS (ALT 636 FOR OP/ED)

## 2023-12-12 PROCEDURE — 2500000005 HC RX 250 GENERAL PHARMACY W/O HCPCS: Performed by: NURSE PRACTITIONER

## 2023-12-12 PROCEDURE — 2500000005 HC RX 250 GENERAL PHARMACY W/O HCPCS

## 2023-12-12 PROCEDURE — 2500000004 HC RX 250 GENERAL PHARMACY W/ HCPCS (ALT 636 FOR OP/ED): Performed by: INTERNAL MEDICINE

## 2023-12-12 PROCEDURE — 2020000001 HC ICU ROOM DAILY

## 2023-12-12 PROCEDURE — C9113 INJ PANTOPRAZOLE SODIUM, VIA: HCPCS

## 2023-12-12 PROCEDURE — 71045 X-RAY EXAM CHEST 1 VIEW: CPT | Performed by: RADIOLOGY

## 2023-12-12 PROCEDURE — 84295 ASSAY OF SERUM SODIUM: CPT | Performed by: NURSE PRACTITIONER

## 2023-12-12 PROCEDURE — 36415 COLL VENOUS BLD VENIPUNCTURE: CPT

## 2023-12-12 PROCEDURE — 71045 X-RAY EXAM CHEST 1 VIEW: CPT | Mod: FY

## 2023-12-12 PROCEDURE — 96372 THER/PROPH/DIAG INJ SC/IM: CPT

## 2023-12-12 PROCEDURE — 36600 WITHDRAWAL OF ARTERIAL BLOOD: CPT

## 2023-12-12 PROCEDURE — 82947 ASSAY GLUCOSE BLOOD QUANT: CPT

## 2023-12-12 PROCEDURE — 85025 COMPLETE CBC W/AUTO DIFF WBC: CPT

## 2023-12-12 PROCEDURE — 99291 CRITICAL CARE FIRST HOUR: CPT | Performed by: NURSE PRACTITIONER

## 2023-12-12 PROCEDURE — 94660 CPAP INITIATION&MGMT: CPT

## 2023-12-12 PROCEDURE — 2500000002 HC RX 250 W HCPCS SELF ADMINISTERED DRUGS (ALT 637 FOR MEDICARE OP, ALT 636 FOR OP/ED)

## 2023-12-12 PROCEDURE — 84100 ASSAY OF PHOSPHORUS: CPT | Performed by: NURSE PRACTITIONER

## 2023-12-12 PROCEDURE — 94640 AIRWAY INHALATION TREATMENT: CPT

## 2023-12-12 PROCEDURE — 80048 BASIC METABOLIC PNL TOTAL CA: CPT

## 2023-12-12 PROCEDURE — 37799 UNLISTED PX VASCULAR SURGERY: CPT | Performed by: NURSE PRACTITIONER

## 2023-12-12 PROCEDURE — 2500000004 HC RX 250 GENERAL PHARMACY W/ HCPCS (ALT 636 FOR OP/ED): Performed by: NURSE PRACTITIONER

## 2023-12-12 RX ORDER — MORPHINE SULFATE 2 MG/ML
2 INJECTION, SOLUTION INTRAMUSCULAR; INTRAVENOUS EVERY 4 HOURS PRN
Status: DISCONTINUED | OUTPATIENT
Start: 2023-12-12 | End: 2023-12-16

## 2023-12-12 RX ORDER — HALOPERIDOL 5 MG/ML
2 INJECTION INTRAMUSCULAR ONCE
Status: COMPLETED | OUTPATIENT
Start: 2023-12-12 | End: 2023-12-12

## 2023-12-12 RX ORDER — METOPROLOL TARTRATE 1 MG/ML
5 INJECTION, SOLUTION INTRAVENOUS EVERY 6 HOURS
Status: COMPLETED | OUTPATIENT
Start: 2023-12-12 | End: 2023-12-13

## 2023-12-12 RX ORDER — LORAZEPAM 2 MG/ML
1 INJECTION INTRAMUSCULAR ONCE
Status: COMPLETED | OUTPATIENT
Start: 2023-12-12 | End: 2023-12-12

## 2023-12-12 RX ORDER — QUETIAPINE FUMARATE 25 MG/1
12.5 TABLET, FILM COATED ORAL ONCE
Status: DISCONTINUED | OUTPATIENT
Start: 2023-12-12 | End: 2023-12-12

## 2023-12-12 RX ORDER — LORAZEPAM 2 MG/ML
INJECTION INTRAMUSCULAR
Status: COMPLETED
Start: 2023-12-12 | End: 2023-12-12

## 2023-12-12 RX ORDER — MORPHINE SULFATE 2 MG/ML
INJECTION, SOLUTION INTRAMUSCULAR; INTRAVENOUS
Status: COMPLETED
Start: 2023-12-12 | End: 2023-12-12

## 2023-12-12 RX ORDER — MORPHINE SULFATE 2 MG/ML
1 INJECTION, SOLUTION INTRAMUSCULAR; INTRAVENOUS ONCE
Status: COMPLETED | OUTPATIENT
Start: 2023-12-12 | End: 2023-12-12

## 2023-12-12 RX ADMIN — HALOPERIDOL LACTATE 2 MG: 5 INJECTION, SOLUTION INTRAMUSCULAR at 10:28

## 2023-12-12 RX ADMIN — ENOXAPARIN SODIUM 40 MG: 40 INJECTION SUBCUTANEOUS at 08:38

## 2023-12-12 RX ADMIN — METOPROLOL TARTRATE 5 MG: 5 INJECTION INTRAVENOUS at 17:00

## 2023-12-12 RX ADMIN — LORAZEPAM 1 MG: 2 INJECTION INTRAMUSCULAR at 16:43

## 2023-12-12 RX ADMIN — PIPERACILLIN SODIUM AND TAZOBACTAM SODIUM 3.38 G: 3; .375 INJECTION, SOLUTION INTRAVENOUS at 06:00

## 2023-12-12 RX ADMIN — IPRATROPIUM BROMIDE AND ALBUTEROL SULFATE 3 ML: 2.5; .5 SOLUTION RESPIRATORY (INHALATION) at 07:41

## 2023-12-12 RX ADMIN — IPRATROPIUM BROMIDE AND ALBUTEROL SULFATE 3 ML: 2.5; .5 SOLUTION RESPIRATORY (INHALATION) at 20:01

## 2023-12-12 RX ADMIN — PIPERACILLIN SODIUM AND TAZOBACTAM SODIUM 3.38 G: 3; .375 INJECTION, SOLUTION INTRAVENOUS at 13:35

## 2023-12-12 RX ADMIN — METHYLPREDNISOLONE SODIUM SUCCINATE 40 MG: 40 INJECTION, POWDER, FOR SOLUTION INTRAMUSCULAR; INTRAVENOUS at 08:38

## 2023-12-12 RX ADMIN — METHYLPREDNISOLONE SODIUM SUCCINATE 40 MG: 40 INJECTION INTRAMUSCULAR; INTRAVENOUS at 21:52

## 2023-12-12 RX ADMIN — LORAZEPAM 1 MG: 2 INJECTION INTRAMUSCULAR; INTRAVENOUS at 16:43

## 2023-12-12 RX ADMIN — METOPROLOL TARTRATE 5 MG: 5 INJECTION INTRAVENOUS at 23:00

## 2023-12-12 RX ADMIN — PANTOPRAZOLE SODIUM 40 MG: 40 INJECTION, POWDER, FOR SOLUTION INTRAVENOUS at 08:38

## 2023-12-12 RX ADMIN — MORPHINE SULFATE 2 MG: 2 INJECTION, SOLUTION INTRAMUSCULAR; INTRAVENOUS at 13:29

## 2023-12-12 RX ADMIN — METOPROLOL TARTRATE 5 MG: 5 INJECTION INTRAVENOUS at 11:58

## 2023-12-12 RX ADMIN — METOPROLOL TARTRATE 5 MG: 5 INJECTION INTRAVENOUS at 22:36

## 2023-12-12 RX ADMIN — IPRATROPIUM BROMIDE AND ALBUTEROL SULFATE 3 ML: 2.5; .5 SOLUTION RESPIRATORY (INHALATION) at 13:17

## 2023-12-12 RX ADMIN — MORPHINE SULFATE 1 MG: 2 INJECTION, SOLUTION INTRAMUSCULAR; INTRAVENOUS at 08:56

## 2023-12-12 RX ADMIN — PIPERACILLIN SODIUM AND TAZOBACTAM SODIUM 3.38 G: 3; .375 INJECTION, SOLUTION INTRAVENOUS at 21:52

## 2023-12-12 ASSESSMENT — PAIN - FUNCTIONAL ASSESSMENT
PAIN_FUNCTIONAL_ASSESSMENT: 0-10
PAIN_FUNCTIONAL_ASSESSMENT: CPOT (CRITICAL CARE PAIN OBSERVATION TOOL)
PAIN_FUNCTIONAL_ASSESSMENT: 0-10

## 2023-12-12 ASSESSMENT — PAIN SCALES - GENERAL
PAINLEVEL_OUTOF10: 0 - NO PAIN

## 2023-12-12 NOTE — PROGRESS NOTES
1615 attempted to meet with patient to discuss dc planning needs and concerns.  Pt is requiring Bipap and appears to be very agitated and not  able to answer any questions.  Pt requiring 1: 1 sitter at this time . Will attempt to contact sister who is listed as emergency contact.    Calista Odonnell RN

## 2023-12-12 NOTE — PROGRESS NOTES
[facility] Critical Care Medicine       Date:  12/12/2023  Patient:  Gaby Blue  YOB: 1950  MRN:  [id number]   Admit Date:  12/9/2023  ========================================================================================================    Chief Complaint   Patient presents with    Shortness of Breath         History of Present Illness:  Gaby Blue is a 73 y.o. 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 Shortness of breath associated with respiratory cough with yellow sputum.  Shortness of breath persistent and increasing over the last couple days. No improvement with rest or nebulizers. Worse with activity. Did deny fevers, chest pain, abnormal bleeding, or any GI/ symptoms.   ED course:Patient's COVID and influenza testing were negative.  Normal coags.  Normal serum lactate.  Metabolic panel with normal renal function, no major electrolyte derangements.  Bicarb elevated likely due to chronic compensation. Troponin 23 which I suspect is likely mildly increased due to demand during period of hypoxia and increased respiratory effort. HGB was 6.8 she was given 1 unit of PRBCs in ED, started on zosyn, vanco and levaquin in suspicion of pneumonia.  CXR showed bilateral pleural effusions and new left lobe mass. Pt started on BIPAP and sent to ICU for further management and monitoring.     12/10: Patient was transferred out of ICU to [facility].   12/11: Per  "hospitalist, patient potentially aspirated on breakfast this am. Desat to 70% on RA, placed on BiPAP 16/5 50%, ABG  7.26/81/232/36.3. Transferred to ICU for further management of respiratory distress.          Interval ICU Events:  12/10: Admitted to the ICU initially on BIPAP, was given 20mg IV Lasix with improvement with SOB. She is hemodynamically stable, now off BIPaP and 4L NC at 96% SPO2. HR is NSR with no ectopy. She states her SOB has improved, denies chest pain, abd pain, n/v/d.   12/11: Admitted to ICU on BiPAP, A/Ox3, resting comfortably, denies SOB or increased WOB, HDS SpO2 99%. LS- bilateral crackles/rhonchi. NPO until SLP evaluation.   12/12: Disoriented this am. Reports \"always coughs when drinks\" and has been that way for a long time. Attempt to wean from BiPAP and had desat to 77% was placed back on with setting 20/10, 50% with improvement in SPO2 to 94%. Bump in creatinine to 1.22, FB (-) 500.      Medical History:  Past Medical History:   Diagnosis Date    Arthritis     Bilateral sensorineural hearing loss 03/06/2023    Chronic hypoxic respiratory failure, on home oxygen therapy (CMS/HCC)     COPD (chronic obstructive pulmonary disease) (CMS/HCC)     Diverticulitis     History of radiation therapy     History of transfusion     Hypertension     Non-small cell lung cancer (NSCLC) (CMS/HCC)     Secondary malignant neoplasm of right lung (CMS/HCC)     Ulnar neuropathy of left upper extremity 03/06/2023     Past Surgical History:   Procedure Laterality Date    APPENDECTOMY      CARDIAC CATHETERIZATION      COLON SURGERY      CT ANGIO NECK  09/26/2022    CT NECK ANGIO W AND WO IV CONTRAST    CT ANGIO NECK  12/17/2021    CT NECK ANGIO W AND WO IV CONTRAST    EXCISION BENIGN SKIN LESION SCALP / NECK / HANDS / FEET / GENITALIA      scalp    HAND SURGERY      HYSTERECTOMY  03/29/2018    Hysterectomy    MECKEL DIVERTICULUM EXCISION      TONSILLECTOMY  03/29/2018    Tonsillectomy     Medications Prior to " Admission   Medication Sig Dispense Refill Last Dose    dronabinol (Marinol) 2.5 mg capsule Take 1 capsule (2.5 mg) by mouth 2 times a day before meals.       oxygen (O2) gas therapy Inhale 3 L/min continuously.       albuterol 90 mcg/actuation inhaler Inhale 2 puffs 4 times a day as needed.       ALPRAZolam (Xanax) 1 mg tablet Take 1 tablet (1 mg) by mouth 3 times a day as needed.       aspirin 81 mg chewable tablet Chew 1 tablet (81 mg) once daily.       buPROPion XL (Wellbutrin XL) 300 mg 24 hr tablet Take 1 tablet (300 mg) by mouth once daily.       fluticasone (Flonase) 50 mcg/actuation nasal spray Administer 1 spray into affected nostril(s) in the morning and 1 spray in the evening.       fluticasone furoate-vilanteroL (Breo Elipta) 200-25 mcg/dose inhaler Inhale 1 puff once daily.       fluticasone-umeclidin-vilanter (TRELEGY-ELLIPTA) 100-62.5-25 mcg blister with device Inhale 1 puff once daily.       fluticasone-umeclidin-vilanter (TRELEGY-ELLIPTA) 100-62.5-25 mcg blister with device INHALE 1 PUFF BY MOUTH ONCE DAILY 60 each 11     folic acid (Folvite) 1 mg tablet Take 1 tablet (1 mg) by mouth once daily.       gabapentin (Neurontin) 800 mg tablet Take 1 tablet (800 mg) by mouth in the morning and 1 tablet (800 mg) in the evening and 1 tablet (800 mg) before bedtime.       levalbuterol (Xopenex) 1.25 mg/3 mL nebulizer solution Inhale 3 mL every 4 hours if needed.       lisinopriL-hydrochlorothiazide 20-25 mg tablet Take 1 tablet by mouth once daily.       loratadine (Claritin) 10 mg tablet Take 1 tablet (10 mg) by mouth once daily.       metoprolol succinate XL (Toprol-XL) 50 mg 24 hr tablet Take 1 tablet (50 mg) by mouth once daily.       montelukast (Singulair) 10 mg tablet Take 1 tablet (10 mg) by mouth once daily.       OLANZapine (ZyPREXA) 10 mg tablet Take 1 tablet (10 mg) by mouth once daily at bedtime.       oxyCODONE-acetaminophen (Percocet) 7.5-325 mg tablet Take 1 tablet by mouth every 4 hours if  needed for moderate pain (4 - 6).       pantoprazole (ProtoNix) 40 mg EC tablet Take 1 tablet (40 mg) by mouth once daily.        Bee pollen  Social History     Tobacco Use    Smoking status: Former     Packs/day: 0.50     Years: 48.00     Additional pack years: 0.00     Total pack years: 24.00     Types: Cigarettes     Quit date: 2019     Years since quittin.1    Smokeless tobacco: Never   Vaping Use    Vaping Use: Never used   Substance Use Topics    Alcohol use: Not Currently    Drug use: Never     Family History   Problem Relation Name Age of Onset    COPD Mother      Cancer Father      Other (Lupus erythematosus) Other family history     Heart disease Other family history        Hospital Medications:         Current Facility-Administered Medications:     albuterol 90 mcg/actuation inhaler 2 puff, 2 puff, inhalation, 4x daily PRN, AUGUSTA Barton    [Held by provider] ALPRAZolam (Xanax) tablet 1 mg, 1 mg, oral, TID PRN, AUGUSTA Barton    [Held by provider] aspirin chewable tablet 81 mg, 81 mg, oral, Daily, AUGUSTA Barton, 81 mg at 23    [Held by provider] buPROPion XL (Wellbutrin XL) 24 hr tablet 300 mg, 300 mg, oral, Daily, AUGUSTA Barton, 300 mg at 23    dextrose 10 % in water (D10W) infusion, 0.3 g/kg/hr, intravenous, Once PRN, AUGUSTA Barton    dextrose 50 % injection 25 g, 25 g, intravenous, q15 min PRN, AUGUSTA Barton    enoxaparin (Lovenox) syringe 40 mg, 40 mg, subcutaneous, q24h, AUGUSTA Barton, 40 mg at 23    [Held by provider] folic acid (Folvite) tablet 1 mg, 1 mg, oral, Daily, AUGUSTA Batron, 1 mg at 23    [Held by provider] gabapentin (Neurontin) capsule 400 mg, 400 mg, oral, TID, AUGUSTA Barton, 400 mg at 23    glucagon (Glucagen) injection 1 mg, 1 mg, intramuscular, q15 min MANOLON, Danyelle Mariscal, PAMELA-CNP    insulin lispro (HumaLOG) injection 0-5  Units, 0-5 Units, subcutaneous, TID with meals, AUGUSTA Barton    ipratropium-albuteroL (Duo-Neb) 0.5-2.5 mg/3 mL nebulizer solution 3 mL, 3 mL, nebulization, q4h PRN, AUGUSTA Barton, 3 mL at 12/11/23 1034    ipratropium-albuteroL (Duo-Neb) 0.5-2.5 mg/3 mL nebulizer solution 3 mL, 3 mL, nebulization, TID, AUGUSTA Barton, 3 mL at 12/11/23 2010    [Held by provider] lisinopril 20 mg, hydroCHLOROthiazide 25 mg for Zestoretic/Prinizide, , oral, Daily, AUGUSTA Barton, Given at 12/11/23 0951    methylPREDNISolone sod succinate (PF) (SOLU-Medrol) 40 mg/mL injection 40 mg, 40 mg, intravenous, Daily, AUGUSTA Barton, 40 mg at 12/11/23 0952    [Held by provider] metoprolol succinate XL (Toprol-XL) 24 hr tablet 50 mg, 50 mg, oral, Daily, AUGUSTA Barton, 50 mg at 12/11/23 0951    metoprolol tartrate (Lopressor) injection 5 mg, 5 mg, intravenous, q6h PRN, AUGUSTA Barton    [Held by provider] montelukast (Singulair) tablet 10 mg, 10 mg, oral, Daily, AUGUSTA Barton, 10 mg at 12/11/23 0952    [Held by provider] OLANZapine (ZyPREXA) tablet 10 mg, 10 mg, oral, Nightly, AUGUSTA Barton, 10 mg at 12/10/23 2015    OLANZapine zydis (ZyPREXA) disintegrating tablet 10 mg, 10 mg, oral, Nightly, AUGUSTA Barton, 10 mg at 12/11/23 2100    [Held by provider] oxyCODONE-acetaminophen (Percocet) 5-325 mg per tablet 1 tablet, 1 tablet, oral, q4h PRN, Danyelle M Davey, APRN-CNP    oxygen (O2) therapy, , inhalation, Continuous PRN - O2/gases, AUGUSTA Barton, Rate Verify at 12/11/23 0300    [Held by provider] pantoprazole (ProtoNix) EC tablet 40 mg, 40 mg, oral, Daily, AUGUSTA Barton, 40 mg at 12/11/23 0653    pantoprazole (ProtoNix) injection 40 mg, 40 mg, intravenous, Daily, AUGUSTA Barton    piperacillin-tazobactam-dextrose (Zosyn) IV 3.375 g, 3.375 g, intravenous, q8h, AUGUSTA Barton, Last Rate: 12.5 mL/hr at  "12/12/23 0600, 3.375 g at 12/12/23 0600    Review of Systems:  14 point review of systems was completed and negative except for those specially mention in my HPI    Physical Exam:    Heart Rate:  [61-86]   Temp:  [35.8 °C (96.4 °F)-36.1 °C (97 °F)]   Resp:  [15-42]   BP: (125-164)/(44-93)   Height:  [155 cm (5' 1.02\")]   Weight:  [63.3 kg (139 lb 8.8 oz)]   SpO2:  [70 %-99 %]     Physical Exam  Vitals and nursing note reviewed.   Constitutional:       General: She is awake.      Appearance: Normal appearance. She is cachectic.   HENT:      Head: Normocephalic and atraumatic.   Eyes:      Extraocular Movements: Extraocular movements intact.      Conjunctiva/sclera: Conjunctivae normal.      Pupils: Pupils are equal, round, and reactive to light.   Cardiovascular:      Rate and Rhythm: Normal rate and regular rhythm.      Pulses: Normal pulses.   Pulmonary:      Effort: Pulmonary effort is normal.      Breath sounds: Rhonchi and rales present.   Abdominal:      General: Abdomen is flat. Bowel sounds are normal.      Palpations: Abdomen is soft.   Musculoskeletal:      Cervical back: Full passive range of motion without pain.      Right lower leg: No edema.      Left lower leg: No edema.   Skin:     General: Skin is warm and dry.   Neurological:      Mental Status: She is alert. She is disoriented.         Objective:    I have reviewed all medications, laboratory results, and imaging pertinent for today's encounter.           Intake/Output Summary (Last 24 hours) at 12/12/2023 07  Last data filed at 12/12/2023 0600  Gross per 24 hour   Intake 901.17 ml   Output 1400 ml   Net -498.83 ml           Assessment/Plan:    I am currently managing this critically ill patient for the following problems:    Acute on chronic respiratory failure with hypoxia and hypercapnia  Pneumonia  Bilateral pleural effusions  COPD exacerbation  Acute on chronic anemia  Thrombocytopenia  NSCLC left lung with secondary malignant neoplasm to " right lung  HTN  Chronic pain  Anasarca     Neuro/Psych/Pain Ctrl/Sedation:  Chronic pain  Acute delirium  -On Percocet, OARRS reviewed no issues, on hold for NPO  -Monitor for opioid induced constipation  -morphine as needed  -haldol    Respiratory/ENT:  Aspiration Pneumonia  12/11 transfer to ICU requiring NIPPV  Acute on chronic respiratory failure with hypoxia and hypercapnia  Bilateral pleural effusions  COPD exacerbation  -Normally on 3-4 L nasal cannula   -Antibiotic coverage below  -Titrate FiO2 to maintain SpO2 greater than 90%   -Pulmonary hygiene, IS  -Scheduled DuoNebs, albuterol as needed  -Solu-Medrol 40 mg IV twice daily   -BiPAP therapy, wean as able  -zosyn     Cardiovascular:  HTN  -oral medications on hold d/t concern for aspiration  -lopressor IV while NPO    GI:  NPO 2/2 concern for aspiration  SLP evaluation ordered    Renal/Volume Status (Intra & Extravascular):  EMA  Anasarca, Likely in setting of malignancy  -Monitor renal function  -Strict intake and output  -Keep potassium 4 magnesium greater than 2  -hold off on further diuresis    Endocrine  -No hx of DM  -Monitor for stress hyperglycemia    Hem/Onc:  NSCLC left lung with secondary malignant neoplasm to right lung  Acute on chronic anemia s/p PRBC x2, Hemoglobin baseline 8-9  Thrombocytopenia 36-> 57  -Monitor CBC  -Hold pharmacological antiplatelet or anticoagulant ppx until platelets improve  -Last chemo (gemcitabine, carboplatin ) infusion 2 weeks ago, will need to follow-up with [facility] oncology     OBGYN/MSK:  PT/OT      Ethics/Code Status:  Full code     :  DVT Prophylaxis: Lovenox  GI Prophylaxis: Protonix IV  Bowel Regimen: PRN suppository  Diet: NPO, SLP eval    CVC: NA  [place]: NA  Nagel: NA  Restraints: NA  Dispo: ICU     50 minutes spent in preparing to see patient (I.e. review of medical records), evaluation of diagnostics (I.e. labs, imaging, etc.), documentation, discussing plan of care with patient/ family/  caregiver, and/ or coordination of care with multidisciplinary team. Time does not include completion of procedure time.              Plan Discussed with Dr. Agustin Escobar, APRN-CNP

## 2023-12-12 NOTE — TREATMENT PLAN
Patient taken off Bipap at this time Per [place] NP request and placed on Airvo HF nasal cannula with 50L/50% oxygen.

## 2023-12-12 NOTE — PROGRESS NOTES
Speech-Language Pathology                 Therapy Communication Note    Patient Name: Gaby Blue  MRN: [id number]  Today's Date: 12/12/2023     Discipline: Speech Language Pathology    Missed Visit Reason:  Pt placed on BiPAP again. Not tolerating being off BiPAP for long periods of time. Will plan to assess when BiPAP is removed. Pt to remain NPO until then.     Missed Time: Attempt    Comment:

## 2023-12-13 LAB
ABO GROUP (TYPE) IN BLOOD: NORMAL
ANION GAP SERPL CALC-SCNC: 10 MMOL/L (ref 10–20)
ANTIBODY SCREEN: NORMAL
BASOPHILS # BLD AUTO: 0 X10*3/UL (ref 0–0.1)
BASOPHILS NFR BLD AUTO: 0 %
BLOOD EXPIRATION DATE: NORMAL
BLOOD EXPIRATION DATE: NORMAL
BUN SERPL-MCNC: 36 MG/DL (ref 6–23)
CALCIUM SERPL-MCNC: 8.8 MG/DL (ref 8.6–10.3)
CHLORIDE SERPL-SCNC: 97 MMOL/L (ref 98–107)
CO2 SERPL-SCNC: 37 MMOL/L (ref 21–32)
CREAT SERPL-MCNC: 1.34 MG/DL (ref 0.5–1.05)
DISPENSE STATUS: NORMAL
DISPENSE STATUS: NORMAL
EOSINOPHIL # BLD AUTO: 0 X10*3/UL (ref 0–0.4)
EOSINOPHIL NFR BLD AUTO: 0 %
ERYTHROCYTE [DISTWIDTH] IN BLOOD BY AUTOMATED COUNT: 17.4 % (ref 11.5–14.5)
ERYTHROCYTE [DISTWIDTH] IN BLOOD BY AUTOMATED COUNT: 17.6 % (ref 11.5–14.5)
ERYTHROCYTE [DISTWIDTH] IN BLOOD BY AUTOMATED COUNT: 19.9 % (ref 11.5–14.5)
GFR SERPL CREATININE-BSD FRML MDRD: 42 ML/MIN/1.73M*2
GLUCOSE BLD MANUAL STRIP-MCNC: 178 MG/DL (ref 74–99)
GLUCOSE BLD MANUAL STRIP-MCNC: 194 MG/DL (ref 74–99)
GLUCOSE BLD MANUAL STRIP-MCNC: 77 MG/DL (ref 74–99)
GLUCOSE BLD MANUAL STRIP-MCNC: 96 MG/DL (ref 74–99)
GLUCOSE SERPL-MCNC: 81 MG/DL (ref 74–99)
HCT VFR BLD AUTO: 21.9 % (ref 36–46)
HCT VFR BLD AUTO: 22.2 % (ref 36–46)
HCT VFR BLD AUTO: 28.4 % (ref 36–46)
HGB BLD-MCNC: 6.7 G/DL (ref 12–16)
HGB BLD-MCNC: 6.8 G/DL (ref 12–16)
HGB BLD-MCNC: 9.2 G/DL (ref 12–16)
HOLD SPECIMEN: NORMAL
IMM GRANULOCYTES # BLD AUTO: 0.02 X10*3/UL (ref 0–0.5)
IMM GRANULOCYTES NFR BLD AUTO: 0.5 % (ref 0–0.9)
LYMPHOCYTES # BLD AUTO: 0.23 X10*3/UL (ref 0.8–3)
LYMPHOCYTES NFR BLD AUTO: 5.8 %
MAGNESIUM SERPL-MCNC: 1.7 MG/DL (ref 1.6–2.4)
MCH RBC QN AUTO: 31.3 PG (ref 26–34)
MCH RBC QN AUTO: 31.6 PG (ref 26–34)
MCH RBC QN AUTO: 32.2 PG (ref 26–34)
MCHC RBC AUTO-ENTMCNC: 30.2 G/DL (ref 32–36)
MCHC RBC AUTO-ENTMCNC: 31.1 G/DL (ref 32–36)
MCHC RBC AUTO-ENTMCNC: 32.4 G/DL (ref 32–36)
MCV RBC AUTO: 104 FL (ref 80–100)
MCV RBC AUTO: 105 FL (ref 80–100)
MCV RBC AUTO: 97 FL (ref 80–100)
MONOCYTES # BLD AUTO: 0.21 X10*3/UL (ref 0.05–0.8)
MONOCYTES NFR BLD AUTO: 5.3 %
NEUTROPHILS # BLD AUTO: 3.52 X10*3/UL (ref 1.6–5.5)
NEUTROPHILS NFR BLD AUTO: 88.4 %
NRBC BLD-RTO: 0 /100 WBCS (ref 0–0)
PHOSPHATE SERPL-MCNC: 4.7 MG/DL (ref 2.5–4.9)
PLATELET # BLD AUTO: 71 X10*3/UL (ref 150–450)
PLATELET # BLD AUTO: 75 X10*3/UL (ref 150–450)
PLATELET # BLD AUTO: 90 X10*3/UL (ref 150–450)
POTASSIUM SERPL-SCNC: 4.2 MMOL/L (ref 3.5–5.3)
PRODUCT BLOOD TYPE: 9500
PRODUCT BLOOD TYPE: 9500
PRODUCT CODE: NORMAL
PRODUCT CODE: NORMAL
RBC # BLD AUTO: 2.11 X10*6/UL (ref 4–5.2)
RBC # BLD AUTO: 2.12 X10*6/UL (ref 4–5.2)
RBC # BLD AUTO: 2.94 X10*6/UL (ref 4–5.2)
RH FACTOR (ANTIGEN D): NORMAL
SODIUM SERPL-SCNC: 140 MMOL/L (ref 136–145)
UNIT ABO: NORMAL
UNIT ABO: NORMAL
UNIT NUMBER: NORMAL
UNIT NUMBER: NORMAL
UNIT RH: NORMAL
UNIT RH: NORMAL
UNIT VOLUME: 350
UNIT VOLUME: 350
WBC # BLD AUTO: 4 X10*3/UL (ref 4.4–11.3)
WBC # BLD AUTO: 4.3 X10*3/UL (ref 4.4–11.3)
WBC # BLD AUTO: 7.7 X10*3/UL (ref 4.4–11.3)
XM INTEP: NORMAL
XM INTEP: NORMAL

## 2023-12-13 PROCEDURE — 2500000002 HC RX 250 W HCPCS SELF ADMINISTERED DRUGS (ALT 637 FOR MEDICARE OP, ALT 636 FOR OP/ED)

## 2023-12-13 PROCEDURE — P9016 RBC LEUKOCYTES REDUCED: HCPCS

## 2023-12-13 PROCEDURE — 2500000005 HC RX 250 GENERAL PHARMACY W/O HCPCS: Performed by: NURSE PRACTITIONER

## 2023-12-13 PROCEDURE — 82947 ASSAY GLUCOSE BLOOD QUANT: CPT

## 2023-12-13 PROCEDURE — 94640 AIRWAY INHALATION TREATMENT: CPT

## 2023-12-13 PROCEDURE — 2500000004 HC RX 250 GENERAL PHARMACY W/ HCPCS (ALT 636 FOR OP/ED)

## 2023-12-13 PROCEDURE — 37799 UNLISTED PX VASCULAR SURGERY: CPT

## 2023-12-13 PROCEDURE — 86900 BLOOD TYPING SEROLOGIC ABO: CPT

## 2023-12-13 PROCEDURE — 94660 CPAP INITIATION&MGMT: CPT

## 2023-12-13 PROCEDURE — 80048 BASIC METABOLIC PNL TOTAL CA: CPT

## 2023-12-13 PROCEDURE — 85025 COMPLETE CBC W/AUTO DIFF WBC: CPT

## 2023-12-13 PROCEDURE — 2020000001 HC ICU ROOM DAILY

## 2023-12-13 PROCEDURE — C9113 INJ PANTOPRAZOLE SODIUM, VIA: HCPCS

## 2023-12-13 PROCEDURE — 96372 THER/PROPH/DIAG INJ SC/IM: CPT

## 2023-12-13 PROCEDURE — 85027 COMPLETE CBC AUTOMATED: CPT

## 2023-12-13 PROCEDURE — 84100 ASSAY OF PHOSPHORUS: CPT | Performed by: NURSE PRACTITIONER

## 2023-12-13 PROCEDURE — 83735 ASSAY OF MAGNESIUM: CPT | Performed by: NURSE PRACTITIONER

## 2023-12-13 PROCEDURE — 99291 CRITICAL CARE FIRST HOUR: CPT | Performed by: NURSE PRACTITIONER

## 2023-12-13 PROCEDURE — 92610 EVALUATE SWALLOWING FUNCTION: CPT | Mod: GN

## 2023-12-13 PROCEDURE — 36430 TRANSFUSION BLD/BLD COMPNT: CPT

## 2023-12-13 PROCEDURE — 86920 COMPATIBILITY TEST SPIN: CPT

## 2023-12-13 PROCEDURE — 2500000001 HC RX 250 WO HCPCS SELF ADMINISTERED DRUGS (ALT 637 FOR MEDICARE OP)

## 2023-12-13 RX ORDER — DOCUSATE SODIUM 100 MG/1
100 CAPSULE, LIQUID FILLED ORAL 2 TIMES DAILY
Status: DISCONTINUED | OUTPATIENT
Start: 2023-12-13 | End: 2023-12-17 | Stop reason: HOSPADM

## 2023-12-13 RX ORDER — MAGNESIUM SULFATE HEPTAHYDRATE 40 MG/ML
2 INJECTION, SOLUTION INTRAVENOUS ONCE
Status: COMPLETED | OUTPATIENT
Start: 2023-12-13 | End: 2023-12-13

## 2023-12-13 RX ADMIN — METHYLPREDNISOLONE SODIUM SUCCINATE 40 MG: 40 INJECTION INTRAMUSCULAR; INTRAVENOUS at 20:56

## 2023-12-13 RX ADMIN — PIPERACILLIN SODIUM AND TAZOBACTAM SODIUM 3.38 G: 3; .375 INJECTION, SOLUTION INTRAVENOUS at 05:50

## 2023-12-13 RX ADMIN — METOPROLOL TARTRATE 5 MG: 5 INJECTION INTRAVENOUS at 05:50

## 2023-12-13 RX ADMIN — METOPROLOL TARTRATE 5 MG: 5 INJECTION INTRAVENOUS at 16:12

## 2023-12-13 RX ADMIN — IPRATROPIUM BROMIDE AND ALBUTEROL SULFATE 3 ML: 2.5; .5 SOLUTION RESPIRATORY (INHALATION) at 20:50

## 2023-12-13 RX ADMIN — MAGNESIUM SULFATE HEPTAHYDRATE 2 G: 40 INJECTION, SOLUTION INTRAVENOUS at 06:50

## 2023-12-13 RX ADMIN — METOPROLOL TARTRATE 5 MG: 5 INJECTION INTRAVENOUS at 23:13

## 2023-12-13 RX ADMIN — PANTOPRAZOLE SODIUM 40 MG: 40 INJECTION, POWDER, FOR SOLUTION INTRAVENOUS at 05:50

## 2023-12-13 RX ADMIN — INSULIN LISPRO 1 UNITS: 100 INJECTION, SOLUTION INTRAVENOUS; SUBCUTANEOUS at 16:57

## 2023-12-13 RX ADMIN — METHYLPREDNISOLONE SODIUM SUCCINATE 40 MG: 40 INJECTION INTRAMUSCULAR; INTRAVENOUS at 08:45

## 2023-12-13 RX ADMIN — PIPERACILLIN SODIUM AND TAZOBACTAM SODIUM 3.38 G: 3; .375 INJECTION, SOLUTION INTRAVENOUS at 23:13

## 2023-12-13 RX ADMIN — IPRATROPIUM BROMIDE AND ALBUTEROL SULFATE 3 ML: 2.5; .5 SOLUTION RESPIRATORY (INHALATION) at 14:07

## 2023-12-13 RX ADMIN — IPRATROPIUM BROMIDE AND ALBUTEROL SULFATE 3 ML: 2.5; .5 SOLUTION RESPIRATORY (INHALATION) at 07:59

## 2023-12-13 RX ADMIN — PIPERACILLIN SODIUM AND TAZOBACTAM SODIUM 3.38 G: 3; .375 INJECTION, SOLUTION INTRAVENOUS at 14:00

## 2023-12-13 RX ADMIN — ENOXAPARIN SODIUM 40 MG: 40 INJECTION SUBCUTANEOUS at 09:15

## 2023-12-13 RX ADMIN — Medication 50 L/MIN: at 20:50

## 2023-12-13 RX ADMIN — DOCUSATE SODIUM 100 MG: 100 CAPSULE, LIQUID FILLED ORAL at 23:13

## 2023-12-13 ASSESSMENT — PAIN - FUNCTIONAL ASSESSMENT
PAIN_FUNCTIONAL_ASSESSMENT: CPOT (CRITICAL CARE PAIN OBSERVATION TOOL)
PAIN_FUNCTIONAL_ASSESSMENT: 0-10

## 2023-12-13 ASSESSMENT — PAIN SCALES - GENERAL
PAINLEVEL_OUTOF10: 0 - NO PAIN

## 2023-12-13 NOTE — TREATMENT PLAN
Placed pt. On Air-vo per nsg. request. Settings 50 liters/50% FiO2.  SaO2-98%.  Will continue to monitor pt. progress.

## 2023-12-13 NOTE — PROGRESS NOTES
12/13/23 1731   Discharge Planning   Living Arrangements Alone   Support Systems Friends/neighbors   Type of Residence Private residence   Do you have animals or pets at home? Yes   Type of Animals or Pets dog   Who is requesting discharge planning? Provider   Home or Post Acute Services Post acute facilities (Rehab/SNF/etc);In home services   Type of Post Acute Facility Services Skilled nursing   Patient expects to be discharged to: [facility]   Does the patient need discharge transport arranged? Yes   RoundTrip coordination needed? Yes   Has discharge transport been arranged? No     I was able to reach sister, Natali Davenport at [phone],  Introduced self and role.  She explained that she resides in [place] and pt has minimal support at home.  She has a friend Sushil [facility] [phone]  that assists in the evenings as needed.  Pt has cane , uses Rolator and drives .  Sister is concerned for patients safety and own ability to care for self at home.  She states that Sushil  ( friend ) works until 730 in the evening and cannot be reached at that time but he is able to provide more feedback and information d/t being local.  Pt requiring Bipap and unable to interview for dc planning.  Care transitions will need to follow up , dc plan TBD at present time.       1800 met with patient who is now requiring Airvo, is much more alert and oriented able to answer my questions.  Friend Sushil is at bedside. Pt state she is not doing well keeping her house clean and laundry done but is working to find more assistance.  Sushil has been staying in the home overnight more frequently to assist pt . Pt is very adamant about returning home and states she will not go to any rehab or snf at discharge.  [facility] has had [facility] in the past.   Use home o2 at 5 liters NC.    Agreeable to working with therapy and having tcc follow up .  Requested therapy orders when medically able.

## 2023-12-13 NOTE — CONSULTS
Nutrition Progress Note    RD consulted for nutrition assessment/recommendation, enteral assessment/recommendation, initiate and manage tube feeding. Pt NPO this AM, on BiPAP, confused. Per ICU interdisciplinary rounds, plans for TF if didn't pass SLP eval. SLP recommending regular/thin, with small sips. Will send Glucerna BID (one provides 220 kcal, 10g protein). If intakes are not consistently adequate, may need to consider enteral nutrition given currently meeting the criteria for severe malnutrition. 1 intake documented 12/13 shows consumed 100% of lunch. Currently on steroids. To order regular diet, if blood glucose do not remain within desired limits please re-order carb controlled diet. To follow-up with pt as scheduled.

## 2023-12-13 NOTE — PROGRESS NOTES
Speech-Language Pathology    Inpatient Speech-Language Pathology Clinical Swallow Evaluation       Patient Name: Gaby Blue  MRN: [id number]  : 1950  Today's Date: 23  Time Calculation  Start Time: 930  Stop Time: 950  Time Calculation (min): 20 min         Subjective   Patient was seen this a.m. at bedside.  She was transitioned from BiPAP to high flow nasal cannula this a.m.  Patient was sitting upright and was awake alert and cooperative.  She was also oriented and able to answer simple questions and follow directions.  Patient requesting coffee.    General Visit Information:  Pt. Admitted patient admitted with chief complaint of shortness of breath associated with respiratory cough with yellow sputum.  Her COVID test was negative.  She was transferred to ICU to further manage her respiratory distress.  Past medical history: Patient has a past medical history of non-small cell lung cancer, with radiation and chemotherapy, chronic respiratory failure, COPD, former smoker,.                Reason for Referral: Dysphagia      Current Diet : N.p.o.   Prior to Session Communication: Bedside nurse   Pain:  Pain Assessment  Pain Assessment: 0-10  Pain Score: 0 - No pain     Assessment:  Consistencies Trialed: Consistencies Trialed: Thin (IDDSI Level 0) - Straw, Thin (IDDSI Level 0) - Cup, Pureed/extremely thick (IDDSI Level 4), Regular (IDDSI Level 7)   Oral Motor: Within Functional Limits  Lingual Strength: Within Functional Limits   Dentition: Edentulous   Assessment Results:   DYSPHAGIA EVALUATION: (Consistencies attempted: Puree, gram cracker trial, thin liquids via cup and straw)    Oral Phase: Patient's oral phase of the swallow characterized by within functional limits ability to tolerate bites of pudding.  She had a mild oral delay secondary to edentulous status with douglas cracker trial but was able to clear oral cavity to minimal post swallow with douglas cracker.  She was able to tolerate thin  liquids without anterior leakage out of the mouth via cup and straw sips.    Pharyngeal Phase: Patient's pharyngeal phase of the swallow characterized by visible laryngeal elevation during the swallow.  Patient had 1 episode of coughing post multiple sips of thin liquids via cup.  She was able to tolerate multiple single sips of thin liquid via cup and straw without overt signs symptoms aspiration.  No overt signs symptoms aspiration seen with solid food trials.      DIAGNOSTIC IMPRESSIONS:  Patient presents with a mild oral phase dysphagia secondary to edentulous status and 1 episode of coughing with multiple sips of thin liquids via cup.  She was able to tolerate several single sips of thin liquids via cup and straw without overt signs symptoms aspiration.  Unable to rule out possible silent aspiration at bedside swallow evaluation.  A modified barium swallow study may be warranted if patient has continued need for increased oxygen support or increasing infiltrates/consolidation and/or pneumonia.  Chest x-ray from 12/12/2023 revealed no consolidation or opacities.  Speech therapy will continue to assess patient's ability to safely tolerate p.o. diet and discuss possibility of modified barium swallow in the future if patient symptoms warrant a modified barium swallow study.  Prognosis: Good   Medical Staff Made Aware: Yes     Baseline Assessment:  Respiratory Status: Oxygen via nasal cannula, Other (Comment) (High flow nasal cannula)   Patient Positioning: Upright in Bed     Plan:  SLP Plan: Skilled SLP Skilled speech therapy for dysphagia treatment is warranted in order to provide training and instruction regarding the use of compensatory swallow strategies, oropharyngeal strengthening exercises, and pt/caregiver education in order to reduce risk of aspiration, dehydration and malnutrition.  SLP Frequency: 4x per week   Duration: 30 days       Discussed POC: Patient, Nursing, Physician   Discussed Risks/Benefits:  Yes, Patient, Nursing, Physician   Patient/Caregiver Agreeable: Yes   SLP Discharge Recommendations: unable to determine at this time, refer to subsequent notes    Goals:  1.  Patient will be able to tolerate a regular diet with thin liquids without overt signs symptoms aspiration or pulmonary compromise.  2.  Patient will be able to use take small single sips via cup or straw to reduce her risk of aspiration with thin liquids.  3.  Patient will be able to alternate between bites/sips, take small bites/sips and sit upright while eating to reduce her risk of aspiration.    Inpatient Education:  Pt. educated on safe swallow strategies, role of SLP, S/S of aspiration to be aware of, risks of aspiration, current swallow function, recommended diet, recommendation of Modified Barium swallow study and procedure  Patient gave verbal understanding

## 2023-12-13 NOTE — PROGRESS NOTES
[facility] Critical Care Medicine       Date:  12/13/2023  Patient:  Gaby Blue  YOB: 1950  MRN:  [id number]   Admit Date:  12/9/2023  ========================================================================================================    Chief Complaint   Patient presents with    Shortness of Breath         History of Present Illness:  Gaby Blue is a 73 y.o. 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 Shortness of breath associated with respiratory cough with yellow sputum.  Shortness of breath persistent and increasing over the last couple days. No improvement with rest or nebulizers. Worse with activity. Did deny fevers, chest pain, abnormal bleeding, or any GI/ symptoms.   ED course:Patient's COVID and influenza testing were negative.  Normal coags.  Normal serum lactate.  Metabolic panel with normal renal function, no major electrolyte derangements.  Bicarb elevated likely due to chronic compensation. Troponin 23 which I suspect is likely mildly increased due to demand during period of hypoxia and increased respiratory effort. HGB was 6.8 she was given 1 unit of PRBCs in ED, started on zosyn, vanco and levaquin in suspicion of pneumonia.  CXR showed bilateral pleural effusions and new left lobe mass. Pt started on BIPAP and sent to ICU for further management and monitoring.     12/10: Patient was transferred out of ICU to [facility].   12/11: Per  "hospitalist, patient potentially aspirated on breakfast this am. Desat to 70% on RA, placed on BiPAP 16/5 50%, ABG  7.26/81/232/36.3. Transferred to ICU for further management of respiratory distress.          Interval ICU Events:  12/10: Admitted to the ICU initially on BIPAP, was given 20mg IV Lasix with improvement with SOB. She is hemodynamically stable, now off BIPaP and 4L NC at 96% SPO2. HR is NSR with no ectopy. She states her SOB has improved, denies chest pain, abd pain, n/v/d.   12/11: Admitted to ICU on BiPAP, A/Ox3, resting comfortably, denies SOB or increased WOB, HDS SpO2 99%. LS- bilateral crackles/rhonchi. NPO until SLP evaluation.   12/12: Disoriented this am. Reports \"always coughs when drinks\" and has been that way for a long time. Attempt to wean from BiPAP and had desat to 77% was placed back on with setting 20/10, 50% with improvement in SPO2 to 94%. Bump in creatinine to 1.22, FB (-) 500.    12/13: Improvement in mentation this am. Tolerating BiPAP and transitioned to HFNC  maintaining SPO2 % . VSS, NSR with HR 60-70s. Creat bumped to 1.34, hgb 6.8. Sister (HPCUBA)called to obtain consent and updated on medical status.     Medical History:  Past Medical History:   Diagnosis Date    Arthritis     Bilateral sensorineural hearing loss 03/06/2023    Chronic hypoxic respiratory failure, on home oxygen therapy (CMS/HCC)     COPD (chronic obstructive pulmonary disease) (CMS/HCC)     Diverticulitis     History of radiation therapy     History of transfusion     Hypertension     Non-small cell lung cancer (NSCLC) (CMS/HCC)     Secondary malignant neoplasm of right lung (CMS/HCC)     Ulnar neuropathy of left upper extremity 03/06/2023     Past Surgical History:   Procedure Laterality Date    APPENDECTOMY      CARDIAC CATHETERIZATION      COLON SURGERY      CT ANGIO NECK  09/26/2022    CT NECK ANGIO W AND WO IV CONTRAST    CT ANGIO NECK  12/17/2021    CT NECK ANGIO W AND WO IV CONTRAST    EXCISION " BENIGN SKIN LESION SCALP / NECK / HANDS / FEET / GENITALIA      scalp    HAND SURGERY      HYSTERECTOMY  03/29/2018    Hysterectomy    MECKEL DIVERTICULUM EXCISION      TONSILLECTOMY  03/29/2018    Tonsillectomy     Medications Prior to Admission   Medication Sig Dispense Refill Last Dose    dronabinol (Marinol) 2.5 mg capsule Take 1 capsule (2.5 mg) by mouth 2 times a day before meals.       oxygen (O2) gas therapy Inhale 3 L/min continuously.       albuterol 90 mcg/actuation inhaler Inhale 2 puffs 4 times a day as needed.       ALPRAZolam (Xanax) 1 mg tablet Take 1 tablet (1 mg) by mouth 3 times a day as needed.       aspirin 81 mg chewable tablet Chew 1 tablet (81 mg) once daily.       buPROPion XL (Wellbutrin XL) 300 mg 24 hr tablet Take 1 tablet (300 mg) by mouth once daily.       fluticasone (Flonase) 50 mcg/actuation nasal spray Administer 1 spray into affected nostril(s) in the morning and 1 spray in the evening.       fluticasone furoate-vilanteroL (Breo Elipta) 200-25 mcg/dose inhaler Inhale 1 puff once daily.       fluticasone-umeclidin-vilanter (TRELEGY-ELLIPTA) 100-62.5-25 mcg blister with device Inhale 1 puff once daily.       fluticasone-umeclidin-vilanter (TRELEGY-ELLIPTA) 100-62.5-25 mcg blister with device INHALE 1 PUFF BY MOUTH ONCE DAILY 60 each 11     folic acid (Folvite) 1 mg tablet Take 1 tablet (1 mg) by mouth once daily.       gabapentin (Neurontin) 800 mg tablet Take 1 tablet (800 mg) by mouth in the morning and 1 tablet (800 mg) in the evening and 1 tablet (800 mg) before bedtime.       levalbuterol (Xopenex) 1.25 mg/3 mL nebulizer solution Inhale 3 mL every 4 hours if needed.       lisinopriL-hydrochlorothiazide 20-25 mg tablet Take 1 tablet by mouth once daily.       loratadine (Claritin) 10 mg tablet Take 1 tablet (10 mg) by mouth once daily.       metoprolol succinate XL (Toprol-XL) 50 mg 24 hr tablet Take 1 tablet (50 mg) by mouth once daily.       montelukast (Singulair) 10 mg tablet  Take 1 tablet (10 mg) by mouth once daily.       OLANZapine (ZyPREXA) 10 mg tablet Take 1 tablet (10 mg) by mouth once daily at bedtime.       oxyCODONE-acetaminophen (Percocet) 7.5-325 mg tablet Take 1 tablet by mouth every 4 hours if needed for moderate pain (4 - 6).       pantoprazole (ProtoNix) 40 mg EC tablet Take 1 tablet (40 mg) by mouth once daily.        Bee pollen  Social History     Tobacco Use    Smoking status: Former     Packs/day: 0.50     Years: 48.00     Additional pack years: 0.00     Total pack years: 24.00     Types: Cigarettes     Quit date: 2019     Years since quittin.1    Smokeless tobacco: Never   Vaping Use    Vaping Use: Never used   Substance Use Topics    Alcohol use: Not Currently    Drug use: Never     Family History   Problem Relation Name Age of Onset    COPD Mother      Cancer Father      Other (Lupus erythematosus) Other family history     Heart disease Other family history        Hospital Medications:         Current Facility-Administered Medications:     albuterol 90 mcg/actuation inhaler 2 puff, 2 puff, inhalation, 4x daily PRN, AUGUSTA Barton    [Held by provider] ALPRAZolam (Xanax) tablet 1 mg, 1 mg, oral, TID PRN, AUGUSTA Barton    [Held by provider] aspirin chewable tablet 81 mg, 81 mg, oral, Daily, AUGUSTA Barton, 81 mg at 23 0951    [Held by provider] buPROPion XL (Wellbutrin XL) 24 hr tablet 300 mg, 300 mg, oral, Daily, AUGUSTA Barton, 300 mg at 23 0951    dextrose 10 % in water (D10W) infusion, 0.3 g/kg/hr, intravenous, Once PRN, AUGUSTA Barton    dextrose 50 % injection 25 g, 25 g, intravenous, q15 min PRN, AUGUSTA Barton    enoxaparin (Lovenox) syringe 40 mg, 40 mg, subcutaneous, q24h, AUGUSTA Barton, 40 mg at 23 0838    [Held by provider] folic acid (Folvite) tablet 1 mg, 1 mg, oral, Daily, AUGUSTA Barton, 1 mg at 23 0951    [Held by provider] gabapentin  (Neurontin) capsule 400 mg, 400 mg, oral, TID, AUGUSTA Barton, 400 mg at 12/11/23 0952    glucagon (Glucagen) injection 1 mg, 1 mg, intramuscular, q15 min PRN, AUGUSTA Barton    insulin lispro (HumaLOG) injection 0-5 Units, 0-5 Units, subcutaneous, TID with meals, AUGUSTA Barton    ipratropium-albuteroL (Duo-Neb) 0.5-2.5 mg/3 mL nebulizer solution 3 mL, 3 mL, nebulization, q4h PRN, PAMELA Barton-CNP, 3 mL at 12/11/23 1034    ipratropium-albuteroL (Duo-Neb) 0.5-2.5 mg/3 mL nebulizer solution 3 mL, 3 mL, nebulization, TID, AUGUSTA Barton, 3 mL at 12/12/23 2001    [Held by provider] lisinopril 20 mg, hydroCHLOROthiazide 25 mg for Zestoretic/Prinizide, , oral, Daily, AUGUSTA Barton, Given at 12/11/23 0951    magnesium sulfate IV 2 g, 2 g, intravenous, Once, Kalie Kingsley PA-C, Last Rate: 25 mL/hr at 12/13/23 0650, 2 g at 12/13/23 0650    methylPREDNISolone sod succinate (PF) (SOLU-Medrol) 40 mg/mL injection 40 mg, 40 mg, intravenous, q12h, AUGUSTA Frias, 40 mg at 12/12/23 2152    [Held by provider] metoprolol succinate XL (Toprol-XL) 24 hr tablet 50 mg, 50 mg, oral, Daily, AUGUSTA Barton, 50 mg at 12/11/23 0951    metoprolol tartrate (Lopressor) injection 5 mg, 5 mg, intravenous, q6h PRN, PAMELA Barton-CNP, 5 mg at 12/12/23 2236    metoprolol tartrate (Lopressor) injection 5 mg, 5 mg, intravenous, q6h, PAMELA Desai-CNP, 5 mg at 12/13/23 0550    [Held by provider] montelukast (Singulair) tablet 10 mg, 10 mg, oral, Daily, PAMELA Barton-CNP, 10 mg at 12/11/23 0952    morphine injection 2 mg, 2 mg, intravenous, q4h PRN, PAMELA Desai-CNP, 2 mg at 12/12/23 1329    [Held by provider] OLANZapine (ZyPREXA) tablet 10 mg, 10 mg, oral, Nightly, AUGUSTA Barton, 10 mg at 12/10/23 2015    [Held by provider] oxyCODONE-acetaminophen (Percocet) 5-325 mg per tablet 1 tablet, 1 tablet, oral, q4h PRN, Danyelle JOSEPH  Davey, APRN-CNP    oxygen (O2) therapy, , inhalation, Continuous PRN - O2/gases, Danyelle JOSEPH AUGUSTA Mariscal, Rate Change at 12/12/23 0900    oxygen (O2) therapy, , inhalation, Continuous PRN - O2/gases, [place] AUGUSTA Mckeon, 50 L/min at 12/12/23 1500    [Held by provider] pantoprazole (ProtoNix) EC tablet 40 mg, 40 mg, oral, Daily, Danyelle M Davey, APRN-CNP, 40 mg at 12/11/23 0653    pantoprazole (ProtoNix) injection 40 mg, 40 mg, intravenous, Daily, Danyelle OPAL Davey, APRN-CNP, 40 mg at 12/13/23 0550    piperacillin-tazobactam-dextrose (Zosyn) IV 3.375 g, 3.375 g, intravenous, q8h, Danyelle OPAL AUGUSTA Mariscal, Last Rate: 12.5 mL/hr at 12/13/23 0550, 3.375 g at 12/13/23 0550    Review of Systems:  14 point review of systems was completed and negative except for those specially mention in my HPI    Physical Exam:    Heart Rate:  [60-92]   Temp:  [36 °C (96.8 °F)-36.5 °C (97.7 °F)]   Resp:  [13-46]   BP: ()/()   Weight:  [60.1 kg (132 lb 7.9 oz)]   SpO2:  [81 %-100 %]     Physical Exam  Vitals and nursing note reviewed.   Constitutional:       General: She is awake.      Appearance: Normal appearance. She is cachectic.   HENT:      Head: Normocephalic and atraumatic.   Eyes:      Extraocular Movements: Extraocular movements intact.      Conjunctiva/sclera: Conjunctivae normal.      Pupils: Pupils are equal, round, and reactive to light.   Cardiovascular:      Rate and Rhythm: Normal rate and regular rhythm.      Pulses: Normal pulses.   Pulmonary:      Effort: Pulmonary effort is normal.      Breath sounds: Examination of the right-lower field reveals decreased breath sounds. Examination of the left-lower field reveals decreased breath sounds. Decreased breath sounds present.   Abdominal:      General: Abdomen is flat. Bowel sounds are normal.      Palpations: Abdomen is soft.   Musculoskeletal:      Cervical back: Full passive range of motion without pain.      Right lower leg: No edema.      Left lower  leg: No edema.   Skin:     General: Skin is warm and dry.   Neurological:      Mental Status: She is alert. She is disoriented.         Objective:    I have reviewed all medications, laboratory results, and imaging pertinent for today's encounter.    FiO2 (%):  [50 %-52 %] 50 %  MAP (cm H2O):  [9.1] 9.1      Intake/Output Summary (Last 24 hours) at 12/13/2023 0741  Last data filed at 12/13/2023 0650  Gross per 24 hour   Intake 100 ml   Output 200 ml   Net -100 ml           Assessment/Plan:    I am currently managing this critically ill patient for the following problems:    Acute on chronic respiratory failure with hypoxia and hypercapnia  Pneumonia  Bilateral pleural effusions  COPD exacerbation  Acute on chronic anemia  Thrombocytopenia  NSCLC left lung with secondary malignant neoplasm to right lung  HTN  Chronic pain  Anasarca     Neuro/Psych/Pain Ctrl/Sedation:  Chronic pain  Acute delirium  -On Percocet, OARRS reviewed no issues, on hold for NPO  -Monitor for opioid induced constipation  -morphine as needed  -MRI brain when able to evaluate acute cause of delirium/ encephalopathy    Respiratory/ENT:  Aspiration Pneumonia  12/11 transfer to ICU requiring NIPPV  Acute on chronic respiratory failure with hypoxia and hypercapnia  Bilateral pleural effusions  COPD exacerbation  -Normally on 3-4 L nasal cannula   -Titrate FiO2 to maintain SpO2 greater than 90%   -Pulmonary hygiene, IS  -Scheduled DuoNebs, albuterol as needed  -Solu-Medrol 40 mg IV twice daily   -BiPAP therapy, intermittent  -HFNC, wean as able  -zosyn     Cardiovascular:  HTN  -resume oral antihypertensive medications when able  -lopressor IV while NPO    GI:  SLP recommends regular with thin liquids, small sips    Renal/Volume Status (Intra & Extravascular):  EMA  Anasarca, Likely in setting of malignancy  -Monitor renal function  -Strict intake and output  -Keep potassium 4 magnesium greater than 2  -hold off on further diuresis    Endocrine  -No  hx of DM  -Monitor for stress hyperglycemia    Hem/Onc:  NSCLC left lung with secondary malignant neoplasm to right lung  Acute on chronic anemia s/p PRBC x2, Hemoglobin baseline 8-9  Thrombocytopenia 36-> 57  -Monitor CBC  -Hold pharmacological antiplatelet or anticoagulant ppx until platelets improve  -Last chemo (gemcitabine, carboplatin ) infusion 2 weeks ago, will need to follow-up with [facility] oncology   -transfuse for hemoglobin less than 7 or symptomatic anemia  -no s/s of active bleeding, monitor    OBGYN/MSK:  PT/OT      Ethics/Code Status:  Full code     :  DVT Prophylaxis: Lovenox  GI Prophylaxis: Protonix IV  Bowel Regimen: PRN suppository  Diet: regular diet with thin   CVC: NA  [place]: NA  [place]: NA  Restraints: NA  Dispo: ICU     50 minutes spent in preparing to see patient (I.e. review of medical records), evaluation of diagnostics (I.e. labs, imaging, etc.), documentation, discussing plan of care with patient/ family/ caregiver, and/ or coordination of care with multidisciplinary team. Time does not include completion of procedure time.              Plan Discussed with Dr. Agustin Escobar, APRN-CNP

## 2023-12-14 LAB
ANION GAP SERPL CALC-SCNC: 11 MMOL/L (ref 10–20)
BACTERIA BLD CULT: NORMAL
BACTERIA BLD CULT: NORMAL
BASOPHILS # BLD AUTO: 0 X10*3/UL (ref 0–0.1)
BASOPHILS NFR BLD AUTO: 0 %
BUN SERPL-MCNC: 39 MG/DL (ref 6–23)
CALCIUM SERPL-MCNC: 8.5 MG/DL (ref 8.6–10.3)
CHLORIDE SERPL-SCNC: 95 MMOL/L (ref 98–107)
CO2 SERPL-SCNC: 36 MMOL/L (ref 21–32)
CREAT SERPL-MCNC: 1.25 MG/DL (ref 0.5–1.05)
EOSINOPHIL # BLD AUTO: 0 X10*3/UL (ref 0–0.4)
EOSINOPHIL NFR BLD AUTO: 0 %
ERYTHROCYTE [DISTWIDTH] IN BLOOD BY AUTOMATED COUNT: 19.6 % (ref 11.5–14.5)
GFR SERPL CREATININE-BSD FRML MDRD: 46 ML/MIN/1.73M*2
GLUCOSE BLD MANUAL STRIP-MCNC: 134 MG/DL (ref 74–99)
GLUCOSE BLD MANUAL STRIP-MCNC: 135 MG/DL (ref 74–99)
GLUCOSE BLD MANUAL STRIP-MCNC: 198 MG/DL (ref 74–99)
GLUCOSE SERPL-MCNC: 148 MG/DL (ref 74–99)
HCT VFR BLD AUTO: 29 % (ref 36–46)
HGB BLD-MCNC: 9.3 G/DL (ref 12–16)
IMM GRANULOCYTES # BLD AUTO: 0.03 X10*3/UL (ref 0–0.5)
IMM GRANULOCYTES NFR BLD AUTO: 0.5 % (ref 0–0.9)
LYMPHOCYTES # BLD AUTO: 0.3 X10*3/UL (ref 0.8–3)
LYMPHOCYTES NFR BLD AUTO: 4.7 %
MAGNESIUM SERPL-MCNC: 1.97 MG/DL (ref 1.6–2.4)
MCH RBC QN AUTO: 30.9 PG (ref 26–34)
MCHC RBC AUTO-ENTMCNC: 32.1 G/DL (ref 32–36)
MCV RBC AUTO: 96 FL (ref 80–100)
MONOCYTES # BLD AUTO: 0.46 X10*3/UL (ref 0.05–0.8)
MONOCYTES NFR BLD AUTO: 7.2 %
NEUTROPHILS # BLD AUTO: 5.6 X10*3/UL (ref 1.6–5.5)
NEUTROPHILS NFR BLD AUTO: 87.6 %
NRBC BLD-RTO: 0 /100 WBCS (ref 0–0)
PHOSPHATE SERPL-MCNC: 2.9 MG/DL (ref 2.5–4.9)
PLATELET # BLD AUTO: 87 X10*3/UL (ref 150–450)
POTASSIUM SERPL-SCNC: 3.8 MMOL/L (ref 3.5–5.3)
RBC # BLD AUTO: 3.01 X10*6/UL (ref 4–5.2)
SODIUM SERPL-SCNC: 138 MMOL/L (ref 136–145)
WBC # BLD AUTO: 6.4 X10*3/UL (ref 4.4–11.3)

## 2023-12-14 PROCEDURE — 96372 THER/PROPH/DIAG INJ SC/IM: CPT

## 2023-12-14 PROCEDURE — 97161 PT EVAL LOW COMPLEX 20 MIN: CPT | Mod: GP | Performed by: PHYSICAL THERAPIST

## 2023-12-14 PROCEDURE — 2500000004 HC RX 250 GENERAL PHARMACY W/ HCPCS (ALT 636 FOR OP/ED): Performed by: NURSE PRACTITIONER

## 2023-12-14 PROCEDURE — 37799 UNLISTED PX VASCULAR SURGERY: CPT | Performed by: NURSE PRACTITIONER

## 2023-12-14 PROCEDURE — 94640 AIRWAY INHALATION TREATMENT: CPT

## 2023-12-14 PROCEDURE — 82947 ASSAY GLUCOSE BLOOD QUANT: CPT

## 2023-12-14 PROCEDURE — 2500000001 HC RX 250 WO HCPCS SELF ADMINISTERED DRUGS (ALT 637 FOR MEDICARE OP)

## 2023-12-14 PROCEDURE — 2500000004 HC RX 250 GENERAL PHARMACY W/ HCPCS (ALT 636 FOR OP/ED)

## 2023-12-14 PROCEDURE — 94660 CPAP INITIATION&MGMT: CPT

## 2023-12-14 PROCEDURE — 84100 ASSAY OF PHOSPHORUS: CPT | Performed by: NURSE PRACTITIONER

## 2023-12-14 PROCEDURE — 1200000002 HC GENERAL ROOM WITH TELEMETRY DAILY

## 2023-12-14 PROCEDURE — 2500000002 HC RX 250 W HCPCS SELF ADMINISTERED DRUGS (ALT 637 FOR MEDICARE OP, ALT 636 FOR OP/ED): Performed by: NURSE PRACTITIONER

## 2023-12-14 PROCEDURE — 80048 BASIC METABOLIC PNL TOTAL CA: CPT | Performed by: NURSE PRACTITIONER

## 2023-12-14 PROCEDURE — 97165 OT EVAL LOW COMPLEX 30 MIN: CPT | Mod: GO

## 2023-12-14 PROCEDURE — 2500000002 HC RX 250 W HCPCS SELF ADMINISTERED DRUGS (ALT 637 FOR MEDICARE OP, ALT 636 FOR OP/ED)

## 2023-12-14 PROCEDURE — 99291 CRITICAL CARE FIRST HOUR: CPT | Performed by: NURSE PRACTITIONER

## 2023-12-14 PROCEDURE — 85025 COMPLETE CBC W/AUTO DIFF WBC: CPT | Performed by: NURSE PRACTITIONER

## 2023-12-14 PROCEDURE — 83735 ASSAY OF MAGNESIUM: CPT | Performed by: NURSE PRACTITIONER

## 2023-12-14 PROCEDURE — 2500000001 HC RX 250 WO HCPCS SELF ADMINISTERED DRUGS (ALT 637 FOR MEDICARE OP): Performed by: NURSE PRACTITIONER

## 2023-12-14 RX ORDER — MAGNESIUM SULFATE HEPTAHYDRATE 40 MG/ML
2 INJECTION, SOLUTION INTRAVENOUS ONCE
Status: COMPLETED | OUTPATIENT
Start: 2023-12-14 | End: 2023-12-14

## 2023-12-14 RX ORDER — OLANZAPINE 2.5 MG/1
2.5 TABLET ORAL NIGHTLY
Status: DISCONTINUED | OUTPATIENT
Start: 2023-12-14 | End: 2023-12-16

## 2023-12-14 RX ORDER — POTASSIUM CHLORIDE 1.5 G/1.58G
20 POWDER, FOR SOLUTION ORAL ONCE
Status: COMPLETED | OUTPATIENT
Start: 2023-12-14 | End: 2023-12-14

## 2023-12-14 RX ADMIN — IPRATROPIUM BROMIDE AND ALBUTEROL SULFATE 3 ML: 2.5; .5 SOLUTION RESPIRATORY (INHALATION) at 13:55

## 2023-12-14 RX ADMIN — POTASSIUM CHLORIDE 20 MEQ: 1.5 POWDER, FOR SOLUTION ORAL at 05:56

## 2023-12-14 RX ADMIN — MAGNESIUM SULFATE HEPTAHYDRATE 2 G: 40 INJECTION, SOLUTION INTRAVENOUS at 05:56

## 2023-12-14 RX ADMIN — METHYLPREDNISOLONE SODIUM SUCCINATE 40 MG: 40 INJECTION INTRAMUSCULAR; INTRAVENOUS at 21:05

## 2023-12-14 RX ADMIN — DOCUSATE SODIUM 100 MG: 100 CAPSULE, LIQUID FILLED ORAL at 08:17

## 2023-12-14 RX ADMIN — OLANZAPINE 2.5 MG: 2.5 TABLET ORAL at 23:25

## 2023-12-14 RX ADMIN — INSULIN LISPRO 1 UNITS: 100 INJECTION, SOLUTION INTRAVENOUS; SUBCUTANEOUS at 11:27

## 2023-12-14 RX ADMIN — DOCUSATE SODIUM 100 MG: 100 CAPSULE, LIQUID FILLED ORAL at 21:05

## 2023-12-14 RX ADMIN — METHYLPREDNISOLONE SODIUM SUCCINATE 40 MG: 40 INJECTION INTRAMUSCULAR; INTRAVENOUS at 08:17

## 2023-12-14 RX ADMIN — PIPERACILLIN SODIUM AND TAZOBACTAM SODIUM 3.38 G: 3; .375 INJECTION, SOLUTION INTRAVENOUS at 13:18

## 2023-12-14 RX ADMIN — FOLIC ACID 1 MG: 1 TABLET ORAL at 08:17

## 2023-12-14 RX ADMIN — METOPROLOL SUCCINATE 50 MG: 50 TABLET, EXTENDED RELEASE ORAL at 08:17

## 2023-12-14 RX ADMIN — LISINOPRIL: 20 TABLET ORAL at 08:17

## 2023-12-14 RX ADMIN — ENOXAPARIN SODIUM 40 MG: 40 INJECTION SUBCUTANEOUS at 08:17

## 2023-12-14 RX ADMIN — IPRATROPIUM BROMIDE AND ALBUTEROL SULFATE 3 ML: 2.5; .5 SOLUTION RESPIRATORY (INHALATION) at 07:53

## 2023-12-14 RX ADMIN — IPRATROPIUM BROMIDE AND ALBUTEROL SULFATE 3 ML: 2.5; .5 SOLUTION RESPIRATORY (INHALATION) at 19:52

## 2023-12-14 RX ADMIN — PANTOPRAZOLE SODIUM 40 MG: 40 TABLET, DELAYED RELEASE ORAL at 05:56

## 2023-12-14 RX ADMIN — PIPERACILLIN SODIUM AND TAZOBACTAM SODIUM 3.38 G: 3; .375 INJECTION, SOLUTION INTRAVENOUS at 07:30

## 2023-12-14 RX ADMIN — PIPERACILLIN SODIUM AND TAZOBACTAM SODIUM 3.38 G: 3; .375 INJECTION, SOLUTION INTRAVENOUS at 21:09

## 2023-12-14 ASSESSMENT — COGNITIVE AND FUNCTIONAL STATUS - GENERAL
DRESSING REGULAR UPPER BODY CLOTHING: A LITTLE
PERSONAL GROOMING: A LITTLE
DRESSING REGULAR LOWER BODY CLOTHING: A LOT
TOILETING: A LOT
DAILY ACTIVITIY SCORE: 16
TURNING FROM BACK TO SIDE WHILE IN FLAT BAD: A LOT
MOVING TO AND FROM BED TO CHAIR: A LITTLE
STANDING UP FROM CHAIR USING ARMS: A LITTLE
MOBILITY SCORE: 14
WALKING IN HOSPITAL ROOM: A LOT
HELP NEEDED FOR BATHING: A LOT
CLIMB 3 TO 5 STEPS WITH RAILING: A LOT
MOVING FROM LYING ON BACK TO SITTING ON SIDE OF FLAT BED WITH BEDRAILS: A LOT

## 2023-12-14 ASSESSMENT — PAIN SCALES - GENERAL
PAINLEVEL_OUTOF10: 0 - NO PAIN

## 2023-12-14 ASSESSMENT — ACTIVITIES OF DAILY LIVING (ADL): BATHING_ASSISTANCE: MAXIMAL

## 2023-12-14 ASSESSMENT — PAIN - FUNCTIONAL ASSESSMENT
PAIN_FUNCTIONAL_ASSESSMENT: 0-10
PAIN_FUNCTIONAL_ASSESSMENT: 0-10

## 2023-12-14 NOTE — PROGRESS NOTES
[facility] Critical Care Medicine       Date:  12/14/2023  Patient:  Gaby Blue  YOB: 1950  MRN:  [id number]   Admit Date:  12/9/2023  ========================================================================================================    Chief Complaint   Patient presents with    Shortness of Breath         History of Present Illness:  Gaby Blue is a 73 y.o. 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 Shortness of breath associated with respiratory cough with yellow sputum.  Shortness of breath persistent and increasing over the last couple days. No improvement with rest or nebulizers. Worse with activity. Did deny fevers, chest pain, abnormal bleeding, or any GI/ symptoms.   ED course:Patient's COVID and influenza testing were negative.  Normal coags.  Normal serum lactate.  Metabolic panel with normal renal function, no major electrolyte derangements.  Bicarb elevated likely due to chronic compensation. Troponin 23 which I suspect is likely mildly increased due to demand during period of hypoxia and increased respiratory effort. HGB was 6.8 she was given 1 unit of PRBCs in ED, started on zosyn, vanco and levaquin in suspicion of pneumonia.  CXR showed bilateral pleural effusions and new left lobe mass. Pt started on BIPAP and sent to ICU for further management and monitoring.     12/10: Patient was transferred out of ICU to [facility].   12/11: Per  "hospitalist, patient potentially aspirated on breakfast this am. Desat to 70% on RA, placed on BiPAP 16/5 50%, ABG  7.26/81/232/36.3. Transferred to ICU for further management of respiratory distress.          Interval ICU Events:  12/10: Admitted to the ICU initially on BIPAP, was given 20mg IV Lasix with improvement with SOB. She is hemodynamically stable, now off BIPaP and 4L NC at 96% SPO2. HR is NSR with no ectopy. She states her SOB has improved, denies chest pain, abd pain, n/v/d.   12/11: Admitted to ICU on BiPAP, A/Ox3, resting comfortably, denies SOB or increased WOB, HDS SpO2 99%. LS- bilateral crackles/rhonchi. NPO until SLP evaluation.   12/12: Disoriented this am. Reports \"always coughs when drinks\" and has been that way for a long time. Attempt to wean from BiPAP and had desat to 77% was placed back on with setting 20/10, 50% with improvement in SPO2 to 94%. Bump in creatinine to 1.22, FB (-) 500.    12/13: Improvement in mentation this am. Tolerating BiPAP and transitioned to HFNC  maintaining SPO2 % . VSS, NSR with HR 60-70s. Creat bumped to 1.34, hgb 6.8. Sister (HPCUBA)called to obtain consent and updated on medical status.   12/14:     Medical History:  Past Medical History:   Diagnosis Date    Arthritis     Bilateral sensorineural hearing loss 03/06/2023    Chronic hypoxic respiratory failure, on home oxygen therapy (CMS/HCC)     COPD (chronic obstructive pulmonary disease) (CMS/HCC)     Diverticulitis     History of radiation therapy     History of transfusion     Hypertension     Non-small cell lung cancer (NSCLC) (CMS/HCC)     Secondary malignant neoplasm of right lung (CMS/HCC)     Ulnar neuropathy of left upper extremity 03/06/2023     Past Surgical History:   Procedure Laterality Date    APPENDECTOMY      CARDIAC CATHETERIZATION      COLON SURGERY      CT ANGIO NECK  09/26/2022    CT NECK ANGIO W AND WO IV CONTRAST    CT ANGIO NECK  12/17/2021    CT NECK ANGIO W AND WO IV CONTRAST    " EXCISION BENIGN SKIN LESION SCALP / NECK / HANDS / FEET / GENITALIA      scalp    HAND SURGERY      HYSTERECTOMY  03/29/2018    Hysterectomy    MECKEL DIVERTICULUM EXCISION      TONSILLECTOMY  03/29/2018    Tonsillectomy     Medications Prior to Admission   Medication Sig Dispense Refill Last Dose    dronabinol (Marinol) 2.5 mg capsule Take 1 capsule (2.5 mg) by mouth 2 times a day before meals.       oxygen (O2) gas therapy Inhale 3 L/min continuously.       albuterol 90 mcg/actuation inhaler Inhale 2 puffs 4 times a day as needed.       ALPRAZolam (Xanax) 1 mg tablet Take 1 tablet (1 mg) by mouth 3 times a day as needed.       aspirin 81 mg chewable tablet Chew 1 tablet (81 mg) once daily.       buPROPion XL (Wellbutrin XL) 300 mg 24 hr tablet Take 1 tablet (300 mg) by mouth once daily.       fluticasone (Flonase) 50 mcg/actuation nasal spray Administer 1 spray into affected nostril(s) in the morning and 1 spray in the evening.       fluticasone furoate-vilanteroL (Breo Elipta) 200-25 mcg/dose inhaler Inhale 1 puff once daily.       fluticasone-umeclidin-vilanter (TRELEGY-ELLIPTA) 100-62.5-25 mcg blister with device Inhale 1 puff once daily.       fluticasone-umeclidin-vilanter (TRELEGY-ELLIPTA) 100-62.5-25 mcg blister with device INHALE 1 PUFF BY MOUTH ONCE DAILY 60 each 11     folic acid (Folvite) 1 mg tablet Take 1 tablet (1 mg) by mouth once daily.       gabapentin (Neurontin) 800 mg tablet Take 1 tablet (800 mg) by mouth in the morning and 1 tablet (800 mg) in the evening and 1 tablet (800 mg) before bedtime.       levalbuterol (Xopenex) 1.25 mg/3 mL nebulizer solution Inhale 3 mL every 4 hours if needed.       lisinopriL-hydrochlorothiazide 20-25 mg tablet Take 1 tablet by mouth once daily.       loratadine (Claritin) 10 mg tablet Take 1 tablet (10 mg) by mouth once daily.       metoprolol succinate XL (Toprol-XL) 50 mg 24 hr tablet Take 1 tablet (50 mg) by mouth once daily.       montelukast (Singulair) 10  mg tablet Take 1 tablet (10 mg) by mouth once daily.       OLANZapine (ZyPREXA) 10 mg tablet Take 1 tablet (10 mg) by mouth once daily at bedtime.       oxyCODONE-acetaminophen (Percocet) 7.5-325 mg tablet Take 1 tablet by mouth every 4 hours if needed for moderate pain (4 - 6).       pantoprazole (ProtoNix) 40 mg EC tablet Take 1 tablet (40 mg) by mouth once daily.        Bee pollen  Social History     Tobacco Use    Smoking status: Former     Packs/day: 0.50     Years: 48.00     Additional pack years: 0.00     Total pack years: 24.00     Types: Cigarettes     Quit date: 2019     Years since quittin.1    Smokeless tobacco: Never   Vaping Use    Vaping Use: Never used   Substance Use Topics    Alcohol use: Not Currently    Drug use: Never     Family History   Problem Relation Name Age of Onset    COPD Mother      Cancer Father      Other (Lupus erythematosus) Other family history     Heart disease Other family history        Hospital Medications:         Current Facility-Administered Medications:     albuterol 90 mcg/actuation inhaler 2 puff, 2 puff, inhalation, 4x daily PRN, AUGUSTA Barton    [Held by provider] aspirin chewable tablet 81 mg, 81 mg, oral, Daily, PAMELA Barton-CNP, 81 mg at 23 0951    [Held by provider] buPROPion XL (Wellbutrin XL) 24 hr tablet 300 mg, 300 mg, oral, Daily, PAMELA Barton-CNP, 300 mg at 23 0951    dextrose 10 % in water (D10W) infusion, 0.3 g/kg/hr, intravenous, Once PRN, AUGUSTA Barton    dextrose 50 % injection 25 g, 25 g, intravenous, q15 min PRN, AUGUSTA Barton    docusate sodium (Colace) capsule 100 mg, 100 mg, oral, BID, Kalie Kingsely PA-C, 100 mg at 23 2313    enoxaparin (Lovenox) syringe 40 mg, 40 mg, subcutaneous, q24h, AUGUSTA Barton, 40 mg at 23 0915    folic acid (Folvite) tablet 1 mg, 1 mg, oral, Daily, AUGUSTA Barton, 1 mg at 23 0951    [Held by provider] gabapentin  (Neurontin) capsule 400 mg, 400 mg, oral, TID, AUGUSTA Barton, 400 mg at 12/11/23 0952    glucagon (Glucagen) injection 1 mg, 1 mg, intramuscular, q15 min PRN, AUGUSTA Barton    insulin lispro (HumaLOG) injection 0-5 Units, 0-5 Units, subcutaneous, TID with meals, AUGUSTA Barton, 1 Units at 12/13/23 1657    ipratropium-albuteroL (Duo-Neb) 0.5-2.5 mg/3 mL nebulizer solution 3 mL, 3 mL, nebulization, q4h PRN, AUGUSTA Barton, 3 mL at 12/11/23 1034    ipratropium-albuteroL (Duo-Neb) 0.5-2.5 mg/3 mL nebulizer solution 3 mL, 3 mL, nebulization, TID, AUGUSTA Barton, 3 mL at 12/13/23 2050    lisinopril 20 mg, hydroCHLOROthiazide 25 mg for Zestoretic/Prinizide, , oral, Daily, AUGUSTA Barton, Given at 12/11/23 0951    magnesium sulfate IV 2 g, 2 g, intravenous, Once, Kalie Kingsley PA-C, Last Rate: 25 mL/hr at 12/14/23 0556, 2 g at 12/14/23 0556    methylPREDNISolone sod succinate (PF) (SOLU-Medrol) 40 mg/mL injection 40 mg, 40 mg, intravenous, q12h, AUGUSTA Frias, 40 mg at 12/13/23 2056    metoprolol succinate XL (Toprol-XL) 24 hr tablet 50 mg, 50 mg, oral, Daily, AUGUSTA Barton, 50 mg at 12/11/23 0951    metoprolol tartrate (Lopressor) injection 5 mg, 5 mg, intravenous, q6h PRN, AUGUSTA Barton, 5 mg at 12/12/23 2236    [Held by provider] montelukast (Singulair) tablet 10 mg, 10 mg, oral, Daily, AUGUSTA Barton, 10 mg at 12/11/23 0952    morphine injection 2 mg, 2 mg, intravenous, q4h PRN, AUGUSTA Desai, 2 mg at 12/12/23 1329    [Held by provider] OLANZapine (ZyPREXA) tablet 10 mg, 10 mg, oral, Nightly, AUGUSTA Barton, 10 mg at 12/10/23 2015    [Held by provider] oxyCODONE-acetaminophen (Percocet) 5-325 mg per tablet 1 tablet, 1 tablet, oral, q4h PRN, AUGUSTA Barton    oxygen (O2) therapy, , inhalation, Continuous PRN - O2/gases, AUGUSTA Barton, Rate Change at 12/12/23 0900     oxygen (O2) therapy, , inhalation, Continuous PRN - O2/gases, Asia Escobar, APRN-CNP, 50 L/min at 12/13/23 2050    pantoprazole (ProtoNix) EC tablet 40 mg, 40 mg, oral, Daily, Danyelle JOSEPH Davey, APRN-CNP, 40 mg at 12/14/23 0556    piperacillin-tazobactam-dextrose (Zosyn) IV 3.375 g, 3.375 g, intravenous, q8h, Danyelle JOSEPH AUGUSTA Mariscal, Last Rate: 12.5 mL/hr at 12/14/23 0730, 3.375 g at 12/14/23 0730    Review of Systems:  14 point review of systems was completed and negative except for those specially mention in my HPI    Physical Exam:    Heart Rate:  [54-86]   Temp:  [36.1 °C (97 °F)-37.2 °C (99 °F)]   Resp:  [9-31]   BP: (110-182)/(25-93)   SpO2:  [83 %-100 %]     Physical Exam  Vitals and nursing note reviewed.   Constitutional:       General: She is awake.      Appearance: Normal appearance. She is cachectic.   HENT:      Head: Normocephalic and atraumatic.      Mouth/Throat:      Mouth: Mucous membranes are moist.   Eyes:      Extraocular Movements: Extraocular movements intact.      Conjunctiva/sclera: Conjunctivae normal.      Pupils: Pupils are equal, round, and reactive to light.   Cardiovascular:      Rate and Rhythm: Normal rate and regular rhythm.      Pulses: Normal pulses.   Pulmonary:      Effort: Pulmonary effort is normal.      Breath sounds: Examination of the right-lower field reveals decreased breath sounds. Examination of the left-lower field reveals decreased breath sounds. Decreased breath sounds present.   Abdominal:      General: Abdomen is flat. Bowel sounds are normal.      Palpations: Abdomen is soft.   Musculoskeletal:         General: Normal range of motion.      Cervical back: Full passive range of motion without pain and normal range of motion.      Right lower leg: No edema.      Left lower leg: No edema.   Skin:     General: Skin is warm and dry.   Neurological:      General: No focal deficit present.      Mental Status: She is alert and oriented to person, place, and time.    Psychiatric:         Mood and Affect: Mood normal.         Behavior: Behavior normal.         Objective:    I have reviewed all medications, laboratory results, and imaging pertinent for today's encounter.    FiO2 (%):  [50 %-65 %] 50 %  MAP (cm H2O):  [8-10.8] 8      Intake/Output Summary (Last 24 hours) at 12/14/2023 0749  Last data filed at 12/13/2023 1747  Gross per 24 hour   Intake 1776 ml   Output --   Net 1776 ml           Assessment/Plan:    I am currently managing this critically ill patient for the following problems:    Acute on chronic respiratory failure with hypoxia and hypercapnia  Pneumonia  Bilateral pleural effusions  COPD exacerbation  Acute on chronic anemia  Thrombocytopenia  NSCLC left lung with secondary malignant neoplasm to right lung  HTN  Chronic pain  Anasarca     Neuro/Psych/Pain Ctrl/Sedation:  Chronic pain  Acute delirium-resolved  -resume home medications as able, introduce slowly to reduce risk of delirium  -pain management as needed    Respiratory/ENT:  Aspiration Pneumonia  12/11 transfer to ICU requiring NIPPV  Acute on chronic respiratory failure with hypoxia and hypercapnia  Bilateral pleural effusions  COPD exacerbation  -Normally on 3-4 L nasal cannula   -Titrate FiO2 to maintain SpO2 greater than 90%   -Pulmonary hygiene, IS  -Scheduled DuoNebs, albuterol as needed  -Solu-Medrol 40 mg IV twice daily   -BiPAP therapy, intermittent  -zosyn x7 days    Cardiovascular:  HTN  -Resume home medications    GI:  SLP recommends regular with thin liquids, small sips    Renal/Volume Status (Intra & Extravascular):  EMA-improving  Anasarca, Likely in setting of malignancy  -Monitor renal function  -Strict intake and output  -electrolyte replacement as needed    Endocrine  -No hx of DM  -Monitor for stress hyperglycemia    Hem/Onc:  NSCLC left lung with secondary malignant neoplasm to right lung  Acute on chronic anemia Hemoglobin baseline 8-9  Thrombocytopenia-improving  -Monitor  CBC  -resume aspirin and monitor platelets  -Last chemo (gemcitabine, carboplatin ) infusion 2 weeks ago, will need to follow-up with [facility] oncology   -transfuse for hemoglobin less than 7 or symptomatic anemia  -no s/s of active bleeding, monitor    MSK:  PT/OT      Ethics/Code Status:  Full code  Concern for safety at home per sister and close friend Sushil  Social service and [facility] for discharge planning  Referral made to Healthy at Home     :  DVT Prophylaxis: Lovenox  GI Prophylaxis: PPI  Bowel Regimen: PRN suppository  Diet: regular diet with thin   CVC: NA  Gipsy: NA  Nagel: NA  Restraints: NA  Dispo: transfer                Plan Discussed with Dr. Agustin Escobar, APRN-CNP

## 2023-12-14 NOTE — PROGRESS NOTES
Occupational Therapy    Evaluation/Treatment    Patient Name: Gaby Blue  MRN: [id number]  : 1950  Today's Date: 23  Time Calculation  Start Time: 1037  Stop Time: 1053  Time Calculation (min): 16 min       Assessment:  End of Session Communication: Bedside nurse  End of Session Patient Position: Up in chair, Alarm off, not on at start of session (LE's elevated, call light in reach, RN present in room)  OT Assessment Results: Decreased ADL status, Decreased endurance, Decreased functional mobility    Plan:  Treatment Interventions: ADL retraining, Functional transfer training, Endurance training  OT Frequency: 2 times per day until discharge  OT Discharge Recommendations: Moderate intensity level of continued care  OT Recommended Transfer Status: Minimal assist (use of ww)  OT - OK to Discharge when deemed medically stable  Treatment Interventions: ADL retraining, Functional transfer training, Endurance training  Subjective         Past Medical History:   Diagnosis Date    Arthritis     Bilateral sensorineural hearing loss 2023    Chronic hypoxic respiratory failure, on home oxygen therapy (CMS/HCC)     COPD (chronic obstructive pulmonary disease) (CMS/HCC)     Diverticulitis     History of radiation therapy     History of transfusion     Hypertension     Non-small cell lung cancer (NSCLC) (CMS/HCC)     Secondary malignant neoplasm of right lung (CMS/HCC)     Ulnar neuropathy of left upper extremity 2023     Past Surgical History:   Procedure Laterality Date    APPENDECTOMY      CARDIAC CATHETERIZATION      COLON SURGERY      CT ANGIO NECK  2022    CT NECK ANGIO W AND WO IV CONTRAST    CT ANGIO NECK  2021    CT NECK ANGIO W AND WO IV CONTRAST    EXCISION BENIGN SKIN LESION SCALP / NECK / HANDS / FEET / GENITALIA      scalp    HAND SURGERY      HYSTERECTOMY  2018    Hysterectomy    MECKEL DIVERTICULUM EXCISION      TONSILLECTOMY  2018    Tonsillectomy       General:    OT Received On: 12/14/23  General  Reason for Referral: ADL impairment  Referred By: PT/OT 12/14/23 Luz  Past Medical History Relevant to Rehab: COPD, HTN, Neuropathy, Arthritis, Tinnitus, Appey, PTCA cubital tunnel Left, Vertigo  Prior to Session Communication: Bedside nurse  Patient Position Received: Bed, 3 rail up, Alarm off, not on at start of session (RN had taken pt off vapotherm and placed on 4L at start of OT/PT evals)  General Comment: To ED 12/9/23 with increased SOB. H/H 9.3/ 26.0; Flu A/B (-); C19 (-); CTA 12/9/23 PE (-) PE; BUL mass lesions    Precautions:  Medical Precautions: Fall precautions, Oxygen therapy device and L/min    Vital Signs:  Heart Rate:  (Pre actiivty 70 Post activty 65)  SpO2:  (Pre actiivty 92 % Post activity 91 dropped to 78 % with transfer on 4 liters NC, recovered to 90% after 3 minutes of seated rest break)    Pain:  Pain Assessment  Pain Score: 0 - No pain  Objective     Cognition:  Overall Cognitive Status: Within Functional Limits  Orientation Level: Oriented X4             Home Living/Prior function:  Home Living Comments: Per patient report resides with a friend in 1  story home  1 step with handrail Independent in mobiltly, ADLs and IADLs with cane  v. rollator (depending on how pt feels). Pt also owns wc. Tub shower with seat and grab bar. Drive. Denies any falls past 3 months.         Activities of Daily Living:   Eating Assistance: Independent  Grooming Assistance: Minimal  Bathing Assistance: Maximal  UE Dressing Assistance: Minimal  LE Dressing Assistance: Maximal  Toileting Assistance with Device: Moderate (at [facility])  Functional Assistance:  (pt ambulated 5' in room with ww with min A)                         Activity Tolerance:  Endurance: Tolerates less than 10 min exercise with changes in vital signs           Bed Mobility/Transfers: Bed Mobility  Bed Mobility:  (sup to sit min A, initially states she is lightheaded, improves after 1 minute seated  EOB)  Transfers  Transfer:  (Sit > stand at bed level CGA, vc for hand position. Bed to C min A with ww. Stand > sit at recliner vc to reach back.)                Balance:  Static Sitting: good  Dynamic Sitting: fair   Static Standing: fair   Dynamic Standing: fair -         Strength:  Strength Comments: MAJOR RIVERA 4/5 throughout            Extremities: RUE   RUE : Within Functional Limits and LUE   LUE: Within Functional Limits    Outcome Measures: [facility] Daily Activity  Putting on and taking off regular lower body clothing: A lot  Bathing (including washing, rinsing, drying): A lot  Putting on and taking off regular upper body clothing: A little  Toileting, which includes using toilet, bedpan or urinal: A lot  Taking care of personal grooming such as brushing teeth: A little  Eating Meals: None  Daily Activity - Total Score: 16    Education Documentation  ADL Training, taught by Roxanne Telles, OT at 12/14/2023  2:14 PM.  Learner: Patient  Readiness: Acceptance  Method: Explanation  Response: Verbalizes Understanding, Needs Reinforcement    Education Comments  No comments found.        EDUCATION:       Goals:  Encounter Problems       Encounter Problems (Active)       Dressings Lower Extremities       STG - Patient to complete lower body dressing with modified indep (Progressing)       Start:  12/14/23    Expected End:  12/28/23               OT Goals       pt will verbalize 3 energy conservation strategies to increase indep with ADLS (Progressing)       Start:  12/14/23    Expected End:  12/28/23            Pt will tolerate 8 minutes of functional activity with one rest break, spo2 >90% (Progressing)       Start:  12/14/23    Expected End:  12/28/23            Pt will transfer to bed, chair, toilet with modified indep (Progressing)       Start:  12/14/23    Expected End:  12/28/23

## 2023-12-14 NOTE — PROGRESS NOTES
"Nutrition Follow Up Assessment:   Nutrition Assessment         Patient is a 73 y.o. female presenting with: COPD exacerbation      Nutrition History:  Energy Intake: Good > 75 % (100% x 2 meals 12/13, 100% x 1 meal 12/14)  Food and Nutrient History: RD follow-up. Pt on high flow at time of visit. Says appetite is good, requesting more food at meals. Informed pt can order more at meals d/t only being on regular diet though also encouraged pt to limit sweets and other carb containing foods d/t being on steroids. Explained that may need carb controlled diet if blood glucose is consistently >180. For now, will continue with regular diet though please add carb controlled diet if blood glucose is consistently >180. Pt dislikes Glucerna, requesting Ensure BID (vanilla).  Vitamin/Herbal Supplement Use: 1 mg folic acid  Food Allergies/Intolerances:  None  GI Symptoms: Constipation  Oral Problems: None       Anthropometrics:  Height: 155 cm (5' 1.02\")   Weight: 60.1 kg (132 lb 7.9 oz)   BMI (Calculated): 25.02  IBW/kg (Dietitian Calculated): 47.7 kg          Weight History:   Wt Readings from Last 15 Encounters:   12/13/23 60.1 kg (132 lb 7.9 oz)   03/03/23 64.4 kg (142 lb)   10/26/22 69 kg (152 lb 1.9 oz)   10/07/22 68 kg (150 lb)   07/26/22 68 kg (150 lb)   10/12/20 60.9 kg (134 lb 4.2 oz)         Weight Change %: pt reports 7-8 lb wt loss in the past 2 weeks  Significant Weight Loss: Yes  Interpretation of Weight Loss: >2% in 1 week       Nutrition Focused Physical Exam Findings:    Subcutaneous Fat Loss:   Orbital Fat Pads: Mild-Moderate (slight dark circles and slight hollowing)  Buccal Fat Pads: Mild-Moderate (flat cheeks, minimal bounce)  Muscle Wasting:  Temporalis: Mild-Moderate (slight depression)  Pectoralis (Clavicular Region): Mild-Moderate (some protrusion of clavicle)  Deltoid/Trapezius: Mild-Moderate (slight protrusion of acromion process)  Interosseous: Defer  Trapezius/Infraspinatus/Supraspinatus (Scapular " Region): Defer  Quadriceps: Defer  Gastrocnemius: Defer    Edema:    None  Physical Findings:  Skin: Negative (for pressure injuries)    Nutrition Significant Labs:  BMP Trend:   Results from last 7 days   Lab Units 12/14/23  0429 12/13/23  0351 12/12/23  0421 12/10/23  0720   GLUCOSE mg/dL 148* 81 74 109*   CALCIUM mg/dL 8.5* 8.8 9.2 8.8   SODIUM mmol/L 138 140 138 136   POTASSIUM mmol/L 3.8 4.2 4.0 4.5   CO2 mmol/L 36* 37* 36* 35*   CHLORIDE mmol/L 95* 97* 94* 96*   BUN mg/dL 39* 36* 29* 23   CREATININE mg/dL 1.25* 1.34* 1.22* 0.95        Nutrition Specific Medications: Reviewed, on steroids      I/O:   Last BM Date: 12/08/23;          Dietary Orders (From admission, onward)       Start     Ordered    12/14/23 1125  Oral nutritional supplements  Until discontinued        Comments: Vanilla   Question Answer Comment   Deliver with Breakfast    Deliver with Dinner    Select supplement: Ensure Plus High Protein        12/14/23 1125    12/13/23 1412  Adult diet Regular; Thin 0  Diet effective now        Comments: Small single sips of liquids   Question Answer Comment   Diet type Regular    Fluid consistency Thin 0        12/13/23 1412                     Estimated Needs:   Total Energy Estimated Needs (kCal): 1900 kCal  Method for Estimating Needs: 30 kcal/kg ABW  Total Protein Estimated Needs (g): 76 g  Method for Estimating Needs: 1.2g/kg ABW  Total Fluid Estimated Needs (mL): 1900 mL  Method for Estimating Needs: 1 ml/kcal ABW        Nutrition Diagnosis   Malnutrition Diagnosis  Patient has Malnutrition Diagnosis: Yes  Diagnosis Status: Ongoing  Malnutrition Diagnosis: Severe malnutrition related to acute disease or injury  As Evidenced by: >2% wt loss in 1 week; moderate muscle wasting and fat loss; <50% estimated energy needs in >5 days            Nutrition Interventions/Recommendations         Nutrition Prescription:  Individualized Nutrition Prescription Provided for : Continue regular diet, modify supplement to  Ensure BID (one provides 350 kcal, 20g protein) - may receive a different version of Ensure based on hospital supply. No indication for supplemental enteral nutrition at this time. May need to consider carb controlled diet if blood glucose is consistently >180        Nutrition Interventions:   Food and/or Nutrient Delivery Interventions  Interventions: Meals and snacks, Medical food supplement  Medical Food Supplement: Commercial beverage    Coordination of Nutrition Care by a Nutrition Professional  Collaboration and Referral of Nutrition Care: Collaboration by nutrition professional with other providers (ICU interdisciplinary rounds; RN)    Nutrition Education:       Briefly discussed limiting carb containing foods while on regular diet. Discussed may need carb-controlled diet if blood glucose consistently >180    Nutrition Monitoring and Evaluation   Food/Nutrient Related History Monitoring  Monitoring and Evaluation Plan: Energy intake, Amount of food  Energy Intake: Estimated energy intake  Criteria: Pt meets >75% of estimated energy needs  Amount of Food: Estimated amout of food, Medical food intake  Criteria: Pt consumes >75% of meals and supplements    Body Composition/Growth/Weight History  Monitoring and Evaluation Plan: Weight  Criteria: Maintains stable weight    Biochemical Data, Medical Tests and Procedures  Monitoring and Evaluation Plan: Glucose/endocrine profile  Glucose/Endocrine Profile: Glucose, casual  Criteria: BG within desirable range    Time Spent/Follow-up Reminder:   Time Spent (min): 30 minutes  Last Date of Nutrition Visit: 12/14/23  Nutrition Follow-Up Needed?: 3-8 days

## 2023-12-14 NOTE — PROGRESS NOTES
Physical Therapy    Physical Therapy Evaluation    Patient Name: Gaby Blue  MRN: [id number]  Today's Date: 12/14/2023   Time Calculation  Start Time: 0956  Stop Time: 1025  Time Calculation (min): 29 min    Assessment/Plan   PT Assessment  PT Assessment Results: Decreased strength, Decreased endurance, Impaired balance, Decreased mobility, Decreased safety awareness  Rehab Prognosis: Good  Evaluation/Treatment Tolerance:  (Limited by respiratoy status)  Assessment Comment: Patient will benefit from additional PT to increase actiivty tolerance ahd improve her safety.  End of Session Patient Position: Up in chair, Alarm off, not on at start of session (LEs elelvated)  IP OR SWING BED PT PLAN  Inpatient or Swing Bed: Inpatient  PT Plan  Treatment/Interventions: Bed mobility, Transfer training, Gait training, Strengthening, Endurance training, Therapeutic exercise, Therapeutic activity  PT Plan: Skilled PT  PT Frequency: 3 times per week  PT Discharge Recommendations:  (Continued PT at moderate intensity and moderate frequency with 24/7 care available)  PT Recommended Transfer Status: Assist x1  PT - OK to Discharge: to environment with 24/7 care and continued PT once deemed medically appropriate)      Subjective   General Visit Information:  General  Reason for Referral: Impaired mobilty  Referred By: PT/OT 12/14/23 Luz  Past Medical History Relevant to Rehab: COPD, HTN, Neuropathy, Arthritis, Tinnitus, Appey, PTCA cubital tunnel Left, Vertigo  Patient Position Received: Bed, 3 rail up, Alarm off, not on at start of session  General Comment: To ED 12/9/23 with increased SOB. H/H 9.3/ 26.0; Flu A/B (-); C19 (-); CTA 12/9/23 PE (-) PE; BUL mass lesions; Scr 1.25  Home Living:  Home Living  Home Living Comments: Per patient report resides alone in 1  story home  1 step with handrail Independent in mobiltly, ADLs adn IADLs with cane  v. rollator. Tub shower with seat and grab bar. Drive. Denies any falls past 3  months.     Precautions:  Precautions  Medical Precautions: Fall precautions, Oxygen therapy device and L/min  Vital Signs:  Vital Signs  Heart Rate:  (Pre actiivty 70 Post activty 65)  SpO2:  (Pre actiivty 92 % Post activity 91 dropped ot 88 % with transfer on 4 liters NC)    Objective   Pain:  Pain Assessment  Pain Score: 0 - No pain  Cognition:  Cognition  Overall Cognitive Status: Within Functional Limits  Orientation Level: Oriented X4    General Assessments:  General Observation  General Observation: Patient will benefit from additional PT to increase activity tolernce, educate in safe use of www.     Static Sitting Balance  Static Sitting-Comment/Number of Minutes: Good  Dynamic Sitting Balance  Dynamic Sitting-Comments: Fair +    Static Standing Balance  Static Standing-Comment/Number of Minutes: Fair +  Dynamic Standing Balance  Dynamic Standing-Comments: Fair  Functional Assessments:  Bed Mobility  Bed Mobility:  (Supine > sit + 1 minimal assist. slow transitioning to sit. Does note increased lightheadness with change in position. Resolves within 1 minute)    Transfers  Transfer:  (Sit > stand at bed level CGA, vc for hand position. Stand > sit at recliner vc to reach back.)    Ambulation/Gait Training  Ambulation/Gait Training Performed:  (Patient ambulated with ww 3' x2 + 1minimal assist, note drop in SpO2 to 80% Instructed in pursed lip breathing. Able to recover ot 92%)  Extremity/Trunk Assessments:  RUE   RUE : Within Functional Limits  LUE   LUE: Within Functional Limits  RLE   RLE :  (AROM; Hip/knee/ankle WFL; MMT 4/5 grossly)  LLE   LLE :  (AROM; Hip/knee/ankle WFL; MMT 4/5 grossly)  Outcome Measures:  [facility] Basic Mobility  Turning from your back to your side while in a flat bed without using bedrails: A lot  Moving from lying on your back to sitting on the side of a flat bed without using bedrails: A lot  Moving to and from bed to chair (including a wheelchair): A little  Standing up from a chair  using your arms (e.g. wheelchair or bedside chair): A little  To walk in hospital room: A lot  Climbing 3-5 steps with railing: A lot  Basic Mobility - Total Score: 14    Encounter Problems       Encounter Problems (Active)       PT Problem       Bed mobility supine <> sit supervised  (Progressing)       Start:  12/14/23    Expected End:  12/28/23            Transfers sit <> stand with ww supervised.  (Progressing)       Start:  12/14/23    Expected End:  12/28/23            Patient to ambulate with ww > 30' with SpO2> 90%  (Progressing)       Start:  12/14/23    Expected End:  12/28/23               Pain - Adult              Education Documentation  Mobility Training, taught by Adriane Cantu, PT at 12/14/2023  1:27 PM.  Learner: Patient  Readiness: Acceptance  Method: Demonstration, Explanation  Response: Demonstrated Understanding    Education Comments  No comments found.

## 2023-12-14 NOTE — CONSULTS
Social Work Note  The LSW participated in the interdisciplinary MICU rounds this date. The pt is active with [facility] [phone] prior to admission. The LSW met with the pt who does want to remain active with [facility] with a goal of returning home once medically stable. The LSW with the pt then called Palliative Care. The pt notified [facility] of her current hospitalization and asked that Lorrine be notified. The pt's next appointment with [facility] is scheduled for 1/2/23 at 9:00am. The pt knows to notify [facility] when returning home.

## 2023-12-15 PROBLEM — D69.6 THROMBOCYTOPENIA (CMS-HCC): Status: ACTIVE | Noted: 2023-12-15

## 2023-12-15 PROBLEM — Z72.0 TOBACCO ABUSE: Status: ACTIVE | Noted: 2023-12-15

## 2023-12-15 PROBLEM — J96.02 ACUTE HYPERCAPNIC RESPIRATORY FAILURE (MULTI): Status: ACTIVE | Noted: 2023-12-15

## 2023-12-15 PROBLEM — N17.9 ACUTE KIDNEY INJURY SUPERIMPOSED ON CKD (CMS-HCC): Status: ACTIVE | Noted: 2023-12-15

## 2023-12-15 PROBLEM — G47.00 INSOMNIA DISORDER: Status: ACTIVE | Noted: 2023-12-15

## 2023-12-15 PROBLEM — I10 PRIMARY HYPERTENSION: Status: ACTIVE | Noted: 2023-12-15

## 2023-12-15 PROBLEM — I10 UNCONTROLLED HYPERTENSION: Status: ACTIVE | Noted: 2023-12-15

## 2023-12-15 PROBLEM — R41.0 ACUTE DELIRIUM: Status: ACTIVE | Noted: 2023-12-15

## 2023-12-15 PROBLEM — N18.9 ACUTE KIDNEY INJURY SUPERIMPOSED ON CKD (CMS-HCC): Status: ACTIVE | Noted: 2023-12-15

## 2023-12-15 LAB
ANION GAP SERPL CALC-SCNC: 10 MMOL/L (ref 10–20)
BASOPHILS # BLD AUTO: 0 X10*3/UL (ref 0–0.1)
BASOPHILS NFR BLD AUTO: 0 %
BUN SERPL-MCNC: 35 MG/DL (ref 6–23)
CALCIUM SERPL-MCNC: 8.6 MG/DL (ref 8.6–10.3)
CHLORIDE SERPL-SCNC: 93 MMOL/L (ref 98–107)
CO2 SERPL-SCNC: 38 MMOL/L (ref 21–32)
CREAT SERPL-MCNC: 1.04 MG/DL (ref 0.5–1.05)
EOSINOPHIL # BLD AUTO: 0 X10*3/UL (ref 0–0.4)
EOSINOPHIL NFR BLD AUTO: 0 %
ERYTHROCYTE [DISTWIDTH] IN BLOOD BY AUTOMATED COUNT: 18.6 % (ref 11.5–14.5)
GFR SERPL CREATININE-BSD FRML MDRD: 57 ML/MIN/1.73M*2
GLUCOSE BLD MANUAL STRIP-MCNC: 149 MG/DL (ref 74–99)
GLUCOSE BLD MANUAL STRIP-MCNC: 175 MG/DL (ref 74–99)
GLUCOSE BLD MANUAL STRIP-MCNC: 176 MG/DL (ref 74–99)
GLUCOSE BLD MANUAL STRIP-MCNC: 195 MG/DL (ref 74–99)
GLUCOSE SERPL-MCNC: 128 MG/DL (ref 74–99)
HCT VFR BLD AUTO: 31.9 % (ref 36–46)
HGB BLD-MCNC: 10.6 G/DL (ref 12–16)
IMM GRANULOCYTES # BLD AUTO: 0.05 X10*3/UL (ref 0–0.5)
IMM GRANULOCYTES NFR BLD AUTO: 0.6 % (ref 0–0.9)
LYMPHOCYTES # BLD AUTO: 0.34 X10*3/UL (ref 0.8–3)
LYMPHOCYTES NFR BLD AUTO: 4.4 %
MAGNESIUM SERPL-MCNC: 2.18 MG/DL (ref 1.6–2.4)
MCH RBC QN AUTO: 32.2 PG (ref 26–34)
MCHC RBC AUTO-ENTMCNC: 33.2 G/DL (ref 32–36)
MCV RBC AUTO: 97 FL (ref 80–100)
MONOCYTES # BLD AUTO: 0.77 X10*3/UL (ref 0.05–0.8)
MONOCYTES NFR BLD AUTO: 10 %
NEUTROPHILS # BLD AUTO: 6.54 X10*3/UL (ref 1.6–5.5)
NEUTROPHILS NFR BLD AUTO: 85 %
NRBC BLD-RTO: 0 /100 WBCS (ref 0–0)
PLATELET # BLD AUTO: 107 X10*3/UL (ref 150–450)
POTASSIUM SERPL-SCNC: 3.5 MMOL/L (ref 3.5–5.3)
RBC # BLD AUTO: 3.29 X10*6/UL (ref 4–5.2)
SODIUM SERPL-SCNC: 137 MMOL/L (ref 136–145)
WBC # BLD AUTO: 7.7 X10*3/UL (ref 4.4–11.3)

## 2023-12-15 PROCEDURE — 85025 COMPLETE CBC W/AUTO DIFF WBC: CPT | Performed by: NURSE PRACTITIONER

## 2023-12-15 PROCEDURE — 92526 ORAL FUNCTION THERAPY: CPT | Mod: GN | Performed by: SPEECH-LANGUAGE PATHOLOGIST

## 2023-12-15 PROCEDURE — 2500000004 HC RX 250 GENERAL PHARMACY W/ HCPCS (ALT 636 FOR OP/ED): Performed by: NURSE PRACTITIONER

## 2023-12-15 PROCEDURE — 94660 CPAP INITIATION&MGMT: CPT

## 2023-12-15 PROCEDURE — S4991 NICOTINE PATCH NONLEGEND: HCPCS | Performed by: INTERNAL MEDICINE

## 2023-12-15 PROCEDURE — 2500000001 HC RX 250 WO HCPCS SELF ADMINISTERED DRUGS (ALT 637 FOR MEDICARE OP): Performed by: INTERNAL MEDICINE

## 2023-12-15 PROCEDURE — 97530 THERAPEUTIC ACTIVITIES: CPT | Mod: GP,CQ

## 2023-12-15 PROCEDURE — 96372 THER/PROPH/DIAG INJ SC/IM: CPT | Performed by: NURSE PRACTITIONER

## 2023-12-15 PROCEDURE — 2500000002 HC RX 250 W HCPCS SELF ADMINISTERED DRUGS (ALT 637 FOR MEDICARE OP, ALT 636 FOR OP/ED): Performed by: INTERNAL MEDICINE

## 2023-12-15 PROCEDURE — 2500000001 HC RX 250 WO HCPCS SELF ADMINISTERED DRUGS (ALT 637 FOR MEDICARE OP): Performed by: NURSE PRACTITIONER

## 2023-12-15 PROCEDURE — 97110 THERAPEUTIC EXERCISES: CPT | Mod: GP,CQ

## 2023-12-15 PROCEDURE — 80048 BASIC METABOLIC PNL TOTAL CA: CPT | Performed by: NURSE PRACTITIONER

## 2023-12-15 PROCEDURE — 94640 AIRWAY INHALATION TREATMENT: CPT

## 2023-12-15 PROCEDURE — 82947 ASSAY GLUCOSE BLOOD QUANT: CPT

## 2023-12-15 PROCEDURE — 2500000002 HC RX 250 W HCPCS SELF ADMINISTERED DRUGS (ALT 637 FOR MEDICARE OP, ALT 636 FOR OP/ED): Performed by: NURSE PRACTITIONER

## 2023-12-15 PROCEDURE — 83735 ASSAY OF MAGNESIUM: CPT | Performed by: NURSE PRACTITIONER

## 2023-12-15 PROCEDURE — 1200000002 HC GENERAL ROOM WITH TELEMETRY DAILY

## 2023-12-15 PROCEDURE — 99233 SBSQ HOSP IP/OBS HIGH 50: CPT | Performed by: INTERNAL MEDICINE

## 2023-12-15 RX ORDER — BISACODYL 5 MG
5 TABLET, DELAYED RELEASE (ENTERIC COATED) ORAL DAILY
Status: DISCONTINUED | OUTPATIENT
Start: 2023-12-15 | End: 2023-12-17 | Stop reason: HOSPADM

## 2023-12-15 RX ORDER — HYDRALAZINE HYDROCHLORIDE 10 MG/1
10 TABLET, FILM COATED ORAL ONCE
Status: COMPLETED | OUTPATIENT
Start: 2023-12-15 | End: 2023-12-15

## 2023-12-15 RX ORDER — IBUPROFEN 200 MG
1 TABLET ORAL DAILY
Status: DISCONTINUED | OUTPATIENT
Start: 2023-12-15 | End: 2023-12-17 | Stop reason: HOSPADM

## 2023-12-15 RX ORDER — AMOXICILLIN 250 MG
2 CAPSULE ORAL 2 TIMES DAILY
Status: DISCONTINUED | OUTPATIENT
Start: 2023-12-15 | End: 2023-12-17 | Stop reason: HOSPADM

## 2023-12-15 RX ORDER — HYDRALAZINE HYDROCHLORIDE 20 MG/ML
5 INJECTION INTRAMUSCULAR; INTRAVENOUS EVERY 6 HOURS PRN
Status: DISCONTINUED | OUTPATIENT
Start: 2023-12-15 | End: 2023-12-17 | Stop reason: HOSPADM

## 2023-12-15 RX ORDER — METOPROLOL SUCCINATE 50 MG/1
100 TABLET, EXTENDED RELEASE ORAL DAILY
Status: DISCONTINUED | OUTPATIENT
Start: 2023-12-15 | End: 2023-12-17 | Stop reason: HOSPADM

## 2023-12-15 RX ADMIN — MONTELUKAST SODIUM 10 MG: 10 TABLET, FILM COATED ORAL at 08:50

## 2023-12-15 RX ADMIN — IPRATROPIUM BROMIDE AND ALBUTEROL SULFATE 3 ML: 2.5; .5 SOLUTION RESPIRATORY (INHALATION) at 12:56

## 2023-12-15 RX ADMIN — NICOTINE 1 PATCH: 21 PATCH, EXTENDED RELEASE TRANSDERMAL at 21:56

## 2023-12-15 RX ADMIN — FOLIC ACID 1 MG: 1 TABLET ORAL at 08:50

## 2023-12-15 RX ADMIN — PIPERACILLIN SODIUM AND TAZOBACTAM SODIUM 3.38 G: 3; .375 INJECTION, SOLUTION INTRAVENOUS at 13:00

## 2023-12-15 RX ADMIN — ASPIRIN 81 MG: 81 TABLET, CHEWABLE ORAL at 08:50

## 2023-12-15 RX ADMIN — METHYLPREDNISOLONE SODIUM SUCCINATE 40 MG: 40 INJECTION INTRAMUSCULAR; INTRAVENOUS at 08:50

## 2023-12-15 RX ADMIN — INSULIN LISPRO 1 UNITS: 100 INJECTION, SOLUTION INTRAVENOUS; SUBCUTANEOUS at 12:18

## 2023-12-15 RX ADMIN — HYDRALAZINE HYDROCHLORIDE 10 MG: 10 TABLET, FILM COATED ORAL at 05:40

## 2023-12-15 RX ADMIN — OLANZAPINE 2.5 MG: 2.5 TABLET ORAL at 21:56

## 2023-12-15 RX ADMIN — DOCUSATE SODIUM 50MG AND SENNOSIDES 8.6MG 2 TABLET: 8.6; 5 TABLET, FILM COATED ORAL at 21:56

## 2023-12-15 RX ADMIN — PIPERACILLIN SODIUM AND TAZOBACTAM SODIUM 3.38 G: 3; .375 INJECTION, SOLUTION INTRAVENOUS at 05:39

## 2023-12-15 RX ADMIN — DOCUSATE SODIUM 100 MG: 100 CAPSULE, LIQUID FILLED ORAL at 08:50

## 2023-12-15 RX ADMIN — DOCUSATE SODIUM 100 MG: 100 CAPSULE, LIQUID FILLED ORAL at 21:56

## 2023-12-15 RX ADMIN — BUPROPION HYDROCHLORIDE 300 MG: 150 TABLET, EXTENDED RELEASE ORAL at 10:00

## 2023-12-15 RX ADMIN — PANTOPRAZOLE SODIUM 40 MG: 40 TABLET, DELAYED RELEASE ORAL at 05:39

## 2023-12-15 RX ADMIN — IPRATROPIUM BROMIDE AND ALBUTEROL SULFATE 3 ML: 2.5; .5 SOLUTION RESPIRATORY (INHALATION) at 07:20

## 2023-12-15 RX ADMIN — METHYLPREDNISOLONE SODIUM SUCCINATE 40 MG: 40 INJECTION INTRAMUSCULAR; INTRAVENOUS at 21:55

## 2023-12-15 RX ADMIN — METOPROLOL SUCCINATE 100 MG: 50 TABLET, EXTENDED RELEASE ORAL at 08:50

## 2023-12-15 RX ADMIN — BISACODYL 5 MG: 5 TABLET ORAL at 21:56

## 2023-12-15 RX ADMIN — LISINOPRIL: 20 TABLET ORAL at 08:50

## 2023-12-15 RX ADMIN — PIPERACILLIN SODIUM AND TAZOBACTAM SODIUM 3.38 G: 3; .375 INJECTION, SOLUTION INTRAVENOUS at 21:55

## 2023-12-15 RX ADMIN — INSULIN LISPRO 1 UNITS: 100 INJECTION, SOLUTION INTRAVENOUS; SUBCUTANEOUS at 18:00

## 2023-12-15 RX ADMIN — IPRATROPIUM BROMIDE AND ALBUTEROL SULFATE 3 ML: 2.5; .5 SOLUTION RESPIRATORY (INHALATION) at 19:55

## 2023-12-15 RX ADMIN — ENOXAPARIN SODIUM 40 MG: 40 INJECTION SUBCUTANEOUS at 08:50

## 2023-12-15 RX ADMIN — INSULIN LISPRO 1 UNITS: 100 INJECTION, SOLUTION INTRAVENOUS; SUBCUTANEOUS at 08:49

## 2023-12-15 ASSESSMENT — COGNITIVE AND FUNCTIONAL STATUS - GENERAL
DRESSING REGULAR LOWER BODY CLOTHING: A LITTLE
DRESSING REGULAR LOWER BODY CLOTHING: A LITTLE
CLIMB 3 TO 5 STEPS WITH RAILING: A LOT
MOVING FROM LYING ON BACK TO SITTING ON SIDE OF FLAT BED WITH BEDRAILS: A LITTLE
TURNING FROM BACK TO SIDE WHILE IN FLAT BAD: A LITTLE
DRESSING REGULAR UPPER BODY CLOTHING: A LITTLE
EATING MEALS: A LITTLE
EATING MEALS: A LITTLE
TOILETING: A LITTLE
PERSONAL GROOMING: A LITTLE
WALKING IN HOSPITAL ROOM: A LITTLE
HELP NEEDED FOR BATHING: A LITTLE
STANDING UP FROM CHAIR USING ARMS: A LITTLE
MOBILITY SCORE: 17
HELP NEEDED FOR BATHING: A LITTLE
DRESSING REGULAR UPPER BODY CLOTHING: A LITTLE
CLIMB 3 TO 5 STEPS WITH RAILING: A LOT
WALKING IN HOSPITAL ROOM: A LITTLE
STANDING UP FROM CHAIR USING ARMS: A LITTLE
CLIMB 3 TO 5 STEPS WITH RAILING: A LOT
MOVING FROM LYING ON BACK TO SITTING ON SIDE OF FLAT BED WITH BEDRAILS: A LITTLE
TURNING FROM BACK TO SIDE WHILE IN FLAT BAD: A LITTLE
MOBILITY SCORE: 17
WALKING IN HOSPITAL ROOM: A LITTLE
MOVING TO AND FROM BED TO CHAIR: A LITTLE
DAILY ACTIVITIY SCORE: 18
PERSONAL GROOMING: A LITTLE
STANDING UP FROM CHAIR USING ARMS: A LITTLE
TOILETING: A LITTLE
DAILY ACTIVITIY SCORE: 18
MOVING TO AND FROM BED TO CHAIR: A LITTLE
MOVING TO AND FROM BED TO CHAIR: A LITTLE
TURNING FROM BACK TO SIDE WHILE IN FLAT BAD: A LITTLE
MOVING FROM LYING ON BACK TO SITTING ON SIDE OF FLAT BED WITH BEDRAILS: A LITTLE
MOBILITY SCORE: 17

## 2023-12-15 ASSESSMENT — PAIN SCALES - GENERAL
PAINLEVEL_OUTOF10: 4
PAINLEVEL_OUTOF10: 0 - NO PAIN
PAINLEVEL_OUTOF10: 6

## 2023-12-15 ASSESSMENT — PAIN DESCRIPTION - DESCRIPTORS: DESCRIPTORS: ACHING

## 2023-12-15 ASSESSMENT — PAIN - FUNCTIONAL ASSESSMENT
PAIN_FUNCTIONAL_ASSESSMENT: 0-10

## 2023-12-15 NOTE — PROGRESS NOTES
Physical Therapy    Physical Therapy Treatment    Patient Name: Gaby Blue  MRN: [id number]  Today's Date: 12/15/2023  Time Calculation  Start Time: 1000  Stop Time: 1023  Time Calculation (min): 23 min       Assessment/Plan   PT Assessment  PT Assessment Results: Decreased strength, Decreased endurance, Impaired balance, Decreased mobility, Decreased safety awareness  Rehab Prognosis: Good  End of Session Communication: Bedside nurse  Assessment Comment: Patient will benefit from additional PT to increase actiivty tolerance ahd improve her safety.  End of Session Patient Position: Up in chair, Alarm on  PT Plan  Inpatient/Swing Bed or Outpatient: Inpatient  PT Plan  Treatment/Interventions: Bed mobility, Transfer training, Gait training, Strengthening, Home exercise program  PT Plan: Skilled PT  PT Frequency: 3 times per week  PT Discharge Recommendations: Moderate intensity level of continued care  PT Recommended Transfer Status: Assist x1    General Visit Information:   PT  Visit  PT Received On: 12/15/23  General  Reason for Referral: Impaired mobility  Referred By: PT/OT 12/14/23 Luz  Past Medical History Relevant to Rehab: COPD, HTN, Neuropathy, Arthritis, Tinnitus, Appey, PTCA cubital tunnel Left, Vertigo  Prior to Session Communication: Bedside nurse  Patient Position Received: Bed, 3 rail up, Alarm off, not on at start of session  General Comment: Pt supine and initially resistant to OOB activity but agreeable after educating it may help with her back pain to move. (IV, Oxygen and purewick)    Subjective   Precautions:  Precautions  Medical Precautions: Fall precautions, Oxygen therapy device and L/min  Vital Signs:  Vital Signs  SpO2: 94 % (Does drop to 88 with movement to chair but rebounds quickly. Pt on 7L oxymizer and using PLB.)    Objective   Pain:  Pain Assessment  Pain Assessment: 0-10  Pain Score: 6  Pain Type: Chronic pain  Pain Location: Back  Pain Descriptors: Aching  Pain Interventions:   (Pt asking for nursing to bring medication.)  Cognition:  Cognition  Orientation Level: Oriented X4  Postural Control:     Extremity/Trunk Assessments:        Activity Tolerance:  Activity Tolerance  Endurance: Decreased tolerance for upright activites  Treatments:  Therapeutic Exercise  Therapeutic Exercise Performed:  (Supine AP, LAQ, glute set, HS and abd x 10ea BLE with occasional rests.)    Bed Mobility  Bed Mobility: Yes (Sup>sit with min A for rolling to side and hand over hand assist to reach for bed rail.)    Ambulation/Gait Training  Ambulation/Gait Training Performed: Yes (Pt limited in distance due to SOB with activity. ~10' with WW and CGA. Pt spo2 drops to 88 but does rebound with rest and PLB. Pt demos a slow, recip gait with decreased step height/length.)  Transfers  Transfer: Yes (Sit<>stand transfer from EOB with WW and CGA and verbal cues for hand placement to push up from surfaces.)    Outcome Measures:  [facility] Basic Mobility  Turning from your back to your side while in a flat bed without using bedrails: A little  Moving from lying on your back to sitting on the side of a flat bed without using bedrails: A little  Moving to and from bed to chair (including a wheelchair): A little  Standing up from a chair using your arms (e.g. wheelchair or bedside chair): A little  To walk in hospital room: A little  Climbing 3-5 steps with railing: A lot  Basic Mobility - Total Score: 17    Education Documentation  Mobility Training, taught by Karin Roland PTA at 12/15/2023  1:20 PM.  Learner: Patient  Readiness: Acceptance  Method: Explanation  Response: Verbalizes Understanding, Needs Reinforcement    Education Comments  No comments found.        EDUCATION:  Outpatient Education  Individual(s) Educated: Patient  Education Provided: Body Mechanics, Home Safety, POC, Home Exercise Program  Patient/Caregiver Demonstrated Understanding: yes  Plan of Care Discussed and Agreed Upon: yes  Patient Response to  Education: Patient/Caregiver Verbalized Understanding of Information  Education Comment: Pt educated in gait, transfers, ther ex and pursed lip breathing.    Encounter Problems       Encounter Problems (Active)       PT Problem       Bed mobility supine <> sit supervised  (Progressing)       Start:  12/14/23    Expected End:  12/28/23            Transfers sit <> stand with ww supervised.  (Progressing)       Start:  12/14/23    Expected End:  12/28/23            Patient to ambulate with ww > 30' with SpO2> 90%  (Progressing)       Start:  12/14/23    Expected End:  12/28/23               Pain - Adult

## 2023-12-15 NOTE — NURSING NOTE
0505: Dr. Amezquita notified of patient's B/P 190/85. Awaiting response. Patient asymptomatic at this time

## 2023-12-15 NOTE — PROGRESS NOTES
"Gaby Blue is a 73 y.o. female on day 6 of admission presenting with COPD exacerbation (CMS/HCC).      ASSESSMENT/PLAN   Principal Problem:  -COPD exacerbation (CMS/HCC) with acute hypercapnic respiratory failure-continuing her intensive nebulized treatments, steroids.  -Episode of acute delirium while in ICU-presently resolved.  Her Zyprexa, gabapentin, and narcotics were discontinued there  -Non-small cell CA of the lung on treatment with XRT and chemotherapy (NSCLC, stage IV, cT4, cN3, pM1a) of left upper lobe with secondary malignant neoplasm to right lung, combination radiation therapy as well as chemotherapy (carboplatin, gemcitabine)   -Thrombocytopenia due to chemotherapy-improving-platelet count 107K.  -EMA on CKD-creatinine improving, down to 1.04 this morning.  -Steroid induced hyperglycemia-on SSI.  -Uncontrolled hypertension-blood pressures elevated since last night-increasing her metoprolol, continuing lisinopril HCT.  Will add hydralazine if needed.  -Tobacco abuse-she will try the nicotine patch  -Chronic generalized pain-will restart her gabapentin cautiously-discussed with the patient  -Insomnia-will restart her Zyprexa but at low-dose-discussed with the patient  -Social concerns-patient refusing rehab, wants to go home on her own.  Family concerned about care at home, she does have a friend Sushil who tries to look in on her as well as a neighbor.    SUBJECTIVE/OBJECTIVE   12/15/23 (Dr. Amezquita assuming patient care after patient transferred out of the ICU)  -Thing is better than it was earlier this admission, still somewhat short of breath.  No nausea or vomiting but also has not had a bowel movement for >1-week-will give her some Dulcolax to see if that will help (she says she cannot drink the liquids).  -Quit smoking \"11 days ago\"-but when she wakes up she still wants a cigarette, has been smoking since the age of 14.  Offered her the nicotine gum but she cannot chew because of no dentures, would " "like to try the patch.  -Says she could not find her nebulizer at home, but has plenty of the meds and supplies for it.  Her neighbor stated that she found a gray bag underneath the sofa that is probably the nebulizer-she will check on that for us.  Otherwise we will need to order a nebulizer for her at home.  -Patient's friend Sushil is in the room with her, along with her neighbor (who is a paramedic and lives next-door, keeps a close eye on the patient).  -The Zyprexa she was on on admission was prescribed by palliative care, they started loading gradually increased it.  She is on the gabapentin due to generalized arthritis pain \"throughout my whole body\", along with Percocet.  She has a follow-up appointment with palliative care on January 2.  -She is afebrile, blood pressures have been elevated since leaving the ICU last night-will increase her metoprolol, she is on lisinopril HCT.  -Patient is mildly dyspneic, has an O2 concentrator on in place.  -Chest with scattered expiratory wheezes, has very poor air movement.  No rales.  -Cardiac exam with a regular rhythm, no obvious murmurs  -Extremities with trace edema.  -Chemistry panel with a blood sugar of 128.  Electrolytes with a sodium of 137, potassium 3.5, chloride 93, bicarb 38.  -BUN 35 with a creatinine continuing to improve, down to 1.04 this morning.  -Magnesium 2.18.  -CBC with a WBC of 7.7.  H/H improving up to 10.6/31.9.  Platelet count low at 107, but up from admission.    Chart review:  PCP is  Dr. Hawk Comer  Patient is a 73-year-old white female with a known history of non-small cell lung CA (NSCLC, stage IV, cT4, cN3, pM1a) of left upper lobe with secondary malignant neoplasm to right lung, on combination radiation therapy as well as chemotherapy (carboplatin, gemcitabine) , COPD, chronic hypoxemic respiratory failure on home O2, HTN, diverticulosis.    She presented to the ER on 12/9 due to several days of increasing shortness of breath.  EMS " found her to be hypoxic with an O2 sat in the 80s.  She was treated with Solu-Medrol, magnesium, and DuoNebs by EMS and placed on a nonrebreather.  In the ER she was tachycardic and hypertensive.  ABG showed a pH of 7.28, pCO2 of 74, pO2 of 68, and a bicarb of 34.8.  CBC with a WBC of 7.1, H/H was 6.8/22.3.  Platelet count 36 K.  Chemistry panel with unremarkable electrolytes other than a bicarb of 36, BUN 22 with a creatinine of 0.82.  LFTs normal.  Initial troponin was mildly elevated at 23, BNP was 453.  Lactate was 1.3.  Influenza and COVID screens negative.  CT angio of the chest showed no pulmonary emboli, she had diffuse edema suggestive of anasarca, emphysema, bilateral pleural effusions R >L, and bilateral upper lobe mass lesions with the right upper lobe lesion having increased in size.  Patient received 2 units of packed cells and was admitted to the ICU with acute hypercapnic respiratory failure.    Patient was treated with high flow O2 and BiPAP with steroids, she received IV furosemide for fluid overload and anasarca.  She was treated with initially vancomycin, Zosyn, and Levaquin.  Her respiratory status improved and she was initially transferred out of the ICU on 12/10, however on the morning of 12/11 she became very short of breath after possibly aspirating while eating breakfast and had to be transferred back to the ICU for recurrent hypercapnic respiratory failure with an ABG pH of 7.26, pCO2 of 81, pO2 of 232, and bicarb of 36.3 with concerns of possible aspiration.  CXR showed no acute infiltrate.  She was treated with BiPAP and high flow O2, the following morning patient had an acute delirium and remained hypoxic off BiPAP.  She was able to be transition to high flow O2 and underwent a speech swallow eval on 12/13 with mild oral phase dysphagia but was deemed okay for thin liquids.  By 12/13 her mentation had improved.  She did develop some mild EMA with a creatinine up to 1.34.  She was  transferred out of the unit last night.  Overnight her blood pressures have been elevated.    Past Medical History:  -Lung cancer initially diagnosed 2018-squamous cell CA  -Depression/anxiety  -GERD  -Hyperlipidemia  -Hypertension  -Irritable bowel syndrome  -History of migraines  -Mild CAD    Past Surgical History:  -Remote tonsillectomy 1963  -Appendectomy  -Cardiac catheterizations 5/16/2013, 11/8/2013-two-vessel nonobstructive CAD  -Remote hysterectomy 1980  -History of colon surgery  -Resection of Meckel's diverticulum  -Bladder suspension  -C-spine surgery  -Hand surgery 1998  -Right shoulder surgery 4/28/2009  -ORIF of open right index finger fracture 7/30/2013  -Bronchoscopy with biopsy 12/28/2018  -Excision of scalp lesion 5/21/2019  -Left carpal tunnel release 10/28/2022  -Placement of port for chemotherapy 2023    *These notes are being done using Dragon voice recognition technology and may include unintended errors with respect to translation of words, typographical errors or grammar errors which may not have been identified prior to finalization of the chart note.    CURRENT MEDICATIONS     Scheduled Medications:    Current Facility-Administered Medications:     albuterol 90 mcg/actuation inhaler 2 puff, 2 puff, inhalation, 4x daily PRN, AUGUSTA Desai    aspirin chewable tablet 81 mg, 81 mg, oral, Daily, PAMELA Desai-CNP, 81 mg at 12/11/23 0951    [Held by provider] buPROPion XL (Wellbutrin XL) 24 hr tablet 300 mg, 300 mg, oral, Daily, PAMELA Desai-CNP, 300 mg at 12/11/23 0951    dextrose 10 % in water (D10W) infusion, 0.3 g/kg/hr, intravenous, Once PRN, AUGUSTA Desai    dextrose 50 % injection 25 g, 25 g, intravenous, q15 min PRN, AUGUSTA Desai    docusate sodium (Colace) capsule 100 mg, 100 mg, oral, BID, PAMELA Desai-CNP, 100 mg at 12/14/23 2105    enoxaparin (Lovenox) syringe 40 mg, 40 mg, subcutaneous, q24h,  PAMELA Desai-CNP, 40 mg at 12/14/23 0817    folic acid (Folvite) tablet 1 mg, 1 mg, oral, Daily, AUGUSTA Desai, 1 mg at 12/14/23 0817    [Held by provider] gabapentin (Neurontin) capsule 400 mg, 400 mg, oral, TID, PAMELA Desai-CNP, 400 mg at 12/11/23 0952    glucagon (Glucagen) injection 1 mg, 1 mg, intramuscular, q15 min PRN, AUGUSTA Desai    insulin lispro (HumaLOG) injection 0-5 Units, 0-5 Units, subcutaneous, TID with meals, AUGUSTA Dseai, 1 Units at 12/14/23 1127    ipratropium-albuteroL (Duo-Neb) 0.5-2.5 mg/3 mL nebulizer solution 3 mL, 3 mL, nebulization, q4h PRN, PAMELA Desai-CNP, 3 mL at 12/11/23 1034    ipratropium-albuteroL (Duo-Neb) 0.5-2.5 mg/3 mL nebulizer solution 3 mL, 3 mL, nebulization, TID, AUGUSTA Desai, 3 mL at 12/15/23 0720    lisinopril 20 mg, hydroCHLOROthiazide 25 mg for Zestoretic/Prinizide, , oral, Daily, AUGUSTA Desai, Given at 12/14/23 0817    methylPREDNISolone sod succinate (PF) (SOLU-Medrol) 40 mg/mL injection 40 mg, 40 mg, intravenous, q12h, AUGUSTA Desai, 40 mg at 12/14/23 2105    metoprolol succinate XL (Toprol-XL) 24 hr tablet 50 mg, 50 mg, oral, Daily, AUGUSTA Desai, 50 mg at 12/14/23 0817    metoprolol tartrate (Lopressor) injection 5 mg, 5 mg, intravenous, q6h PRN, AUGUSTA Desai, 5 mg at 12/12/23 2236    montelukast (Singulair) tablet 10 mg, 10 mg, oral, Daily, AUGUSTA Desai, 10 mg at 12/11/23 0952    morphine injection 2 mg, 2 mg, intravenous, q4h PRN, AUGUSTA Desai, 2 mg at 12/12/23 1329    OLANZapine (ZyPREXA) tablet 2.5 mg, 2.5 mg, oral, Nightly, AUGUSTA Desai, 2.5 mg at 12/14/23 2320    [Held by provider] oxyCODONE-acetaminophen (Percocet) 5-325 mg per tablet 1 tablet, 1 tablet, oral, q4h PRN, AUGUSTA Desai    oxygen (O2) therapy, , inhalation,  "Continuous PRN - O2/gases, HASMUKH DesaiCNP, 50 L/min at 12/14/23 0753    oxygen (O2) therapy, , inhalation, Continuous PRN - O2/gases, HASMUKH DesaiCNP, 7 L/min at 12/15/23 0720    pantoprazole (ProtoNix) EC tablet 40 mg, 40 mg, oral, Daily, AUGUSTA Desai, 40 mg at 12/15/23 0539    piperacillin-tazobactam-dextrose (Zosyn) IV 3.375 g, 3.375 g, intravenous, q8h, AUGUSTA Desai, Last Rate: 12.5 mL/hr at 12/15/23 0539, 3.375 g at 12/15/23 0539     PRN Medications:  PRN medications   Medication    albuterol    dextrose 10 % in water (D10W)    dextrose    glucagon    ipratropium-albuteroL    metoprolol    morphine    [Held by provider] oxyCODONE-acetaminophen    oxygen    oxygen         IVs:        I&Os     Intake/Output last 3 Shifts:  I/O last 3 completed shifts:  In: 200 (3.2 mL/kg) [I.V.:50 (0.8 mL/kg); IV Piggyback:150]  Out: 700 (11.3 mL/kg) [Urine:700 (0.3 mL/kg/hr)]  Weight: 61.7 kg     VITAL SIGNS     Blood pressure (!) 186/94, pulse 60, temperature 35.6 °C (96.1 °F), temperature source Temporal, resp. rate 20, height 1.55 m (5' 1.02\"), weight 61.7 kg (136 lb), SpO2 92 %.     PHYSICAL EXAM   Physical exam:  -General appearance: Pleasant elderly white female, sitting up in bed alert and in NAD, she is somewhat dyspneic.  -Vital signs:  As above  -HEENT: Oxymizer in place, she is edentulous  -Neck: No JVD  -Chest: Very poor air movement, a few scattered expiratory wheezes.  No rales.  She has a port in the upper right chest wall.  -Cardiac: Heart sounds, sound regular.  -Abdomen: Soft, bowel sounds active.  It is nontender  -:   Pure wick in place  -Extremities: Only trace edema  -Skin: No rash  -Neurologic:   Patient is alert and oriented x3.   -Behavior/Emotional:  Appropriate, cooperative    LABS   Relevant Results:  Results from last 7 days   Lab Units 12/15/23  0818 12/15/23  0550 12/14/23  1646 12/14/23  1110 12/14/23  0755 12/14/23  0429 " 12/13/23  0858 12/13/23  0351   POCT GLUCOSE mg/dL 175*  --  135* 198*   < >  --    < >  --    GLUCOSE mg/dL  --  128*  --   --   --  148*  --  81    < > = values in this interval not displayed.        CBC:   Results from last 7 days   Lab Units 12/15/23  0550 12/14/23  0429 12/13/23  2038   WBC AUTO x10*3/uL 7.7 6.4 7.7   RBC AUTO x10*6/uL 3.29* 3.01* 2.94*   HEMOGLOBIN g/dL 10.6* 9.3* 9.2*   HEMATOCRIT % 31.9* 29.0* 28.4*   MCV fL 97 96 97   MCH pg 32.2 30.9 31.3   MCHC g/dL 33.2 32.1 32.4   RDW % 18.6* 19.6* 19.9*   PLATELETS AUTO x10*3/uL 107* 87* 90*     CMP:    Results from last 7 days   Lab Units 12/15/23  0550 12/14/23  0429 12/13/23  0351 12/12/23  0421 12/10/23  0720 12/09/23  2102   SODIUM mmol/L 137 138 140   < > 136 141   POTASSIUM mmol/L 3.5 3.8 4.2   < > 4.5 4.0   CHLORIDE mmol/L 93* 95* 97*   < > 96* 100   CO2 mmol/L 38* 36* 37*   < > 35* 36*   BUN mg/dL 35* 39* 36*   < > 23 22   CREATININE mg/dL 1.04 1.25* 1.34*   < > 0.95 0.84   GLUCOSE mg/dL 128* 148* 81   < > 109* 133*   PROTEIN TOTAL g/dL  --   --   --   --  5.9* 6.3*   CALCIUM mg/dL 8.6 8.5* 8.8   < > 8.8 8.8   BILIRUBIN TOTAL mg/dL  --   --   --   --  1.0 1.0   ALK PHOS U/L  --   --   --   --  49 63   AST U/L  --   --   --   --  18 24   ALT U/L  --   --   --   --  10 12    < > = values in this interval not displayed.     Magnesium:  Results from last 7 days   Lab Units 12/15/23  0550 12/14/23  0429 12/13/23  0351   MAGNESIUM mg/dL 2.18 1.97 1.70     Troponin:    Results from last 7 days   Lab Units 12/09/23  2102   TROPHS ng/L 23*     BNP:   Results from last 7 days   Lab Units 12/09/23  2102   BNP pg/mL 453*     Lipid Panel:         IMAGING     XR chest 1 view   Final Result   Emphysema, scarring, known upper lobe nodules/masses        CT three days ago suggested significant bronchitis, difficult to   evaluate on a portable chest radiograph        Overall no change when compared to radiographs from one day ago and   three days ago         MACRO:   None        Signed by: Lloyd Longo 12/12/2023 8:14 AM   Dictation workstation:   [id number]      XR chest 1 view   Final Result   1.  See above                  MACRO:   None        Signed by: Luís Schoenberger 12/11/2023 3:36 PM   Dictation workstation:   [id number]      CT angio chest for pulmonary embolism   Final Result   1. Limited exam due to patient motion.  There is no evidence of   central or segmental pulmonary embolus.  Subsegmental emboli cannot be   excluded.   2. Bilateral upper lobe mass lesions again noted.  RIGHT upper lobe   lesion is increased in size.  LEFT upper lobe lesion appears grossly   stable.  Mediastinal adenopathy also noted, increased compared with   prior exam.  Bilateral pleural effusions have developed, RIGHT greater   than LEFT.  These may be malignant.   3. Emphysematous changes are present.   4. Diffuse subcutaneous edema, suggestive of anasarca.   Signed by Stiven Ahumada II, MD      XR chest 1 view   Final Result   *Right infrahilar infiltrate.  Underlying chronic interstitial   fibrotic interstitial fibrotic changes are seen.   *Left upper lobe mass, new since the prior study.   *Soft tissue fullness of the right suprahilar area.  A mass in   this region is not ruled out.   *Bilateral pleural effusions.   *Cardiomegaly.   Signed by Gaston Gordon MD          I spent 70 minutes in the professional and overall care of this patient.    Adriane Amezquita MD

## 2023-12-15 NOTE — PROGRESS NOTES
Speech-Language Pathology    Inpatient Speech Language Pathology Treatment Note     Patient Name: Gaby Blue  MRN: [id number]  : 1950  Today's Date: 12/15/23  Time Calculation  Start Time: 1100  Stop Time: 1115  Time Calculation (min): 15 min       Current Problem:   1. COPD exacerbation (CMS/HCC)  Referral to [facility] Home [facility]      2. Pneumonia due to infectious organism, unspecified laterality, unspecified part of lung        3. Anemia, unspecified type        4. Thrombocytopenia (CMS/HCC)        5. Acute hypercapnic respiratory failure (CMS/HCC)          PLAN:  Skilled speech therapy for dysphagia treatment continues to be warranted to provide training and instruction regarding the use of compensatory swallow strategies, for pt/caregiver education in order to reduce risk of aspiration, dehydration and malnutrition. , to assess tolerance of diet   SLP TX Plan: Continue Plan of Care  SLP Frequency: 3x per week  Discussed POC: Patient  Discussed Risks/Benefits: Patient  Patient/Caregiver Agreeable: Yes       Recommendations:   Solid Diet Recommendations: Regular (IDDSI Level 7)  Liquid Diet Recommendations: Thin (IDDSI Level 0)  Compensatory strategies: small bites/sips, upright 90 degrees for intake   Medication administration:     Subjective:  Pt. Seen at bedside for skilled dysphagia treatment.   Prior to Session Communication: PCT/NA/CTA  Pain:  Pain Assessment  Pain Assessment: 0-10  Pain Score: 4  Pain Location: Back     Oxygen Status:   highflow nasal cannula     Goals:   1.  Patient will be able to tolerate a regular diet with thin liquids without overt signs symptoms aspiration or pulmonary compromise.  2.  Patient will be able to use take small single sips via cup or straw to reduce her risk of aspiration with thin liquids.  3.  Patient will be able to alternate between bites/sips, take small bites/sips and sit upright while eating to reduce her risk of aspiration.    SLP Assessment:  Pt  upright in chair requesting to get back in bed due to back pain from sitting in chair. Pt was informed she would need to wait a bit longer as lunch was coming and then staff would be able to help her transfer back to bed. She acknowledged. Pt reported she has been following the single sip recommendation and has noted a decrease in coughing during intake. She stated she still coughs throughout the day but never notices it directly after P.O. intake. Pt willing to demonstrate compliance with straw sips of thin liquids for clinician. Pt still somewhat SOB at baseline which appeared to be exacerbated during conversation but pt did not complain of this. Speech therapy to continue per POC.    Treatment Outcome:  SLP TX Intervention Outcome: Making Progress Towards Goals    Treatment Tolerance: Patient tolerated treatment well   Prognosis: Good   Barriers: Comorbidities   Medical Staff Made Aware: Yes     Education:  Pt. Given skilled instruction on diet recommendations, s/s of aspiration, and compensatory strategies.   Pt. gave verbal understanding

## 2023-12-15 NOTE — CARE PLAN
The patient's goals for the shift include      The clinical goals for the shift include patient to wear bipap for 6+ hours through out my shift      Problem: Pain - Adult  Goal: Verbalizes/displays adequate comfort level or baseline comfort level  Outcome: Progressing     Problem: Safety - Adult  Goal: Free from fall injury  Outcome: Progressing     Problem: Discharge Planning  Goal: Discharge to home or other facility with appropriate resources  Outcome: Progressing     Problem: Chronic Conditions and Co-morbidities  Goal: Patient's chronic conditions and co-morbidity symptoms are monitored and maintained or improved  Outcome: Progressing     Problem: Skin  Goal: Decreased wound size/increased tissue granulation at next dressing change  Outcome: Progressing  Goal: Participates in plan/prevention/treatment measures  Outcome: Progressing  Goal: Prevent/manage excess moisture  Outcome: Progressing  Goal: Prevent/minimize sheer/friction injuries  Outcome: Progressing  Goal: Promote/optimize nutrition  Outcome: Progressing  Goal: Promote skin healing  Outcome: Progressing     Problem: Fall/Injury  Goal: Not fall by end of shift  Outcome: Progressing  Goal: Be free from injury by end of the shift  Outcome: Progressing  Goal: Verbalize understanding of personal risk factors for fall in the hospital  Outcome: Progressing  Goal: Verbalize understanding of risk factor reduction measures to prevent injury from fall in the home  Outcome: Progressing  Goal: Use assistive devices by end of the shift  Outcome: Progressing  Goal: Pace activities to prevent fatigue by end of the shift  Outcome: Progressing     Problem: Nutrition  Goal: Less than 5 days NPO/clear liquids  Outcome: Progressing  Goal: Oral intake greater than 50%  Outcome: Progressing  Goal: Oral intake greater 75%  Outcome: Progressing  Goal: Consume prescribed supplement  Outcome: Progressing  Goal: Adequate PO fluid intake  Outcome: Progressing  Goal: Nutrition  support goals are met within 48 hrs  Outcome: Progressing  Goal: Nutrition support is meeting 75% of nutrient needs  Outcome: Progressing  Goal: Tube feed tolerance  Outcome: Progressing  Goal: BG  mg/dL  Outcome: Progressing  Goal: Lab values WNL  Outcome: Progressing  Goal: Electrolytes WNL  Outcome: Progressing  Goal: Promote healing  Outcome: Progressing  Goal: Maintain stable weight  Outcome: Progressing  Goal: Reduce weight from edema/fluid  Outcome: Progressing  Goal: Gradual weight gain  Outcome: Progressing  Goal: Improve ostomy output  Outcome: Progressing     Problem: Dressings Lower Extremities  Goal: STG - Patient to complete lower body dressing with modified indep  Outcome: Progressing

## 2023-12-16 ENCOUNTER — PATIENT OUTREACH (OUTPATIENT)
Dept: HOME HEALTH SERVICES | Age: 73
End: 2023-12-16
Payer: MEDICARE

## 2023-12-16 DIAGNOSIS — J44.1 COPD EXACERBATION (MULTI): Primary | ICD-10-CM

## 2023-12-16 LAB
ALBUMIN SERPL BCP-MCNC: 3.3 G/DL (ref 3.4–5)
ALP SERPL-CCNC: 51 U/L (ref 33–136)
ALT SERPL W P-5'-P-CCNC: 9 U/L (ref 7–45)
ANION GAP SERPL CALC-SCNC: 9 MMOL/L (ref 10–20)
AST SERPL W P-5'-P-CCNC: 15 U/L (ref 9–39)
BASOPHILS # BLD AUTO: 0 X10*3/UL (ref 0–0.1)
BASOPHILS NFR BLD AUTO: 0 %
BILIRUB SERPL-MCNC: 0.9 MG/DL (ref 0–1.2)
BUN SERPL-MCNC: 31 MG/DL (ref 6–23)
CALCIUM SERPL-MCNC: 8.5 MG/DL (ref 8.6–10.3)
CHLORIDE SERPL-SCNC: 93 MMOL/L (ref 98–107)
CO2 SERPL-SCNC: 37 MMOL/L (ref 21–32)
CREAT SERPL-MCNC: 1.06 MG/DL (ref 0.5–1.05)
EOSINOPHIL # BLD AUTO: 0 X10*3/UL (ref 0–0.4)
EOSINOPHIL NFR BLD AUTO: 0 %
ERYTHROCYTE [DISTWIDTH] IN BLOOD BY AUTOMATED COUNT: 17.8 % (ref 11.5–14.5)
GFR SERPL CREATININE-BSD FRML MDRD: 56 ML/MIN/1.73M*2
GLUCOSE BLD MANUAL STRIP-MCNC: 102 MG/DL (ref 74–99)
GLUCOSE BLD MANUAL STRIP-MCNC: 113 MG/DL (ref 74–99)
GLUCOSE BLD MANUAL STRIP-MCNC: 148 MG/DL (ref 74–99)
GLUCOSE BLD MANUAL STRIP-MCNC: 190 MG/DL (ref 74–99)
GLUCOSE SERPL-MCNC: 166 MG/DL (ref 74–99)
HCT VFR BLD AUTO: 31.5 % (ref 36–46)
HGB BLD-MCNC: 10.2 G/DL (ref 12–16)
IMM GRANULOCYTES # BLD AUTO: 0.04 X10*3/UL (ref 0–0.5)
IMM GRANULOCYTES NFR BLD AUTO: 0.6 % (ref 0–0.9)
LYMPHOCYTES # BLD AUTO: 0.29 X10*3/UL (ref 0.8–3)
LYMPHOCYTES NFR BLD AUTO: 4.2 %
MAGNESIUM SERPL-MCNC: 1.91 MG/DL (ref 1.6–2.4)
MCH RBC QN AUTO: 31.8 PG (ref 26–34)
MCHC RBC AUTO-ENTMCNC: 32.4 G/DL (ref 32–36)
MCV RBC AUTO: 98 FL (ref 80–100)
MONOCYTES # BLD AUTO: 0.49 X10*3/UL (ref 0.05–0.8)
MONOCYTES NFR BLD AUTO: 7.1 %
NEUTROPHILS # BLD AUTO: 6.11 X10*3/UL (ref 1.6–5.5)
NEUTROPHILS NFR BLD AUTO: 88.1 %
NRBC BLD-RTO: 0 /100 WBCS (ref 0–0)
PLATELET # BLD AUTO: 92 X10*3/UL (ref 150–450)
POTASSIUM SERPL-SCNC: 3.5 MMOL/L (ref 3.5–5.3)
PROT SERPL-MCNC: 5.7 G/DL (ref 6.4–8.2)
RBC # BLD AUTO: 3.21 X10*6/UL (ref 4–5.2)
SODIUM SERPL-SCNC: 135 MMOL/L (ref 136–145)
WBC # BLD AUTO: 6.9 X10*3/UL (ref 4.4–11.3)

## 2023-12-16 PROCEDURE — 99233 SBSQ HOSP IP/OBS HIGH 50: CPT | Performed by: INTERNAL MEDICINE

## 2023-12-16 PROCEDURE — 96372 THER/PROPH/DIAG INJ SC/IM: CPT | Performed by: NURSE PRACTITIONER

## 2023-12-16 PROCEDURE — 80053 COMPREHEN METABOLIC PANEL: CPT | Performed by: INTERNAL MEDICINE

## 2023-12-16 PROCEDURE — 94640 AIRWAY INHALATION TREATMENT: CPT

## 2023-12-16 PROCEDURE — 2500000005 HC RX 250 GENERAL PHARMACY W/O HCPCS: Performed by: NURSE PRACTITIONER

## 2023-12-16 PROCEDURE — 83735 ASSAY OF MAGNESIUM: CPT | Performed by: NURSE PRACTITIONER

## 2023-12-16 PROCEDURE — 2500000002 HC RX 250 W HCPCS SELF ADMINISTERED DRUGS (ALT 637 FOR MEDICARE OP, ALT 636 FOR OP/ED): Performed by: NURSE PRACTITIONER

## 2023-12-16 PROCEDURE — 94660 CPAP INITIATION&MGMT: CPT

## 2023-12-16 PROCEDURE — 85025 COMPLETE CBC W/AUTO DIFF WBC: CPT | Performed by: NURSE PRACTITIONER

## 2023-12-16 PROCEDURE — 2500000001 HC RX 250 WO HCPCS SELF ADMINISTERED DRUGS (ALT 637 FOR MEDICARE OP): Performed by: INTERNAL MEDICINE

## 2023-12-16 PROCEDURE — 1200000002 HC GENERAL ROOM WITH TELEMETRY DAILY

## 2023-12-16 PROCEDURE — 2500000001 HC RX 250 WO HCPCS SELF ADMINISTERED DRUGS (ALT 637 FOR MEDICARE OP): Performed by: NURSE PRACTITIONER

## 2023-12-16 PROCEDURE — S4991 NICOTINE PATCH NONLEGEND: HCPCS | Performed by: INTERNAL MEDICINE

## 2023-12-16 PROCEDURE — 2500000004 HC RX 250 GENERAL PHARMACY W/ HCPCS (ALT 636 FOR OP/ED): Performed by: NURSE PRACTITIONER

## 2023-12-16 PROCEDURE — 82947 ASSAY GLUCOSE BLOOD QUANT: CPT

## 2023-12-16 PROCEDURE — 2500000004 HC RX 250 GENERAL PHARMACY W/ HCPCS (ALT 636 FOR OP/ED): Performed by: INTERNAL MEDICINE

## 2023-12-16 PROCEDURE — 2500000002 HC RX 250 W HCPCS SELF ADMINISTERED DRUGS (ALT 637 FOR MEDICARE OP, ALT 636 FOR OP/ED): Performed by: INTERNAL MEDICINE

## 2023-12-16 RX ORDER — PREDNISONE 20 MG/1
30 TABLET ORAL DAILY
Status: DISCONTINUED | OUTPATIENT
Start: 2023-12-16 | End: 2023-12-17 | Stop reason: HOSPADM

## 2023-12-16 RX ORDER — OXYCODONE AND ACETAMINOPHEN 5; 325 MG/1; MG/1
1 TABLET ORAL EVERY 6 HOURS PRN
Status: DISCONTINUED | OUTPATIENT
Start: 2023-12-16 | End: 2023-12-17 | Stop reason: HOSPADM

## 2023-12-16 RX ORDER — OLANZAPINE 5 MG/1
5 TABLET ORAL NIGHTLY
Status: DISCONTINUED | OUTPATIENT
Start: 2023-12-16 | End: 2023-12-17 | Stop reason: HOSPADM

## 2023-12-16 RX ORDER — LISINOPRIL 20 MG/1
20 TABLET ORAL
Status: DISCONTINUED | OUTPATIENT
Start: 2023-12-16 | End: 2023-12-17 | Stop reason: HOSPADM

## 2023-12-16 RX ADMIN — NICOTINE 1 PATCH: 21 PATCH, EXTENDED RELEASE TRANSDERMAL at 08:24

## 2023-12-16 RX ADMIN — PANTOPRAZOLE SODIUM 40 MG: 40 TABLET, DELAYED RELEASE ORAL at 05:19

## 2023-12-16 RX ADMIN — DOCUSATE SODIUM 50MG AND SENNOSIDES 8.6MG 2 TABLET: 8.6; 5 TABLET, FILM COATED ORAL at 08:17

## 2023-12-16 RX ADMIN — METOPROLOL SUCCINATE 100 MG: 50 TABLET, EXTENDED RELEASE ORAL at 08:17

## 2023-12-16 RX ADMIN — PIPERACILLIN SODIUM AND TAZOBACTAM SODIUM 3.38 G: 3; .375 INJECTION, SOLUTION INTRAVENOUS at 05:19

## 2023-12-16 RX ADMIN — HYDRALAZINE HYDROCHLORIDE 5 MG: 20 INJECTION INTRAMUSCULAR; INTRAVENOUS at 19:59

## 2023-12-16 RX ADMIN — BUPROPION HYDROCHLORIDE 300 MG: 150 TABLET, EXTENDED RELEASE ORAL at 08:17

## 2023-12-16 RX ADMIN — ASPIRIN 81 MG: 81 TABLET, CHEWABLE ORAL at 08:17

## 2023-12-16 RX ADMIN — Medication 7 L/MIN: at 13:34

## 2023-12-16 RX ADMIN — DOCUSATE SODIUM 100 MG: 100 CAPSULE, LIQUID FILLED ORAL at 08:17

## 2023-12-16 RX ADMIN — LISINOPRIL: 20 TABLET ORAL at 08:17

## 2023-12-16 RX ADMIN — LISINOPRIL 20 MG: 20 TABLET ORAL at 16:23

## 2023-12-16 RX ADMIN — INSULIN LISPRO 1 UNITS: 100 INJECTION, SOLUTION INTRAVENOUS; SUBCUTANEOUS at 13:13

## 2023-12-16 RX ADMIN — IPRATROPIUM BROMIDE AND ALBUTEROL SULFATE 3 ML: 2.5; .5 SOLUTION RESPIRATORY (INHALATION) at 06:53

## 2023-12-16 RX ADMIN — ENOXAPARIN SODIUM 40 MG: 40 INJECTION SUBCUTANEOUS at 08:17

## 2023-12-16 RX ADMIN — BISACODYL 5 MG: 5 TABLET ORAL at 08:17

## 2023-12-16 RX ADMIN — OLANZAPINE 5 MG: 2.5 TABLET, FILM COATED ORAL at 20:01

## 2023-12-16 RX ADMIN — IPRATROPIUM BROMIDE AND ALBUTEROL SULFATE 3 ML: 2.5; .5 SOLUTION RESPIRATORY (INHALATION) at 20:42

## 2023-12-16 RX ADMIN — FOLIC ACID 1 MG: 1 TABLET ORAL at 08:17

## 2023-12-16 RX ADMIN — PIPERACILLIN SODIUM AND TAZOBACTAM SODIUM 3.38 G: 3; .375 INJECTION, SOLUTION INTRAVENOUS at 14:57

## 2023-12-16 RX ADMIN — METHYLPREDNISOLONE SODIUM SUCCINATE 40 MG: 40 INJECTION INTRAMUSCULAR; INTRAVENOUS at 08:14

## 2023-12-16 RX ADMIN — PREDNISONE 30 MG: 20 TABLET ORAL at 16:23

## 2023-12-16 RX ADMIN — IPRATROPIUM BROMIDE AND ALBUTEROL SULFATE 3 ML: 2.5; .5 SOLUTION RESPIRATORY (INHALATION) at 13:34

## 2023-12-16 RX ADMIN — MONTELUKAST SODIUM 10 MG: 10 TABLET, FILM COATED ORAL at 08:17

## 2023-12-16 ASSESSMENT — COGNITIVE AND FUNCTIONAL STATUS - GENERAL
TURNING FROM BACK TO SIDE WHILE IN FLAT BAD: A LITTLE
TOILETING: A LITTLE
DAILY ACTIVITIY SCORE: 20
MOBILITY SCORE: 19
HELP NEEDED FOR BATHING: A LITTLE
DRESSING REGULAR UPPER BODY CLOTHING: A LITTLE
CLIMB 3 TO 5 STEPS WITH RAILING: A LITTLE
MOVING TO AND FROM BED TO CHAIR: A LITTLE
WALKING IN HOSPITAL ROOM: A LITTLE
DRESSING REGULAR LOWER BODY CLOTHING: A LITTLE
STANDING UP FROM CHAIR USING ARMS: A LITTLE

## 2023-12-16 ASSESSMENT — PAIN SCALES - GENERAL
PAINLEVEL_OUTOF10: 0 - NO PAIN
PAINLEVEL_OUTOF10: 0 - NO PAIN

## 2023-12-16 ASSESSMENT — PAIN - FUNCTIONAL ASSESSMENT: PAIN_FUNCTIONAL_ASSESSMENT: 0-10

## 2023-12-16 NOTE — CARE PLAN
The patient's goals for the shift include Labs WNL    The clinical goals for the shift include patient will have no respiratory distress through out shift    Over the shift, the patient did not make progress toward the following goals. Recommendations to address these continue with current plan of care.

## 2023-12-16 NOTE — CARE PLAN
The patient's goals for the shift include Labs WNL    The clinical goals for the shift include Labs WNL      Problem: Pain - Adult  Goal: Verbalizes/displays adequate comfort level or baseline comfort level  12/16/2023 0452 by Daysi Nicholas RN  Outcome: Progressing  12/15/2023 1819 by Daysi Nicholas RN  Outcome: Progressing     Problem: Safety - Adult  Goal: Free from fall injury  12/16/2023 0452 by Daysi Nicholas RN  Outcome: Progressing  12/15/2023 1819 by Daysi Nicholas RN  Outcome: Progressing     Problem: Discharge Planning  Goal: Discharge to home or other facility with appropriate resources  12/16/2023 0452 by Daysi Nicholas RN  Outcome: Progressing  12/15/2023 1819 by Daysi Nicholas RN  Outcome: Progressing     Problem: Chronic Conditions and Co-morbidities  Goal: Patient's chronic conditions and co-morbidity symptoms are monitored and maintained or improved  12/16/2023 0452 by Daysi Nicholas RN  Outcome: Progressing  12/15/2023 1819 by Daysi Nicholas RN  Outcome: Progressing     Problem: Skin  Goal: Decreased wound size/increased tissue granulation at next dressing change  12/16/2023 0452 by Daysi Nicholas RN  Outcome: Progressing  12/15/2023 1839 by Daysi Nicholas RN  Flowsheets (Taken 12/15/2023 1839)  Decreased wound size/increased tissue granulation at next dressing change:   Utilize specialty bed per algorithm   Promote sleep for wound healing   Protective dressings over bony prominences  12/15/2023 1819 by Daysi Nicholas RN  Outcome: Progressing  Goal: Participates in plan/prevention/treatment measures  12/16/2023 0452 by Daysi Nicholas RN  Outcome: Progressing  12/15/2023 1839 by Daysi Nicholas RN  Flowsheets (Taken 12/15/2023 1839)  Participates in plan/prevention/treatment measures:   Increase activity/out of bed for meals   Discuss with provider PT/OT consult   Elevate heels  12/15/2023 1819 by Daysi Nicholas RN  Outcome: Progressing  Goal:  Prevent/manage excess moisture  12/16/2023 0452 by Daysi Nicholas RN  Outcome: Progressing  12/15/2023 1839 by Daysi Nicholas RN  Flowsheets (Taken 12/15/2023 1839)  Prevent/manage excess moisture:   Use wicking fabric (obtain order)   Moisturize dry skin   Cleanse incontinence/protect with barrier cream   Monitor for/manage infection if present   Follow provider orders for dressing changes  12/15/2023 1819 by Daysi Nicholas RN  Outcome: Progressing  Goal: Prevent/minimize sheer/friction injuries  12/16/2023 0452 by Daysi Nicholas RN  Outcome: Progressing  12/15/2023 1839 by Daysi Nicholas RN  Flowsheets (Taken 12/15/2023 1839)  Prevent/minimize sheer/friction injuries:   Utilize specialty bed per algorithm   Use pull sheet   Turn/reposition every 2 hours/use positioning/transfer devices   Increase activity/out of bed for meals   HOB 30 degrees or less   Complete micro-shifts as needed if patient unable. Adjust patient position to relieve pressure points, not a full turn  12/15/2023 1819 by Daysi Nicholas RN  Outcome: Progressing  Goal: Promote/optimize nutrition  12/16/2023 0452 by Daysi Nicholas RN  Outcome: Progressing  12/15/2023 1839 by Daysi Nicholas RN  Flowsheets (Taken 12/15/2023 1839)  Promote/optimize nutrition:   Offer water/supplements/favorite foods   Discuss with provider if NPO > 2 days   Assist with feeding   Reassess MST if dietician not consulted   Monitor/record intake including meals   Consume > 50% meals/supplements  12/15/2023 1819 by Daysi Nicholas RN  Outcome: Progressing  Goal: Promote skin healing  12/16/2023 0452 by Daysi Nicholas RN  Outcome: Progressing  12/15/2023 1839 by Daysi Nicholas RN  Flowsheets (Taken 12/15/2023 1839)  Promote skin healing:   Turn/reposition every 2 hours/use positioning/transfer devices   Rotate device position/do not position patient on device   Ensure correct size (line/device) and apply per   instructions   Assess skin/pad under line(s)/device(s)   Protective dressings over bony prominences  12/15/2023 1819 by Daysi Nicholas RN  Outcome: Progressing     Problem: Fall/Injury  Goal: Not fall by end of shift  12/16/2023 0452 by Daysi Nicholas RN  Outcome: Progressing  12/15/2023 1819 by Daysi Nicholas RN  Outcome: Progressing  Goal: Be free from injury by end of the shift  12/16/2023 0452 by Daysi Nicholas RN  Outcome: Progressing  12/15/2023 1819 by Daysi Nicholas RN  Outcome: Progressing  Goal: Verbalize understanding of personal risk factors for fall in the hospital  12/16/2023 0452 by Daysi Nicholas RN  Outcome: Progressing  12/15/2023 1819 by Daysi Nicholas RN  Outcome: Progressing  Goal: Verbalize understanding of risk factor reduction measures to prevent injury from fall in the home  12/16/2023 0452 by Daysi Nicholas RN  Outcome: Progressing  12/15/2023 1819 by Daysi Nicholas RN  Outcome: Progressing  Goal: Use assistive devices by end of the shift  12/16/2023 0452 by Daysi Nicholas RN  Outcome: Progressing  12/15/2023 1819 by Daysi Nicholas RN  Outcome: Progressing  Goal: Pace activities to prevent fatigue by end of the shift  12/16/2023 0452 by Daysi Nicholas RN  Outcome: Progressing  12/15/2023 1819 by Daysi Nicholas RN  Outcome: Progressing     Problem: Nutrition  Goal: Less than 5 days NPO/clear liquids  12/16/2023 0452 by Daysi Nicholas RN  Outcome: Progressing  12/15/2023 1819 by Daysi Nicholas RN  Outcome: Progressing  Goal: Oral intake greater than 50%  12/16/2023 0452 by Daysi Nicholas RN  Outcome: Progressing  12/15/2023 1819 by Daysi Nicholas RN  Outcome: Progressing  Goal: Oral intake greater 75%  12/16/2023 0452 by Daysi Nicholas RN  Outcome: Progressing  12/15/2023 1819 by Daysi Nicholas RN  Outcome: Progressing  Goal: Consume prescribed supplement  12/16/2023 0452 by Daysi Nicholas, RN  Outcome:  Progressing  12/15/2023 1819 by Daysi Nicholas RN  Outcome: Progressing  Goal: Adequate PO fluid intake  12/16/2023 0452 by Daysi Nicholas RN  Outcome: Progressing  12/15/2023 1819 by Daysi Nicholas RN  Outcome: Progressing  Goal: Nutrition support goals are met within 48 hrs  12/16/2023 0452 by Daysi Nicholas RN  Outcome: Progressing  12/15/2023 1819 by Daysi Nicholas RN  Outcome: Progressing  Goal: Nutrition support is meeting 75% of nutrient needs  12/16/2023 0452 by Daysi Nicholas RN  Outcome: Progressing  12/15/2023 1819 by Daysi Nicholas RN  Outcome: Progressing  Goal: Tube feed tolerance  12/16/2023 0452 by Daysi Nicholas RN  Outcome: Progressing  12/15/2023 1819 by Daysi Nicholas RN  Outcome: Progressing  Goal: BG  mg/dL  12/16/2023 0452 by Daysi Nicholas RN  Outcome: Progressing  12/15/2023 1819 by Daysi Nicholas RN  Outcome: Progressing  Goal: Lab values WNL  12/16/2023 0452 by Daysi Nicholas RN  Outcome: Progressing  12/15/2023 1819 by Daysi Nicholas RN  Outcome: Progressing  Goal: Electrolytes WNL  12/16/2023 0452 by Daysi Nicholas RN  Outcome: Progressing  12/15/2023 1819 by Daysi Nicholas RN  Outcome: Progressing  Goal: Promote healing  12/16/2023 0452 by Daysi Nicholas RN  Outcome: Progressing  12/15/2023 1819 by Daysi Nicholas RN  Outcome: Progressing  Goal: Maintain stable weight  12/16/2023 0452 by Daysi Nicholas RN  Outcome: Progressing  12/15/2023 1819 by Daysi Nciholas RN  Outcome: Progressing  Goal: Reduce weight from edema/fluid  12/16/2023 0452 by Daysi Nicholas RN  Outcome: Progressing  12/15/2023 1819 by Daysi Nicholas RN  Outcome: Progressing  Goal: Gradual weight gain  12/16/2023 0452 by Daysi Nicholas, NOREEN  Outcome: Progressing  12/15/2023 1819 by Daysi Diedrick, RN  Outcome: Progressing  Goal: Improve ostomy output  12/16/2023 0452 by Daysi Nicholas, RN  Outcome: Progressing  12/15/2023  1819 by Daysi Nicholas, RN  Outcome: Progressing     Problem: Dressings Lower Extremities  Goal: STG - Patient to complete lower body dressing with modified indep  12/16/2023 0452 by Daysi Nicholas RN  Outcome: Progressing  12/15/2023 1819 by Daysi Nicholas RN  Outcome: Progressing

## 2023-12-16 NOTE — PROGRESS NOTES
Gaby Blue is a 73 y.o. female on day 7 of admission presenting with COPD exacerbation (CMS/HCC).      ASSESSMENT/PLAN   Principal Problem:  -COPD exacerbation (CMS/HCC) with acute hypercapnic respiratory failure-continuing her intensive nebulized treatments, steroids.  -Episode of acute delirium while in ICU-presently resolved.  Her Zyprexa, gabapentin, and narcotics were discontinued there, and gradually reintroducing them at lower doses.  This was discussed with the patient and Sushil.  -Non-small cell CA of the lung on treatment with XRT and chemotherapy (NSCLC, stage IV, cT4, cN3, pM1a) of left upper lobe with secondary malignant neoplasm to right lung, combination radiation therapy as well as chemotherapy (carboplatin, gemcitabine)-her oncologist is Dr. Colon  -Thrombocytopenia due to chemotherapy-platelet count 92 K this morning  -EMA on CKD-creatinine stable at 1.06 this morning  -Steroid induced hyperglycemia-on SSI, blood sugars controlled.  -Uncontrolled hypertension-blood pressures improved but still elevated, adding lisinopril 20 mg in the evening to her morning lisinopril HCT.  -Tobacco abuse-on a nicotine patch  -Chronic generalized pain-gabapentin restarted cautiously yesterday, will reenter cautious Percocet.  -Insomnia-will increase her Zyprexa to 5 mg HS  -Social concerns-patient refusing rehab, wants to go home on her own.  Family concerned about care at home, she does have a friend Sushil who tries to look in on her as well as a neighbor.  Patient is interested in [facility].    SUBJECTIVE/OBJECTIVE   12/16/23   -Breathing continues to get better, still occasional cough but not much sputum.  Did not sleep real well last night, but does not like the hospital bed because she can adjust things easily when she is having pain.  Pain control is fair, she has not been using the IV morphine.  -I discussed with her that I am gradually adding back in her medications.  Will restart her Percocet at every 6 hour  "PRN, increase her olanzapine to 5 mg at bedtime.  -Also discussed with her discontinuing the antibiotics as she has finished a full course.  -Will change her steroids from IV to oral.  -Patient has questions about HHC-she is worried about going home and not having somebody to look in on her-Sushil (who normally checks on her) will be gone for 10 days over the holidays.  However she still adamantly refuses consideration of a SNF, even short-term.  -Sushil is in the room with her.  -She is afebrile, blood pressures are still elevated but improving.  Will increase her lisinopril, she does have hydralazine if needed.  -Satting 97% on high flow nasal cannula at 50%  -Chest with decreased breath sounds, somewhat improving air movement but still poor.  No rales, no wheezes.  -Cardiac exam is a regular rhythm  -Blood sugars reasonably controlled-113 this morning, highest was 195 before dinner yesterday.  -Chemistry panel with a sodium of 135, potassium 3.5, chloride 93, bicarb 37.  -BUN and creatinine stable at 31 and 1.06.  -LFTs normal other than a low total protein and albumin.  -Magnesium 1.91.  -CBC with a WBC of 6.9.  H/H stable at 10.2/31.5.  Platelet count remains low but stable at 92 K.  -Case discussed with TCC.    12/15/2023 (Dr. Amezquita assuming patient care after patient transferred out of the ICU)  -Breathing is better than it was earlier this admission, still somewhat short of breath.  No nausea or vomiting but also has not had a bowel movement for >1-week-will give her some Dulcolax to see if that will help (she says she cannot drink the liquids).  -Quit smoking \"11 days ago\"-but when she wakes up she still wants a cigarette, has been smoking since the age of 14.  Offered her the nicotine gum but she cannot chew because of no dentures, would like to try the patch.  -Says she could not find her nebulizer at home, but has plenty of the meds and supplies for it.  Her neighbor stated that she found a gray bag " "underneath the sofa that is probably the nebulizer-she will check on that for us.  Otherwise we will need to order a nebulizer for her at home.  -Patient's friend Sushil is in the room with her, along with her neighbor (who is a paramedic and lives next-door, keeps a close eye on the patient).  -The Zyprexa she was on on admission was prescribed by palliative care, they started loading gradually increased it.  She is on the gabapentin due to generalized arthritis pain \"throughout my whole body\", along with Percocet.  She has a follow-up appointment with palliative care on January 2.  -She is afebrile, blood pressures have been elevated since leaving the ICU last night-will increase her metoprolol, she is on lisinopril HCT.  -Patient is mildly dyspneic, has an O2 concentrator on in place.  -Chest with scattered expiratory wheezes, has very poor air movement.  No rales.  -Cardiac exam with a regular rhythm, no obvious murmurs  -Extremities with trace edema.  -Chemistry panel with a blood sugar of 128.  Electrolytes with a sodium of 137, potassium 3.5, chloride 93, bicarb 38.  -BUN 35 with a creatinine continuing to improve, down to 1.04 this morning.  -Magnesium 2.18.  -CBC with a WBC of 7.7.  H/H improving up to 10.6/31.9.  Platelet count low at 107, but up from admission.    Chart review:  PCP is  Dr. Hawk Comer  Patient is a 73-year-old white female with a known history of non-small cell lung CA (NSCLC, stage IV, cT4, cN3, pM1a) of left upper lobe with secondary malignant neoplasm to right lung, on combination radiation therapy as well as chemotherapy (carboplatin, gemcitabine) , COPD, chronic hypoxemic respiratory failure on home O2, HTN, diverticulosis.    She presented to the ER on 12/9 due to several days of increasing shortness of breath.  EMS found her to be hypoxic with an O2 sat in the 80s.  She was treated with Solu-Medrol, magnesium, and DuoNebs by EMS and placed on a nonrebreather.  In the ER she was " tachycardic and hypertensive.  ABG showed a pH of 7.28, pCO2 of 74, pO2 of 68, and a bicarb of 34.8.  CBC with a WBC of 7.1, H/H was 6.8/22.3.  Platelet count 36 K.  Chemistry panel with unremarkable electrolytes other than a bicarb of 36, BUN 22 with a creatinine of 0.82.  LFTs normal.  Initial troponin was mildly elevated at 23, BNP was 453.  Lactate was 1.3.  Influenza and COVID screens negative.  CT angio of the chest showed no pulmonary emboli, she had diffuse edema suggestive of anasarca, emphysema, bilateral pleural effusions R >L, and bilateral upper lobe mass lesions with the right upper lobe lesion having increased in size.  Patient received 2 units of packed cells and was admitted to the ICU with acute hypercapnic respiratory failure.    Patient was treated with high flow O2 and BiPAP with steroids, she received IV furosemide for fluid overload and anasarca.  She was treated with initially vancomycin, Zosyn, and Levaquin.  Her respiratory status improved and she was initially transferred out of the ICU on 12/10, however on the morning of 12/11 she became very short of breath after possibly aspirating while eating breakfast and had to be transferred back to the ICU for recurrent hypercapnic respiratory failure with an ABG pH of 7.26, pCO2 of 81, pO2 of 232, and bicarb of 36.3 with concerns of possible aspiration.  CXR showed no acute infiltrate.  She was treated with BiPAP and high flow O2, the following morning patient had an acute delirium and remained hypoxic off BiPAP.  She was able to be transition to high flow O2 and underwent a speech swallow eval on 12/13 with mild oral phase dysphagia but was deemed okay for thin liquids.  By 12/13 her mentation had improved.  She did develop some mild EMA with a creatinine up to 1.34.  She was transferred out of the unit last night.  Overnight her blood pressures have been elevated.    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  -Depression/anxiety  -GERD  -Hyperlipidemia  -Hypertension  -Irritable bowel syndrome  -History of migraines  -Mild CAD    Past Surgical History:  -Remote tonsillectomy 1963  -Appendectomy  -Cardiac catheterizations 5/16/2013, 11/8/2013-two-vessel nonobstructive CAD  -Remote hysterectomy 1980  -History of colon surgery  -Resection of Meckel's diverticulum  -Bladder suspension  -C-spine surgery  -Hand surgery 1998  -Right shoulder surgery 4/28/2009  -ORIF of open right index finger fracture 7/30/2013  -Bronchoscopy with biopsy 12/28/2018  -Excision of scalp lesion 5/21/2019  -Left carpal tunnel release 10/28/2022  -Placement of port for chemotherapy 2023    *These notes are being done using Dragon voice recognition technology and may include unintended errors with respect to translation of words, typographical errors or grammar errors which may not have been identified prior to finalization of the chart note.    CURRENT MEDICATIONS     Scheduled Medications:    Current Facility-Administered Medications:     albuterol 90 mcg/actuation inhaler 2 puff, 2 puff, inhalation, 4x daily PRN, AUGUSTA Desai    aspirin chewable tablet 81 mg, 81 mg, oral, Daily, PAMELA Desai-CNP, 81 mg at 12/16/23 0817    bisacodyl (Dulcolax) EC tablet 5 mg, 5 mg, oral, Daily, Adriane Amezquita MD, 5 mg at 12/16/23 0817    buPROPion XL (Wellbutrin XL) 24 hr tablet 300 mg, 300 mg, oral, Daily, AUGUSTA Desai, 300 mg at 12/16/23 0817    dextrose 10 % in water (D10W) infusion, 0.3 g/kg/hr, intravenous, Once PRN, AUGUSTA Desai    dextrose 50 % injection 25 g, 25 g, intravenous, q15 min PRN, AUGUSTA Desai    docusate sodium (Colace) capsule 100 mg, 100 mg, oral, BID, AUGUSTA Desai, 100 mg at 12/16/23 0817    enoxaparin (Lovenox) syringe 40 mg, 40 mg, subcutaneous, q24h, AUGUSTA Desai, 40 mg at 12/16/23 0817    folic acid (Folvite) tablet 1 mg, 1  mg, oral, Daily, PAMELA Desai-CNP, 1 mg at 12/16/23 0817    [Held by provider] gabapentin (Neurontin) capsule 400 mg, 400 mg, oral, TID, PAMELA Desai-CNP, 400 mg at 12/11/23 0952    glucagon (Glucagen) injection 1 mg, 1 mg, intramuscular, q15 min PRN, AUGUSTA Desai    hydrALAZINE (Apresoline) injection 5 mg, 5 mg, intravenous, q6h PRN, Adriane Amezquita MD    insulin lispro (HumaLOG) injection 0-5 Units, 0-5 Units, subcutaneous, TID with meals, AUGUSTA Desai, 1 Units at 12/15/23 1800    ipratropium-albuteroL (Duo-Neb) 0.5-2.5 mg/3 mL nebulizer solution 3 mL, 3 mL, nebulization, q4h PRN, PAMELA Desai-CNP, 3 mL at 12/11/23 1034    ipratropium-albuteroL (Duo-Neb) 0.5-2.5 mg/3 mL nebulizer solution 3 mL, 3 mL, nebulization, TID, PAMELA Desai-CNP, 3 mL at 12/16/23 0653    lisinopril 20 mg, hydroCHLOROthiazide 25 mg for Zestoretic/Prinizide, , oral, Daily, AUGUSTA Desai, Given at 12/16/23 0817    methylPREDNISolone sod succinate (PF) (SOLU-Medrol) 40 mg/mL injection 40 mg, 40 mg, intravenous, q12h, AUGUSTA Desai, 40 mg at 12/16/23 0814    metoprolol succinate XL (Toprol-XL) 24 hr tablet 100 mg, 100 mg, oral, Daily, Adriane Amezquita MD, 100 mg at 12/16/23 0817    metoprolol tartrate (Lopressor) injection 5 mg, 5 mg, intravenous, q6h PRN, AUGUSTA Desai, 5 mg at 12/12/23 2236    montelukast (Singulair) tablet 10 mg, 10 mg, oral, Daily, AUGUSTA Desai, 10 mg at 12/16/23 0817    morphine injection 2 mg, 2 mg, intravenous, q4h PRN, AUGUSTA Desai, 2 mg at 12/12/23 1329    nicotine (Nicoderm CQ) 21 mg/24 hr patch 1 patch, 1 patch, transdermal, Daily, Adriane Amezquita MD, 1 patch at 12/16/23 0824    OLANZapine (ZyPREXA) tablet 2.5 mg, 2.5 mg, oral, Nightly, AUGUSTA Desai, 2.5 mg at 12/15/23 2156    [Held by provider] oxyCODONE-acetaminophen (Percocet) 5-325 mg per tablet  "1 tablet, 1 tablet, oral, q4h PRN, AUGUSTA Desai    oxygen (O2) therapy, , inhalation, Continuous PRN - O2/gases, PAMELA Desai-CNP, 50 L/min at 12/14/23 0753    oxygen (O2) therapy, , inhalation, Continuous PRN - O2/gases, AUGUSTA Desai, 7 L/min at 12/16/23 0653    pantoprazole (ProtoNix) EC tablet 40 mg, 40 mg, oral, Daily, AUGUSTA Desai, 40 mg at 12/16/23 0519    piperacillin-tazobactam-dextrose (Zosyn) IV 3.375 g, 3.375 g, intravenous, q8h, AUGUSTA Desai, Last Rate: 12.5 mL/hr at 12/16/23 0519, 3.375 g at 12/16/23 0519    sennosides-docusate sodium (Juani-Colace) 8.6-50 mg per tablet 2 tablet, 2 tablet, oral, BID, Adriane Amezquita MD, 2 tablet at 12/16/23 0817     PRN Medications:  PRN medications   Medication    albuterol    dextrose 10 % in water (D10W)    dextrose    glucagon    hydrALAZINE    ipratropium-albuteroL    metoprolol    morphine    [Held by provider] oxyCODONE-acetaminophen    oxygen    oxygen         IVs:        I&Os     Intake/Output last 3 Shifts:  I/O last 3 completed shifts:  In: 580 (8.9 mL/kg) [P.O.:480; IV Piggyback:100]  Out: 1200 (18.4 mL/kg) [Urine:1200 (0.5 mL/kg/hr)]  Weight: 65.1 kg     VITAL SIGNS     Blood pressure (!) 186/77, pulse 61, temperature 37 °C (98.6 °F), temperature source Temporal, resp. rate 18, height 1.55 m (5' 1.02\"), weight 65.1 kg (143 lb 8.3 oz), SpO2 97 %.     PHYSICAL EXAM   Physical exam:  -General appearance: Pleasant elderly white female, sitting up in bed alert and in NAD-occasional moist cough, but alert and in NAD.  Less dyspneic than yesterday.  Sushil is in the room with her.  -Vital signs:  As above  -HEENT: Oxymizer in place, she is edentulous  -Neck: No JVD  -Chest: Poor air movement, no wheezes or rales.  She has a port in the upper right chest wall.  -Cardiac: Regular rhythm  -Abdomen: Bowel sounds active  -Extremities: No edema  -Skin: No rash  -Neurologic:   Patient is alert and " oriented x3.   -Behavior/Emotional:  Appropriate, cooperative    LABS   Relevant Results:  Results from last 7 days   Lab Units 12/16/23  0717 12/16/23  0413 12/15/23  2026 12/15/23  1558 12/15/23  0818 12/15/23  0550 12/14/23  0755 12/14/23  0429   POCT GLUCOSE mg/dL 113*  --  149* 195*   < >  --    < >  --    GLUCOSE mg/dL  --  166*  --   --   --  128*  --  148*    < > = values in this interval not displayed.          CBC:   Results from last 7 days   Lab Units 12/16/23  0413 12/15/23  0550 12/14/23  0429   WBC AUTO x10*3/uL 6.9 7.7 6.4   RBC AUTO x10*6/uL 3.21* 3.29* 3.01*   HEMOGLOBIN g/dL 10.2* 10.6* 9.3*   HEMATOCRIT % 31.5* 31.9* 29.0*   MCV fL 98 97 96   MCH pg 31.8 32.2 30.9   MCHC g/dL 32.4 33.2 32.1   RDW % 17.8* 18.6* 19.6*   PLATELETS AUTO x10*3/uL 92* 107* 87*       CMP:    Results from last 7 days   Lab Units 12/16/23 0413 12/15/23  0550 12/14/23  0429 12/12/23  0421 12/10/23  0720 12/09/23  2102   SODIUM mmol/L 135* 137 138   < > 136 141   POTASSIUM mmol/L 3.5 3.5 3.8   < > 4.5 4.0   CHLORIDE mmol/L 93* 93* 95*   < > 96* 100   CO2 mmol/L 37* 38* 36*   < > 35* 36*   BUN mg/dL 31* 35* 39*   < > 23 22   CREATININE mg/dL 1.06* 1.04 1.25*   < > 0.95 0.84   GLUCOSE mg/dL 166* 128* 148*   < > 109* 133*   PROTEIN TOTAL g/dL 5.7*  --   --   --  5.9* 6.3*   CALCIUM mg/dL 8.5* 8.6 8.5*   < > 8.8 8.8   BILIRUBIN TOTAL mg/dL 0.9  --   --   --  1.0 1.0   ALK PHOS U/L 51  --   --   --  49 63   AST U/L 15  --   --   --  18 24   ALT U/L 9  --   --   --  10 12    < > = values in this interval not displayed.       Magnesium:  Results from last 7 days   Lab Units 12/16/23  0413 12/15/23  0550 12/14/23  0429   MAGNESIUM mg/dL 1.91 2.18 1.97       Troponin:    Results from last 7 days   Lab Units 12/09/23  2102   TROPHS ng/L 23*       BNP:   Results from last 7 days   Lab Units 12/09/23  2102   BNP pg/mL 453*       Lipid Panel:         IMAGING     XR chest 1 view   Final Result   Emphysema, scarring, known upper lobe  nodules/masses        CT three days ago suggested significant bronchitis, difficult to   evaluate on a portable chest radiograph        Overall no change when compared to radiographs from one day ago and   three days ago        MACRO:   None        Signed by: Lloyd Donta 12/12/2023 8:14 AM   Dictation workstation:   [id number]      XR chest 1 view   Final Result   1.  See above                  MACRO:   None        Signed by: Joseph Schoenberger 12/11/2023 3:36 PM   Dictation workstation:   [id number]      CT angio chest for pulmonary embolism   Final Result   1. Limited exam due to patient motion.  There is no evidence of   central or segmental pulmonary embolus.  Subsegmental emboli cannot be   excluded.   2. Bilateral upper lobe mass lesions again noted.  RIGHT upper lobe   lesion is increased in size.  LEFT upper lobe lesion appears grossly   stable.  Mediastinal adenopathy also noted, increased compared with   prior exam.  Bilateral pleural effusions have developed, RIGHT greater   than LEFT.  These may be malignant.   3. Emphysematous changes are present.   4. Diffuse subcutaneous edema, suggestive of anasarca.   Signed by Stiven Ahumada II, MD      XR chest 1 view   Final Result   *Right infrahilar infiltrate.  Underlying chronic interstitial   fibrotic interstitial fibrotic changes are seen.   *Left upper lobe mass, new since the prior study.   *Soft tissue fullness of the right suprahilar area.  A mass in   this region is not ruled out.   *Bilateral pleural effusions.   *Cardiomegaly.   Signed by MD Adriane Goodson MD

## 2023-12-17 ENCOUNTER — HOME HEALTH ADMISSION (OUTPATIENT)
Dept: HOME HEALTH SERVICES | Facility: HOME HEALTH | Age: 73
End: 2023-12-17
Payer: MEDICARE

## 2023-12-17 VITALS
DIASTOLIC BLOOD PRESSURE: 70 MMHG | RESPIRATION RATE: 18 BRPM | HEART RATE: 61 BPM | WEIGHT: 144.84 LBS | HEIGHT: 61 IN | SYSTOLIC BLOOD PRESSURE: 157 MMHG | OXYGEN SATURATION: 95 % | BODY MASS INDEX: 27.35 KG/M2 | TEMPERATURE: 98.2 F

## 2023-12-17 PROBLEM — G89.29 CHRONIC PAIN: Status: ACTIVE | Noted: 2023-12-17

## 2023-12-17 PROBLEM — E87.6 HYPOKALEMIA: Status: ACTIVE | Noted: 2023-12-17

## 2023-12-17 LAB
ALBUMIN SERPL BCP-MCNC: 3.2 G/DL (ref 3.4–5)
ALP SERPL-CCNC: 44 U/L (ref 33–136)
ALT SERPL W P-5'-P-CCNC: 9 U/L (ref 7–45)
ANION GAP SERPL CALC-SCNC: <7 MMOL/L (ref 10–20)
AST SERPL W P-5'-P-CCNC: 14 U/L (ref 9–39)
BASOPHILS # BLD AUTO: 0.01 X10*3/UL (ref 0–0.1)
BASOPHILS NFR BLD AUTO: 0.1 %
BILIRUB SERPL-MCNC: 0.7 MG/DL (ref 0–1.2)
BUN SERPL-MCNC: 26 MG/DL (ref 6–23)
CALCIUM SERPL-MCNC: 8.4 MG/DL (ref 8.6–10.3)
CHLORIDE SERPL-SCNC: 93 MMOL/L (ref 98–107)
CO2 SERPL-SCNC: 38 MMOL/L (ref 21–32)
CREAT SERPL-MCNC: 0.93 MG/DL (ref 0.5–1.05)
EOSINOPHIL # BLD AUTO: 0 X10*3/UL (ref 0–0.4)
EOSINOPHIL NFR BLD AUTO: 0 %
ERYTHROCYTE [DISTWIDTH] IN BLOOD BY AUTOMATED COUNT: 17.6 % (ref 11.5–14.5)
GFR SERPL CREATININE-BSD FRML MDRD: 65 ML/MIN/1.73M*2
GLUCOSE BLD MANUAL STRIP-MCNC: 131 MG/DL (ref 74–99)
GLUCOSE BLD MANUAL STRIP-MCNC: 172 MG/DL (ref 74–99)
GLUCOSE SERPL-MCNC: 90 MG/DL (ref 74–99)
HCT VFR BLD AUTO: 31.8 % (ref 36–46)
HGB BLD-MCNC: 10.2 G/DL (ref 12–16)
IMM GRANULOCYTES # BLD AUTO: 0.03 X10*3/UL (ref 0–0.5)
IMM GRANULOCYTES NFR BLD AUTO: 0.4 % (ref 0–0.9)
LYMPHOCYTES # BLD AUTO: 0.64 X10*3/UL (ref 0.8–3)
LYMPHOCYTES NFR BLD AUTO: 8.8 %
MCH RBC QN AUTO: 31.2 PG (ref 26–34)
MCHC RBC AUTO-ENTMCNC: 32.1 G/DL (ref 32–36)
MCV RBC AUTO: 97 FL (ref 80–100)
MONOCYTES # BLD AUTO: 1.43 X10*3/UL (ref 0.05–0.8)
MONOCYTES NFR BLD AUTO: 19.7 %
NEUTROPHILS # BLD AUTO: 5.14 X10*3/UL (ref 1.6–5.5)
NEUTROPHILS NFR BLD AUTO: 71 %
NRBC BLD-RTO: 0 /100 WBCS (ref 0–0)
PLATELET # BLD AUTO: 114 X10*3/UL (ref 150–450)
POTASSIUM SERPL-SCNC: 3 MMOL/L (ref 3.5–5.3)
PROT SERPL-MCNC: 5.5 G/DL (ref 6.4–8.2)
RBC # BLD AUTO: 3.27 X10*6/UL (ref 4–5.2)
SODIUM SERPL-SCNC: 134 MMOL/L (ref 136–145)
WBC # BLD AUTO: 7.3 X10*3/UL (ref 4.4–11.3)

## 2023-12-17 PROCEDURE — 94640 AIRWAY INHALATION TREATMENT: CPT

## 2023-12-17 PROCEDURE — 82947 ASSAY GLUCOSE BLOOD QUANT: CPT

## 2023-12-17 PROCEDURE — 94760 N-INVAS EAR/PLS OXIMETRY 1: CPT

## 2023-12-17 PROCEDURE — 2500000002 HC RX 250 W HCPCS SELF ADMINISTERED DRUGS (ALT 637 FOR MEDICARE OP, ALT 636 FOR OP/ED): Performed by: NURSE PRACTITIONER

## 2023-12-17 PROCEDURE — 80053 COMPREHEN METABOLIC PANEL: CPT | Performed by: INTERNAL MEDICINE

## 2023-12-17 PROCEDURE — 96372 THER/PROPH/DIAG INJ SC/IM: CPT | Performed by: NURSE PRACTITIONER

## 2023-12-17 PROCEDURE — 2500000004 HC RX 250 GENERAL PHARMACY W/ HCPCS (ALT 636 FOR OP/ED): Performed by: INTERNAL MEDICINE

## 2023-12-17 PROCEDURE — 2500000001 HC RX 250 WO HCPCS SELF ADMINISTERED DRUGS (ALT 637 FOR MEDICARE OP): Performed by: INTERNAL MEDICINE

## 2023-12-17 PROCEDURE — 2500000002 HC RX 250 W HCPCS SELF ADMINISTERED DRUGS (ALT 637 FOR MEDICARE OP, ALT 636 FOR OP/ED): Performed by: INTERNAL MEDICINE

## 2023-12-17 PROCEDURE — 2500000001 HC RX 250 WO HCPCS SELF ADMINISTERED DRUGS (ALT 637 FOR MEDICARE OP): Performed by: NURSE PRACTITIONER

## 2023-12-17 PROCEDURE — 85025 COMPLETE CBC W/AUTO DIFF WBC: CPT | Performed by: INTERNAL MEDICINE

## 2023-12-17 PROCEDURE — 99239 HOSP IP/OBS DSCHRG MGMT >30: CPT | Performed by: INTERNAL MEDICINE

## 2023-12-17 PROCEDURE — 2500000004 HC RX 250 GENERAL PHARMACY W/ HCPCS (ALT 636 FOR OP/ED): Performed by: NURSE PRACTITIONER

## 2023-12-17 PROCEDURE — S4991 NICOTINE PATCH NONLEGEND: HCPCS | Performed by: INTERNAL MEDICINE

## 2023-12-17 RX ORDER — LISINOPRIL 20 MG/1
20 TABLET ORAL
Qty: 30 TABLET | Refills: 0 | Status: SHIPPED | OUTPATIENT
Start: 2023-12-17 | End: 2024-01-03 | Stop reason: WASHOUT

## 2023-12-17 RX ORDER — PREDNISONE 10 MG/1
20 TABLET ORAL DAILY
Qty: 10 TABLET | Refills: 0 | Status: SHIPPED | OUTPATIENT
Start: 2023-12-18 | End: 2023-12-28

## 2023-12-17 RX ORDER — HYDRALAZINE HYDROCHLORIDE 50 MG/1
50 TABLET, FILM COATED ORAL 3 TIMES DAILY
Qty: 90 TABLET | Refills: 0 | Status: SHIPPED | OUTPATIENT
Start: 2023-12-17 | End: 2024-01-23

## 2023-12-17 RX ORDER — HYDRALAZINE HYDROCHLORIDE 50 MG/1
50 TABLET, FILM COATED ORAL 3 TIMES DAILY
Status: DISCONTINUED | OUTPATIENT
Start: 2023-12-17 | End: 2023-12-17 | Stop reason: HOSPADM

## 2023-12-17 RX ORDER — POTASSIUM CHLORIDE 20 MEQ/1
20 TABLET, EXTENDED RELEASE ORAL ONCE
Status: COMPLETED | OUTPATIENT
Start: 2023-12-17 | End: 2023-12-17

## 2023-12-17 RX ORDER — GABAPENTIN 400 MG/1
400 CAPSULE ORAL 3 TIMES DAILY
Start: 2023-12-17

## 2023-12-17 RX ORDER — OLANZAPINE 5 MG/1
5 TABLET ORAL NIGHTLY
Start: 2023-12-17 | End: 2024-01-26 | Stop reason: HOSPADM

## 2023-12-17 RX ORDER — IPRATROPIUM BROMIDE AND ALBUTEROL SULFATE 2.5; .5 MG/3ML; MG/3ML
3 SOLUTION RESPIRATORY (INHALATION) 3 TIMES DAILY
Qty: 270 ML | Refills: 0 | Status: SHIPPED | OUTPATIENT
Start: 2023-12-17 | End: 2024-01-16

## 2023-12-17 RX ORDER — HEPARIN 100 UNIT/ML
5 SYRINGE INTRAVENOUS AS NEEDED
Status: DISCONTINUED | OUTPATIENT
Start: 2023-12-17 | End: 2023-12-17 | Stop reason: HOSPADM

## 2023-12-17 RX ORDER — IBUPROFEN 200 MG
1 TABLET ORAL DAILY
Qty: 30 PATCH | Refills: 0 | Status: SHIPPED | OUTPATIENT
Start: 2023-12-18 | End: 2024-01-17

## 2023-12-17 RX ADMIN — POTASSIUM CHLORIDE 20 MEQ: 1500 TABLET, EXTENDED RELEASE ORAL at 15:58

## 2023-12-17 RX ADMIN — DOCUSATE SODIUM 50MG AND SENNOSIDES 8.6MG 2 TABLET: 8.6; 5 TABLET, FILM COATED ORAL at 08:23

## 2023-12-17 RX ADMIN — IPRATROPIUM BROMIDE AND ALBUTEROL SULFATE 3 ML: 2.5; .5 SOLUTION RESPIRATORY (INHALATION) at 14:07

## 2023-12-17 RX ADMIN — BUPROPION HYDROCHLORIDE 300 MG: 150 TABLET, EXTENDED RELEASE ORAL at 08:22

## 2023-12-17 RX ADMIN — METOPROLOL SUCCINATE 100 MG: 50 TABLET, EXTENDED RELEASE ORAL at 08:22

## 2023-12-17 RX ADMIN — HYDRALAZINE HYDROCHLORIDE 50 MG: 50 TABLET ORAL at 10:40

## 2023-12-17 RX ADMIN — LISINOPRIL 20 MG: 20 TABLET ORAL at 15:58

## 2023-12-17 RX ADMIN — BISACODYL 5 MG: 5 TABLET ORAL at 08:23

## 2023-12-17 RX ADMIN — ENOXAPARIN SODIUM 40 MG: 40 INJECTION SUBCUTANEOUS at 08:23

## 2023-12-17 RX ADMIN — ASPIRIN 81 MG: 81 TABLET, CHEWABLE ORAL at 08:23

## 2023-12-17 RX ADMIN — LISINOPRIL: 20 TABLET ORAL at 08:22

## 2023-12-17 RX ADMIN — MONTELUKAST SODIUM 10 MG: 10 TABLET, FILM COATED ORAL at 08:23

## 2023-12-17 RX ADMIN — IPRATROPIUM BROMIDE AND ALBUTEROL SULFATE 3 ML: 2.5; .5 SOLUTION RESPIRATORY (INHALATION) at 06:53

## 2023-12-17 RX ADMIN — INSULIN LISPRO 1 UNITS: 100 INJECTION, SOLUTION INTRAVENOUS; SUBCUTANEOUS at 12:02

## 2023-12-17 RX ADMIN — PANTOPRAZOLE SODIUM 40 MG: 40 TABLET, DELAYED RELEASE ORAL at 05:42

## 2023-12-17 RX ADMIN — DOCUSATE SODIUM 100 MG: 100 CAPSULE, LIQUID FILLED ORAL at 08:22

## 2023-12-17 RX ADMIN — PREDNISONE 30 MG: 20 TABLET ORAL at 08:22

## 2023-12-17 RX ADMIN — FOLIC ACID 1 MG: 1 TABLET ORAL at 08:22

## 2023-12-17 RX ADMIN — NICOTINE 1 PATCH: 21 PATCH, EXTENDED RELEASE TRANSDERMAL at 08:23

## 2023-12-17 RX ADMIN — HYDRALAZINE HYDROCHLORIDE 50 MG: 50 TABLET ORAL at 15:58

## 2023-12-17 ASSESSMENT — COGNITIVE AND FUNCTIONAL STATUS - GENERAL
WALKING IN HOSPITAL ROOM: A LITTLE
DRESSING REGULAR UPPER BODY CLOTHING: A LITTLE
DRESSING REGULAR LOWER BODY CLOTHING: A LITTLE
DAILY ACTIVITIY SCORE: 20
HELP NEEDED FOR BATHING: A LITTLE
CLIMB 3 TO 5 STEPS WITH RAILING: A LITTLE
MOBILITY SCORE: 19
TOILETING: A LITTLE
MOVING TO AND FROM BED TO CHAIR: A LITTLE
STANDING UP FROM CHAIR USING ARMS: A LITTLE
TURNING FROM BACK TO SIDE WHILE IN FLAT BAD: A LITTLE

## 2023-12-17 ASSESSMENT — PAIN SCALES - GENERAL: PAINLEVEL_OUTOF10: 0 - NO PAIN

## 2023-12-17 NOTE — NURSING NOTE
Discharge paper work reviewed with patient. Patient voiced understanding. Belonging packed up and sent with patient. Port de -accessed by Laya SORTO. Taken down to main lobby by Rosmery MAR for discharge.

## 2023-12-17 NOTE — PROGRESS NOTES
Gaby Blue is a 73 y.o. female on day 8 of admission presenting with COPD exacerbation (CMS/HCC).      ASSESSMENT/PLAN   Principal Problem:  -COPD exacerbation (CMS/HCC) with acute hypercapnic respiratory failure-significantly improved, will discharge home today.  -Episode of acute delirium while in ICU-no resolved.  Her Zyprexa, gabapentin, and narcotics were discontinued there, and gradually reintroducing them at lower doses.  This was discussed with the patient.  -Non-small cell CA of the lung on treatment with XRT and chemotherapy (NSCLC, stage IV, cT4, cN3, pM1a) of left upper lobe with secondary malignant neoplasm to right lung, combination radiation therapy as well as chemotherapy (carboplatin, gemcitabine)-her oncologist is Dr. Colon  -Thrombocytopenia due to chemotherapy-platelet count 92 K this morning  -EMA on CKD-creatinine stable at 1.06 this morning  -Steroid induced hyperglycemia-on SSI, blood sugars controlled.  -Uncontrolled hypertension-blood pressures improved but still elevated, adding lisinopril 20 mg in the evening to her morning lisinopril HCT.  -Tobacco abuse-on a nicotine patch  -Chronic generalized pain-gabapentin restarted cautiously yesterday, will reenter cautious Percocet.  -Insomnia-will increase her Zyprexa to 5 mg HS  -Social concerns-patient refusing rehab, wants to go home on her own.  Family concerned about care at home, she does have a friend Sushil who tries to look in on her as well as a neighbor.  Patient is interested in [facility].    SUBJECTIVE/OBJECTIVE   12/17/23  -Breathing is good, denies shortness of breath.  [place] problems.  No nausea/vomiting.  Tells me that her home oxygen only goes to 5L-will see how she does walking on 5L, if does okay will allow her to go home today.  -She is afebrile, blood pressures are again elevated-will add scheduled hydralazine.  Satting 96% on 6L/NC O2.  Patient tells me she is normally on 5L/NC at  home.  -Chest with decreased breath sounds, no wheezes or rales  -Cardiac exam is a regular rhythm  -Blood sugars well-controlled-highest was 148, lowest 102.  Was changed to oral prednisone yesterday afternoon.  -Chemistry panel with a sodium of 134, potassium is low at 3.0-will replace.  Chloride 93, bicarb 38  -CBC with a WBC of 7.3.  H/H stable at 10.2/31.8.  Platelet count slightly improved at 114 K.    12/16/2023  -Breathing continues to get better, still occasional cough but not much sputum.  Did not sleep real well last night, but does not like the hospital bed because she can adjust things easily when she is having pain.  Pain control is fair, she has not been using the IV morphine.  -I discussed with her that I am gradually adding back in her medications.  Will restart her Percocet at every 6 hour PRN, increase her olanzapine to 5 mg at bedtime.  -Also discussed with her discontinuing the antibiotics as she has finished a full course.  -Will change her steroids from IV to oral.  -Patient has questions about C-she is worried about going home and not having somebody to look in on her-Sushil (who normally checks on her) will be gone for 10 days over the holidays.  However she still adamantly refuses consideration of a SNF, even short-term.  -Sushil is in the room with her.  -She is afebrile, blood pressures are still elevated but improving.  Will increase her lisinopril, she does have hydralazine if needed.  -Satting 97% on high flow nasal cannula at 50%  -Chest with decreased breath sounds, somewhat improving air movement but still poor.  No rales, no wheezes.  -Cardiac exam is a regular rhythm  -Blood sugars reasonably controlled-113 this morning, highest was 195 before dinner yesterday.  -Chemistry panel with a sodium of 135, potassium 3.5, chloride 93, bicarb 37.  -BUN and creatinine stable at 31 and 1.06.  -LFTs normal other than a low total protein and albumin.  -Magnesium 1.91.  -CBC with a WBC of 6.9.   "H/H stable at 10.2/31.5.  Platelet count remains low but stable at 92 K.  -Case discussed with TCC.    12/15/2023 (Dr. Amezquita assuming patient care after patient transferred out of the ICU)  -Breathing is better than it was earlier this admission, still somewhat short of breath.  No nausea or vomiting but also has not had a bowel movement for >1-week-will give her some Dulcolax to see if that will help (she says she cannot drink the liquids).  -Quit smoking \"11 days ago\"-but when she wakes up she still wants a cigarette, has been smoking since the age of 14.  Offered her the nicotine gum but she cannot chew because of no dentures, would like to try the patch.  -Says she could not find her nebulizer at home, but has plenty of the meds and supplies for it.  Her neighbor stated that she found a gray bag underneath the sofa that is probably the nebulizer-she will check on that for us.  Otherwise we will need to order a nebulizer for her at home.  -Patient's friend Sushil is in the room with her, along with her neighbor (who is a paramedic and lives next-door, keeps a close eye on the patient).  -The Zyprexa she was on on admission was prescribed by palliative care, they started loading gradually increased it.  She is on the gabapentin due to generalized arthritis pain \"throughout my whole body\", along with Percocet.  She has a follow-up appointment with palliative care on January 2.  -She is afebrile, blood pressures have been elevated since leaving the ICU last night-will increase her metoprolol, she is on lisinopril HCT.  -Patient is mildly dyspneic, has an O2 concentrator on in place.  -Chest with scattered expiratory wheezes, has very poor air movement.  No rales.  -Cardiac exam with a regular rhythm, no obvious murmurs  -Extremities with trace edema.  -Chemistry panel with a blood sugar of 128.  Electrolytes with a sodium of 137, potassium 3.5, chloride 93, bicarb 38.  -BUN 35 with a creatinine continuing to improve, " down to 1.04 this morning.  -Magnesium 2.18.  -CBC with a WBC of 7.7.  H/H improving up to 10.6/31.9.  Platelet count low at 107, but up from admission.    Chart review:  PCP is  Dr. Hawk Comer  Patient is a 73-year-old white female with a known history of non-small cell lung CA (NSCLC, stage IV, cT4, cN3, pM1a) of left upper lobe with secondary malignant neoplasm to right lung, on combination radiation therapy as well as chemotherapy (carboplatin, gemcitabine) , COPD, chronic hypoxemic respiratory failure on home O2, HTN, diverticulosis.    She presented to the ER on 12/9 due to several days of increasing shortness of breath.  EMS found her to be hypoxic with an O2 sat in the 80s.  She was treated with Solu-Medrol, magnesium, and DuoNebs by EMS and placed on a nonrebreather.  In the ER she was tachycardic and hypertensive.  ABG showed a pH of 7.28, pCO2 of 74, pO2 of 68, and a bicarb of 34.8.  CBC with a WBC of 7.1, H/H was 6.8/22.3.  Platelet count 36 K.  Chemistry panel with unremarkable electrolytes other than a bicarb of 36, BUN 22 with a creatinine of 0.82.  LFTs normal.  Initial troponin was mildly elevated at 23, BNP was 453.  Lactate was 1.3.  Influenza and COVID screens negative.  CT angio of the chest showed no pulmonary emboli, she had diffuse edema suggestive of anasarca, emphysema, bilateral pleural effusions R >L, and bilateral upper lobe mass lesions with the right upper lobe lesion having increased in size.  Patient received 2 units of packed cells and was admitted to the ICU with acute hypercapnic respiratory failure.    Patient was treated with high flow O2 and BiPAP with steroids, she received IV furosemide for fluid overload and anasarca.  She was treated with initially vancomycin, Zosyn, and Levaquin.  Her respiratory status improved and she was initially transferred out of the ICU on 12/10, however on the morning of 12/11 she became very short of breath after possibly aspirating while  eating breakfast and had to be transferred back to the ICU for recurrent hypercapnic respiratory failure with an ABG pH of 7.26, pCO2 of 81, pO2 of 232, and bicarb of 36.3 with concerns of possible aspiration.  CXR showed no acute infiltrate.  She was treated with BiPAP and high flow O2, the following morning patient had an acute delirium and remained hypoxic off BiPAP.  She was able to be transition to high flow O2 and underwent a speech swallow eval on 12/13 with mild oral phase dysphagia but was deemed okay for thin liquids.  By 12/13 her mentation had improved.  She did develop some mild EMA with a creatinine up to 1.34.  She was transferred out of the unit last night.  Overnight her blood pressures have been elevated.    Past Medical History:  -Lung cancer initially diagnosed 2018-squamous cell CA  -Depression/anxiety  -GERD  -Hyperlipidemia  -Hypertension  -Irritable bowel syndrome  -History of migraines  -Mild CAD    Past Surgical History:  -Remote tonsillectomy 1963  -Appendectomy  -Cardiac catheterizations 5/16/2013, 11/8/2013-two-vessel nonobstructive CAD  -Remote hysterectomy 1980  -History of colon surgery  -Resection of Meckel's diverticulum  -Bladder suspension  -C-spine surgery  -Hand surgery 1998  -Right shoulder surgery 4/28/2009  -ORIF of open right index finger fracture 7/30/2013  -Bronchoscopy with biopsy 12/28/2018  -Excision of scalp lesion 5/21/2019  -Left carpal tunnel release 10/28/2022  -Placement of port for chemotherapy 2023    *These notes are being done using Dragon voice recognition technology and may include unintended errors with respect to translation of words, typographical errors or grammar errors which may not have been identified prior to finalization of the chart note.    CURRENT MEDICATIONS     Scheduled Medications:    Current Facility-Administered Medications:     albuterol 90 mcg/actuation inhaler 2 puff, 2 puff, inhalation, 4x daily PRN, Asia Escobar, APRN-CNP     aspirin chewable tablet 81 mg, 81 mg, oral, Daily, PAMELA Desai-CNP, 81 mg at 12/17/23 0823    bisacodyl (Dulcolax) EC tablet 5 mg, 5 mg, oral, Daily, Adriane Amezquita MD, 5 mg at 12/17/23 0823    buPROPion XL (Wellbutrin XL) 24 hr tablet 300 mg, 300 mg, oral, Daily, PAMELA Desai-CNP, 300 mg at 12/17/23 0822    dextrose 10 % in water (D10W) infusion, 0.3 g/kg/hr, intravenous, Once PRN, AUGUSTA Desai    dextrose 50 % injection 25 g, 25 g, intravenous, q15 min PRN, AUGUSTA Desai    docusate sodium (Colace) capsule 100 mg, 100 mg, oral, BID, AUGUSTA Desai, 100 mg at 12/17/23 0822    enoxaparin (Lovenox) syringe 40 mg, 40 mg, subcutaneous, q24h, PAMELA Desai-CNP, 40 mg at 12/17/23 0823    folic acid (Folvite) tablet 1 mg, 1 mg, oral, Daily, AUGUSTA Desai, 1 mg at 12/17/23 0822    [Held by provider] gabapentin (Neurontin) capsule 400 mg, 400 mg, oral, TID, PAMELA Desai-CNP, 400 mg at 12/11/23 0952    glucagon (Glucagen) injection 1 mg, 1 mg, intramuscular, q15 min PRN, AUGUSTA Desai    hydrALAZINE (Apresoline) injection 5 mg, 5 mg, intravenous, q6h PRN, Adriane Amezquita MD, 5 mg at 12/16/23 1959    insulin lispro (HumaLOG) injection 0-5 Units, 0-5 Units, subcutaneous, TID with meals, AUGUSTA Desai, 1 Units at 12/16/23 1313    ipratropium-albuteroL (Duo-Neb) 0.5-2.5 mg/3 mL nebulizer solution 3 mL, 3 mL, nebulization, q4h PRN, AUGUSTA Desai, 3 mL at 12/11/23 1034    ipratropium-albuteroL (Duo-Neb) 0.5-2.5 mg/3 mL nebulizer solution 3 mL, 3 mL, nebulization, TID, AUGUSTA Desai, 3 mL at 12/17/23 0653    lisinopril 20 mg, hydroCHLOROthiazide 25 mg for Zestoretic/Prinizide, , oral, Daily, AUGUSTA Desai, Given at 12/17/23 0822    lisinopril tablet 20 mg, 20 mg, oral, Daily before evening meal, Adriane Amezquita MD, 20 mg at 12/16/23 1622     "metoprolol succinate XL (Toprol-XL) 24 hr tablet 100 mg, 100 mg, oral, Daily, Adriane Amezquita MD, 100 mg at 12/17/23 0822    metoprolol tartrate (Lopressor) injection 5 mg, 5 mg, intravenous, q6h PRN, AUGUSTA Desai, 5 mg at 12/12/23 2236    montelukast (Singulair) tablet 10 mg, 10 mg, oral, Daily, AUGUSTA Desai, 10 mg at 12/17/23 0823    nicotine (Nicoderm CQ) 21 mg/24 hr patch 1 patch, 1 patch, transdermal, Daily, Adriane Amezquita MD, 1 patch at 12/17/23 0823    OLANZapine (ZyPREXA) tablet 5 mg, 5 mg, oral, Nightly, Adriane Amezquita MD, 5 mg at 12/16/23 2001    oxyCODONE-acetaminophen (Percocet) 5-325 mg per tablet 1 tablet, 1 tablet, oral, q6h PRN, Adriane Amezquita MD    oxygen (O2) therapy, , inhalation, Continuous PRN - O2/gases, AUGUSTA Desai, 6 L/min at 12/16/23 1334    oxygen (O2) therapy, , inhalation, Continuous PRN - O2/gases, AUGUSTA Desai, 6 L/min at 12/17/23 0653    pantoprazole (ProtoNix) EC tablet 40 mg, 40 mg, oral, Daily, AUGUSTA Desai, 40 mg at 12/17/23 0542    predniSONE (Deltasone) tablet 30 mg, 30 mg, oral, Daily, Adriane Amezquita MD, 30 mg at 12/17/23 0822    sennosides-docusate sodium (Juani-Colace) 8.6-50 mg per tablet 2 tablet, 2 tablet, oral, BID, Adriane Amezquita MD, 2 tablet at 12/17/23 0823     PRN Medications:  PRN medications   Medication    albuterol    dextrose 10 % in water (D10W)    dextrose    glucagon    hydrALAZINE    ipratropium-albuteroL    metoprolol    oxyCODONE-acetaminophen    oxygen    oxygen         IVs:        I&Os     Intake/Output last 3 Shifts:  I/O last 3 completed shifts:  In: 630 (9.6 mL/kg) [P.O.:480; IV Piggyback:150]  Out: 800 (12.2 mL/kg) [Urine:800 (0.3 mL/kg/hr)]  Weight: 65.7 kg     VITAL SIGNS     Blood pressure (!) 192/80, pulse 66, temperature 36.9 °C (98.4 °F), temperature source Temporal, resp. rate 18, height 1.55 m (5' 1.02\"), weight 65.7 kg (144 lb 13.5 oz), SpO2 96 %.     PHYSICAL EXAM   Physical " exam:  -General appearance: Pleasant elderly white female, sitting up on the edge of the bed, alert and in NAD.  -Vital signs:  As above  -HEENT: Nasal O2 in place, still with an abrasion on her nose from the BiPAP, not infected.  -Neck: No JVD  -Chest: Poor air movement, no wheezes or rales.  -Cardiac: Regular rhythm  -Abdomen: Bowel sounds active  -Extremities: No edema  -Skin: No rash  -Neurologic:   Patient is alert and oriented x3.   -Behavior/Emotional:  Appropriate, cooperative    LABS   Relevant Results:  Results from last 7 days   Lab Units 12/17/23  0734 12/17/23  0512 12/16/23 2029 12/16/23  1622 12/16/23 0717 12/16/23 0413 12/15/23  0818 12/15/23  0550   POCT GLUCOSE mg/dL 131*  --  148* 102*   < >  --    < >  --    GLUCOSE mg/dL  --  90  --   --   --  166*  --  128*    < > = values in this interval not displayed.        CBC:   Results from last 7 days   Lab Units 12/17/23  0512 12/16/23 0413 12/15/23  0550   WBC AUTO x10*3/uL 7.3 6.9 7.7   RBC AUTO x10*6/uL 3.27* 3.21* 3.29*   HEMOGLOBIN g/dL 10.2* 10.2* 10.6*   HEMATOCRIT % 31.8* 31.5* 31.9*   MCV fL 97 98 97   MCH pg 31.2 31.8 32.2   MCHC g/dL 32.1 32.4 33.2   RDW % 17.6* 17.8* 18.6*   PLATELETS AUTO x10*3/uL 114* 92* 107*       CMP:    Results from last 7 days   Lab Units 12/17/23  0512 12/16/23 0413 12/15/23  0550   SODIUM mmol/L 134* 135* 137   POTASSIUM mmol/L 3.0* 3.5 3.5   CHLORIDE mmol/L 93* 93* 93*   CO2 mmol/L 38* 37* 38*   BUN mg/dL 26* 31* 35*   CREATININE mg/dL 0.93 1.06* 1.04   GLUCOSE mg/dL 90 166* 128*   PROTEIN TOTAL g/dL 5.5* 5.7*  --    CALCIUM mg/dL 8.4* 8.5* 8.6   BILIRUBIN TOTAL mg/dL 0.7 0.9  --    ALK PHOS U/L 44 51  --    AST U/L 14 15  --    ALT U/L 9 9  --        Magnesium:  Results from last 7 days   Lab Units 12/16/23  0413 12/15/23  0550 12/14/23  0429   MAGNESIUM mg/dL 1.91 2.18 1.97       IMAGING     XR chest 1 view   Final Result   Emphysema, scarring, known upper lobe nodules/masses        CT three days ago  suggested significant bronchitis, difficult to   evaluate on a portable chest radiograph        Overall no change when compared to radiographs from one day ago and   three days ago        MACRO:   None        Signed by: Lloyd Garcíaroyerjames 12/12/2023 8:14 AM   Dictation workstation:   [id number]      XR chest 1 view   Final Result   1.  See above                  MACRO:   None        Signed by: Ulís Schoenberger 12/11/2023 3:36 PM   Dictation workstation:   [id number]      CT angio chest for pulmonary embolism   Final Result   1. Limited exam due to patient motion.  There is no evidence of   central or segmental pulmonary embolus.  Subsegmental emboli cannot be   excluded.   2. Bilateral upper lobe mass lesions again noted.  RIGHT upper lobe   lesion is increased in size.  LEFT upper lobe lesion appears grossly   stable.  Mediastinal adenopathy also noted, increased compared with   prior exam.  Bilateral pleural effusions have developed, RIGHT greater   than LEFT.  These may be malignant.   3. Emphysematous changes are present.   4. Diffuse subcutaneous edema, suggestive of anasarca.   Signed by Stiven Ahumada II, MD      XR chest 1 view   Final Result   *Right infrahilar infiltrate.  Underlying chronic interstitial   fibrotic interstitial fibrotic changes are seen.   *Left upper lobe mass, new since the prior study.   *Soft tissue fullness of the right suprahilar area.  A mass in   this region is not ruled out.   *Bilateral pleural effusions.   *Cardiomegaly.   Signed by MD Adriane Goodson MD

## 2023-12-17 NOTE — CARE PLAN
The patient's goals for the shift include Labs WNL    The clinical goals for the shift include Labs WNL      Problem: Pain - Adult  Goal: Verbalizes/displays adequate comfort level or baseline comfort level  Outcome: Progressing     Problem: Safety - Adult  Goal: Free from fall injury  Outcome: Progressing     Problem: Discharge Planning  Goal: Discharge to home or other facility with appropriate resources  Outcome: Progressing     Problem: Chronic Conditions and Co-morbidities  Goal: Patient's chronic conditions and co-morbidity symptoms are monitored and maintained or improved  Outcome: Progressing     Problem: Skin  Goal: Decreased wound size/increased tissue granulation at next dressing change  12/16/2023 2255 by Daysi Nicholas RN  Outcome: Progressing  12/16/2023 2254 by Daysi Nicholas RN  Flowsheets (Taken 12/16/2023 2254)  Decreased wound size/increased tissue granulation at next dressing change:   Utilize specialty bed per algorithm   Promote sleep for wound healing   Protective dressings over bony prominences  Goal: Participates in plan/prevention/treatment measures  12/16/2023 2255 by Daysi Nicholas RN  Outcome: Progressing  12/16/2023 2254 by Daysi Nicholas RN  Flowsheets (Taken 12/16/2023 2254)  Participates in plan/prevention/treatment measures:   Increase activity/out of bed for meals   Discuss with provider PT/OT consult   Elevate heels  Goal: Prevent/manage excess moisture  12/16/2023 2255 by Daysi Nicholas RN  Outcome: Progressing  12/16/2023 2254 by Daysi Nicholas RN  Flowsheets (Taken 12/16/2023 2254)  Prevent/manage excess moisture:   Use wicking fabric (obtain order)   Moisturize dry skin   Cleanse incontinence/protect with barrier cream   Monitor for/manage infection if present   Follow provider orders for dressing changes  Goal: Prevent/minimize sheer/friction injuries  12/16/2023 2255 by Daysi Nicholas RN  Outcome: Progressing  12/16/2023 2254 by Daysi Nicholas  RN  Flowsheets (Taken 12/16/2023 2254)  Prevent/minimize sheer/friction injuries:   Utilize specialty bed per algorithm   Use pull sheet   Turn/reposition every 2 hours/use positioning/transfer devices   Increase activity/out of bed for meals   HOB 30 degrees or less   Complete micro-shifts as needed if patient unable. Adjust patient position to relieve pressure points, not a full turn  Goal: Promote/optimize nutrition  12/16/2023 2255 by Daysi Nicholas RN  Outcome: Progressing  12/16/2023 2254 by Daysi Nicholas RN  Flowsheets (Taken 12/16/2023 2254)  Promote/optimize nutrition:   Offer water/supplements/favorite foods   Discuss with provider if NPO > 2 days   Assist with feeding   Reassess MST if dietician not consulted   Monitor/record intake including meals   Consume > 50% meals/supplements  Goal: Promote skin healing  12/16/2023 2255 by Daysi Nicholas RN  Outcome: Progressing  12/16/2023 2254 by Daysi Nicholas RN  Flowsheets (Taken 12/16/2023 2254)  Promote skin healing:   Turn/reposition every 2 hours/use positioning/transfer devices   Rotate device position/do not position patient on device   Protective dressings over bony prominences   Ensure correct size (line/device) and apply per  instructions   Assess skin/pad under line(s)/device(s)     Problem: Fall/Injury  Goal: Not fall by end of shift  Outcome: Progressing  Goal: Be free from injury by end of the shift  Outcome: Progressing  Goal: Verbalize understanding of personal risk factors for fall in the hospital  Outcome: Progressing  Goal: Verbalize understanding of risk factor reduction measures to prevent injury from fall in the home  Outcome: Progressing  Goal: Use assistive devices by end of the shift  Outcome: Progressing  Goal: Pace activities to prevent fatigue by end of the shift  Outcome: Progressing     Problem: Nutrition  Goal: Less than 5 days NPO/clear liquids  Outcome: Progressing  Goal: Oral intake greater than  50%  Outcome: Progressing  Goal: Oral intake greater 75%  Outcome: Progressing  Goal: Consume prescribed supplement  Outcome: Progressing  Goal: Adequate PO fluid intake  Outcome: Progressing  Goal: Nutrition support goals are met within 48 hrs  Outcome: Progressing  Goal: Nutrition support is meeting 75% of nutrient needs  Outcome: Progressing  Goal: Tube feed tolerance  Outcome: Progressing  Goal: BG  mg/dL  Outcome: Progressing  Goal: Lab values WNL  Outcome: Progressing  Goal: Electrolytes WNL  Outcome: Progressing  Goal: Promote healing  Outcome: Progressing  Goal: Maintain stable weight  Outcome: Progressing  Goal: Reduce weight from edema/fluid  Outcome: Progressing  Goal: Gradual weight gain  Outcome: Progressing  Goal: Improve ostomy output  Outcome: Progressing     Problem: Dressings Lower Extremities  Goal: STG - Patient to complete lower body dressing with modified indep  Outcome: Progressing

## 2023-12-17 NOTE — DISCHARGE SUMMARY
DISCHARGE SUMMARY      Gaby Blue  Age:  73 y.o. female   :  1950  MRN:  [id number]    23    Date of admission:  2023     Date of discharge:   23     Attending physician at discharge:  Adriane Amezquita MD     Discharge Diagnoses:  -COPD exacerbation (CMS/HCC) with acute hypercapnic and hypoxic respiratory failure  -Acute delirium-resolved  -Volume overload  -Acute kidney injury on CKD stage III  -Steroid-induced hyperglycemia  -Uncontrolled hypertension    -Severe COPD  -Metastatic non-small cell CA of the lung  -Thrombocytopenia due to chemotherapy  -Chronic generalized pain  -Chronic insomnia    Hospital Course:    Patient is a 73-year-old white female with a known history of non-small cell lung CA (NSCLC, stage IV, cT4, cN3, pM1a) of left upper lobe with secondary malignant neoplasm to right lung, on combination radiation therapy as well as chemotherapy (carboplatin, gemcitabine) , COPD, chronic hypoxemic respiratory failure on home O2, HTN, diverticulosis.    She presented to the ER on  due to several days of increasing shortness of breath.  EMS found her to be hypoxic with an O2 sat in the 80s.  She was treated with Solu-Medrol, magnesium, and DuoNebs by EMS and placed on a nonrebreather.  In the ER she was tachycardic and hypertensive.  ABG showed a pH of 7.28, pCO2 of 74, pO2 of 68, and a bicarb of 34.8.  CBC with a WBC of 7.1, H/H was 6.8/22.3.  Platelet count 36 K.  Chemistry panel with unremarkable electrolytes other than a bicarb of 36, BUN 22 with a creatinine of 0.82.  LFTs normal.  Initial troponin was mildly elevated at 23, BNP was 453.  Lactate was 1.3.  Influenza and COVID screens negative.  CT angio of the chest showed no pulmonary emboli, she had diffuse edema suggestive of anasarca, emphysema, bilateral pleural effusions R >L, and bilateral upper lobe mass lesions with the right upper lobe lesion having increased in  size.  Patient received 2 units of packed cells and was admitted to the ICU with acute hypercapnic respiratory failure.    Patient was treated with high flow O2 and BiPAP with steroids, she received IV furosemide for fluid overload and anasarca.  She was treated with initially vancomycin, Zosyn, and Levaquin.  Her respiratory status improved and she was initially transferred out of the ICU on 12/10, however on the morning of 12/11 she became very short of breath after possibly aspirating while eating breakfast and had to be transferred back to the ICU for recurrent hypercapnic respiratory failure with an ABG pH of 7.26, pCO2 of 81, pO2 of 232, and bicarb of 36.3 with concerns of possible aspiration.  CXR showed no acute infiltrate.  She was treated with BiPAP and high flow O2, the following morning patient had an acute delirium and remained hypoxic off BiPAP.  She was able to be transition to high flow O2 and underwent a speech swallow eval on 12/13 with mild oral phase dysphagia but was deemed okay for thin liquids.  By 12/13 her mentation had improved.  She did develop some mild EMA with a creatinine up to 1.34.  Creatinine was down to 1.06 at the time of discharge.    Patient continued to improve after discharge from the ICU.  She did have significantly elevated blood pressures, and an additional dose of lisinopril was added in the evenings to her morning lisinopril HCT.  Hydralazine/Apresoline 50 mg 3 times a day was also added.    Smoking cessation was discussed with the patient, she was given a nicotine patch.  She was using Xopenex at home PTA, she was given a prescription for ipratropium/albuterol to use scheduled 3 times daily.    Patient declined going to SNF, but is going home with [facility].  She will follow-up with her PCP,  Dr. Hawk Comer after discharge.    Procedures:  None    Problem list:  Problem List Items Addressed This Visit          Cardiac and Vasculature    Uncontrolled hypertension     Relevant Medications    hydrALAZINE (Apresoline) 50 mg tablet    lisinopril 20 mg tablet       Coag and Thromboembolic    Thrombocytopenia (CMS/[facility])    Relevant Medications    aspirin chewable tablet 81 mg    enoxaparin (Lovenox) syringe 40 mg       Hematology and Neoplasia    Lung cancer (CMS/[facility])    Relevant Medications    OLANZapine (ZyPREXA) 5 mg tablet    Other Relevant Orders    Referral to Home Health       Pulmonary and Pneumonias    * (Principal) COPD exacerbation (CMS/[facility]) - Primary    Relevant Medications    ipratropium-albuteroL (Duo-Neb) 0.5-2.5 mg/3 mL nebulizer solution    Other Relevant Orders    Referral to Healthy at Home Program    Referral to Home Health    Acute hypercapnic respiratory failure (CMS/[facility])    Relevant Medications    predniSONE (Deltasone) 10 mg tablet (Start on 12/18/2023)    ipratropium-albuteroL (Duo-Neb) 0.5-2.5 mg/3 mL nebulizer solution    Other Relevant Orders    Referral to Home Health       Sleep    Insomnia disorder    Relevant Medications    OLANZapine (ZyPREXA) 5 mg tablet    gabapentin (Neurontin) 400 mg capsule       Tobacco    Tobacco abuse    Relevant Medications    nicotine (Nicoderm CQ) 21 mg/24 hr patch (Start on 12/18/2023)     Other Visit Diagnoses       Pneumonia due to infectious organism, unspecified laterality, unspecified part of lung        Anemia, unspecified type                 Disposition:  Home with [facility]    Issues Requiring Follow-Up:  Blood pressure control    Condition on Discharge:  Fair    Discharge medications:  Medication List      START taking these medications     gabapentin 400 mg capsule; Commonly known as: Neurontin; Take 1 capsule   (400 mg) by mouth 3 times a day.; Replaces: gabapentin 800 mg tablet   hydrALAZINE 50 mg tablet; Commonly known as: Apresoline; Take 1 tablet   (50 mg) by mouth 3 times a day.   ipratropium-albuteroL 0.5-2.5 mg/3 mL nebulizer solution; Commonly known   as: Duo-Neb; Take 3 mL by nebulization 3 times a day.    lisinopril 20 mg tablet; Take 1 tablet (20 mg) by mouth once daily in   the evening.  Take before meals.   nicotine 21 mg/24 hr patch; Commonly known as: Nicoderm CQ; Place 1   patch over 24 hours on the skin once daily. Do not start before December 18, 2023.; Start taking on: December 18, 2023   predniSONE 10 mg tablet; Commonly known as: Deltasone; Take 2 tablets   (20 mg) by mouth once daily for 10 days. Do not start before December 18, 2023.; Start taking on: December 18, 2023     CHANGE how you take these medications     fluticasone-umeclidin-vilanter 100-62.5-25 mcg blister with device;   Commonly known as: TRELEGY-ELLIPTA; What changed: Another medication with   the same name was removed. Continue taking this medication, and follow the   directions you see here.   OLANZapine 5 mg tablet; Commonly known as: ZyPREXA; Take 1 tablet (5 mg)   by mouth once daily at bedtime.; What changed: medication strength, how   much to take     CONTINUE taking these medications     albuterol 90 mcg/actuation inhaler   ALPRAZolam 1 mg tablet; Commonly known as: Xanax   aspirin 81 mg chewable tablet   buPROPion  mg 24 hr tablet; Commonly known as: Wellbutrin XL   dronabinol 2.5 mg capsule; Commonly known as: Marinol   fluticasone 50 mcg/actuation nasal spray; Commonly known as: Flonase   folic acid 1 mg tablet; Commonly known as: Folvite   levalbuterol 1.25 mg/3 mL nebulizer solution; Commonly known as: Xopenex   lisinopriL-hydrochlorothiazide 20-25 mg tablet   loratadine 10 mg tablet; Commonly known as: Claritin   metoprolol succinate XL 50 mg 24 hr tablet; Commonly known as: Toprol-XL   montelukast 10 mg tablet; Commonly known as: Singulair   oxyCODONE-acetaminophen 7.5-325 mg tablet; Commonly known as: Percocet   oxygen gas therapy; Commonly known as: O2   pantoprazole 40 mg EC tablet; Commonly known as: ProtoNix     STOP taking these medications     fluticasone furoate-vilanteroL 200-25 mcg/dose inhaler; Commonly  "known   as: Evi Maciasta   gabapentin 800 mg tablet; Commonly known as: Neurontin; Replaced by:   gabapentin 400 mg capsule    Discharge physical exam:  Vital signs:  Blood pressure 157/70, pulse 61, temperature 36.8 °C (98.2 °F), resp. rate 18, height 1.55 m (5' 1.02\"), weight 65.7 kg (144 lb 13.5 oz), SpO2 95 %.     Test Results Pending At Discharge:  None     Code Status:  Full Code     Outpatient Follow-Up: To follow-up with her PCP,  Dr. Hawk Comer in 1 week.  Follow-up with palliative care as scheduled.  Follow-up with her oncologist, Dr. Colon as scheduled.    Adriane Amezquita MD  12/17/23    *Some of this note was completed using Dragon voice recognition technology and may include unintended errors with respect to translation of words, typographical errors or grammar errors which may not have been identified prior to finalization of the chart note.  >31 minutes patient care/medication reconciliation/discharge coordination and discussion of discharge instructions & follow-up with the patient.      "

## 2023-12-18 ENCOUNTER — PATIENT OUTREACH (OUTPATIENT)
Dept: CARE COORDINATION | Age: 73
End: 2023-12-18
Payer: MEDICARE

## 2023-12-18 ENCOUNTER — PATIENT OUTREACH (OUTPATIENT)
Dept: CARDIOLOGY | Facility: CLINIC | Age: 73
End: 2023-12-18
Payer: MEDICARE

## 2023-12-18 ENCOUNTER — DOCUMENTATION (OUTPATIENT)
Dept: HOME HEALTH SERVICES | Age: 73
End: 2023-12-18
Payer: MEDICARE

## 2023-12-18 SDOH — ECONOMIC STABILITY: FOOD INSECURITY: WITHIN THE PAST 12 MONTHS, THE FOOD YOU BOUGHT JUST DIDN'T LAST AND YOU DIDN'T HAVE MONEY TO GET MORE.: NEVER TRUE

## 2023-12-18 SDOH — HEALTH STABILITY: PHYSICAL HEALTH: ON AVERAGE, HOW MANY DAYS PER WEEK DO YOU ENGAGE IN MODERATE TO STRENUOUS EXERCISE (LIKE A BRISK WALK)?: 0 DAYS

## 2023-12-18 SDOH — SOCIAL STABILITY: SOCIAL NETWORK: HOW OFTEN DO YOU ATTEND CHURCH OR RELIGIOUS SERVICES?: NEVER

## 2023-12-18 SDOH — SOCIAL STABILITY: SOCIAL INSECURITY: WITHIN THE LAST YEAR, HAVE YOU BEEN AFRAID OF YOUR PARTNER OR EX-PARTNER?: NO

## 2023-12-18 SDOH — SOCIAL STABILITY: SOCIAL INSECURITY
WITHIN THE LAST YEAR, HAVE YOU BEEN KICKED, HIT, SLAPPED, OR OTHERWISE PHYSICALLY HURT BY YOUR PARTNER OR EX-PARTNER?: NO

## 2023-12-18 SDOH — SOCIAL STABILITY: SOCIAL NETWORK: HOW OFTEN DO YOU GET TOGETHER WITH FRIENDS OR RELATIVES?: MORE THAN THREE TIMES A WEEK

## 2023-12-18 SDOH — HEALTH STABILITY: MENTAL HEALTH: HOW OFTEN DO YOU HAVE A DRINK CONTAINING ALCOHOL?: NEVER

## 2023-12-18 SDOH — SOCIAL STABILITY: SOCIAL NETWORK: ARE YOU MARRIED, WIDOWED, DIVORCED, SEPARATED, NEVER MARRIED, OR LIVING WITH A PARTNER?: WIDOWED

## 2023-12-18 SDOH — SOCIAL STABILITY: SOCIAL INSECURITY
WITHIN THE LAST YEAR, HAVE TO BEEN RAPED OR FORCED TO HAVE ANY KIND OF SEXUAL ACTIVITY BY YOUR PARTNER OR EX-PARTNER?: NO

## 2023-12-18 SDOH — HEALTH STABILITY: MENTAL HEALTH
STRESS IS WHEN SOMEONE FEELS TENSE, NERVOUS, ANXIOUS, OR CAN'T SLEEP AT NIGHT BECAUSE THEIR MIND IS TROUBLED. HOW STRESSED ARE YOU?: TO SOME EXTENT

## 2023-12-18 SDOH — ECONOMIC STABILITY: FOOD INSECURITY: WITHIN THE PAST 12 MONTHS, YOU WORRIED THAT YOUR FOOD WOULD RUN OUT BEFORE YOU GOT MONEY TO BUY MORE.: NEVER TRUE

## 2023-12-18 SDOH — ECONOMIC STABILITY: INCOME INSECURITY: IN THE PAST 12 MONTHS, HAS THE ELECTRIC, GAS, OIL, OR WATER COMPANY THREATENED TO SHUT OFF SERVICE IN YOUR HOME?: NO

## 2023-12-18 SDOH — SOCIAL STABILITY: SOCIAL NETWORK
DO YOU BELONG TO ANY CLUBS OR ORGANIZATIONS SUCH AS CHURCH GROUPS UNIONS, FRATERNAL OR ATHLETIC GROUPS, OR SCHOOL GROUPS?: NO

## 2023-12-18 SDOH — SOCIAL STABILITY: SOCIAL NETWORK
IN A TYPICAL WEEK, HOW MANY TIMES DO YOU TALK ON THE PHONE WITH FAMILY, FRIENDS, OR NEIGHBORS?: MORE THAN THREE TIMES A WEEK

## 2023-12-18 SDOH — SOCIAL STABILITY: SOCIAL INSECURITY: WITHIN THE LAST YEAR, HAVE YOU BEEN HUMILIATED OR EMOTIONALLY ABUSED IN OTHER WAYS BY YOUR PARTNER OR EX-PARTNER?: NO

## 2023-12-18 SDOH — SOCIAL STABILITY: SOCIAL NETWORK: HOW OFTEN DO YOU ATTENT MEETINGS OF THE CLUB OR ORGANIZATION YOU BELONG TO?: NEVER

## 2023-12-18 SDOH — HEALTH STABILITY: PHYSICAL HEALTH: ON AVERAGE, HOW MANY MINUTES DO YOU ENGAGE IN EXERCISE AT THIS LEVEL?: 0 MIN

## 2023-12-18 NOTE — PROGRESS NOTES
Discharge Facility:  [facility]  Discharge Diagnosis:  COPD  Admission Date:  12/10/23  Discharge Date:  12/17/23    PCP Appointment Date: Dr Comer 12/19/23  Specialist Appointment Date: 1/2/24 Pallative cancer center  Hospital Encounter and Summary: Linked  See discharge assessment below for further details     Engagement  Call Start Time: 1116 (12/18/2023 11:24 AM)    Medications  Medications reviewed with patient/caregiver?: Yes (12/18/2023 11:24 AM)  Is the patient having any side effects they believe may be caused by any medication additions or changes?: No (12/18/2023 11:24 AM)  Does the patient have all medications ordered at discharge?: Yes (12/18/2023 11:24 AM)  Is the patient taking all medications as directed (includes completed medication regime)?: Yes (12/18/2023 11:24 AM)    Appointments  Does the patient have a primary care provider?: Yes (12/18/2023 11:24 AM)  Care Management Interventions: Verified appointment date/time/provider (12/18/2023 11:24 AM)    Self Management  What is the home health agency?: [facility] at home (12/18/2023 11:24 AM)  What Durable Medical Equipment (DME) was ordered?: patient continues on Home oxygenat 5 liter per NC (12/18/2023 11:24 AM)    Patient Teaching  Does the patient have access to their discharge instructions?: Yes (12/18/2023 11:24 AM)  Care Management Interventions: Reviewed instructions with patient (12/18/2023 11:24 AM)  What is the patient's perception of their health status since discharge?: Improving (12/18/2023 11:24 AM)    Wrap Up  Wrap Up Additional Comments: Patient denies any SOB at this time. She states it is nice to be able to go to the bathroom and not feel lightheaded like Im going to pass out. Patient has 5 liters of oxygen via NC. She monitors her home oxygen saturations and she states its been between 97-99%. Patient states she has not smoked in 21 days. Patient is scheduled an appt with Dr Comer while on this call. (12/18/2023 11:24 AM)  Call  End Time: 1127 (12/18/2023 11:24 AM)

## 2023-12-18 NOTE — PROGRESS NOTES
I explained the Healthy@Home program to the patient in detail. She confirmed that she wanted to enroll in the program.     Patient's Address:   [address] [place] [address] [place] [address]  **  If this is not the address patient will receive services - alert team and address in EMR**       Patient Contacts:  Extended Emergency Contact Information  Primary Emergency Contact: Natali Davenport  [place] Phone: [phone]  Relation: Sibling  Secondary Emergency Contact: Sushil lyon  [place] Phone: [phone]  Relation: Friend                                Patient's Preferred Phone: [phone]  Patient's E-mail: [email or web address]

## 2023-12-19 ENCOUNTER — PATIENT OUTREACH (OUTPATIENT)
Dept: HOME HEALTH SERVICES | Age: 73
End: 2023-12-19

## 2023-12-19 ENCOUNTER — OFFICE VISIT (OUTPATIENT)
Dept: PRIMARY CARE | Facility: CLINIC | Age: 73
End: 2023-12-19
Payer: MEDICARE

## 2023-12-19 VITALS
WEIGHT: 140.6 LBS | SYSTOLIC BLOOD PRESSURE: 148 MMHG | TEMPERATURE: 97.8 F | OXYGEN SATURATION: 92 % | BODY MASS INDEX: 26.55 KG/M2 | HEIGHT: 61 IN | HEART RATE: 68 BPM | DIASTOLIC BLOOD PRESSURE: 86 MMHG

## 2023-12-19 DIAGNOSIS — C34.12 MALIGNANT NEOPLASM OF UPPER LOBE OF LEFT LUNG (MULTI): ICD-10-CM

## 2023-12-19 DIAGNOSIS — J44.1 COPD EXACERBATION (MULTI): ICD-10-CM

## 2023-12-19 DIAGNOSIS — J96.02 ACUTE HYPERCAPNIC RESPIRATORY FAILURE (MULTI): ICD-10-CM

## 2023-12-19 DIAGNOSIS — Z09 HOSPITAL DISCHARGE FOLLOW-UP: Primary | ICD-10-CM

## 2023-12-19 PROCEDURE — 3077F SYST BP >= 140 MM HG: CPT | Performed by: INTERNAL MEDICINE

## 2023-12-19 PROCEDURE — 1111F DSCHRG MED/CURRENT MED MERGE: CPT | Performed by: INTERNAL MEDICINE

## 2023-12-19 PROCEDURE — 1126F AMNT PAIN NOTED NONE PRSNT: CPT | Performed by: INTERNAL MEDICINE

## 2023-12-19 PROCEDURE — 3079F DIAST BP 80-89 MM HG: CPT | Performed by: INTERNAL MEDICINE

## 2023-12-19 PROCEDURE — 1159F MED LIST DOCD IN RCRD: CPT | Performed by: INTERNAL MEDICINE

## 2023-12-19 PROCEDURE — 1036F TOBACCO NON-USER: CPT | Performed by: INTERNAL MEDICINE

## 2023-12-19 PROCEDURE — 99214 OFFICE O/P EST MOD 30 MIN: CPT | Performed by: INTERNAL MEDICINE

## 2023-12-19 ASSESSMENT — ENCOUNTER SYMPTOMS
BLOOD IN STOOL: 0
JOINT SWELLING: 0
CHEST TIGHTNESS: 0
ACTIVITY CHANGE: 0
PALPITATIONS: 0
FEVER: 0
SPEECH DIFFICULTY: 0
LIGHT-HEADEDNESS: 0
HEMATURIA: 0
STRIDOR: 0
EYE REDNESS: 0

## 2023-12-19 NOTE — PROGRESS NOTES
Daily Call Note:   Ms. Blue denies increased shortness of breathe.  Hx of COPD and Lung cancer.  Patient states she quit smoking over 1 month ago.  Ms. Blue is currently on 5 liters of oxygen via nasal canula. Patient states baseline oxygen requirement was 3 liters prior to hospitalization. Initial Healthy at Home visit, December 20, 2023 at 10:30.  Patient is aware of visit.  Patient encouraged to have all current medications available to discuss with the pharmacist.  Patient has no questions or concerns at this time.     Pt Education:   Barriers:   Topics for Daily Review:   Pt demonstrates clear understanding:     Daily Weight:  There were no vitals filed for this visit.   Last 3 Weights:  Wt Readings from Last 7 Encounters:   12/19/23 63.8 kg (140 lb 9.6 oz)   12/17/23 65.7 kg (144 lb 13.5 oz)   03/03/23 64.4 kg (142 lb)   10/26/22 69 kg (152 lb 1.9 oz)   10/07/22 68 kg (150 lb)   07/26/22 68 kg (150 lb)   10/12/20 60.9 kg (134 lb 4.2 oz)       Masimo Device:    Masimo Clinical Impression:     Virtual Visits--Scheduled (Most Recent Date at Top)  Follow up Appointments  Recent Visits  No visits were found meeting these conditions.  Showing recent visits within past 30 days and meeting all other requirements  Today's Visits  Date Type Provider Dept   12/19/23 Office Visit Hawk Comer MD [facility]   Showing today's visits and meeting all other requirements  Future Appointments  No visits were found meeting these conditions.  Showing future appointments within next 90 days and meeting all other requirements       Frequency of RN Calls & Virtual Visits per Team Agreement: Daily      Medication issues Addressed (what was done):     Follow up appointments scheduled by [facility] Staff:   Referrals made by [facility] staff:       Acute [facility] Care Transitions Assessment    Patient's Address:   [address] [place] [address] [place] [address]  **  If this is not the address patient will receive services - alert team  and address in EMR**       Patient Contacts:  Extended Emergency Contact Information  Primary Emergency Contact: Natali Davenport  [place] Phone: [phone]  Relation: Sibling  Secondary Emergency Contact: Sushil lyon  [employer] Phone: [phone]  Relation: Friend                                Patient's Preferred Phone: [phone]  Patient's E-mail: [email or web address]

## 2023-12-19 NOTE — PROGRESS NOTES
CHIEF COMPLAINT:    Gaby Blue is a 73 y.o. female who presents for Hospital Follow-up (TCM/HOSPITAL DISCHARGE FOLLOW-UP, COPD PNEUMONIA. ).    HISTORY OF PRESENT ILLNESS:  Gaby Blue  is a pleasant 73-year-old female comes here for hospital discharge follow-up.  I have reviewed ER visit summary, ER physician's assessment, imaging studies, biochemical lab results and EKG.  I have also reviewed hospital course, consultants' notes, med reconciliation and discharge summary.  Patient was admitted for COPD exacerbation along with hypercapnic respiratory failure.  She was treated with aggressive pulmonary toileting and generous bronchodilator therapy.  She was sent home with antibiotic and tapering steroid.  She is done with those and now doing much better.  She is on supplemental oxygen currently using about 4 to 5 L via nasal cannula.  Normally she is at 3 L supplemental oxygen via nasal cannula.  Patient is trying to wean it off.  Patient tells me that her nebulizer is 24 years old and need hardly works at home.  She does not get enough aerosolized neb treatment through that mention.  We have provided her a nebulizer from our office through her insurance.        Review of Systems   Constitutional:  Negative for activity change and fever.   HENT:  Negative for hearing loss, nosebleeds and tinnitus.    Eyes:  Negative for redness.   Respiratory:  Negative for chest tightness and stridor.    Cardiovascular:  Negative for chest pain, palpitations and leg swelling.   Gastrointestinal:  Negative for blood in stool.   Endocrine: Negative for cold intolerance.   Genitourinary:  Negative for hematuria.   Musculoskeletal:  Negative for joint swelling.   Skin:  Negative for rash.   Neurological:  Negative for speech difficulty and light-headedness.   Psychiatric/Behavioral:  Negative for behavioral problems.      Visit Vitals  /86 (BP Location: Right arm, Patient Position: Sitting, BP Cuff Size: Adult)   Pulse 68  "  Temp 36.6 °C (97.8 °F) (Temporal)   Ht 1.549 m (5' 1\")   Wt 63.8 kg (140 lb 9.6 oz)   SpO2 92%   BMI 26.57 kg/m²   OB Status Postmenopausal   Smoking Status Former   BSA 1.66 m²         Wt Readings from Last 10 Encounters:   12/19/23 63.8 kg (140 lb 9.6 oz)   12/17/23 65.7 kg (144 lb 13.5 oz)   03/03/23 64.4 kg (142 lb)   10/26/22 69 kg (152 lb 1.9 oz)   10/07/22 68 kg (150 lb)   07/26/22 68 kg (150 lb)   10/12/20 60.9 kg (134 lb 4.2 oz)       Physical Exam  Constitutional:       General: She is not in acute distress.     Appearance: Normal appearance.   HENT:      Head: Normocephalic.      Right Ear: Tympanic membrane normal.      Left Ear: Tympanic membrane normal.      Mouth/Throat:      Mouth: Mucous membranes are moist.   Cardiovascular:      Rate and Rhythm: Normal rate and regular rhythm.      Heart sounds: No murmur heard.  Pulmonary:      Effort: No respiratory distress.   Abdominal:      Palpations: Abdomen is soft.   Musculoskeletal:      Cervical back: Neck supple.      Right lower leg: No edema.      Left lower leg: No edema.   Skin:     Findings: No rash.   Neurological:      General: No focal deficit present.      Mental Status: She is alert and oriented to person, place, and time.   Psychiatric:         Mood and Affect: Mood normal.        RECENT LABS:  Lab Results   Component Value Date    WBC 7.3 12/17/2023    HGB 10.2 (L) 12/17/2023    HCT 31.8 (L) 12/17/2023     (L) 12/17/2023    CHOL 147 05/09/2019    TRIG 140 05/09/2019    HDL 54.0 05/09/2019    ALT 9 12/17/2023    AST 14 12/17/2023     (L) 12/17/2023    K 3.0 (L) 12/17/2023    CL 93 (L) 12/17/2023    CREATININE 0.93 12/17/2023    BUN 26 (H) 12/17/2023    CO2 38 (H) 12/17/2023    TSH 0.27 (L) 02/01/2023    INR 1.0 12/09/2023    HGBA1C 5.5 01/31/2023     IMAGING:  Reviewed:   MEDICATIONS:   Current Outpatient Medications   Medication Instructions    albuterol 90 mcg/actuation inhaler 2 puffs, inhalation, 4 times daily PRN    " ALPRAZolam (XANAX) 1 mg, oral, 3 times daily PRN    aspirin 81 mg, oral, Daily    buPROPion XL (WELLBUTRIN XL) 300 mg, oral, Daily    dronabinol (MARINOL) 2.5 mg, oral, 2 times daily before meals    fluticasone (Flonase) 50 mcg/actuation nasal spray 1 spray, nasal, 2 times daily    fluticasone-umeclidin-vilanter (TRELEGY-ELLIPTA) 100-62.5-25 mcg blister with device 1 puff, inhalation, Daily    folic acid (Folvite) 1 mg tablet 1 tablet, oral, Daily    gabapentin (NEURONTIN) 400 mg, oral, 3 times daily    hydrALAZINE (APRESOLINE) 50 mg, oral, 3 times daily    ipratropium-albuteroL (Duo-Neb) 0.5-2.5 mg/3 mL nebulizer solution 3 mL, nebulization, 3 times daily    levalbuterol (Xopenex) 1.25 mg/3 mL nebulizer solution 3 mL, inhalation, Every 4 hours PRN    lisinopriL-hydrochlorothiazide 20-25 mg tablet 1 tablet, oral, Daily    lisinopril 20 mg, oral, Daily before evening meal    loratadine (Claritin) 10 mg tablet 1 tablet, oral, Daily    metoprolol succinate XL (TOPROL-XL) 50 mg, oral, Daily    montelukast (Singulair) 10 mg tablet 1 tablet, oral, Daily    nicotine (Nicoderm CQ) 21 mg/24 hr patch 1 patch, transdermal, Daily    OLANZapine (ZYPREXA) 5 mg, oral, Nightly    oxyCODONE-acetaminophen (Percocet) 7.5-325 mg tablet 1 tablet, oral, Every 4 hours PRN    oxygen (O2) 3 L/min, inhalation, Continuous    pantoprazole (ProtoNix) 40 mg EC tablet 1 tablet, oral, Daily    predniSONE (DELTASONE) 20 mg, oral, Daily      TODAY'S VISIT  DX:   1. Hospital discharge follow-up        2. COPD exacerbation (CMS/HCC)        3. Acute hypercapnic respiratory failure (CMS/HCC)        4. Malignant neoplasm of upper lobe of left lung (CMS/HCC)           MEDICAL DECISION MAKING:  - Recent lab work and relevant imaging studies have been reviewed.    - The current active medical co morbidities have been considered.   - Relevant correspondence/notes from specialty consultants were reviewed and discussed with patient.    - Patient was given  treatment as per above plan.   - Patient will continue with current medical therapy and plan.   - Medication have been sent for refill.    - Next Follow up in 3 months.

## 2023-12-20 ENCOUNTER — HOME CARE VISIT (OUTPATIENT)
Dept: HOME HEALTH SERVICES | Facility: HOME HEALTH | Age: 73
End: 2023-12-20
Payer: MEDICARE

## 2023-12-20 ENCOUNTER — PATIENT OUTREACH (OUTPATIENT)
Dept: HOME HEALTH SERVICES | Age: 73
End: 2023-12-20

## 2023-12-20 PROCEDURE — G0299 HHS/HOSPICE OF RN EA 15 MIN: HCPCS

## 2023-12-20 PROCEDURE — 0023 HH SOC

## 2023-12-20 NOTE — PROGRESS NOTES
Daily Call Note: Initial [facility] call completed with Dr Durand from pharmacy patient is doing OK she is still on 5l nc her baseline is 3l nc her sats have been 96 % medication list reviewed patient did not get her nicotine patch due to cost patient does not have a rescue inhaler so they are going to send a rescue inhaler to her pharmacy patient is already set up with a patient assistance for her meds patient is going to turn her o2 down to 4l nc and she will monitor with her POX and call if she has any concerns     Pt Education: meds reviewed   Barriers: none  Topics for Daily Review: meds pox o2 nebulizers  Pt demonstrates clear understanding: Yes    Daily Weight:  There were no vitals filed for this visit.   Last 3 Weights:  Wt Readings from Last 7 Encounters:   12/19/23 63.8 kg (140 lb 9.6 oz)   12/17/23 65.7 kg (144 lb 13.5 oz)   03/03/23 64.4 kg (142 lb)   10/26/22 69 kg (152 lb 1.9 oz)   10/07/22 68 kg (150 lb)   07/26/22 68 kg (150 lb)   10/12/20 60.9 kg (134 lb 4.2 oz)       Masimo Device: No   Masimo Clinical Impression: may be a good candidate     Virtual Visits--Scheduled (Most Recent Date at Top)  Follow up Appointments  Recent Visits  Date Type Provider Dept   12/19/23 Office Visit Hawk Comer MD [facility]   Showing recent visits within past 30 days and meeting all other requirements  Future Appointments  No visits were found meeting these conditions.  Showing future appointments within next 90 days and meeting all other requirements       Frequency of RN Calls & Virtual Visits per Team Agreement: [facility] Frequency: Daily    Medication issues Addressed (what was done): reviewed script sent for rescue inhaler     Follow up appointments scheduled by [facility] Staff: NA  Referrals made by [facility] staff: NA      Acute [facility] Care Transitions Assessment    Patient's Address:   [address] [place] [address] [place] [address]  **  If this is not the address patient will receive services -  alert team and address in EMR**       Patient Contacts:  Extended Emergency Contact Information  Primary Emergency Contact: Natali Davenport  Home Phone: [phone]  Relation: Sibling  Secondary Emergency Contact: Sushil lyon  [employer] Phone: [phone]  Relation: Friend                                Patient's Preferred Phone: [phone]  Patient's E-mail: [email or web address]

## 2023-12-20 NOTE — CASE COMMUNICATION
Bed bugs noted on patient and multiple flying insects in the home.  There is also a large dog that the patient cannot control and the dog was moderately aggresive, growling and barking if I came near the patient.  Please be sure that dog is locked in cage prior to entering home.

## 2023-12-21 ENCOUNTER — PATIENT OUTREACH (OUTPATIENT)
Dept: HOME HEALTH SERVICES | Age: 73
End: 2023-12-21
Payer: MEDICARE

## 2023-12-21 VITALS
WEIGHT: 128 LBS | HEART RATE: 79 BPM | TEMPERATURE: 97.8 F | RESPIRATION RATE: 18 BRPM | SYSTOLIC BLOOD PRESSURE: 128 MMHG | BODY MASS INDEX: 22.68 KG/M2 | DIASTOLIC BLOOD PRESSURE: 86 MMHG | HEIGHT: 63 IN

## 2023-12-21 VITALS — OXYGEN SATURATION: 93 %

## 2023-12-21 SDOH — HEALTH STABILITY: MENTAL HEALTH: SMOKING IN HOME: 0

## 2023-12-21 SDOH — ECONOMIC STABILITY: HOUSING INSECURITY: EVIDENCE OF SMOKING MATERIAL: 0

## 2023-12-21 ASSESSMENT — ENCOUNTER SYMPTOMS
PAIN SEVERITY GOAL: 0/10
HIGHEST PAIN SEVERITY IN PAST 24 HOURS: 5/10
DYSPNEA ACTIVITY LEVEL: AFTER AMBULATING 10 - 20 FT
COUGH: 1
PAIN LOCATION: BACK
PAIN LOCATION - PAIN QUALITY: ACHE
LOWEST PAIN SEVERITY IN PAST 24 HOURS: 3/10
LOSS OF SENSATION IN FEET: 0
SHORTNESS OF BREATH: 1
COUGH CHARACTERISTICS: NON-PRODUCTIVE
DEPRESSION: 0
OCCASIONAL FEELINGS OF UNSTEADINESS: 0
PAIN: 1
APPETITE LEVEL: FAIR
PAIN LOCATION - PAIN SEVERITY: 5/10
PERSON REPORTING PAIN: PATIENT
DYSPNEA ACTIVITY LEVEL: WHILE SPEAKING
SUBJECTIVE PAIN PROGRESSION: UNCHANGED
CHANGE IN APPETITE: UNCHANGED

## 2023-12-21 ASSESSMENT — PAIN SCALES - PAIN ASSESSMENT IN ADVANCED DEMENTIA (PAINAD)
TOTALSCORE: 1
CONSOLABILITY: 0 - NO NEED TO CONSOLE.
BODYLANGUAGE: 0 - RELAXED.
BODYLANGUAGE: 0
FACIALEXPRESSION: 0 - SMILING OR INEXPRESSIVE.
BREATHING: 1
FACIALEXPRESSION: 0
NEGVOCALIZATION: 0
NEGVOCALIZATION: 0 - NONE.
CONSOLABILITY: 0

## 2023-12-21 ASSESSMENT — LIFESTYLE VARIABLES: SMOKING_STATUS: 0

## 2023-12-21 ASSESSMENT — ACTIVITIES OF DAILY LIVING (ADL)
ENTERING_EXITING_HOME: MODERATE ASSIST
AMBULATION ASSISTANCE: 1
AMBULATION ASSISTANCE: STAND BY ASSIST
OASIS_M1830: 05

## 2023-12-21 NOTE — PROGRESS NOTES
Daily Call Note: Spoke with patient , states feeling better , still a little weak, states has been consistent with using nebulizer 4x /day, finds it helpful. Has home care coming in , also has referral for physical therapy. No concerns regarding meds, no other concerns voiced. Has returned to baseline with O2 usage, at 3L/n/c    Pt Education: yes, spoke with client reinforced need to use nebulizer as ordered, taking meds as prescribed  Barriers: no  Topics for Daily Review: pulse ox , nebulizer, meds, O2 therapy  Pt demonstrates clear understanding: Yes    Daily Weight:  There were no vitals filed for this visit.   Last 3 Weights:  Wt Readings from Last 7 Encounters:   12/20/23 58.1 kg (128 lb)   12/19/23 63.8 kg (140 lb 9.6 oz)   12/17/23 65.7 kg (144 lb 13.5 oz)   03/03/23 64.4 kg (142 lb)   10/26/22 69 kg (152 lb 1.9 oz)   10/07/22 68 kg (150 lb)   07/26/22 68 kg (150 lb)       Masimo Device: No   Masimo Clinical Impression: no    Virtual Visits--Scheduled (Most Recent Date at Top)  Follow up Appointments  Recent Visits  Date Type Provider Dept   12/19/23 [facility]   Showing recent visits within past 30 days and meeting all other requirements  Future Appointments  No visits were found meeting these conditions.  Showing future appointments within next 90 days and meeting all other requirements       Frequency of RN Calls & Virtual Visits per Team Agreement: Healthy at Home Frequency: Daily    Medication issues Addressed (what was done): confirmed taking meds as prescribed, use Nebulizer as ordered     Follow up appointments scheduled by [facility] Staff: no  Referrals made by [facility] staff: nono      [facility] Care Transitions Assessment    Patient's Address:   [address] [place] [address] [place] [address]  **  If this is not the address patient will receive services - alert team and address in EMR**       Patient Contacts:  Extended Emergency Contact Information  Primary  Emergency Contact: Natali Davenport  Home Phone: [phone]  Relation: Sibling  Secondary Emergency Contact: Sushil lyon  Mobile Phone: [phone]  Relation: Friend                                Patient's Preferred Phone: [phone]  Patient's E-mail: [email or web address]

## 2023-12-22 ENCOUNTER — APPOINTMENT (OUTPATIENT)
Dept: RADIOLOGY | Facility: HOSPITAL | Age: 73
DRG: 305 | End: 2023-12-22
Payer: MEDICARE

## 2023-12-22 ENCOUNTER — APPOINTMENT (OUTPATIENT)
Dept: CARDIOLOGY | Facility: HOSPITAL | Age: 73
DRG: 305 | End: 2023-12-22
Payer: MEDICARE

## 2023-12-22 ENCOUNTER — HOSPITAL ENCOUNTER (OUTPATIENT)
Facility: HOSPITAL | Age: 73
Setting detail: OBSERVATION
LOS: 1 days | Discharge: HOME | DRG: 305 | End: 2023-12-23
Attending: STUDENT IN AN ORGANIZED HEALTH CARE EDUCATION/TRAINING PROGRAM | Admitting: INTERNAL MEDICINE
Payer: MEDICARE

## 2023-12-22 ENCOUNTER — PATIENT OUTREACH (OUTPATIENT)
Dept: HOME HEALTH SERVICES | Age: 73
End: 2023-12-22
Payer: MEDICARE

## 2023-12-22 ENCOUNTER — TELEPHONE (OUTPATIENT)
Dept: PRIMARY CARE | Facility: CLINIC | Age: 73
End: 2023-12-22
Payer: MEDICARE

## 2023-12-22 DIAGNOSIS — I16.1 HYPERTENSIVE EMERGENCY: Primary | ICD-10-CM

## 2023-12-22 DIAGNOSIS — R79.89 ELEVATED TROPONIN: ICD-10-CM

## 2023-12-22 DIAGNOSIS — R60.0 EDEMA OF LEFT LOWER EXTREMITY: ICD-10-CM

## 2023-12-22 LAB
ALBUMIN SERPL BCP-MCNC: 3.5 G/DL (ref 3.4–5)
ALP SERPL-CCNC: 56 U/L (ref 33–136)
ALT SERPL W P-5'-P-CCNC: 8 U/L (ref 7–45)
ANION GAP SERPL CALC-SCNC: 11 MMOL/L (ref 10–20)
AST SERPL W P-5'-P-CCNC: 15 U/L (ref 9–39)
BASOPHILS # BLD AUTO: 0.01 X10*3/UL (ref 0–0.1)
BASOPHILS NFR BLD AUTO: 0.1 %
BILIRUB SERPL-MCNC: 0.8 MG/DL (ref 0–1.2)
BNP SERPL-MCNC: 87 PG/ML (ref 0–99)
BUN SERPL-MCNC: 22 MG/DL (ref 6–23)
CALCIUM SERPL-MCNC: 8.7 MG/DL (ref 8.6–10.3)
CARDIAC TROPONIN I PNL SERPL HS: 30 NG/L (ref 0–13)
CARDIAC TROPONIN I PNL SERPL HS: 43 NG/L (ref 0–13)
CARDIAC TROPONIN I PNL SERPL HS: 56 NG/L (ref 0–13)
CHLORIDE SERPL-SCNC: 100 MMOL/L (ref 98–107)
CO2 SERPL-SCNC: 33 MMOL/L (ref 21–32)
CREAT SERPL-MCNC: 1.01 MG/DL (ref 0.5–1.05)
EOSINOPHIL # BLD AUTO: 0 X10*3/UL (ref 0–0.4)
EOSINOPHIL NFR BLD AUTO: 0 %
ERYTHROCYTE [DISTWIDTH] IN BLOOD BY AUTOMATED COUNT: 18.2 % (ref 11.5–14.5)
GFR SERPL CREATININE-BSD FRML MDRD: 59 ML/MIN/1.73M*2
GLUCOSE SERPL-MCNC: 124 MG/DL (ref 74–99)
HCT VFR BLD AUTO: 35.8 % (ref 36–46)
HGB BLD-MCNC: 11 G/DL (ref 12–16)
IMM GRANULOCYTES # BLD AUTO: 0.03 X10*3/UL (ref 0–0.5)
IMM GRANULOCYTES NFR BLD AUTO: 0.4 % (ref 0–0.9)
INR PPP: 1 (ref 0.9–1.1)
LYMPHOCYTES # BLD AUTO: 0.75 X10*3/UL (ref 0.8–3)
LYMPHOCYTES NFR BLD AUTO: 9.6 %
MCH RBC QN AUTO: 31.4 PG (ref 26–34)
MCHC RBC AUTO-ENTMCNC: 30.7 G/DL (ref 32–36)
MCV RBC AUTO: 102 FL (ref 80–100)
MONOCYTES # BLD AUTO: 1.39 X10*3/UL (ref 0.05–0.8)
MONOCYTES NFR BLD AUTO: 17.8 %
NEUTROPHILS # BLD AUTO: 5.61 X10*3/UL (ref 1.6–5.5)
NEUTROPHILS NFR BLD AUTO: 72.1 %
NRBC BLD-RTO: 0 /100 WBCS (ref 0–0)
PLATELET # BLD AUTO: 159 X10*3/UL (ref 150–450)
POTASSIUM SERPL-SCNC: 3.6 MMOL/L (ref 3.5–5.3)
PROT SERPL-MCNC: 6.1 G/DL (ref 6.4–8.2)
PROTHROMBIN TIME: 11 SECONDS (ref 9.8–12.8)
RBC # BLD AUTO: 3.5 X10*6/UL (ref 4–5.2)
SODIUM SERPL-SCNC: 140 MMOL/L (ref 136–145)
WBC # BLD AUTO: 7.8 X10*3/UL (ref 4.4–11.3)

## 2023-12-22 PROCEDURE — 85610 PROTHROMBIN TIME: CPT | Performed by: HOME HEALTH

## 2023-12-22 PROCEDURE — 36415 COLL VENOUS BLD VENIPUNCTURE: CPT | Performed by: HOME HEALTH

## 2023-12-22 PROCEDURE — 84484 ASSAY OF TROPONIN QUANT: CPT | Performed by: HOME HEALTH

## 2023-12-22 PROCEDURE — 71045 X-RAY EXAM CHEST 1 VIEW: CPT

## 2023-12-22 PROCEDURE — 99223 1ST HOSP IP/OBS HIGH 75: CPT | Performed by: INTERNAL MEDICINE

## 2023-12-22 PROCEDURE — 93005 ELECTROCARDIOGRAM TRACING: CPT

## 2023-12-22 PROCEDURE — G0378 HOSPITAL OBSERVATION PER HR: HCPCS

## 2023-12-22 PROCEDURE — 2500000001 HC RX 250 WO HCPCS SELF ADMINISTERED DRUGS (ALT 637 FOR MEDICARE OP): Performed by: HOME HEALTH

## 2023-12-22 PROCEDURE — 71045 X-RAY EXAM CHEST 1 VIEW: CPT | Performed by: RADIOLOGY

## 2023-12-22 PROCEDURE — 1210000001 HC SEMI-PRIVATE ROOM DAILY

## 2023-12-22 PROCEDURE — 83880 ASSAY OF NATRIURETIC PEPTIDE: CPT | Performed by: HOME HEALTH

## 2023-12-22 PROCEDURE — 85025 COMPLETE CBC W/AUTO DIFF WBC: CPT | Performed by: HOME HEALTH

## 2023-12-22 PROCEDURE — 93971 EXTREMITY STUDY: CPT

## 2023-12-22 PROCEDURE — 99285 EMERGENCY DEPT VISIT HI MDM: CPT | Performed by: STUDENT IN AN ORGANIZED HEALTH CARE EDUCATION/TRAINING PROGRAM

## 2023-12-22 PROCEDURE — 93005 ELECTROCARDIOGRAM TRACING: CPT | Mod: MUE

## 2023-12-22 PROCEDURE — 93971 EXTREMITY STUDY: CPT | Performed by: RADIOLOGY

## 2023-12-22 PROCEDURE — 80053 COMPREHEN METABOLIC PANEL: CPT | Performed by: HOME HEALTH

## 2023-12-22 RX ORDER — LISINOPRIL 10 MG/1
20 TABLET ORAL ONCE
Status: COMPLETED | OUTPATIENT
Start: 2023-12-22 | End: 2023-12-22

## 2023-12-22 RX ORDER — ACETAMINOPHEN 325 MG/1
975 TABLET ORAL ONCE
Status: COMPLETED | OUTPATIENT
Start: 2023-12-22 | End: 2023-12-22

## 2023-12-22 RX ORDER — HYDRALAZINE HYDROCHLORIDE 25 MG/1
50 TABLET, FILM COATED ORAL ONCE
Status: COMPLETED | OUTPATIENT
Start: 2023-12-22 | End: 2023-12-22

## 2023-12-22 RX ORDER — LABETALOL HYDROCHLORIDE 5 MG/ML
10 INJECTION, SOLUTION INTRAVENOUS ONCE
Status: DISCONTINUED | OUTPATIENT
Start: 2023-12-22 | End: 2023-12-23 | Stop reason: HOSPADM

## 2023-12-22 RX ADMIN — HYDRALAZINE HYDROCHLORIDE 50 MG: 25 TABLET ORAL at 19:38

## 2023-12-22 RX ADMIN — LISINOPRIL 20 MG: 10 TABLET ORAL at 19:38

## 2023-12-22 RX ADMIN — ACETAMINOPHEN 975 MG: 325 TABLET ORAL at 21:29

## 2023-12-22 ASSESSMENT — LIFESTYLE VARIABLES
HAVE PEOPLE ANNOYED YOU BY CRITICIZING YOUR DRINKING: NO
EVER FELT BAD OR GUILTY ABOUT YOUR DRINKING: NO
EVER HAD A DRINK FIRST THING IN THE MORNING TO STEADY YOUR NERVES TO GET RID OF A HANGOVER: NO
HAVE YOU EVER FELT YOU SHOULD CUT DOWN ON YOUR DRINKING: NO
REASON UNABLE TO ASSESS: NO

## 2023-12-22 ASSESSMENT — PAIN DESCRIPTION - LOCATION: LOCATION: HIP

## 2023-12-22 ASSESSMENT — PAIN - FUNCTIONAL ASSESSMENT: PAIN_FUNCTIONAL_ASSESSMENT: 0-10

## 2023-12-22 ASSESSMENT — COLUMBIA-SUICIDE SEVERITY RATING SCALE - C-SSRS
1. IN THE PAST MONTH, HAVE YOU WISHED YOU WERE DEAD OR WISHED YOU COULD GO TO SLEEP AND NOT WAKE UP?: NO
2. HAVE YOU ACTUALLY HAD ANY THOUGHTS OF KILLING YOURSELF?: NO
6. HAVE YOU EVER DONE ANYTHING, STARTED TO DO ANYTHING, OR PREPARED TO DO ANYTHING TO END YOUR LIFE?: NO

## 2023-12-22 ASSESSMENT — PAIN SCALES - GENERAL: PAINLEVEL_OUTOF10: 7

## 2023-12-22 NOTE — PROGRESS NOTES
Daily Call Note:   Daily call complete. Pt states she is good and has no complaints. Advised pt of holiday call schedule. No other issues.   Pt Education: na  Barriers: na  Topics for Daily Review: na  Pt demonstrates clear understanding: Yes    Daily Weight:  There were no vitals filed for this visit.   Last 3 Weights:  Wt Readings from Last 7 Encounters:   12/20/23 58.1 kg (128 lb)   12/19/23 63.8 kg (140 lb 9.6 oz)   12/17/23 65.7 kg (144 lb 13.5 oz)   03/03/23 64.4 kg (142 lb)   10/26/22 69 kg (152 lb 1.9 oz)   10/07/22 68 kg (150 lb)   07/26/22 68 kg (150 lb)       Masimo Device: No   Masimo Clinical Impression: na    Virtual Visits--Scheduled (Most Recent Date at Top)  Follow up Appointments  Recent Visits  Date Type Provider Dept   12/19/23 Office Visit Hawk Comer MD [facility]   Showing recent visits within past 30 days and meeting all other requirements  Future Appointments  No visits were found meeting these conditions.  Showing future appointments within next 90 days and meeting all other requirements       Frequency of RN Calls & Virtual Visits per Team Agreement: Healthy at Home Frequency: Daily    Medication issues Addressed (what was done): na    Follow up appointments scheduled by [facility] Staff: na  Referrals made by [facility] staff: na      [facility] Care Transitions Assessment    Patient's Address:   [address] [place] [address] [place] [address]  **  If this is not the address patient will receive services - alert team and address in EMR**       Patient Contacts:  Extended Emergency Contact Information  Primary Emergency Contact: Natali Davenport  Home Phone: [phone]  Relation: Sibling  Secondary Emergency Contact: Sushil lyon  Mobile Phone: [phone]  Relation: Friend                                Patient's Preferred Phone: [phone]  Patient's E-mail: [email or web address]

## 2023-12-22 NOTE — ED PROVIDER NOTES
HPI   Chief Complaint   Patient presents with    Leg Swelling       Patient is a 73-year-old female presenting to the ED with left lower leg swelling.  Past medical history significant for COPD treated with 3 L of oxygen nasal cannula chronically.  History of hypertension, non-small cell lung carcinoma.  Patient was recently hospitalized approximately a week ago due to a COPD exacerbation resulting in hypercapnia and placement into the ICU.  Patient states that she noticed today that her left leg has progressively increased in swelling when she contacted her PCP and they advised her to come to the ED for an ultrasound to rule out a DVT.  Patient has no known history of DVTs or PEs.  Currently not on anticoagulation.  Has minimal pain associated in her left leg but does have mild pain in her left calf.  No numbness or tingling distally.  No injury or trauma.  Patient denies chest pain or shortness of breath.  No cough.  No abdominal pain, nausea or vomiting.  No fever or chills.                          No data recorded                Patient History   Past Medical History:   Diagnosis Date    Arthritis     Bilateral sensorineural hearing loss 03/06/2023    Chronic hypoxic respiratory failure, on home oxygen therapy (CMS/HCC)     COPD (chronic obstructive pulmonary disease) (CMS/HCC)     Diverticulitis     History of radiation therapy     History of transfusion     Hypertension     Non-small cell lung cancer (NSCLC) (CMS/HCC)     Secondary malignant neoplasm of right lung (CMS/HCC)     Ulnar neuropathy of left upper extremity 03/06/2023     Past Surgical History:   Procedure Laterality Date    APPENDECTOMY      CARDIAC CATHETERIZATION      COLON SURGERY      CT ANGIO NECK  09/26/2022    CT NECK ANGIO W AND WO IV CONTRAST    CT ANGIO NECK  12/17/2021    CT NECK ANGIO W AND WO IV CONTRAST    EXCISION BENIGN SKIN LESION SCALP / NECK / HANDS / FEET / GENITALIA      scalp    HAND SURGERY      HYSTERECTOMY  03/29/2018     Hysterectomy    MECKEL DIVERTICULUM EXCISION      TONSILLECTOMY  2018    Tonsillectomy     Family History   Problem Relation Name Age of Onset    COPD Mother      Cancer Father      Other (Lupus erythematosus) Other family history     Heart disease Other family history      Social History     Tobacco Use    Smoking status: Former     Packs/day: 0.50     Years: 48.00     Additional pack years: 0.00     Total pack years: 24.00     Types: Cigarettes     Quit date: 2019     Years since quittin.1    Smokeless tobacco: Never   Vaping Use    Vaping Use: Never used   Substance Use Topics    Alcohol use: Not Currently    Drug use: Never       Physical Exam   ED Triage Vitals [23 1620]   Temp Heart Rate Resp BP   36 °C (96.8 °F) 97 20 148/65      SpO2 Temp Source Heart Rate Source Patient Position   92 % Temporal -- --      BP Location FiO2 (%)     Right arm --       Physical Exam  Vitals reviewed.   Constitutional:       Appearance: Normal appearance.   HENT:      Head: Normocephalic.      Nose: Nose normal.      Mouth/Throat:      Mouth: Mucous membranes are moist.      Pharynx: Oropharynx is clear.   Eyes:      Extraocular Movements: Extraocular movements intact.      Pupils: Pupils are equal, round, and reactive to light.   Cardiovascular:      Rate and Rhythm: Normal rate and regular rhythm.      Pulses: Normal pulses.      Heart sounds: Normal heart sounds.   Pulmonary:      Effort: Pulmonary effort is normal.      Breath sounds: Normal breath sounds. No wheezing, rhonchi or rales.   Musculoskeletal:         General: Normal range of motion.      Cervical back: Normal range of motion.      Left lower leg: Edema present.   Skin:     General: Skin is warm.   Neurological:      General: No focal deficit present.      Mental Status: She is alert. Mental status is at baseline.   Psychiatric:         Mood and Affect: Mood normal.         Behavior: Behavior normal.       Labs Reviewed   CBC WITH AUTO  DIFFERENTIAL - Abnormal       Result Value    WBC 7.8      nRBC 0.0      RBC 3.50 (*)     Hemoglobin 11.0 (*)     Hematocrit 35.8 (*)      (*)     MCH 31.4      MCHC 30.7 (*)     RDW 18.2 (*)     Platelets 159      Neutrophils % 72.1      Immature Granulocytes %, Automated 0.4      Lymphocytes % 9.6      Monocytes % 17.8      Eosinophils % 0.0      Basophils % 0.1      Neutrophils Absolute 5.61 (*)     Immature Granulocytes Absolute, Automated 0.03      Lymphocytes Absolute 0.75 (*)     Monocytes Absolute 1.39 (*)     Eosinophils Absolute 0.00      Basophils Absolute 0.01     COMPREHENSIVE METABOLIC PANEL - Abnormal    Glucose 124 (*)     Sodium 140      Potassium 3.6      Chloride 100      Bicarbonate 33 (*)     Anion Gap 11      Urea Nitrogen 22      Creatinine 1.01      eGFR 59 (*)     Calcium 8.7      Albumin 3.5      Alkaline Phosphatase 56      Total Protein 6.1 (*)     AST 15      Bilirubin, Total 0.8      ALT 8     SERIAL TROPONIN-INITIAL - Abnormal    Troponin I, High Sensitivity 30 (*)     Narrative:     Less than 99th percentile of normal range cutoff-  Female and children under 18 years old <14 ng/L; Male <21 ng/L: Negative  Repeat testing should be performed if clinically indicated.     Female and children under 18 years old 14-50 ng/L; Male 21-50 ng/L:  Consistent with possible cardiac damage and possible increased clinical   risk. Serial measurements may help to assess extent of myocardial damage.     >50 ng/L: Consistent with cardiac damage, increased clinical risk and  myocardial infarction. Serial measurements may help assess extent of   myocardial damage.      NOTE: Children less than 1 year old may have higher baseline troponin   levels and results should be interpreted in conjunction with the overall   clinical context.     NOTE: Troponin I testing is performed using a different   testing methodology at [facility] than at other   [facility]. Direct result comparisons  should only   be made within the same method.   SERIAL TROPONIN, 1 HOUR - Abnormal    Troponin I, High Sensitivity 43 (*)     Narrative:     Less than 99th percentile of normal range cutoff-  Female and children under 18 years old <14 ng/L; Male <21 ng/L: Negative  Repeat testing should be performed if clinically indicated.     Female and children under 18 years old 14-50 ng/L; Male 21-50 ng/L:  Consistent with possible cardiac damage and possible increased clinical   risk. Serial measurements may help to assess extent of myocardial damage.     >50 ng/L: Consistent with cardiac damage, increased clinical risk and  myocardial infarction. Serial measurements may help assess extent of   myocardial damage.      NOTE: Children less than 1 year old may have higher baseline troponin   levels and results should be interpreted in conjunction with the overall   clinical context.     NOTE: Troponin I testing is performed using a different   testing methodology at [facility]] than at other   [facility]. Direct result comparisons should only   be made within the same method.   TROPONIN I, HIGH SENSITIVITY - Abnormal    Troponin I, High Sensitivity 56 (*)     Narrative:     Less than 99th percentile of normal range cutoff-  Female and children under 18 years old <14 ng/L; Male <21 ng/L: Negative  Repeat testing should be performed if clinically indicated.     Female and children under 18 years old 14-50 ng/L; Male 21-50 ng/L:  Consistent with possible cardiac damage and possible increased clinical   risk. Serial measurements may help to assess extent of myocardial damage.     >50 ng/L: Consistent with cardiac damage, increased clinical risk and  myocardial infarction. Serial measurements may help assess extent of   myocardial damage.      NOTE: Children less than 1 year old may have higher baseline troponin   levels and results should be interpreted in conjunction with the overall   clinical context.     NOTE:  Troponin I testing is performed using a different   testing methodology at [facility] than at other   system [facility]. Direct result comparisons should only   be made within the same method.   PROTIME-INR - Normal    Protime 11.0      INR 1.0     B-TYPE NATRIURETIC PEPTIDE - Normal    BNP 87      Narrative:        <100 pg/mL - Heart failure unlikely  100-299 pg/mL - Intermediate probability of acute heart                  failure exacerbation. Correlate with clinical                  context and patient history.    >=300 pg/mL - Heart Failure likely. Correlate with clinical                  context and patient history.    BNP testing is performed using different testing methodology at [facility] than at other [facility] hospitals. Direct result comparisons should only be made within the same method.      TROPONIN SERIES- (INITIAL, 1 HR)    Narrative:     The following orders were created for panel order Troponin Series, (0, 1 HR).  Procedure                               Abnormality         Status                     ---------                               -----------         ------                     Troponin I, High Sensiti...[[id number]]  Abnormal            Final result               Troponin, High Sensitivi...[[id number]]  Abnormal            Final result                 Please view results for these tests on the individual orders.     Lower extremity venous duplex left   Final Result   Negative study.  No deep venous thrombosis of the  left lower   extremity.        MACRO:   None        Signed by: Grover Barkley 12/22/2023 6:01 PM   Dictation workstation:   [id number]      XR chest 1 view   Final Result   Significant improvement of basilar opacities and effusions with a   small amount of basilar opacity and left effusion remaining favoring   interstitial edema.        Signed by: Nikhil Carranza 12/22/2023 5:44 PM   Dictation workstation:   [id number]          ED Course & MDM   Diagnoses  as of 12/22/23 2017   Hypertensive emergency   Elevated troponin   Edema of left lower extremity       Medical Decision Making  Independent Historians: Patient    MDM:   73-year-old female presenting to the ED with left lower leg swelling.  Symptoms started approximately 2 days ago and progressively worsening.  Patient was recently hospitalized following a COPD exacerbation.  Also has a history of lung cancer but no known history of DVTs or PEs.  No history of CHF.  Patient is currently not on anticoagulation.  Currently denying chest pain or shortness of breath.  Reports minimal pain in her left calf.  On exam patient has no signs of tachycardia pulse ox is at  92% which patient states is typical for her.  Normotensive.  Patient has mild edema of the left lower extremity which is nonpitting.  Mild calf tenderness to palpation.  Dorsal pedis pulse 2+.  Breath sounds auscultated bilateral with no adventitious sounds.  Patient is on 3 L oxygen nasal cannula.  Labs and imaging ordered to further evaluate patient's symptoms.    My interpretation of EKG performed at 1752 suggest normal sinus rhythm with a ventricular rate of 84 no ST elevations.  Repeat EKG performed at 1918 suggest sinus rhythm with premature atrial complexes.  Left anterior fascicular block.  Ventricular rate of 87 no ST elevation.    My interpretation of labs suggest CBC with no leukocytosis.  Mild anemia with hemoglobin 11.0 hematocrit 35.8.  CMP suggest no electrolyte abnormalities.  GFR 59 consistent with previous kidney functions.  Liver enzymes unremarkable.  PT/INR within normal limits.  BNP unremarkable reducing suspicion of CHF.  Initial troponin elevated at 30 with repeat troponin elevated at 43 and third troponin elevated at 56.  Patient's blood pressure did increase from the ED concerning for hypertensive emergency.  Patient was treated without home medications of lisinopril p.o. hydralazine p.o. in which her blood pressure improve  following the treatments.  Chest x-ray interpreted by radiologist suggest significant improvement of basilar opacities and effusions with a small amount of basilar opacity and left effusion remaining favoring interstitial edema.  Ultrasound suggest no signs of DVT of the left lower extremity.  Discussed this finding with patient and family which understand.    Patient reassessed and she was resting comfortably.  Still denying any chest pain or shortness of breath.  Still requiring 3 L of oxygen nasal cannula.  No pain in her left lower leg.  Discussed with patient and family due to her uptrending troponin and concern for hypertensive emergency patient would benefit from hospitalization and further evaluation in regards to her uptrending troponin.  Patient and family understand agree to this treatment plan.  Patient to hospitalist who is excepted the patient for admission.  Patient will be admitted with a diagnosis of hypertensive emergency, elevated troponin left lower extremity edema.    DDx/Differential: DVT, CHF, venous insufficiency, ACS, hypertensive emergency, renal failure    Diagnosis: hypertensive emergency, elevated troponin, left lower extremity edema        Dictation Disclaimer: Due to voice recognition software, sound alike and misspelled words may be contained in documentation. If you have any questions please contact me.            Procedure  Procedures    Attestation of Tavo Latham MD    This patient was seen, evaluated, treated, and dispositioned by the advanced practice provider. I performed an independent history and physical examination, discussed and reviewed pertinent aspects of the case, care, and management with the advanced practice provider, was physically available to the advanced practice provider for questions and consultation at the time the patient was being evaluated in the Emergency Department, and agree with the general content of the advanced practice provider's note, findings,  and plan of care.     In summary, the patient is a 73-year-old woman who presents with left leg swelling but no pain for the last day.  Patient denies any chest pain or shortness of breath or dizziness or headache or lightheadedness..     PHYSICAL EXAM:  VITAL SIGNS: Nursing notes reviewed.  GENERAL:  Alert and interactive  EYES:   Eyes track. No injection.  ENT:  Airway patent. Mucus membranes moist.  RESPIRATORY:  Nonlabored breathing. Chest rise symmetric  CARDIOVASCULAR:  [Regular rate.] [Regular rhythm.]  GASTROINTESTINAL:  No distension.  MUSCULOSKELETAL:  No deformity.  Mild swelling to the left lower extremity near the ankle but no warmth induration or erythema.  No calf tenderness or pain.  No pain with passive range of motion of the ankle.  NEUROLOGICAL:  Awake. Moves extremities  SKIN:  Warm. Dry.    The patient's ED course included EKG blood work and left lower extremity Doppler.  No evidence of DVT.  The etiology of the swelling is uncertain but doubt necrotizing soft tissue infection arterial occlusion or septic arthritis.  Patient was found to have an elevated troponin but a reassuring EKG.  We trended and was uptrending which is concerning given her elevated blood pressure.  This is concerning for hypertensive emergency as we gave her home medications which controlled her blood pressure and then it bounced back up.  We gave her a dose of labetalol which was able to control her blood pressure.  Patient be admitted to Dr. Katya lala who is her primary care provider.  He request that we speak with the intensivist and I spoke with the nurse practitioner in the ICU who agrees the patient is safe for the floor especially given the improved blood pressure.  We did not heparinize the patient given I do not believe that she has a subocclusive MI and believe that this is demand ischemia in the setting of elevated blood pressure.  Please refer to the advanced practice provider's note for further details of  this case.     ----------------------------------------------------     Karsten Preciado PA-C  12/26/23 1629

## 2023-12-22 NOTE — TELEPHONE ENCOUNTER
Patient called office advising that she has noticed some lower extremity swelling and she is concerned, she was recently discharged from an 8 day stay in the hospital for COPD exacerbation and pneumonia, advised to the nearest ER or call 911 patient verbalized understanding.

## 2023-12-23 ENCOUNTER — PATIENT OUTREACH (OUTPATIENT)
Dept: HOME HEALTH SERVICES | Age: 73
End: 2023-12-23
Payer: MEDICARE

## 2023-12-23 VITALS
SYSTOLIC BLOOD PRESSURE: 133 MMHG | WEIGHT: 141.54 LBS | DIASTOLIC BLOOD PRESSURE: 63 MMHG | OXYGEN SATURATION: 95 % | TEMPERATURE: 98.1 F | RESPIRATION RATE: 17 BRPM | HEIGHT: 63 IN | BODY MASS INDEX: 25.08 KG/M2 | HEART RATE: 66 BPM

## 2023-12-23 LAB
ALBUMIN SERPL BCP-MCNC: 2.7 G/DL (ref 3.4–5)
ALP SERPL-CCNC: 39 U/L (ref 33–136)
ALT SERPL W P-5'-P-CCNC: 7 U/L (ref 7–45)
ANION GAP SERPL CALC-SCNC: 7 MMOL/L (ref 10–20)
AST SERPL W P-5'-P-CCNC: 13 U/L (ref 9–39)
BILIRUB SERPL-MCNC: 0.7 MG/DL (ref 0–1.2)
BUN SERPL-MCNC: 21 MG/DL (ref 6–23)
CALCIUM SERPL-MCNC: 7.7 MG/DL (ref 8.6–10.3)
CARDIAC TROPONIN I PNL SERPL HS: 43 NG/L (ref 0–13)
CHLORIDE SERPL-SCNC: 103 MMOL/L (ref 98–107)
CO2 SERPL-SCNC: 31 MMOL/L (ref 21–32)
CREAT SERPL-MCNC: 0.94 MG/DL (ref 0.5–1.05)
ERYTHROCYTE [DISTWIDTH] IN BLOOD BY AUTOMATED COUNT: 17.8 % (ref 11.5–14.5)
GFR SERPL CREATININE-BSD FRML MDRD: 65 ML/MIN/1.73M*2
GLUCOSE SERPL-MCNC: 84 MG/DL (ref 74–99)
HCT VFR BLD AUTO: 27.5 % (ref 36–46)
HGB BLD-MCNC: 8.5 G/DL (ref 12–16)
MCH RBC QN AUTO: 31 PG (ref 26–34)
MCHC RBC AUTO-ENTMCNC: 30.9 G/DL (ref 32–36)
MCV RBC AUTO: 100 FL (ref 80–100)
NRBC BLD-RTO: 0 /100 WBCS (ref 0–0)
PLATELET # BLD AUTO: 123 X10*3/UL (ref 150–450)
POTASSIUM SERPL-SCNC: 3.3 MMOL/L (ref 3.5–5.3)
PROT SERPL-MCNC: 4.7 G/DL (ref 6.4–8.2)
RBC # BLD AUTO: 2.74 X10*6/UL (ref 4–5.2)
SODIUM SERPL-SCNC: 138 MMOL/L (ref 136–145)
WBC # BLD AUTO: 6.3 X10*3/UL (ref 4.4–11.3)

## 2023-12-23 PROCEDURE — 85027 COMPLETE CBC AUTOMATED: CPT | Performed by: INTERNAL MEDICINE

## 2023-12-23 PROCEDURE — 96360 HYDRATION IV INFUSION INIT: CPT

## 2023-12-23 PROCEDURE — G0378 HOSPITAL OBSERVATION PER HR: HCPCS

## 2023-12-23 PROCEDURE — 2500000002 HC RX 250 W HCPCS SELF ADMINISTERED DRUGS (ALT 637 FOR MEDICARE OP, ALT 636 FOR OP/ED): Performed by: INTERNAL MEDICINE

## 2023-12-23 PROCEDURE — 2500000004 HC RX 250 GENERAL PHARMACY W/ HCPCS (ALT 636 FOR OP/ED): Performed by: INTERNAL MEDICINE

## 2023-12-23 PROCEDURE — 96361 HYDRATE IV INFUSION ADD-ON: CPT

## 2023-12-23 PROCEDURE — 99238 HOSP IP/OBS DSCHRG MGMT 30/<: CPT | Performed by: INTERNAL MEDICINE

## 2023-12-23 PROCEDURE — 2500000001 HC RX 250 WO HCPCS SELF ADMINISTERED DRUGS (ALT 637 FOR MEDICARE OP): Performed by: INTERNAL MEDICINE

## 2023-12-23 PROCEDURE — 84075 ASSAY ALKALINE PHOSPHATASE: CPT | Performed by: INTERNAL MEDICINE

## 2023-12-23 PROCEDURE — 36415 COLL VENOUS BLD VENIPUNCTURE: CPT | Performed by: INTERNAL MEDICINE

## 2023-12-23 PROCEDURE — S4991 NICOTINE PATCH NONLEGEND: HCPCS | Performed by: INTERNAL MEDICINE

## 2023-12-23 PROCEDURE — 96372 THER/PROPH/DIAG INJ SC/IM: CPT | Mod: 59 | Performed by: INTERNAL MEDICINE

## 2023-12-23 PROCEDURE — 2500000005 HC RX 250 GENERAL PHARMACY W/O HCPCS: Performed by: INTERNAL MEDICINE

## 2023-12-23 PROCEDURE — 94640 AIRWAY INHALATION TREATMENT: CPT

## 2023-12-23 PROCEDURE — 84484 ASSAY OF TROPONIN QUANT: CPT | Performed by: INTERNAL MEDICINE

## 2023-12-23 RX ORDER — GUAIFENESIN/DEXTROMETHORPHAN 100-10MG/5
5 SYRUP ORAL EVERY 4 HOURS PRN
Status: DISCONTINUED | OUTPATIENT
Start: 2023-12-23 | End: 2023-12-23 | Stop reason: HOSPADM

## 2023-12-23 RX ORDER — FLUTICASONE FUROATE AND VILANTEROL 100; 25 UG/1; UG/1
1 POWDER RESPIRATORY (INHALATION)
Status: DISCONTINUED | OUTPATIENT
Start: 2023-12-23 | End: 2023-12-23 | Stop reason: HOSPADM

## 2023-12-23 RX ORDER — PANTOPRAZOLE SODIUM 40 MG/1
40 TABLET, DELAYED RELEASE ORAL DAILY
Status: DISCONTINUED | OUTPATIENT
Start: 2023-12-23 | End: 2023-12-23 | Stop reason: HOSPADM

## 2023-12-23 RX ORDER — PANTOPRAZOLE SODIUM 40 MG/1
40 TABLET, DELAYED RELEASE ORAL DAILY
Status: DISCONTINUED | OUTPATIENT
Start: 2023-12-23 | End: 2023-12-23 | Stop reason: SDUPTHER

## 2023-12-23 RX ORDER — ONDANSETRON HYDROCHLORIDE 2 MG/ML
4 INJECTION, SOLUTION INTRAVENOUS EVERY 8 HOURS PRN
Status: DISCONTINUED | OUTPATIENT
Start: 2023-12-23 | End: 2023-12-23 | Stop reason: HOSPADM

## 2023-12-23 RX ORDER — PANTOPRAZOLE SODIUM 40 MG/10ML
40 INJECTION, POWDER, LYOPHILIZED, FOR SOLUTION INTRAVENOUS DAILY
Status: DISCONTINUED | OUTPATIENT
Start: 2023-12-23 | End: 2023-12-23 | Stop reason: HOSPADM

## 2023-12-23 RX ORDER — MONTELUKAST SODIUM 10 MG/1
10 TABLET ORAL DAILY
Status: DISCONTINUED | OUTPATIENT
Start: 2023-12-23 | End: 2023-12-23 | Stop reason: HOSPADM

## 2023-12-23 RX ORDER — SODIUM CHLORIDE 9 MG/ML
100 INJECTION, SOLUTION INTRAVENOUS CONTINUOUS
Status: DISCONTINUED | OUTPATIENT
Start: 2023-12-23 | End: 2023-12-23 | Stop reason: HOSPADM

## 2023-12-23 RX ORDER — IBUPROFEN 200 MG
1 TABLET ORAL DAILY
Status: DISCONTINUED | OUTPATIENT
Start: 2023-12-23 | End: 2023-12-23 | Stop reason: HOSPADM

## 2023-12-23 RX ORDER — HEPARIN SODIUM 5000 [USP'U]/ML
5000 INJECTION, SOLUTION INTRAVENOUS; SUBCUTANEOUS EVERY 8 HOURS SCHEDULED
Status: DISCONTINUED | OUTPATIENT
Start: 2023-12-23 | End: 2023-12-23 | Stop reason: HOSPADM

## 2023-12-23 RX ORDER — IPRATROPIUM BROMIDE AND ALBUTEROL SULFATE 2.5; .5 MG/3ML; MG/3ML
3 SOLUTION RESPIRATORY (INHALATION) 3 TIMES DAILY
Status: DISCONTINUED | OUTPATIENT
Start: 2023-12-23 | End: 2023-12-23 | Stop reason: HOSPADM

## 2023-12-23 RX ORDER — HYDRALAZINE HYDROCHLORIDE 20 MG/ML
10 INJECTION INTRAMUSCULAR; INTRAVENOUS EVERY 6 HOURS PRN
Status: DISCONTINUED | OUTPATIENT
Start: 2023-12-23 | End: 2023-12-23 | Stop reason: HOSPADM

## 2023-12-23 RX ORDER — FOLIC ACID 1 MG/1
1 TABLET ORAL DAILY
Status: DISCONTINUED | OUTPATIENT
Start: 2023-12-23 | End: 2023-12-23 | Stop reason: HOSPADM

## 2023-12-23 RX ORDER — AMOXICILLIN 250 MG
2 CAPSULE ORAL 2 TIMES DAILY
Status: DISCONTINUED | OUTPATIENT
Start: 2023-12-23 | End: 2023-12-23 | Stop reason: HOSPADM

## 2023-12-23 RX ORDER — ONDANSETRON 4 MG/1
4 TABLET, FILM COATED ORAL EVERY 8 HOURS PRN
Status: DISCONTINUED | OUTPATIENT
Start: 2023-12-23 | End: 2023-12-23 | Stop reason: HOSPADM

## 2023-12-23 RX ORDER — METOPROLOL SUCCINATE 50 MG/1
50 TABLET, EXTENDED RELEASE ORAL DAILY
Status: DISCONTINUED | OUTPATIENT
Start: 2023-12-23 | End: 2023-12-23 | Stop reason: HOSPADM

## 2023-12-23 RX ORDER — OLANZAPINE 2.5 MG/1
5 TABLET ORAL NIGHTLY
Status: DISCONTINUED | OUTPATIENT
Start: 2023-12-23 | End: 2023-12-23 | Stop reason: HOSPADM

## 2023-12-23 RX ORDER — LISINOPRIL 20 MG/1
20 TABLET ORAL
Status: DISCONTINUED | OUTPATIENT
Start: 2023-12-23 | End: 2023-12-23 | Stop reason: HOSPADM

## 2023-12-23 RX ORDER — ACETAMINOPHEN 650 MG/1
650 SUPPOSITORY RECTAL EVERY 4 HOURS PRN
Status: DISCONTINUED | OUTPATIENT
Start: 2023-12-23 | End: 2023-12-23 | Stop reason: HOSPADM

## 2023-12-23 RX ORDER — GABAPENTIN 400 MG/1
400 CAPSULE ORAL 3 TIMES DAILY
Status: DISCONTINUED | OUTPATIENT
Start: 2023-12-23 | End: 2023-12-23 | Stop reason: HOSPADM

## 2023-12-23 RX ORDER — BUPROPION HYDROCHLORIDE 150 MG/1
300 TABLET ORAL DAILY
Status: DISCONTINUED | OUTPATIENT
Start: 2023-12-23 | End: 2023-12-23 | Stop reason: HOSPADM

## 2023-12-23 RX ORDER — ALPRAZOLAM 0.5 MG/1
1 TABLET ORAL 3 TIMES DAILY PRN
Status: DISCONTINUED | OUTPATIENT
Start: 2023-12-23 | End: 2023-12-23 | Stop reason: HOSPADM

## 2023-12-23 RX ORDER — NAPROXEN SODIUM 220 MG/1
81 TABLET, FILM COATED ORAL DAILY
Status: DISCONTINUED | OUTPATIENT
Start: 2023-12-23 | End: 2023-12-23 | Stop reason: HOSPADM

## 2023-12-23 RX ORDER — ACETAMINOPHEN 325 MG/1
650 TABLET ORAL EVERY 4 HOURS PRN
Status: DISCONTINUED | OUTPATIENT
Start: 2023-12-23 | End: 2023-12-23 | Stop reason: HOSPADM

## 2023-12-23 RX ORDER — OXYCODONE AND ACETAMINOPHEN 5; 325 MG/1; MG/1
1 TABLET ORAL EVERY 6 HOURS PRN
Status: DISCONTINUED | OUTPATIENT
Start: 2023-12-23 | End: 2023-12-23 | Stop reason: HOSPADM

## 2023-12-23 RX ORDER — HYDRALAZINE HYDROCHLORIDE 50 MG/1
50 TABLET, FILM COATED ORAL 3 TIMES DAILY
Status: DISCONTINUED | OUTPATIENT
Start: 2023-12-23 | End: 2023-12-23 | Stop reason: HOSPADM

## 2023-12-23 RX ORDER — ACETAMINOPHEN 160 MG/5ML
650 SOLUTION ORAL EVERY 4 HOURS PRN
Status: DISCONTINUED | OUTPATIENT
Start: 2023-12-23 | End: 2023-12-23 | Stop reason: HOSPADM

## 2023-12-23 RX ADMIN — FLUTICASONE FUROATE AND VILANTEROL TRIFENATATE 1 PUFF: 100; 25 POWDER RESPIRATORY (INHALATION) at 09:03

## 2023-12-23 RX ADMIN — BUPROPION HYDROCHLORIDE 300 MG: 150 TABLET, EXTENDED RELEASE ORAL at 09:03

## 2023-12-23 RX ADMIN — ALPRAZOLAM 1 MG: 0.5 TABLET ORAL at 02:17

## 2023-12-23 RX ADMIN — METOPROLOL SUCCINATE 50 MG: 50 TABLET, EXTENDED RELEASE ORAL at 09:03

## 2023-12-23 RX ADMIN — HEPARIN SODIUM 5000 UNITS: 5000 INJECTION INTRAVENOUS; SUBCUTANEOUS at 17:07

## 2023-12-23 RX ADMIN — TIOTROPIUM BROMIDE INHALATION SPRAY 2 PUFF: 3.12 SPRAY, METERED RESPIRATORY (INHALATION) at 09:03

## 2023-12-23 RX ADMIN — LISINOPRIL 20 MG: 20 TABLET ORAL at 17:07

## 2023-12-23 RX ADMIN — SODIUM CHLORIDE 100 ML/HR: 9 INJECTION, SOLUTION INTRAVENOUS at 02:13

## 2023-12-23 RX ADMIN — OLANZAPINE 5 MG: 2.5 TABLET ORAL at 02:13

## 2023-12-23 RX ADMIN — MONTELUKAST SODIUM 10 MG: 10 TABLET, FILM COATED ORAL at 09:04

## 2023-12-23 RX ADMIN — FOLIC ACID 1 MG: 1 TABLET ORAL at 09:04

## 2023-12-23 RX ADMIN — Medication 3 L/MIN: at 01:30

## 2023-12-23 RX ADMIN — HEPARIN SODIUM 5000 UNITS: 5000 INJECTION INTRAVENOUS; SUBCUTANEOUS at 05:29

## 2023-12-23 RX ADMIN — HYDRALAZINE HYDROCHLORIDE 50 MG: 50 TABLET ORAL at 17:07

## 2023-12-23 RX ADMIN — GABAPENTIN 400 MG: 400 CAPSULE ORAL at 09:03

## 2023-12-23 RX ADMIN — ASPIRIN 81 MG: 81 TABLET, CHEWABLE ORAL at 09:03

## 2023-12-23 RX ADMIN — DOCUSATE SODIUM AND SENNOSIDES 2 TABLET: 8.6; 5 TABLET, FILM COATED ORAL at 09:04

## 2023-12-23 RX ADMIN — NICOTINE 1 PATCH: 21 PATCH, EXTENDED RELEASE TRANSDERMAL at 09:04

## 2023-12-23 RX ADMIN — GABAPENTIN 400 MG: 400 CAPSULE ORAL at 17:07

## 2023-12-23 RX ADMIN — HYDRALAZINE HYDROCHLORIDE 50 MG: 50 TABLET ORAL at 09:04

## 2023-12-23 RX ADMIN — PANTOPRAZOLE SODIUM 40 MG: 40 TABLET, DELAYED RELEASE ORAL at 05:29

## 2023-12-23 RX ADMIN — IPRATROPIUM BROMIDE AND ALBUTEROL SULFATE 3 ML: 2.5; .5 SOLUTION RESPIRATORY (INHALATION) at 13:25

## 2023-12-23 SDOH — SOCIAL STABILITY: SOCIAL INSECURITY: WERE YOU ABLE TO COMPLETE ALL THE BEHAVIORAL HEALTH SCREENINGS?: YES

## 2023-12-23 SDOH — ECONOMIC STABILITY: FOOD INSECURITY: WITHIN THE PAST 12 MONTHS, YOU WORRIED THAT YOUR FOOD WOULD RUN OUT BEFORE YOU GOT MONEY TO BUY MORE.: NEVER TRUE

## 2023-12-23 SDOH — ECONOMIC STABILITY: INCOME INSECURITY: HOW HARD IS IT FOR YOU TO PAY FOR THE VERY BASICS LIKE FOOD, HOUSING, MEDICAL CARE, AND HEATING?: NOT VERY HARD

## 2023-12-23 SDOH — ECONOMIC STABILITY: INCOME INSECURITY: IN THE LAST 12 MONTHS, WAS THERE A TIME WHEN YOU WERE NOT ABLE TO PAY THE MORTGAGE OR RENT ON TIME?: NO

## 2023-12-23 SDOH — ECONOMIC STABILITY: HOUSING INSECURITY: IN THE LAST 12 MONTHS, HOW MANY PLACES HAVE YOU LIVED?: 1

## 2023-12-23 SDOH — ECONOMIC STABILITY: FOOD INSECURITY: WITHIN THE PAST 12 MONTHS, THE FOOD YOU BOUGHT JUST DIDN'T LAST AND YOU DIDN'T HAVE MONEY TO GET MORE.: NEVER TRUE

## 2023-12-23 SDOH — ECONOMIC STABILITY: HOUSING INSECURITY
IN THE LAST 12 MONTHS, WAS THERE A TIME WHEN YOU DID NOT HAVE A STEADY PLACE TO SLEEP OR SLEPT IN A SHELTER (INCLUDING NOW)?: NO

## 2023-12-23 SDOH — SOCIAL STABILITY: SOCIAL INSECURITY: ARE THERE ANY APPARENT SIGNS OF INJURIES/BEHAVIORS THAT COULD BE RELATED TO ABUSE/NEGLECT?: NO

## 2023-12-23 SDOH — SOCIAL STABILITY: SOCIAL INSECURITY: HAVE YOU HAD THOUGHTS OF HARMING ANYONE ELSE?: NO

## 2023-12-23 SDOH — SOCIAL STABILITY: SOCIAL INSECURITY: DO YOU FEEL ANYONE HAS EXPLOITED OR TAKEN ADVANTAGE OF YOU FINANCIALLY OR OF YOUR PERSONAL PROPERTY?: NO

## 2023-12-23 SDOH — SOCIAL STABILITY: SOCIAL INSECURITY: ABUSE: ADULT

## 2023-12-23 SDOH — SOCIAL STABILITY: SOCIAL INSECURITY: HAS ANYONE EVER THREATENED TO HURT YOUR FAMILY OR YOUR PETS?: NO

## 2023-12-23 SDOH — SOCIAL STABILITY: SOCIAL INSECURITY: DOES ANYONE TRY TO KEEP YOU FROM HAVING/CONTACTING OTHER FRIENDS OR DOING THINGS OUTSIDE YOUR HOME?: NO

## 2023-12-23 SDOH — SOCIAL STABILITY: SOCIAL INSECURITY: DO YOU FEEL UNSAFE GOING BACK TO THE PLACE WHERE YOU ARE LIVING?: NO

## 2023-12-23 SDOH — SOCIAL STABILITY: SOCIAL INSECURITY: ARE YOU OR HAVE YOU BEEN THREATENED OR ABUSED PHYSICALLY, EMOTIONALLY, OR SEXUALLY BY ANYONE?: NO

## 2023-12-23 ASSESSMENT — ACTIVITIES OF DAILY LIVING (ADL)
BATHING: INDEPENDENT
HEARING - RIGHT EAR: FUNCTIONAL
LACK_OF_TRANSPORTATION: NO
FEEDING YOURSELF: INDEPENDENT
HEARING - LEFT EAR: FUNCTIONAL
ADEQUATE_TO_COMPLETE_ADL: YES
PATIENT'S MEMORY ADEQUATE TO SAFELY COMPLETE DAILY ACTIVITIES?: YES
TOILETING: INDEPENDENT
DRESSING YOURSELF: INDEPENDENT
GROOMING: INDEPENDENT
WALKS IN HOME: INDEPENDENT
JUDGMENT_ADEQUATE_SAFELY_COMPLETE_DAILY_ACTIVITIES: YES

## 2023-12-23 ASSESSMENT — COGNITIVE AND FUNCTIONAL STATUS - GENERAL
CLIMB 3 TO 5 STEPS WITH RAILING: A LITTLE
CLIMB 3 TO 5 STEPS WITH RAILING: A LITTLE
MOBILITY SCORE: 23
PATIENT BASELINE BEDBOUND: NO
DAILY ACTIVITIY SCORE: 24
MOBILITY SCORE: 23
DAILY ACTIVITIY SCORE: 24

## 2023-12-23 ASSESSMENT — LIFESTYLE VARIABLES
AUDIT-C TOTAL SCORE: 0
SKIP TO QUESTIONS 9-10: 1
AUDIT-C TOTAL SCORE: 0
HOW MANY STANDARD DRINKS CONTAINING ALCOHOL DO YOU HAVE ON A TYPICAL DAY: PATIENT DOES NOT DRINK
HOW OFTEN DO YOU HAVE 6 OR MORE DRINKS ON ONE OCCASION: NEVER
HOW OFTEN DO YOU HAVE A DRINK CONTAINING ALCOHOL: NEVER

## 2023-12-23 ASSESSMENT — PAIN SCALES - GENERAL
PAINLEVEL_OUTOF10: 0 - NO PAIN
PAINLEVEL_OUTOF10: 6

## 2023-12-23 ASSESSMENT — PAIN - FUNCTIONAL ASSESSMENT: PAIN_FUNCTIONAL_ASSESSMENT: 0-10

## 2023-12-23 ASSESSMENT — PATIENT HEALTH QUESTIONNAIRE - PHQ9
1. LITTLE INTEREST OR PLEASURE IN DOING THINGS: NOT AT ALL
2. FEELING DOWN, DEPRESSED OR HOPELESS: NOT AT ALL
SUM OF ALL RESPONSES TO PHQ9 QUESTIONS 1 & 2: 0

## 2023-12-23 NOTE — H&P
CHIEF COMPLAINT:  Lower extremity edema       HISTORY OF PRESENT ILLNESS.:   Gaby Blue is a 73 y.o. female presenting with left lower extremity edema.  She called her primary care physician's office and was not able to get in because of the busy schedule.  She was told over the phone that she should go to the emergency department.  Patient otherwise did not have much problem.  She was recently discharged from the hospital for COPD exacerbation.  She even had a follow-up appointment after discharge with her primary care physician.  This time patient went came to the emergency department she was stable in terms of r respiratory symptoms.  However her blood pressure was so high.  Second blood pressure was about 220/124..  Lab work showed high troponin x 3 near 40-50.  She never complained about chest pain or shortness of breath.  She needed IV labetalol and hydralazine to bring her pressure down.  Since there is a increased cardiac marker with high blood pressure he was thought that she needed to be in the hospital for hypertensive emergency.  Patient was sent to the floor for further management.    PCP: Hawk Comer MD    REVIEW OF SYSTEMS:  Denies fever or chills.  No dizziness, syncope, or seizures.  No headaches. No chest pain, chest tightness. No shortness of breath.  No nausea, vomiting, or diarrhea.  No rash or abnormal bleeding.  Denies ankle swelling, muscle aches.  No depression or anxiety symptoms.  Remainder of review of systems is negative and also as per HPI.     PAST MEDICAL AND SURGICAL HISTORY:     Past Medical History:   Diagnosis Date    Arthritis     Bilateral sensorineural hearing loss 03/06/2023    Chronic hypoxic respiratory failure, on home oxygen therapy (CMS/HCC)     COPD (chronic obstructive pulmonary disease) (CMS/[facility])     Diverticulitis     History of radiation therapy     History of transfusion     Hypertension     Non-small cell lung cancer (NSCLC) (CMS/[facility])     Secondary  malignant neoplasm of right lung (CMS/HCC)     Ulnar neuropathy of left upper extremity 2023      Past Surgical History:   Procedure Laterality Date    APPENDECTOMY      CARDIAC CATHETERIZATION      COLON SURGERY      CT ANGIO NECK  2022    CT NECK ANGIO W AND WO IV CONTRAST    CT ANGIO NECK  2021    CT NECK ANGIO W AND WO IV CONTRAST    EXCISION BENIGN SKIN LESION SCALP / NECK / HANDS / FEET / GENITALIA      scalp    HAND SURGERY      HYSTERECTOMY  2018    Hysterectomy    MECKEL DIVERTICULUM EXCISION      TONSILLECTOMY  2018    Tonsillectomy         SOCIAL HISTORY:  Social History     Tobacco Use    Smoking status: Former     Packs/day: 0.50     Years: 48.00     Additional pack years: 0.00     Total pack years: 24.00     Types: Cigarettes     Quit date: 2019     Years since quittin.1    Smokeless tobacco: Never   Vaping Use    Vaping Use: Some days   Substance Use Topics    Alcohol use: Not Currently    Drug use: Never        VITALS:  Vitals:    23 1707   BP: 133/63   Pulse: 66   Resp:    Temp:    SpO2:         PHYSICAL EXAM:  HEENT:  Atraumatic, PERRL.  Conjunctivae clear.   Moist nasal mucous membranes. oropharynx non erythematous,   Neck:  Supple without thyromegaly or lymphadenopathy.  Lungs:  Clear to auscultation without rales, rhonchi, or rub.  Heart:  RRR, S1, S2, without M.  Abdomen:  Soft, non tender, no organ enlargement, bruit. Bowel sounds present . No CVA tenderness.  Extremities:  No edema. No calf swelling or tenderness.    Skin:  No rash, ecchymosis or erythema.      LAB RESULTS:  CBC:   Results from last 7 days   Lab Units 23  0532 23  1658 23  0512   WBC AUTO x10*3/uL 6.3 7.8 7.3   RBC AUTO x10*6/uL 2.74* 3.50* 3.27*   HEMOGLOBIN g/dL 8.5* 11.0* 10.2*   HEMATOCRIT % 27.5* 35.8* 31.8*   MCV fL 100 102* 97   MCH pg 31.0 31.4 31.2   MCHC g/dL 30.9* 30.7* 32.1   RDW % 17.8* 18.2* 17.6*   PLATELETS AUTO x10*3/uL 123* 159 114*     CMP:     Results from last 7 days   Lab Units 12/23/23  0533 12/22/23  1658 12/17/23  0512   SODIUM mmol/L 138 140 134*   POTASSIUM mmol/L 3.3* 3.6 3.0*   CHLORIDE mmol/L 103 100 93*   CO2 mmol/L 31 33* 38*   BUN mg/dL 21 22 26*   CREATININE mg/dL 0.94 1.01 0.93   GLUCOSE mg/dL 84 124* 90   PROTEIN TOTAL g/dL 4.7* 6.1* 5.5*   CALCIUM mg/dL 7.7* 8.7 8.4*   BILIRUBIN TOTAL mg/dL 0.7 0.8 0.7   ALK PHOS U/L 39 56 44   AST U/L 13 15 14   ALT U/L 7 8 9     BMP:    Results from last 7 days   Lab Units 12/23/23  0533 12/22/23 1658 12/17/23  0512   SODIUM mmol/L 138 140 134*   POTASSIUM mmol/L 3.3* 3.6 3.0*   CHLORIDE mmol/L 103 100 93*   CO2 mmol/L 31 33* 38*   BUN mg/dL 21 22 26*   CREATININE mg/dL 0.94 1.01 0.93   CALCIUM mg/dL 7.7* 8.7 8.4*   GLUCOSE mg/dL 84 124* 90     Magnesium:    Troponin:    Results from last 7 days   Lab Units 12/23/23  0156 12/22/23  1910 12/22/23  1743   TROPHS ng/L 43* 56* 43*     BNP:   Results from last 7 days   Lab Units 12/22/23  1658   BNP pg/mL 87     Lipid Panel:       IMAGING:  ECG 12 lead    Result Date: 12/22/2023  Sinus rhythm with Premature atrial complexes Left anterior fascicular block     Lower extremity venous duplex left  Result Date: 12/22/2023  Negative study.  No deep venous thrombosis of the  left lower extremity.       XR chest 1 view  Result Date: 12/22/2023  Significant improvement of basilar opacities and effusions with a small amount of basilar opacity and left effusion remaining favoring interstitial edema.        CURRENT MEDICATIONS:  aspirin, 81 mg, oral, Daily  buPROPion XL, 300 mg, oral, Daily  influenza, 0.7 mL, intramuscular, During hospitalization  tiotropium, 2 Inhalation, inhalation, Daily   And  fluticasone furoate-vilanteroL, 1 puff, inhalation, Daily  folic acid, 1 mg, oral, Daily  gabapentin, 400 mg, oral, TID  heparin (porcine), 5,000 Units, subcutaneous, q8h RENAE  hydrALAZINE, 50 mg, oral, TID  ipratropium-albuteroL, 3 mL, nebulization, TID  labetaloL, 10 mg,  intravenous, Once  lisinopril, 20 mg, oral, Daily before evening meal  metoprolol succinate XL, 50 mg, oral, Daily  montelukast, 10 mg, oral, Daily  nicotine, 1 patch, transdermal, Daily  OLANZapine, 5 mg, oral, Nightly  pantoprazole, 40 mg, oral, Daily   Or  pantoprazole, 40 mg, intravenous, Daily  sennosides-docusate sodium, 2 tablet, oral, BID       PROBLEM LIST ON ADMISSION:  Elevated troponin [R79.89]  Hypertensive emergency [I16.1]  Edema of left lower extremity [R60.0]     HOSPITAL PROBLEM LIST:     Lung cancer (CMS/[facility])    COPD exacerbation (CMS/[facility])    Tobacco abuse    Primary hypertension     PLAN ON ADMISSION:  Patient be admitted to medical telemetry for closer monitoring of cardiorespiratory status.  Closer vital signs will be taken.  Will recheck her blood pressure.  She received IV hydralazine and labetalol in the emergency department since that her blood pressure has been stable.  Patient remained chest pain-free.  Will cycle the cardiac enzymes.  Her home medications will be resumed.  PT OT will be consulted.  DVT prophylaxis be done with heparin.  GI prophylaxis with with PPI.  I do not think the troponin is mostly because of the type II MI in the face of chronic kidney disease.  She had lower extremity ultrasound done which did not show any DVT.  She will get IV Lasix 1 dose.  CODE STATUS full.  Total time spent on this admission encounter was about 50 minutes.      *Some of this note was completed using Dragon voice recognition technology and may include unintended errors with respect to translation of words, typographical errors or grammar errors which may not have been identified prior to finalization of the chart note.

## 2023-12-23 NOTE — PROGRESS NOTES
Daily Call Note:   Mr. Blue was dis enrolled from [facility] due currently admitted inpatient at [facility].  I updated Ms. Blue.     Pt Education:   Barriers:   Topics for Daily Review:   Pt demonstrates clear understanding:     Daily Weight:  There were no vitals filed for this visit.   Last 3 Weights:  Wt Readings from Last 7 Encounters:   12/23/23 64.2 kg (141 lb 8.6 oz)   12/20/23 58.1 kg (128 lb)   12/19/23 63.8 kg (140 lb 9.6 oz)   12/17/23 65.7 kg (144 lb 13.5 oz)   03/03/23 64.4 kg (142 lb)   10/26/22 69 kg (152 lb 1.9 oz)   10/07/22 68 kg (150 lb)       Masimo Device:    Masimo Clinical Impression:     Virtual Visits--Scheduled (Most Recent Date at Top)  Follow up Appointments  Recent Visits  Date Type Provider Dept   12/19/23 Office Visit Hawk Comer MD [facility]   Showing recent visits within past 30 days and meeting all other requirements  Future Appointments  No visits were found meeting these conditions.  Showing future appointments within next 90 days and meeting all other requirements       Frequency of RN Calls & Virtual Visits per Team Agreement:     Medication issues Addressed (what was done):     Follow up appointments scheduled by [facility] Staff:   Referrals made by [facility] staff:       [facility] Care Transitions Assessment    Patient's Address:   [address] [place] [address] [place] [address]  **  If this is not the address patient will receive services - alert team and address in EMR**       Patient Contacts:  Extended Emergency Contact Information  Primary Emergency Contact: Natali Davenport  Home Phone: [phone]  Relation: Sibling  Secondary Emergency Contact: Sushil lyon  Mobile Phone: [phone]  Relation: Friend                                Patient's Preferred Phone: [phone]  Patient's E-mail: [email or web address]

## 2023-12-23 NOTE — CARE PLAN
"Falls prevention measures have been explained. Pt warned of bed alarm use if activated, use of nurse call bell system detailed and modifications made to devices if activation is difficult.   Purposeful hourly rounding program explained for assurance of the \"five Ps (pain, position, potty, possessions, privacy). Monitoring devices, IV tubing and SCD connecting tubes shown to pt to educate on their contribution to falling hazard and personal injury potential.   The above points reviewed with pts lacking retention of new information until evidence of learning is shown.     "

## 2023-12-23 NOTE — DISCHARGE SUMMARY
Discharge Diagnosis:   Hypertensive emergency     Discharge Date: 12/23/23   Admission Date: 12/22/2023     Discharge Physical Exam:    HEENT:  Atraumatic, PERRL. Conjunctivae clear.  Moist nasal mucous membranes.   Neck:  Supple without thyromegaly or lymphadenopathy.  Lungs:  Clear to auscultation without rales, rhonchi, or rub.  Heart:  RRR, S1, S2, without M.  Abdomen:  Soft, non tender, no organ enlargement.  Bowel sounds present . No CVA tenderness.  Extremities:  No edema. No calf swelling or tenderness.    Skin:  No rash, ecchymosis or erythema.    Hospital Course:   Gaby Blue is a pleasant 73-year-old female with history of COPD, chronic smoking, hypertension, hyperlipidemia, oxygen dependence comes to the emergency department for lower extremity edema.  Upon arrival she had very high blood pressure.  Troponin was also high.  She needed extra doses of hydralazine and labetalol.  Patient was sent to the floor for further.  During her hospital stay she did not have any spiking her blood pressure.  She went to well.  She did not have any chest pain.  Her troponins downtrending.      On the day of discharge patient was ambulating well, eating and drinking well. Physical exam was essentially benign and was at baseline. Blood chemistry and other lab testing results were at acceptable for discharge. Patient remained hemodynamically stable and with no further acute concern. Patient verbalized understanding of discharge medications and the expected adverse effects, if any.       The detail of the hospital course  including admission H&P, consult recommendations, biochemical lab results, imaging studies can be found in EHR of [facility]. Total 29 minutes time was spent on discharge exam, medicine reconciliation, discharge instructions and preparing discharge summary.     Pending Labs       No current pending labs.            Home Going Medications:      Medication List      CONTINUE taking these  medications     albuterol 90 mcg/actuation inhaler; Inhale 2 puffs every 4 hours if   needed for shortness of breath or wheezing.   ALPRAZolam 1 mg tablet; Commonly known as: Xanax   aspirin 81 mg chewable tablet   buPROPion  mg 24 hr tablet; Commonly known as: Wellbutrin XL   fluticasone-umeclidin-vilanter 100-62.5-25 mcg blister with device;   Commonly known as: TRELEGY-ELLIPTA   folic acid 1 mg tablet; Commonly known as: Folvite   gabapentin 400 mg capsule; Commonly known as: Neurontin; Take 1 capsule   (400 mg) by mouth 3 times a day.   hydrALAZINE 50 mg tablet; Commonly known as: Apresoline; Take 1 tablet   (50 mg) by mouth 3 times a day.   ipratropium-albuteroL 0.5-2.5 mg/3 mL nebulizer solution; Commonly known   as: Duo-Neb; Take 3 mL by nebulization 3 times a day.   levalbuterol 1.25 mg/3 mL nebulizer solution; Commonly known as: Xopenex   lisinopril 20 mg tablet; Take 1 tablet (20 mg) by mouth once daily in   the evening.  Take before meals.   metoprolol succinate XL 50 mg 24 hr tablet; Commonly known as: Toprol-XL   montelukast 10 mg tablet; Commonly known as: Singulair   nicotine 21 mg/24 hr patch; Commonly known as: Nicoderm CQ; Place 1   patch over 24 hours on the skin once daily. Do not start before December 18, 2023.   OLANZapine 5 mg tablet; Commonly known as: ZyPREXA; Take 1 tablet (5 mg)   by mouth once daily at bedtime.   oxyCODONE-acetaminophen 7.5-325 mg tablet; Commonly known as: Percocet   oxygen gas therapy; Commonly known as: O2   pantoprazole 40 mg EC tablet; Commonly known as: ProtoNix   predniSONE 10 mg tablet; Commonly known as: Deltasone; Take 2 tablets   (20 mg) by mouth once daily for 10 days. Do not start before December 18, 2023.       Outpatient Follow up:   Future Appointments   Date Time Provider Department Center   12/27/2023 To Be Determined Leilani Newton LPN [facility]   12/28/2023 To Be Determined FRANKIE Hong [facility]   1/3/2024 To Be  Determined Leilani Newton LPN [facility]   1/10/2024 To Be Determined Leilani Newton G[facility]MEZ [facility]     PCP: Hawk Comer MD   The transitional care management team of [facility] will contact patient.    Patient was also advised to call [phone] for hospital discharge follow-up with PCP in 7 day from today.          PS: This note was completed using Dragon voice recognition technology and may include unintended errors with respect to translation of words, typographical or grammar errors which may not have been identified while finalizing the chart.

## 2023-12-24 NOTE — DOCUMENTATION CLARIFICATION NOTE
"    PATIENT:               ALLEN CABRERA  ACCT #:                  [id number]  MRN:                       [id number]  :                       1950  ADMIT DATE:       2023 8:34 PM  DISCH DATE:        2023 4:43 PM  RESPONDING PROVIDER #:        [id number]          PROVIDER RESPONSE TEXT:    Aspiration PNA    CDI QUERY TEXT:    [facility]_Pneumonia Specificity    Instruction:    Based on your assessment of the patient and the clinical information, please provide the requested documentation by clicking on the appropriate radio button and enter any additional information if prompted.    Question: Please further specify the type of pneumonia that was presented on admission    When answering this query, please exercise your independent professional judgment. The fact that a question is being asked, does not imply that any particular answer is desired or expected.    The patient's clinical indicators include:  Clinical Information:  The patient is a 73 year old female who presented to the ED in acute hypoxic and hypercapnic respiratory failure.    Clinical Indicators:    ED Note  \"Pneumonia due to infectious organism, unspecified laterality, unspecified part of lung: acute illness or injury\"    ICU HP 12/10 \"Shortness of breath associated with respiratory cough with yellow sputum.  Shortness of breath persistent and increasing over the last couple days.\"  \"Pneumonia.  CAP, immunocompromised.\"  \"Given risk factors will treat with vancomycin, Zosyn and Levaquin.\"    ICU Progress Note 12/10 \"CAP.\"  \"Given risk factors will treat with doxycycline.\"    Nursing Note  at 12:48pm \"11:27: notified Herman Latham that pt is coughing after eating and oxygen dropped to 83 percent on 4L NC.\"    ICU PN  \"Per hospitalist, patient potentially aspirated on breakfast this am. Desat to 70 percent on RA, placed on BiPAP 16/5 50 percent, ABG  7.26/81/232/36.3. Transferred to ICU for further management of respiratory distress.\"  " "\"Aspiration pneumonia 12/11 transfer to ICU requiring NIPPV.\"  \"Empiric Zosyn 12/11-12/16.\"    CXR 12/9 \"Right infrahilar infiltrate.  Underlying chronic interstitial fibrotic interstitial fibrotic changes are seen.\"    Treatment:  IV Zosyn, Levaquin, Azithromycin, Vancomycin, p.o. Doxycycline    Risk Factors:  Immunocompromised patient on chemo and radiation, OLIVERIO lung cancer, dysphagia  Options provided:  -- Aspiration PNA  -- Gram Negative PNA  -- Other - I will add my own diagnosis  -- Refer to Clinical Documentation Reviewer    Query created by: Christa Deluna on 12/21/2023 3:14 PM      Electronically signed by:  JORDAN SANTIAGO-CNP 12/23/2023 9:08 PM          "

## 2023-12-25 LAB
ATRIAL RATE: 84 BPM
ATRIAL RATE: 87 BPM
P AXIS: 103 DEGREES
P AXIS: 79 DEGREES
P OFFSET: 190 MS
P OFFSET: 192 MS
P ONSET: 144 MS
P ONSET: 144 MS
PR INTERVAL: 146 MS
PR INTERVAL: 146 MS
Q ONSET: 217 MS
Q ONSET: 217 MS
QRS COUNT: 14 BEATS
QRS COUNT: 14 BEATS
QRS DURATION: 78 MS
QRS DURATION: 82 MS
QT INTERVAL: 384 MS
QT INTERVAL: 388 MS
QTC CALCULATION(BAZETT): 453 MS
QTC CALCULATION(BAZETT): 466 MS
QTC FREDERICIA: 429 MS
QTC FREDERICIA: 439 MS
R AXIS: -67 DEGREES
R AXIS: 216 DEGREES
T AXIS: 107 DEGREES
T AXIS: 79 DEGREES
T OFFSET: 409 MS
T OFFSET: 411 MS
VENTRICULAR RATE: 84 BPM
VENTRICULAR RATE: 87 BPM

## 2023-12-26 ENCOUNTER — HOME CARE VISIT (OUTPATIENT)
Dept: HOME HEALTH SERVICES | Facility: HOME HEALTH | Age: 73
End: 2023-12-26
Payer: MEDICARE

## 2023-12-26 ENCOUNTER — PATIENT OUTREACH (OUTPATIENT)
Dept: CARDIOLOGY | Facility: CLINIC | Age: 73
End: 2023-12-26
Payer: MEDICARE

## 2023-12-26 NOTE — PROGRESS NOTES
Discharge Facility:  [facility]  Discharge Diagnosis: HTN Emergency  Admission Date:  12/22/23  Discharge Date:   12/23/23    PCP Appointment Date: Dr Comer 1/3/24  Specialist Appointment Date: None listed  Hospital Encounter and Summary: Linked   See discharge assessment below for further details     Engagement  Call Start Time: 1114 (12/26/2023 11:22 AM)    Medications  Medications reviewed with patient/caregiver?: Not applicable (12/26/2023 11:22 AM)  Is the patient having any side effects they believe may be caused by any medication additions or changes?: No (12/26/2023 11:22 AM)  Does the patient have all medications ordered at discharge?: Not applicable (No new medications) (12/26/2023 11:22 AM)  Is the patient taking all medications as directed (includes completed medication regime)?: Not applicable (12/26/2023 11:22 AM)    Appointments  Does the patient have a primary care provider?: Yes (12/26/2023 11:22 AM)  Care Management Interventions: Verified appointment date/time/provider (12/26/2023 11:22 AM)  Care Management Interventions: Advised patient to keep appointment (12/26/2023 11:22 AM)    Self Management  What is the home health agency?: Patient had previous [facility] and it looks to be scheduled for tomorrow (12/26/2023 11:22 AM)  What Durable Medical Equipment (DME) was ordered?: n/a (12/26/2023 11:22 AM)    Patient Teaching  Does the patient have access to their discharge instructions?: Yes (12/26/2023 11:22 AM)  Care Management Interventions: Reviewed instructions with patient (12/26/2023 11:22 AM)  What is the patient's perception of their health status since discharge?: Improving (12/26/2023 11:22 AM)  Patient/Caregiver Education Comments: Patient denies any SOB or CP at this time. Patient had no new medications ordered at this time. An appointment with Dr Comer was scheduled with this call and patient states understanding (12/26/2023 11:22 AM)    Wrap Up  Wrap Up Additional Comments: Patient denies  CP or SOB. Patient has had no new medication changes. An appointment with Dr Comer was scheduled during this call assessment with patient (12/26/2023 11:24 AM)  Call End Time: 1125 (12/26/2023 11:24 AM)

## 2023-12-28 ENCOUNTER — HOME CARE VISIT (OUTPATIENT)
Dept: HOME HEALTH SERVICES | Facility: HOME HEALTH | Age: 73
End: 2023-12-28
Payer: MEDICARE

## 2023-12-28 ENCOUNTER — APPOINTMENT (OUTPATIENT)
Dept: CARE COORDINATION | Age: 73
End: 2023-12-28
Payer: MEDICARE

## 2023-12-30 PROCEDURE — G0180 MD CERTIFICATION HHA PATIENT: HCPCS | Performed by: INTERNAL MEDICINE

## 2024-01-02 ENCOUNTER — OFFICE VISIT (OUTPATIENT)
Dept: PALLATIVE CARE | Age: 74
End: 2024-01-02
Payer: MEDICARE

## 2024-01-02 VITALS
DIASTOLIC BLOOD PRESSURE: 72 MMHG | SYSTOLIC BLOOD PRESSURE: 161 MMHG | HEART RATE: 80 BPM | TEMPERATURE: 98.9 F | OXYGEN SATURATION: 98 % | BODY MASS INDEX: 23.93 KG/M2 | RESPIRATION RATE: 20 BRPM | WEIGHT: 139.4 LBS

## 2024-01-02 DIAGNOSIS — M54.6 CHRONIC THORACIC BACK PAIN, UNSPECIFIED BACK PAIN LATERALITY: ICD-10-CM

## 2024-01-02 DIAGNOSIS — Z51.5 ENCOUNTER FOR PALLIATIVE CARE: ICD-10-CM

## 2024-01-02 DIAGNOSIS — G89.3 NEOPLASM RELATED PAIN: ICD-10-CM

## 2024-01-02 DIAGNOSIS — C34.12 MALIGNANT NEOPLASM OF UPPER LOBE OF LEFT LUNG (HCC): ICD-10-CM

## 2024-01-02 DIAGNOSIS — G89.29 CHRONIC THORACIC BACK PAIN, UNSPECIFIED BACK PAIN LATERALITY: ICD-10-CM

## 2024-01-02 PROCEDURE — 99214 OFFICE O/P EST MOD 30 MIN: CPT

## 2024-01-02 PROCEDURE — 3077F SYST BP >= 140 MM HG: CPT

## 2024-01-02 PROCEDURE — 3078F DIAST BP <80 MM HG: CPT

## 2024-01-02 PROCEDURE — 1123F ACP DISCUSS/DSCN MKR DOCD: CPT

## 2024-01-02 RX ORDER — LISINOPRIL 20 MG/1
20 TABLET ORAL DAILY
COMMUNITY
Start: 2023-12-17

## 2024-01-02 RX ORDER — HYDRALAZINE HYDROCHLORIDE 50 MG/1
50 TABLET, FILM COATED ORAL 3 TIMES DAILY
COMMUNITY
Start: 2023-12-17

## 2024-01-02 ASSESSMENT — ENCOUNTER SYMPTOMS
ABDOMINAL DISTENTION: 0
CONSTIPATION: 0
VOMITING: 0
TROUBLE SWALLOWING: 0
SHORTNESS OF BREATH: 0
DIARRHEA: 0
COLOR CHANGE: 0
COUGH: 1
NAUSEA: 0
BACK PAIN: 1
VOICE CHANGE: 0
RECTAL PAIN: 0

## 2024-01-02 ASSESSMENT — PATIENT HEALTH QUESTIONNAIRE - PHQ9
SUM OF ALL RESPONSES TO PHQ9 QUESTIONS 1 & 2: 1
1. LITTLE INTEREST OR PLEASURE IN DOING THINGS: 0
SUM OF ALL RESPONSES TO PHQ QUESTIONS 1-9: 1
2. FEELING DOWN, DEPRESSED OR HOPELESS: 1
SUM OF ALL RESPONSES TO PHQ QUESTIONS 1-9: 1

## 2024-01-02 NOTE — PROGRESS NOTES
No HI/SI. Denies wanting to see counselor. Patient is on Zoloft with improvement of symptoms. She is on Valium for panic attacks. She has tried and failed ativan and xanax for panic attacks. She is tirtrating off Valium and down to 2 tabs a day. She is not taking Valium with narcotics. She has not needed Valium since July 2023. This is an improvement.     Sleep: Patient sleeping has improved with zoloft.     Skin: denies any wounds, rashes or skin concerns at this time. Patient has port in right chest.     GI/: denies constipation. Denies any new urinary concerns. She is taking Zofran for nausea. She reports that compazine does not work. She is also on Zyprexa but does not take it.     I have personally reviewed the patient's most recent and available provider notes, lab work and imaging.      Past Medical History:   Diagnosis Date    Anxiety     Asthma     Cervical radiculopathy at C7 9/28/2013    CERVICAL SPONDYLOSIS, WITH COMBINATION OF DISK BULGING AND    Cervical radiculopathy at C7     SUBTLE PER EMG    Cervical spondylosis 10/12/13    PER MRI    Chronic back pain     COPD (chronic obstructive pulmonary disease) (HCC)     Chronic bonchitis    CTS (carpal tunnel syndrome) 9/28/13    PER EMG    Depression     Diverticulitis     GERD (gastroesophageal reflux disease)     Headache(784.0)     Hyperlipidemia     Hypertension     Hypothyroidism due to medication 5/8/2019    Irritable bowel syndrome     Malignant neoplasm of upper lobe of left lung (HCC) 1/7/2019    Osteoarthritis      Past Surgical History:   Procedure Laterality Date    APPENDECTOMY  01/01/1980    CARDIAC CATHETERIZATION  5/16/13,11/8/2013    DR. GRAVES    COLON SURGERY      HAND SURGERY  01/01/1996    HYSTERECTOMY (CERVIX STATUS UNKNOWN)  01/01/1980    MEDIPORT INSERTION, SINGLE Right 05/18/2023    Inserted by Dr. Cunningham - BARD 8F mediport from [employer] ClearVUE Power Port kit (LOT [id number] exp 01/31/2024).    TONSILLECTOMY  01/01/1963     Social

## 2024-01-03 ENCOUNTER — OFFICE VISIT (OUTPATIENT)
Dept: PRIMARY CARE | Facility: CLINIC | Age: 74
End: 2024-01-03
Payer: MEDICARE

## 2024-01-03 VITALS
OXYGEN SATURATION: 96 % | HEIGHT: 63 IN | DIASTOLIC BLOOD PRESSURE: 66 MMHG | WEIGHT: 162 LBS | TEMPERATURE: 98.4 F | HEART RATE: 102 BPM | BODY MASS INDEX: 28.7 KG/M2 | SYSTOLIC BLOOD PRESSURE: 150 MMHG

## 2024-01-03 DIAGNOSIS — Z99.81 O2 DEPENDENT: ICD-10-CM

## 2024-01-03 DIAGNOSIS — C34.12 MALIGNANT NEOPLASM OF UPPER LOBE OF LEFT LUNG (MULTI): ICD-10-CM

## 2024-01-03 DIAGNOSIS — I16.1 HYPERTENSIVE EMERGENCY: ICD-10-CM

## 2024-01-03 DIAGNOSIS — J44.1 COPD EXACERBATION (MULTI): ICD-10-CM

## 2024-01-03 DIAGNOSIS — Z09 HOSPITAL DISCHARGE FOLLOW-UP: Primary | ICD-10-CM

## 2024-01-03 PROBLEM — R22.1 NECK MASS: Status: RESOLVED | Noted: 2023-03-06 | Resolved: 2024-01-03

## 2024-01-03 PROBLEM — L24.A9 WOUND DRAINAGE: Status: RESOLVED | Noted: 2023-03-06 | Resolved: 2024-01-03

## 2024-01-03 PROBLEM — G56.02 CARPAL TUNNEL SYNDROME OF LEFT WRIST: Status: RESOLVED | Noted: 2023-03-06 | Resolved: 2024-01-03

## 2024-01-03 PROBLEM — H93.13 TINNITUS OF BOTH EARS: Status: RESOLVED | Noted: 2023-03-06 | Resolved: 2024-01-03

## 2024-01-03 PROBLEM — I10 UNCONTROLLED HYPERTENSION: Status: RESOLVED | Noted: 2023-12-15 | Resolved: 2024-01-03

## 2024-01-03 PROBLEM — Z72.0 TOBACCO ABUSE: Status: RESOLVED | Noted: 2023-12-15 | Resolved: 2024-01-03

## 2024-01-03 PROBLEM — M79.602 PAIN OF LEFT UPPER EXTREMITY: Status: RESOLVED | Noted: 2023-03-06 | Resolved: 2024-01-03

## 2024-01-03 PROBLEM — R41.0 ACUTE DELIRIUM: Status: RESOLVED | Noted: 2023-12-15 | Resolved: 2024-01-03

## 2024-01-03 PROBLEM — G56.22 ENTRAPMENT OF LEFT ULNAR NERVE AT WRIST: Status: RESOLVED | Noted: 2023-03-06 | Resolved: 2024-01-03

## 2024-01-03 PROBLEM — H61.21 IMPACTED CERUMEN OF RIGHT EAR: Status: RESOLVED | Noted: 2023-03-06 | Resolved: 2024-01-03

## 2024-01-03 PROBLEM — E87.6 HYPOKALEMIA: Status: RESOLVED | Noted: 2023-12-17 | Resolved: 2024-01-03

## 2024-01-03 PROBLEM — L98.9 SKIN LESION OF SCALP: Status: RESOLVED | Noted: 2023-03-06 | Resolved: 2024-01-03

## 2024-01-03 PROBLEM — H60.391: Status: RESOLVED | Noted: 2023-03-06 | Resolved: 2024-01-03

## 2024-01-03 PROBLEM — J96.02 ACUTE HYPERCAPNIC RESPIRATORY FAILURE (MULTI): Status: RESOLVED | Noted: 2023-12-15 | Resolved: 2024-01-03

## 2024-01-03 PROBLEM — G56.22 CUBITAL TUNNEL SYNDROME ON LEFT: Status: RESOLVED | Noted: 2023-03-06 | Resolved: 2024-01-03

## 2024-01-03 PROBLEM — R22.2 SUPRACLAVICULAR MASS: Status: RESOLVED | Noted: 2023-03-06 | Resolved: 2024-01-03

## 2024-01-03 PROCEDURE — 3077F SYST BP >= 140 MM HG: CPT | Performed by: INTERNAL MEDICINE

## 2024-01-03 PROCEDURE — 1036F TOBACCO NON-USER: CPT | Performed by: INTERNAL MEDICINE

## 2024-01-03 PROCEDURE — 99496 TRANSJ CARE MGMT HIGH F2F 7D: CPT | Performed by: INTERNAL MEDICINE

## 2024-01-03 PROCEDURE — 3078F DIAST BP <80 MM HG: CPT | Performed by: INTERNAL MEDICINE

## 2024-01-03 PROCEDURE — 1159F MED LIST DOCD IN RCRD: CPT | Performed by: INTERNAL MEDICINE

## 2024-01-03 PROCEDURE — 1125F AMNT PAIN NOTED PAIN PRSNT: CPT | Performed by: INTERNAL MEDICINE

## 2024-01-03 PROCEDURE — 1111F DSCHRG MED/CURRENT MED MERGE: CPT | Performed by: INTERNAL MEDICINE

## 2024-01-03 RX ORDER — LISINOPRIL AND HYDROCHLOROTHIAZIDE 20; 25 MG/1; MG/1
1 TABLET ORAL DAILY
COMMUNITY
End: 2024-01-26 | Stop reason: HOSPADM

## 2024-01-03 ASSESSMENT — ENCOUNTER SYMPTOMS
SPEECH DIFFICULTY: 0
LIGHT-HEADEDNESS: 0
FEVER: 0
HEMATURIA: 0
PALPITATIONS: 0
BLOOD IN STOOL: 0
STRIDOR: 0
HYPERTENSION: 1
CHEST TIGHTNESS: 0
EYE REDNESS: 0
ACTIVITY CHANGE: 0
JOINT SWELLING: 0

## 2024-01-03 ASSESSMENT — PATIENT HEALTH QUESTIONNAIRE - PHQ9
1. LITTLE INTEREST OR PLEASURE IN DOING THINGS: NOT AT ALL
SUM OF ALL RESPONSES TO PHQ9 QUESTIONS 1 AND 2: 0
2. FEELING DOWN, DEPRESSED OR HOPELESS: NOT AT ALL

## 2024-01-04 ENCOUNTER — HOME CARE VISIT (OUTPATIENT)
Dept: HOME HEALTH SERVICES | Facility: HOME HEALTH | Age: 74
End: 2024-01-04
Payer: MEDICARE

## 2024-01-04 NOTE — PROGRESS NOTES
CHIEF COMPLAINT:    Gaby Blue is a 73 y.o. female who presents for Leg Swelling, Hypertension, and Hospital Follow-up (Patient in office today for hospital discharge follow up for HTN and left lower leg swelling. ).    HISTORY OF PRESENT ILLNESS:  Gaby Blue  is a pleasant 73-year-old female who has COPD, history of lung cancer just stopped smoking about 2 months ago went to the emergency department for lower extremity edema.  She was found to have hypertensive emergency.  I have reviewed ER visit summary, ER physician's assessment, imaging studies, biochemical lab results and EKG.  I have also reviewed hospital course, consultants' notes, med reconciliation and discharge summary.  Patient was treated with IV labetalol and hydralazine.  She was given extra dose of IV Lasix as well.  Her blood pressure remained stable.  She had chest pain with high troponin what was thought to be due to type II MI for mismatch of demand and supply.  She is oxygen dependent.  She is back to her normal self.  She has seen her pulmonologist who has given her extra dose of Lasix hopefully that will help her lower extremity edema.  Patient is back to herself now.    Hypertension  Pertinent negatives include no chest pain or palpitations.       Review of Systems   Constitutional:  Negative for activity change and fever.   HENT:  Negative for hearing loss, nosebleeds and tinnitus.    Eyes:  Negative for redness.   Respiratory:  Negative for chest tightness and stridor.    Cardiovascular:  Negative for chest pain, palpitations and leg swelling.   Gastrointestinal:  Negative for blood in stool.   Endocrine: Negative for cold intolerance.   Genitourinary:  Negative for hematuria.   Musculoskeletal:  Negative for joint swelling.   Skin:  Negative for rash.   Neurological:  Negative for speech difficulty and light-headedness.   Psychiatric/Behavioral:  Negative for behavioral problems.      Visit Vitals  /66 (BP Location: Right arm,  "Patient Position: Sitting, BP Cuff Size: Adult)   Pulse 102   Temp 36.9 °C (98.4 °F) (Temporal)   Ht 1.6 m (5' 3\")   Wt 73.5 kg (162 lb)   SpO2 96%   BMI 28.70 kg/m²   OB Status Postmenopausal   Smoking Status Former   BSA 1.81 m²         Wt Readings from Last 10 Encounters:   01/03/24 73.5 kg (162 lb)   12/23/23 64.2 kg (141 lb 8.6 oz)   12/20/23 58.1 kg (128 lb)   12/19/23 63.8 kg (140 lb 9.6 oz)   12/17/23 65.7 kg (144 lb 13.5 oz)   03/03/23 64.4 kg (142 lb)   10/26/22 69 kg (152 lb 1.9 oz)   10/07/22 68 kg (150 lb)   07/26/22 68 kg (150 lb)   10/12/20 60.9 kg (134 lb 4.2 oz)       Physical Exam  Constitutional:       General: She is not in acute distress.     Appearance: Normal appearance.   HENT:      Head: Normocephalic.      Right Ear: Tympanic membrane normal.      Left Ear: Tympanic membrane normal.      Mouth/Throat:      Mouth: Mucous membranes are moist.   Cardiovascular:      Rate and Rhythm: Normal rate and regular rhythm.      Heart sounds: No murmur heard.  Pulmonary:      Effort: No respiratory distress.   Abdominal:      Palpations: Abdomen is soft.   Musculoskeletal:      Cervical back: Neck supple.      Right lower leg: No edema.      Left lower leg: No edema.   Skin:     Findings: No rash.   Neurological:      General: No focal deficit present.      Mental Status: She is alert and oriented to person, place, and time.   Psychiatric:         Mood and Affect: Mood normal.        RECENT LABS:  Lab Results   Component Value Date    WBC 6.3 12/23/2023    HGB 8.5 (L) 12/23/2023    HCT 27.5 (L) 12/23/2023     (L) 12/23/2023    CHOL 147 05/09/2019    TRIG 140 05/09/2019    HDL 54.0 05/09/2019    ALT 7 12/23/2023    AST 13 12/23/2023     12/23/2023    K 3.3 (L) 12/23/2023     12/23/2023    CREATININE 0.94 12/23/2023    BUN 21 12/23/2023    CO2 31 12/23/2023    TSH 0.27 (L) 02/01/2023    INR 1.0 12/22/2023    HGBA1C 5.5 01/31/2023     IMAGING:  Reviewed:   MEDICATIONS:   Current " Outpatient Medications   Medication Instructions    albuterol 90 mcg/actuation inhaler 2 puffs, inhalation, Every 4 hours PRN    ALPRAZolam (XANAX) 1 mg, oral, 3 times daily PRN    aspirin 81 mg, oral, Daily    buPROPion XL (WELLBUTRIN XL) 300 mg, oral, Daily    fluticasone-umeclidin-vilanter (TRELEGY-ELLIPTA) 100-62.5-25 mcg blister with device 1 puff, inhalation, Daily    folic acid (Folvite) 1 mg tablet 1 tablet, oral, Daily    gabapentin (NEURONTIN) 400 mg, oral, 3 times daily    hydrALAZINE (APRESOLINE) 50 mg, oral, 3 times daily    ipratropium-albuteroL (Duo-Neb) 0.5-2.5 mg/3 mL nebulizer solution 3 mL, nebulization, 3 times daily    levalbuterol (Xopenex) 1.25 mg/3 mL nebulizer solution 3 mL, inhalation, Every 4 hours PRN    lisinopriL-hydrochlorothiazide 20-25 mg tablet 1 tablet, oral, Daily    metoprolol succinate XL (TOPROL-XL) 50 mg, oral, Daily    montelukast (Singulair) 10 mg tablet 1 tablet, oral, Daily    nicotine (Nicoderm CQ) 21 mg/24 hr patch 1 patch, transdermal, Daily    OLANZapine (ZYPREXA) 5 mg, oral, Nightly    oxyCODONE-acetaminophen (Percocet) 7.5-325 mg tablet 1 tablet, oral, Every 4 hours PRN    oxygen (O2) 3 L/min, inhalation, Continuous    pantoprazole (ProtoNix) 40 mg EC tablet 1 tablet, oral, Daily      TODAY'S VISIT  DX:   1. Hospital discharge follow-up        2. COPD exacerbation (CMS/HCC)        3. O2 dependent        4. Malignant neoplasm of upper lobe of left lung (CMS/HCC)        5. Hypertensive emergency        Primary  MEDICAL DECISION MAKING:  - Recent lab work and relevant imaging studies have been reviewed.    - The current active medical co morbidities have been considered.   - Relevant correspondence/notes from specialty consultants were reviewed and discussed with patient.    - Patient was given treatment as per above plan.  She has agreed to the blood pressure which will be managed with a extra dose of Lasix that she has been taking.   - Patient will continue with  current medical therapy and plan.   - Medication have been sent for refill.    - Next Follow up in 3 months..

## 2024-01-05 ENCOUNTER — TELEPHONE (OUTPATIENT)
Dept: PALLATIVE CARE | Age: 74
End: 2024-01-05

## 2024-01-05 NOTE — TELEPHONE ENCOUNTER
I have contacted multiple pharmacies in [place], [facility], [facility], [facility]. I am not having luck finding a pharmacy that is able to get Marinol at this time. I spoke with Ángela and asked her if she wanted me to start trying pharmacies in [place]/[facility]/[place], etc. Ángela tells me no because she has a hard time traveling places outside of [place].

## 2024-01-09 ENCOUNTER — PATIENT OUTREACH (OUTPATIENT)
Dept: CARDIOLOGY | Facility: CLINIC | Age: 74
End: 2024-01-09
Payer: MEDICARE

## 2024-01-09 NOTE — PROGRESS NOTES
Unable to reach patient for call back after patient's follow up appointment with PCP.   NAUNM with call back number for patient to call if needed   If no voicemail available call attempts x 2 were made to contact the patient to assist with any questions or concerns patient may have.

## 2024-01-17 ENCOUNTER — HOSPITAL ENCOUNTER (OUTPATIENT)
Dept: INFUSION THERAPY | Age: 74
Setting detail: INFUSION SERIES
Discharge: HOME | End: 2024-01-17
Payer: MEDICARE

## 2024-01-17 VITALS
TEMPERATURE: 98.2 F | SYSTOLIC BLOOD PRESSURE: 180 MMHG | RESPIRATION RATE: 18 BRPM | DIASTOLIC BLOOD PRESSURE: 60 MMHG | HEART RATE: 72 BPM | OXYGEN SATURATION: 99 %

## 2024-01-17 DIAGNOSIS — C78.02 SECONDARY MALIGNANT NEOPLASM OF LEFT LUNG (HCC): ICD-10-CM

## 2024-01-17 DIAGNOSIS — C78.01 SECONDARY MALIGNANT NEOPLASM OF RIGHT LUNG (HCC): ICD-10-CM

## 2024-01-17 DIAGNOSIS — C34.12 MALIGNANT NEOPLASM OF UPPER LOBE OF LEFT LUNG (HCC): Primary | ICD-10-CM

## 2024-01-17 PROCEDURE — 96375 TX/PRO/DX INJ NEW DRUG ADDON: CPT

## 2024-01-17 PROCEDURE — 2580000003 HC RX 258

## 2024-01-17 PROCEDURE — 2580000003 HC RX 258: Performed by: INTERNAL MEDICINE

## 2024-01-17 PROCEDURE — 96413 CHEMO IV INFUSION 1 HR: CPT

## 2024-01-17 PROCEDURE — 6360000002 HC RX W HCPCS

## 2024-01-17 PROCEDURE — 6360000002 HC RX W HCPCS: Performed by: INTERNAL MEDICINE

## 2024-01-17 RX ORDER — SODIUM CHLORIDE 0.9 % (FLUSH) 0.9 %
5-40 SYRINGE (ML) INJECTION PRN
Status: DISCONTINUED | OUTPATIENT
Start: 2024-01-17 | End: 2024-01-18 | Stop reason: HOSPADM

## 2024-01-17 RX ORDER — SODIUM CHLORIDE 9 MG/ML
INJECTION, SOLUTION INTRAVENOUS
Status: COMPLETED
Start: 2024-01-17 | End: 2024-01-17

## 2024-01-17 RX ORDER — HEPARIN 100 UNIT/ML
500 SYRINGE INTRAVENOUS PRN
OUTPATIENT
Start: 2024-01-17

## 2024-01-17 RX ORDER — HEPARIN 100 UNIT/ML
500 SYRINGE INTRAVENOUS PRN
Status: DISCONTINUED | OUTPATIENT
Start: 2024-01-17 | End: 2024-01-18 | Stop reason: HOSPADM

## 2024-01-17 RX ORDER — SODIUM CHLORIDE 0.9 % (FLUSH) 0.9 %
5-40 SYRINGE (ML) INJECTION PRN
OUTPATIENT
Start: 2024-01-17

## 2024-01-17 RX ADMIN — SODIUM CHLORIDE, PRESERVATIVE FREE 10 ML: 5 INJECTION INTRAVENOUS at 13:10

## 2024-01-17 RX ADMIN — GEMCITABINE HYDROCHLORIDE 984 MG: 1 INJECTION, POWDER, LYOPHILIZED, FOR SOLUTION INTRAVENOUS at 12:28

## 2024-01-17 RX ADMIN — SODIUM CHLORIDE 250 ML: 9 INJECTION, SOLUTION INTRAVENOUS at 11:06

## 2024-01-17 RX ADMIN — ONDANSETRON 8 MG: 2 INJECTION INTRAMUSCULAR; INTRAVENOUS at 11:43

## 2024-01-17 RX ADMIN — HEPARIN 500 UNITS: 100 SYRINGE at 13:10

## 2024-01-17 NOTE — FLOWSHEET NOTE
Infusion is complete. Tolerated well. Blood pressure remains elevated. Encouraged to make an appt with PCP to have this evaluated. Verbalized understanding. Left the unit via wheelchair with friend. All equipment used in the care for this patient has been cleaned.

## 2024-01-17 NOTE — FLOWSHEET NOTE
Patient to the floor via wheelchair for her Cycle 15 Day 8 chemo treatment. Vital signs taken. Denies any pain or shortness of breath. Oxygen remains on at 3 liters nasal canula. Blood pressure is elevated. States that she took her blood pressure medications this am. Multiple rechecks 191/70. Will allow to rest and recheck. Call light within reach. Friend at side. Call light within reach.

## 2024-01-17 NOTE — FLOWSHEET NOTE
Blood pressure remains elevated. Spoke with Yana Lance NP at [facility]. Made her aware of all of her blood pressures since she has arrived. Aware of her blood pressure medications currently taking. She spoke with Dr. Hawk and we are ok proceed with treatment today. She will need to follow up with her PCP Dr. Guerrero. Patient and visitor are aware.

## 2024-01-19 PROCEDURE — 0023 HH SOC

## 2024-01-22 ENCOUNTER — APPOINTMENT (OUTPATIENT)
Dept: CARDIOLOGY | Facility: HOSPITAL | Age: 74
DRG: 177 | End: 2024-01-22
Payer: MEDICARE

## 2024-01-22 ENCOUNTER — APPOINTMENT (OUTPATIENT)
Dept: RADIOLOGY | Facility: HOSPITAL | Age: 74
DRG: 177 | End: 2024-01-22
Payer: MEDICARE

## 2024-01-22 ENCOUNTER — HOSPITAL ENCOUNTER (INPATIENT)
Facility: HOSPITAL | Age: 74
LOS: 3 days | Discharge: NURSING, REHAB OR LONG-TERM CARE | DRG: 177 | End: 2024-01-26
Attending: STUDENT IN AN ORGANIZED HEALTH CARE EDUCATION/TRAINING PROGRAM | Admitting: FAMILY MEDICINE
Payer: MEDICARE

## 2024-01-22 DIAGNOSIS — J18.9 PNEUMONIA DUE TO INFECTIOUS ORGANISM, UNSPECIFIED LATERALITY, UNSPECIFIED PART OF LUNG: Primary | ICD-10-CM

## 2024-01-22 DIAGNOSIS — J44.1 COPD EXACERBATION (MULTI): ICD-10-CM

## 2024-01-22 DIAGNOSIS — C34.90 MALIGNANT NEOPLASM OF LUNG, UNSPECIFIED LATERALITY, UNSPECIFIED PART OF LUNG (MULTI): ICD-10-CM

## 2024-01-22 DIAGNOSIS — R26.2 INABILITY TO WALK: ICD-10-CM

## 2024-01-22 DIAGNOSIS — E83.42 HYPOMAGNESEMIA: ICD-10-CM

## 2024-01-22 LAB
ALBUMIN SERPL BCP-MCNC: 2.8 G/DL (ref 3.4–5)
ALP SERPL-CCNC: 49 U/L (ref 33–136)
ALT SERPL W P-5'-P-CCNC: 11 U/L (ref 7–45)
ANION GAP SERPL CALC-SCNC: 11 MMOL/L (ref 10–20)
AST SERPL W P-5'-P-CCNC: 21 U/L (ref 9–39)
BASOPHILS # BLD MANUAL: 0 X10*3/UL (ref 0–0.1)
BASOPHILS NFR BLD MANUAL: 0 %
BILIRUB SERPL-MCNC: 0.7 MG/DL (ref 0–1.2)
BNP SERPL-MCNC: 321 PG/ML (ref 0–99)
BUN SERPL-MCNC: 26 MG/DL (ref 6–23)
CALCIUM SERPL-MCNC: 8.2 MG/DL (ref 8.6–10.3)
CARDIAC TROPONIN I PNL SERPL HS: 52 NG/L (ref 0–13)
CHLORIDE SERPL-SCNC: 97 MMOL/L (ref 98–107)
CO2 SERPL-SCNC: 28 MMOL/L (ref 21–32)
CREAT SERPL-MCNC: 1.09 MG/DL (ref 0.5–1.05)
EGFRCR SERPLBLD CKD-EPI 2021: 54 ML/MIN/1.73M*2
EOSINOPHIL # BLD MANUAL: 0 X10*3/UL (ref 0–0.4)
EOSINOPHIL NFR BLD MANUAL: 0 %
ERYTHROCYTE [DISTWIDTH] IN BLOOD BY AUTOMATED COUNT: 14.6 % (ref 11.5–14.5)
GLUCOSE SERPL-MCNC: 87 MG/DL (ref 74–99)
HCT VFR BLD AUTO: 20.9 % (ref 36–46)
HGB BLD-MCNC: 6.8 G/DL (ref 12–16)
HYPOCHROMIA BLD QL SMEAR: ABNORMAL
IMM GRANULOCYTES # BLD AUTO: 0.69 X10*3/UL (ref 0–0.5)
IMM GRANULOCYTES NFR BLD AUTO: 10.1 % (ref 0–0.9)
INR PPP: 1.2 (ref 0.9–1.1)
LACTATE SERPL-SCNC: 1.1 MMOL/L (ref 0.4–2)
LYMPHOCYTES # BLD MANUAL: 0 X10*3/UL (ref 0.8–3)
LYMPHOCYTES NFR BLD MANUAL: 0 %
MAGNESIUM SERPL-MCNC: 0.89 MG/DL (ref 1.6–2.4)
MCH RBC QN AUTO: 31.2 PG (ref 26–34)
MCHC RBC AUTO-ENTMCNC: 32.5 G/DL (ref 32–36)
MCV RBC AUTO: 96 FL (ref 80–100)
MONOCYTES # BLD MANUAL: 0 X10*3/UL (ref 0.05–0.8)
MONOCYTES NFR BLD MANUAL: 0 %
NEUTROPHILS # BLD MANUAL: 6.8 X10*3/UL (ref 1.6–5.5)
NEUTS BAND # BLD MANUAL: 0.07 X10*3/UL (ref 0–0.5)
NEUTS BAND NFR BLD MANUAL: 1 %
NEUTS SEG # BLD MANUAL: 6.73 X10*3/UL (ref 1.6–5)
NEUTS SEG NFR BLD MANUAL: 99 %
NRBC BLD-RTO: 0 /100 WBCS (ref 0–0)
PLATELET # BLD AUTO: 46 X10*3/UL (ref 150–450)
POTASSIUM SERPL-SCNC: 3.1 MMOL/L (ref 3.5–5.3)
PROT SERPL-MCNC: 5.1 G/DL (ref 6.4–8.2)
PROTHROMBIN TIME: 13.9 SECONDS (ref 9.8–12.8)
RBC # BLD AUTO: 2.18 X10*6/UL (ref 4–5.2)
RBC MORPH BLD: ABNORMAL
SODIUM SERPL-SCNC: 133 MMOL/L (ref 136–145)
TOTAL CELLS COUNTED BLD: 100
WBC # BLD AUTO: 6.8 X10*3/UL (ref 4.4–11.3)

## 2024-01-22 PROCEDURE — 36415 COLL VENOUS BLD VENIPUNCTURE: CPT | Performed by: STUDENT IN AN ORGANIZED HEALTH CARE EDUCATION/TRAINING PROGRAM

## 2024-01-22 PROCEDURE — 83735 ASSAY OF MAGNESIUM: CPT | Performed by: STUDENT IN AN ORGANIZED HEALTH CARE EDUCATION/TRAINING PROGRAM

## 2024-01-22 PROCEDURE — 84484 ASSAY OF TROPONIN QUANT: CPT | Performed by: STUDENT IN AN ORGANIZED HEALTH CARE EDUCATION/TRAINING PROGRAM

## 2024-01-22 PROCEDURE — 83880 ASSAY OF NATRIURETIC PEPTIDE: CPT | Performed by: STUDENT IN AN ORGANIZED HEALTH CARE EDUCATION/TRAINING PROGRAM

## 2024-01-22 PROCEDURE — 83605 ASSAY OF LACTIC ACID: CPT | Performed by: STUDENT IN AN ORGANIZED HEALTH CARE EDUCATION/TRAINING PROGRAM

## 2024-01-22 PROCEDURE — 85027 COMPLETE CBC AUTOMATED: CPT | Performed by: STUDENT IN AN ORGANIZED HEALTH CARE EDUCATION/TRAINING PROGRAM

## 2024-01-22 PROCEDURE — 80053 COMPREHEN METABOLIC PANEL: CPT | Performed by: STUDENT IN AN ORGANIZED HEALTH CARE EDUCATION/TRAINING PROGRAM

## 2024-01-22 PROCEDURE — 93005 ELECTROCARDIOGRAM TRACING: CPT

## 2024-01-22 PROCEDURE — 85007 BL SMEAR W/DIFF WBC COUNT: CPT | Performed by: STUDENT IN AN ORGANIZED HEALTH CARE EDUCATION/TRAINING PROGRAM

## 2024-01-22 PROCEDURE — 85610 PROTHROMBIN TIME: CPT | Performed by: STUDENT IN AN ORGANIZED HEALTH CARE EDUCATION/TRAINING PROGRAM

## 2024-01-22 PROCEDURE — 71250 CT THORAX DX C-: CPT

## 2024-01-22 PROCEDURE — 99223 1ST HOSP IP/OBS HIGH 75: CPT | Performed by: FAMILY MEDICINE

## 2024-01-22 PROCEDURE — 99285 EMERGENCY DEPT VISIT HI MDM: CPT | Performed by: STUDENT IN AN ORGANIZED HEALTH CARE EDUCATION/TRAINING PROGRAM

## 2024-01-22 RX ORDER — MAGNESIUM SULFATE HEPTAHYDRATE 40 MG/ML
2 INJECTION, SOLUTION INTRAVENOUS ONCE
Status: COMPLETED | OUTPATIENT
Start: 2024-01-22 | End: 2024-01-23

## 2024-01-22 ASSESSMENT — COLUMBIA-SUICIDE SEVERITY RATING SCALE - C-SSRS
2. HAVE YOU ACTUALLY HAD ANY THOUGHTS OF KILLING YOURSELF?: NO
6. HAVE YOU EVER DONE ANYTHING, STARTED TO DO ANYTHING, OR PREPARED TO DO ANYTHING TO END YOUR LIFE?: NO
1. IN THE PAST MONTH, HAVE YOU WISHED YOU WERE DEAD OR WISHED YOU COULD GO TO SLEEP AND NOT WAKE UP?: NO

## 2024-01-22 ASSESSMENT — LIFESTYLE VARIABLES
EVER FELT BAD OR GUILTY ABOUT YOUR DRINKING: NO
REASON UNABLE TO ASSESS: NO
HAVE PEOPLE ANNOYED YOU BY CRITICIZING YOUR DRINKING: NO
HAVE YOU EVER FELT YOU SHOULD CUT DOWN ON YOUR DRINKING: NO
EVER HAD A DRINK FIRST THING IN THE MORNING TO STEADY YOUR NERVES TO GET RID OF A HANGOVER: NO

## 2024-01-23 PROBLEM — D63.0 ANEMIA IN NEOPLASTIC DISEASE: Status: ACTIVE | Noted: 2024-01-23

## 2024-01-23 PROBLEM — J18.9 PNEUMONIA DUE TO INFECTIOUS ORGANISM, UNSPECIFIED LATERALITY, UNSPECIFIED PART OF LUNG: Status: ACTIVE | Noted: 2024-01-23

## 2024-01-23 LAB
ABO GROUP (TYPE) IN BLOOD: NORMAL
ANTIBODY SCREEN: NORMAL
ATRIAL RATE: 107 BPM
BLOOD EXPIRATION DATE: NORMAL
CARDIAC TROPONIN I PNL SERPL HS: 48 NG/L (ref 0–13)
DISPENSE STATUS: NORMAL
FLUAV RNA RESP QL NAA+PROBE: NOT DETECTED
FLUBV RNA RESP QL NAA+PROBE: NOT DETECTED
P AXIS: 77 DEGREES
P OFFSET: 191 MS
P ONSET: 138 MS
PR INTERVAL: 150 MS
PRODUCT BLOOD TYPE: 9500
PRODUCT CODE: NORMAL
Q ONSET: 213 MS
QRS COUNT: 17 BEATS
QRS DURATION: 92 MS
QT INTERVAL: 316 MS
QTC CALCULATION(BAZETT): 421 MS
QTC FREDERICIA: 383 MS
R AXIS: -33 DEGREES
RH FACTOR (ANTIGEN D): NORMAL
RSV RNA RESP QL NAA+PROBE: NOT DETECTED
SARS-COV-2 RNA RESP QL NAA+PROBE: NOT DETECTED
T AXIS: 77 DEGREES
T OFFSET: 371 MS
UNIT ABO: NORMAL
UNIT NUMBER: NORMAL
UNIT RH: NORMAL
UNIT VOLUME: 323
VENTRICULAR RATE: 107 BPM
XM INTEP: NORMAL

## 2024-01-23 PROCEDURE — 84484 ASSAY OF TROPONIN QUANT: CPT | Performed by: STUDENT IN AN ORGANIZED HEALTH CARE EDUCATION/TRAINING PROGRAM

## 2024-01-23 PROCEDURE — 1210000001 HC SEMI-PRIVATE ROOM DAILY

## 2024-01-23 PROCEDURE — 36430 TRANSFUSION BLD/BLD COMPNT: CPT

## 2024-01-23 PROCEDURE — 86920 COMPATIBILITY TEST SPIN: CPT

## 2024-01-23 PROCEDURE — 87634 RSV DNA/RNA AMP PROBE: CPT | Performed by: STUDENT IN AN ORGANIZED HEALTH CARE EDUCATION/TRAINING PROGRAM

## 2024-01-23 PROCEDURE — 96367 TX/PROPH/DG ADDL SEQ IV INF: CPT

## 2024-01-23 PROCEDURE — 86901 BLOOD TYPING SEROLOGIC RH(D): CPT | Performed by: STUDENT IN AN ORGANIZED HEALTH CARE EDUCATION/TRAINING PROGRAM

## 2024-01-23 PROCEDURE — 2500000004 HC RX 250 GENERAL PHARMACY W/ HCPCS (ALT 636 FOR OP/ED): Performed by: FAMILY MEDICINE

## 2024-01-23 PROCEDURE — 2500000004 HC RX 250 GENERAL PHARMACY W/ HCPCS (ALT 636 FOR OP/ED): Performed by: STUDENT IN AN ORGANIZED HEALTH CARE EDUCATION/TRAINING PROGRAM

## 2024-01-23 PROCEDURE — 87636 SARSCOV2 & INF A&B AMP PRB: CPT | Performed by: STUDENT IN AN ORGANIZED HEALTH CARE EDUCATION/TRAINING PROGRAM

## 2024-01-23 PROCEDURE — 96375 TX/PRO/DX INJ NEW DRUG ADDON: CPT

## 2024-01-23 PROCEDURE — 99223 1ST HOSP IP/OBS HIGH 75: CPT | Performed by: FAMILY MEDICINE

## 2024-01-23 PROCEDURE — P9016 RBC LEUKOCYTES REDUCED: HCPCS

## 2024-01-23 PROCEDURE — 96365 THER/PROPH/DIAG IV INF INIT: CPT

## 2024-01-23 RX ORDER — PANTOPRAZOLE SODIUM 40 MG/1
40 TABLET, DELAYED RELEASE ORAL
Status: DISCONTINUED | OUTPATIENT
Start: 2024-01-24 | End: 2024-01-26 | Stop reason: HOSPADM

## 2024-01-23 RX ORDER — POLYETHYLENE GLYCOL 3350 17 G/17G
17 POWDER, FOR SOLUTION ORAL DAILY
Status: DISCONTINUED | OUTPATIENT
Start: 2024-01-23 | End: 2024-01-26 | Stop reason: HOSPADM

## 2024-01-23 RX ORDER — CEFTRIAXONE 1 G/50ML
1 INJECTION, SOLUTION INTRAVENOUS ONCE
Status: COMPLETED | OUTPATIENT
Start: 2024-01-23 | End: 2024-01-23

## 2024-01-23 RX ORDER — DIAZEPAM 2 MG/1
2 TABLET ORAL EVERY 8 HOURS PRN
COMMUNITY
End: 2024-01-26 | Stop reason: SDUPTHER

## 2024-01-23 RX ORDER — ACETAMINOPHEN 160 MG/5ML
650 SOLUTION ORAL EVERY 4 HOURS PRN
Status: DISCONTINUED | OUTPATIENT
Start: 2024-01-23 | End: 2024-01-26 | Stop reason: HOSPADM

## 2024-01-23 RX ORDER — DULOXETIN HYDROCHLORIDE 60 MG/1
60 CAPSULE, DELAYED RELEASE ORAL DAILY
COMMUNITY

## 2024-01-23 RX ORDER — FLUTICASONE PROPIONATE 50 MCG
1 SPRAY, SUSPENSION (ML) NASAL DAILY
COMMUNITY

## 2024-01-23 RX ORDER — CETIRIZINE HYDROCHLORIDE 10 MG/1
10 TABLET ORAL DAILY
COMMUNITY

## 2024-01-23 RX ORDER — ACETAMINOPHEN 325 MG/1
650 TABLET ORAL EVERY 4 HOURS PRN
Status: DISCONTINUED | OUTPATIENT
Start: 2024-01-23 | End: 2024-01-26 | Stop reason: HOSPADM

## 2024-01-23 RX ORDER — ENOXAPARIN SODIUM 100 MG/ML
40 INJECTION SUBCUTANEOUS EVERY 24 HOURS
Status: DISCONTINUED | OUTPATIENT
Start: 2024-01-23 | End: 2024-01-26 | Stop reason: HOSPADM

## 2024-01-23 RX ORDER — TALC
3 POWDER (GRAM) TOPICAL DAILY
Status: DISCONTINUED | OUTPATIENT
Start: 2024-01-23 | End: 2024-01-26 | Stop reason: HOSPADM

## 2024-01-23 RX ORDER — SERTRALINE HYDROCHLORIDE 50 MG/1
50 TABLET, FILM COATED ORAL DAILY
COMMUNITY

## 2024-01-23 RX ORDER — ONDANSETRON HYDROCHLORIDE 2 MG/ML
4 INJECTION, SOLUTION INTRAVENOUS EVERY 8 HOURS PRN
Status: DISCONTINUED | OUTPATIENT
Start: 2024-01-23 | End: 2024-01-26 | Stop reason: HOSPADM

## 2024-01-23 RX ORDER — DRONABINOL 2.5 MG/1
2.5 CAPSULE ORAL
COMMUNITY
End: 2024-01-26 | Stop reason: SDUPTHER

## 2024-01-23 RX ORDER — CEFTRIAXONE 1 G/50ML
1 INJECTION, SOLUTION INTRAVENOUS EVERY 24 HOURS
Status: DISCONTINUED | OUTPATIENT
Start: 2024-01-23 | End: 2024-01-26 | Stop reason: HOSPADM

## 2024-01-23 RX ORDER — PROCHLORPERAZINE MALEATE 10 MG
10 TABLET ORAL EVERY 6 HOURS PRN
COMMUNITY

## 2024-01-23 RX ORDER — ACETAMINOPHEN 650 MG/1
650 SUPPOSITORY RECTAL EVERY 4 HOURS PRN
Status: DISCONTINUED | OUTPATIENT
Start: 2024-01-23 | End: 2024-01-26 | Stop reason: HOSPADM

## 2024-01-23 RX ORDER — ONDANSETRON 4 MG/1
4 TABLET, FILM COATED ORAL EVERY 8 HOURS PRN
Status: DISCONTINUED | OUTPATIENT
Start: 2024-01-23 | End: 2024-01-26 | Stop reason: HOSPADM

## 2024-01-23 RX ORDER — OXYCODONE AND ACETAMINOPHEN 10; 325 MG/1; MG/1
1 TABLET ORAL EVERY 4 HOURS PRN
COMMUNITY

## 2024-01-23 RX ORDER — PANTOPRAZOLE SODIUM 40 MG/10ML
40 INJECTION, POWDER, LYOPHILIZED, FOR SOLUTION INTRAVENOUS
Status: DISCONTINUED | OUTPATIENT
Start: 2024-01-24 | End: 2024-01-26 | Stop reason: HOSPADM

## 2024-01-23 RX ORDER — NALOXONE HYDROCHLORIDE 4 MG/.1ML
4 SPRAY NASAL AS NEEDED
COMMUNITY

## 2024-01-23 RX ORDER — LISINOPRIL 20 MG/1
20 TABLET ORAL
COMMUNITY

## 2024-01-23 RX ORDER — SODIUM CHLORIDE 9 MG/ML
75 INJECTION, SOLUTION INTRAVENOUS CONTINUOUS
Status: DISCONTINUED | OUTPATIENT
Start: 2024-01-23 | End: 2024-01-24

## 2024-01-23 RX ADMIN — MAGNESIUM SULFATE HEPTAHYDRATE 2 G: 40 INJECTION, SOLUTION INTRAVENOUS at 00:25

## 2024-01-23 RX ADMIN — SODIUM CHLORIDE 75 ML/HR: 9 INJECTION, SOLUTION INTRAVENOUS at 15:16

## 2024-01-23 RX ADMIN — CEFTRIAXONE SODIUM 1 G: 1 INJECTION, SOLUTION INTRAVENOUS at 02:05

## 2024-01-23 RX ADMIN — ENOXAPARIN SODIUM 40 MG: 40 INJECTION SUBCUTANEOUS at 15:16

## 2024-01-23 RX ADMIN — AZITHROMYCIN MONOHYDRATE 500 MG: 500 INJECTION, POWDER, LYOPHILIZED, FOR SOLUTION INTRAVENOUS at 05:00

## 2024-01-23 RX ADMIN — CEFTRIAXONE SODIUM 1 G: 1 INJECTION, SOLUTION INTRAVENOUS at 15:15

## 2024-01-23 RX ADMIN — SODIUM CHLORIDE, POTASSIUM CHLORIDE, SODIUM LACTATE AND CALCIUM CHLORIDE 500 ML: 600; 310; 30; 20 INJECTION, SOLUTION INTRAVENOUS at 00:29

## 2024-01-23 SDOH — SOCIAL STABILITY: SOCIAL INSECURITY: DO YOU FEEL UNSAFE GOING BACK TO THE PLACE WHERE YOU ARE LIVING?: NO

## 2024-01-23 SDOH — SOCIAL STABILITY: SOCIAL INSECURITY: HAVE YOU HAD THOUGHTS OF HARMING ANYONE ELSE?: NO

## 2024-01-23 SDOH — SOCIAL STABILITY: SOCIAL INSECURITY: HAS ANYONE EVER THREATENED TO HURT YOUR FAMILY OR YOUR PETS?: NO

## 2024-01-23 SDOH — SOCIAL STABILITY: SOCIAL INSECURITY: WERE YOU ABLE TO COMPLETE ALL THE BEHAVIORAL HEALTH SCREENINGS?: YES

## 2024-01-23 SDOH — SOCIAL STABILITY: SOCIAL INSECURITY: DO YOU FEEL ANYONE HAS EXPLOITED OR TAKEN ADVANTAGE OF YOU FINANCIALLY OR OF YOUR PERSONAL PROPERTY?: NO

## 2024-01-23 SDOH — SOCIAL STABILITY: SOCIAL INSECURITY: ARE YOU OR HAVE YOU BEEN THREATENED OR ABUSED PHYSICALLY, EMOTIONALLY, OR SEXUALLY BY ANYONE?: NO

## 2024-01-23 SDOH — SOCIAL STABILITY: SOCIAL INSECURITY: DOES ANYONE TRY TO KEEP YOU FROM HAVING/CONTACTING OTHER FRIENDS OR DOING THINGS OUTSIDE YOUR HOME?: NO

## 2024-01-23 SDOH — SOCIAL STABILITY: SOCIAL INSECURITY: ARE THERE ANY APPARENT SIGNS OF INJURIES/BEHAVIORS THAT COULD BE RELATED TO ABUSE/NEGLECT?: NO

## 2024-01-23 SDOH — SOCIAL STABILITY: SOCIAL INSECURITY: ABUSE: ADULT

## 2024-01-23 ASSESSMENT — COGNITIVE AND FUNCTIONAL STATUS - GENERAL
HELP NEEDED FOR BATHING: A LITTLE
PATIENT BASELINE BEDBOUND: NO
HELP NEEDED FOR BATHING: A LITTLE
PERSONAL GROOMING: A LITTLE
STANDING UP FROM CHAIR USING ARMS: A LITTLE
STANDING UP FROM CHAIR USING ARMS: A LITTLE
DAILY ACTIVITIY SCORE: 19
WALKING IN HOSPITAL ROOM: A LITTLE
DRESSING REGULAR LOWER BODY CLOTHING: A LITTLE
TOILETING: A LITTLE
MOBILITY SCORE: 19
CLIMB 3 TO 5 STEPS WITH RAILING: A LOT
MOVING TO AND FROM BED TO CHAIR: A LITTLE
DRESSING REGULAR LOWER BODY CLOTHING: A LITTLE
DAILY ACTIVITIY SCORE: 19
DRESSING REGULAR UPPER BODY CLOTHING: A LITTLE
CLIMB 3 TO 5 STEPS WITH RAILING: A LOT
MOBILITY SCORE: 19
MOVING TO AND FROM BED TO CHAIR: A LITTLE
WALKING IN HOSPITAL ROOM: A LITTLE
TOILETING: A LITTLE
DRESSING REGULAR UPPER BODY CLOTHING: A LITTLE
PERSONAL GROOMING: A LITTLE

## 2024-01-23 ASSESSMENT — ACTIVITIES OF DAILY LIVING (ADL)
HEARING - RIGHT EAR: DIFFICULTY WITH NOISE
LACK_OF_TRANSPORTATION: NO
DRESSING YOURSELF: NEEDS ASSISTANCE
GROOMING: NEEDS ASSISTANCE
JUDGMENT_ADEQUATE_SAFELY_COMPLETE_DAILY_ACTIVITIES: NO
ADEQUATE_TO_COMPLETE_ADL: NO
HEARING - LEFT EAR: DIFFICULTY WITH NOISE
WALKS IN HOME: NEEDS ASSISTANCE
PATIENT'S MEMORY ADEQUATE TO SAFELY COMPLETE DAILY ACTIVITIES?: YES
TOILETING: NEEDS ASSISTANCE
FEEDING YOURSELF: INDEPENDENT
ASSISTIVE_DEVICE: CANE;WALKER
BATHING: NEEDS ASSISTANCE

## 2024-01-23 ASSESSMENT — PAIN SCALES - GENERAL
PAINLEVEL_OUTOF10: 0 - NO PAIN
PAINLEVEL_OUTOF10: 0 - NO PAIN

## 2024-01-23 ASSESSMENT — PAIN - FUNCTIONAL ASSESSMENT: PAIN_FUNCTIONAL_ASSESSMENT: 0-10

## 2024-01-23 ASSESSMENT — LIFESTYLE VARIABLES
AUDIT-C TOTAL SCORE: 0
HOW OFTEN DO YOU HAVE 6 OR MORE DRINKS ON ONE OCCASION: NEVER
HOW OFTEN DO YOU HAVE A DRINK CONTAINING ALCOHOL: NEVER
SKIP TO QUESTIONS 9-10: 1
HOW MANY STANDARD DRINKS CONTAINING ALCOHOL DO YOU HAVE ON A TYPICAL DAY: PATIENT DOES NOT DRINK
AUDIT-C TOTAL SCORE: 0

## 2024-01-23 NOTE — ED PROVIDER NOTES
HPI   Chief Complaint   Patient presents with    Cough     Pt states cough and weakness for a few days. States she received her last chemo treatment yesterday for stage 4 lung cancer, wears 3L NC at baseline.        Patient presents to the ER with complaint of generalized weakness.  States that she has been having a worsening progressive cough for the last several days.  She received her last chemotherapy yesterday for palliative care for stage IV lung cancer.  She wears 3 L nasal cannula at baseline.  Is complaining of more cough and congestion with mucus production but states that her shortness of breath is controlled with oxygen.  Has a known history of COPD.  Denies a history of DVT or PE.  Denies recent worsening leg swelling.  Does have a known history of cardiac catheterization status post stenting.  Is on aspirin for anticoagulation at home                          Denise Coma Scale Score: 15                  Patient History   Past Medical History:   Diagnosis Date    Arthritis     Bilateral sensorineural hearing loss 03/06/2023    Chronic hypoxic respiratory failure, on home oxygen therapy (CMS/HCC)     COPD (chronic obstructive pulmonary disease) (CMS/HCC)     Diverticulitis     History of radiation therapy     History of transfusion     Hypertension     Non-small cell lung cancer (NSCLC) (CMS/HCC)     Secondary malignant neoplasm of right lung (CMS/HCC)     Ulnar neuropathy of left upper extremity 03/06/2023     Past Surgical History:   Procedure Laterality Date    APPENDECTOMY      CARDIAC CATHETERIZATION      COLON SURGERY      CT ANGIO NECK  09/26/2022    CT NECK ANGIO W AND WO IV CONTRAST    CT ANGIO NECK  12/17/2021    CT NECK ANGIO W AND WO IV CONTRAST    EXCISION BENIGN SKIN LESION SCALP / NECK / HANDS / FEET / GENITALIA      scalp    HAND SURGERY      HYSTERECTOMY  03/29/2018    Hysterectomy    MECKEL DIVERTICULUM EXCISION      TONSILLECTOMY  03/29/2018    Tonsillectomy     Family History    Problem Relation Name Age of Onset    COPD Mother      Cancer Father      Other (Lupus erythematosus) Other family history     Heart disease Other family history      Social History     Tobacco Use    Smoking status: Former     Packs/day: 0.50     Years: 48.00     Additional pack years: 0.00     Total pack years: 24.00     Types: Cigarettes     Quit date: 2019     Years since quittin.2    Smokeless tobacco: Never   Vaping Use    Vaping Use: Some days   Substance Use Topics    Alcohol use: Not Currently    Drug use: Never       Physical Exam   ED Triage Vitals [24 2223]   Temp Heart Rate Respirations BP   36.9 °C (98.4 °F) (!) 114 18 153/61      Pulse Ox Temp Source Heart Rate Source Patient Position   98 % Oral Monitor Sitting      BP Location FiO2 (%)     Left arm --       Physical Exam  Constitutional:       General: She is in acute distress.      Appearance: She is ill-appearing.      Comments: Chronically ill appearing     HENT:      Right Ear: External ear normal.      Left Ear: External ear normal.      Mouth/Throat:      Mouth: Mucous membranes are dry.   Eyes:      Extraocular Movements: Extraocular movements intact.      Pupils: Pupils are equal, round, and reactive to light.   Cardiovascular:      Rate and Rhythm: Regular rhythm. Tachycardia present.   Pulmonary:      Breath sounds: Rales present.      Comments: Rales in bilateral bases. On NC 3 L saturating in the mid 90s.   Abdominal:      Tenderness: There is no abdominal tenderness. There is no guarding or rebound.   Musculoskeletal:      Right lower leg: No edema.      Left lower leg: No edema.   Skin:     Capillary Refill: Capillary refill takes less than 2 seconds.   Neurological:      General: No focal deficit present.      Mental Status: She is oriented to person, place, and time. Mental status is at baseline.         ED Course & MDM        Medical Decision Making  EKG interpreted by myself shows a sinus tachycardic rhythm with  heart rate 107, , cures 92 QTc 421.  Shows normal axis.  No STEMI criteria noted.  Limited by baseline artifact in lead aVF and aVL.  Occasional PAC noted.    Patient does have mild cardiac elevation noted with troponin 52 and repeat was downtrending at 48.  I suspect this is likely demand ischemia as patient is anemic today with hemoglobin 6.8.  Has been downtrending over the last several weeks.  Patient denies any blood in the stool and her fecal occult overall is also negative.    I suspect this is secondary to the chemotherapy the patient is on as she did her last treatment yesterday.    Patient has required blood transfusion in the past.  I consented her for and give her 1 unit RBCs.  She has a normal white blood cell count.  Magnesium was severely low at 0.89 she was given 2 g IV for this in addition to lactated Ringer's and 1 g Rocephin in addition azithromycin for concerning findings of pneumonia on the CT chest    CT Chest:  IMPRESSION:  1. Increasing size of bilateral lung masses and mediastinal/hilar  adenopathy.  2. New patchy consolidative/ground-glass opacities within the left  upper lobe/lingula. Consider pneumonia/pneumonitis.  3. New right breast skin thickening. Please correlate with physical  exam and/or mammography.  4. Small pericardial effusion which appears increased from 01/22/2024.  5. Trace left pleural effusion. Resolution of the right pleural  effusion seen on 12/09/2023.    Patient tells me she is too weak to ambulate and continue ADLs at home.  She wants to be admitted for potential evaluation by PT/OT for also placement.    Will reach out to [facility] for admission, Dr. Burgos.     Procedure  Procedures     Genny Wadsworth MD  01/23/24 [id number]

## 2024-01-23 NOTE — PROGRESS NOTES
Meds per [facility] After Visit Summary & [facility].   Verified with POA - he states - the Palliative center should be correct.  Patient  handles her own meds & she is unable to verify at this time.  [facility] - Home Meds Completed

## 2024-01-23 NOTE — H&P
History Of Present Illness  Gaby Blue is a 73 y.o. female presenting with  .     Patient presented with shortness of breath.  2 or 3 days.  Cough.  Sputum.  History of cancer.  Had recent chemotherapy.  Was also complaining weakness.  Evaluated emergency department.  CT scan revealed pneumonia.  Laboratory assessment revealed anemia.  She was consented initiated transfusion 1 unit PRBC in the emergency department.  Initiated on IV antibiotics.  Patient seen in the emergency department.  Past Medical History  She has a past medical history of Arthritis, Bilateral sensorineural hearing loss (03/06/2023), Chronic hypoxic respiratory failure, on home oxygen therapy (CMS/Formerly [facility]), COPD (chronic obstructive pulmonary disease) (CMS/Formerly [facility]), Diverticulitis, History of radiation therapy, History of transfusion, Hypertension, Non-small cell lung cancer (NSCLC) (CMS/Formerly [facility]), Secondary malignant neoplasm of right lung (CMS/HCC), and Ulnar neuropathy of left upper extremity (03/06/2023).    Surgical History  She has a past surgical history that includes Tonsillectomy (03/29/2018); Hysterectomy (03/29/2018); CT angio neck (09/26/2022); CT angio neck (12/17/2021); Appendectomy; Excision benign skin lesion scalp / neck / hands / feet / genitalia; Cardiac catheterization; Meckel's diverticulum excision; Colon surgery; and Hand surgery.     Social History  She reports that she quit smoking about 4 years ago. Her smoking use included cigarettes. She has a 24.00 pack-year smoking history. She has never used smokeless tobacco. She reports that she does not currently use alcohol. She reports that she does not use drugs.    Family History  Family History   Problem Relation Name Age of Onset    COPD Mother      Cancer Father      Other (Lupus erythematosus) Other family history     Heart disease Other family history         Allergies  Bee pollen and Pollen extracts    Review of Systems     Physical Exam  Resting comfortably.  Awakens to verbal.   "Scattered rhonchi anteriorly.  Distant heart sounds.  Soft normoactive bowel sounds.  Pale.  And oriented.  Last Recorded Vitals  Blood pressure 116/57, pulse 85, temperature 36.4 °C (97.5 °F), temperature source Temporal, resp. rate 18, height 1.6 m (5' 3\"), weight 59 kg (130 lb), SpO2 (S) 100 %.    Relevant Results    Scheduled medications  azithromycin, 500 mg, intravenous, q24h  cefTRIAXone, 1 g, intravenous, q24h  enoxaparin, 40 mg, subcutaneous, q24h  melatonin, 3 mg, oral, Daily  [START ON 1/24/2024] pantoprazole, 40 mg, oral, Daily before breakfast   Or  [START ON 1/24/2024] pantoprazole, 40 mg, intravenous, Daily before breakfast  polyethylene glycol, 17 g, oral, Daily      Continuous medications  sodium chloride 0.9%, 75 mL/hr      PRN medications  PRN medications: acetaminophen **OR** acetaminophen **OR** acetaminophen, acetaminophen **OR** acetaminophen **OR** acetaminophen, ondansetron **OR** ondansetron   Results for orders placed or performed during the hospital encounter of 01/22/24 (from the past 24 hour(s))   ECG 12 lead   Result Value Ref Range    Ventricular Rate 107 BPM    Atrial Rate 107 BPM    FL Interval 150 ms    QRS Duration 92 ms    QT Interval 316 ms    QTC Calculation(Bazett) 421 ms    P Axis 77 degrees    R Axis -33 degrees    T Axis 77 degrees    QRS Count 17 beats    Q Onset 213 ms    P Onset 138 ms    P Offset 191 ms    T Offset 371 ms    QTC Fredericia 383 ms   CBC and Auto Differential   Result Value Ref Range    WBC 6.8 4.4 - 11.3 x10*3/uL    nRBC 0.0 0.0 - 0.0 /100 WBCs    RBC 2.18 (L) 4.00 - 5.20 x10*6/uL    Hemoglobin 6.8 (L) 12.0 - 16.0 g/dL    Hematocrit 20.9 (L) 36.0 - 46.0 %    MCV 96 80 - 100 fL    MCH 31.2 26.0 - 34.0 pg    MCHC 32.5 32.0 - 36.0 g/dL    RDW 14.6 (H) 11.5 - 14.5 %    Platelets 46 (L) 150 - 450 x10*3/uL    Immature Granulocytes %, Automated 10.1 (H) 0.0 - 0.9 %    Immature Granulocytes Absolute, Automated 0.69 (H) 0.00 - 0.50 x10*3/uL   Comprehensive " metabolic panel   Result Value Ref Range    Glucose 87 74 - 99 mg/dL    Sodium 133 (L) 136 - 145 mmol/L    Potassium 3.1 (L) 3.5 - 5.3 mmol/L    Chloride 97 (L) 98 - 107 mmol/L    Bicarbonate 28 21 - 32 mmol/L    Anion Gap 11 10 - 20 mmol/L    Urea Nitrogen 26 (H) 6 - 23 mg/dL    Creatinine 1.09 (H) 0.50 - 1.05 mg/dL    eGFR 54 (L) >60 mL/min/1.73m*2    Calcium 8.2 (L) 8.6 - 10.3 mg/dL    Albumin 2.8 (L) 3.4 - 5.0 g/dL    Alkaline Phosphatase 49 33 - 136 U/L    Total Protein 5.1 (L) 6.4 - 8.2 g/dL    AST 21 9 - 39 U/L    Bilirubin, Total 0.7 0.0 - 1.2 mg/dL    ALT 11 7 - 45 U/L   Protime-INR   Result Value Ref Range    Protime 13.9 (H) 9.8 - 12.8 seconds    INR 1.2 (H) 0.9 - 1.1   Magnesium   Result Value Ref Range    Magnesium 0.89 (L) 1.60 - 2.40 mg/dL   B-type natriuretic peptide   Result Value Ref Range     (H) 0 - 99 pg/mL   Troponin I, High Sensitivity, Initial   Result Value Ref Range    Troponin I, High Sensitivity 52 (HH) 0 - 13 ng/L   Manual Differential   Result Value Ref Range    Neutrophils %, Manual 99.0 40.0 - 80.0 %    Bands %, Manual 1.0 0.0 - 5.0 %    Lymphocytes %, Manual 0.0 13.0 - 44.0 %    Monocytes %, Manual 0.0 2.0 - 10.0 %    Eosinophils %, Manual 0.0 0.0 - 6.0 %    Basophils %, Manual 0.0 0.0 - 2.0 %    Seg Neutrophils Absolute, Manual 6.73 (H) 1.60 - 5.00 x10*3/uL    Bands Absolute, Manual 0.07 0.00 - 0.50 x10*3/uL    Lymphocytes Absolute, Manual 0.00 (L) 0.80 - 3.00 x10*3/uL    Monocytes Absolute, Manual 0.00 (L) 0.05 - 0.80 x10*3/uL    Eosinophils Absolute, Manual 0.00 0.00 - 0.40 x10*3/uL    Basophils Absolute, Manual 0.00 0.00 - 0.10 x10*3/uL    Total Cells Counted 100     Neutrophils Absolute, Manual 6.80 (H) 1.60 - 5.50 x10*3/uL    RBC Morphology See Below     Hypochromia Mild    Lactate   Result Value Ref Range    Lactate 1.1 0.4 - 2.0 mmol/L   Prepare RBC: 1 Units   Result Value Ref Range    PRODUCT CODE M5892C48     Unit Number [id number]     Unit ABO O     Unit RH NEG      XM INTEP COMP     Dispense Status TR     Blood Expiration Date January 31, 2024 23:59 EST     PRODUCT BLOOD TYPE 9500     UNIT VOLUME 323    Troponin, High Sensitivity, 1 Hour   Result Value Ref Range    Troponin I, High Sensitivity 48 (H) 0 - 13 ng/L   Type and screen   Result Value Ref Range    ABO TYPE O     Rh TYPE NEG     ANTIBODY SCREEN NEG    Influenza A, and B PCR   Result Value Ref Range    Flu A Result Not Detected Not Detected    Flu B Result Not Detected Not Detected   Sars-CoV-2 PCR, Symptomatic   Result Value Ref Range    Coronavirus 2019, PCR Not Detected Not Detected   RSV PCR   Result Value Ref Range    RSV PCR Not Detected Not Detected      ECG 12 lead    Result Date: 1/23/2024  Sinus tachycardia with Premature atrial complexes Left axis deviation Abnormal ECG When compared with ECG of 22-DEC-2023 19:18, No significant change was found    CT chest wo IV contrast    Result Date: 1/23/2024  Interpreted By:  Justin Alexander, STUDY: CT CHEST WO IV CONTRAST;  1/22/2024 11:58 pm   INDICATION: Signs/Symptoms:known lung cancer. chemo yesterday. reports worsening LE edema and sob. eval for pericardial and pleural effusions.   COMPARISON: 12/09/2023   ACCESSION NUMBER(S): [id number]   ORDERING CLINICIAN: ABRAM JACQUES   TECHNIQUE: Helical data acquisition of the chest was obtained without intravenous contrast. Images were reformatted in axial, coronal, and sagittal planes.  MIP reconstructions were performed on a separate workstation and provided for interpretation.   FINDINGS: LOWER NECK AND SUPERFICIAL SOFT TISSUES:  Mild body wall edema. Right chest port catheter, partially visualized. There is extensive right breast skin thickening.   MEDIASTINUM/PATRICIA: Substantially increased size of mediastinal and right hilar lymph nodes, for example a right lower paratracheal lymph node measures 1.8 cm and was previously not clearly visible on 12/09/2023.  enlarged subcarinal lymph node which measures 2 cm  short axis, previously approximately 1.7 cm. Additional right hilar lymph nodes have increased in size. Esophagus is unremarkable.   CARDIOVASCULAR: Cardiac chamber size within normal limits. Small pericardial effusion which appears increased from 01/22/2024. Aortic caliber normal. Normal caliber of main pulmonary artery. Multi-vessel coronary atherosclerotic calcification.   LUNGS, AIRWAYS, AND PLEURA: Spiculated left upper lobe mass measures 2.2 cm, previously approximately 2 cm. Right upper lobe/middle lobe infrahilar mass which spans the major fissure measures 3.3 cm, previously 3.1 cm. There is severe apical predominant emphysema. New patchy consolidative/ground-glass opacities within the left upper lobe/lingula. Trace left pleural effusion.   MUSCULOSKELETAL: No acute osseous abnormality or suspicious osseous lesions. Moderate multilevel spinal degenerative change.   UPPER ABDOMEN: Unremarkable.       1. Increasing size of bilateral lung masses and mediastinal/hilar adenopathy. 2. New patchy consolidative/ground-glass opacities within the left upper lobe/lingula. Consider pneumonia/pneumonitis. 3. New right breast skin thickening. Please correlate with physical exam and/or mammography. 4. Small pericardial effusion which appears increased from 01/22/2024. 5. Trace left pleural effusion. Resolution of the right pleural effusion seen on 12/09/2023.   Signed by: Justin Alexander 1/23/2024 1:30 AM Dictation workstation:   [id number]    ECG 12 lead    Result Date: 1/23/2024  Sinus tachycardia with Premature atrial complexes Left axis deviation Abnormal ECG When compared with ECG of 22-DEC-2023 19:18, No significant change was found    CT chest wo IV contrast    Result Date: 1/23/2024  Interpreted By:  Justin Alexander, STUDY: CT CHEST WO IV CONTRAST;  1/22/2024 11:58 pm   INDICATION: Signs/Symptoms:known lung cancer. chemo yesterday. reports worsening LE edema and sob. eval for pericardial and pleural  effusions.   COMPARISON: 12/09/2023   ACCESSION NUMBER(S): [id number]   ORDERING CLINICIAN: ABRAM JACQUES   TECHNIQUE: Helical data acquisition of the chest was obtained without intravenous contrast. Images were reformatted in axial, coronal, and sagittal planes.  MIP reconstructions were performed on a separate workstation and provided for interpretation.   FINDINGS: LOWER NECK AND SUPERFICIAL SOFT TISSUES:  Mild body wall edema. Right chest port catheter, partially visualized. There is extensive right breast skin thickening.   MEDIASTINUM/PATRICIA: Substantially increased size of mediastinal and right hilar lymph nodes, for example a right lower paratracheal lymph node measures 1.8 cm and was previously not clearly visible on 12/09/2023.  enlarged subcarinal lymph node which measures 2 cm short axis, previously approximately 1.7 cm. Additional right hilar lymph nodes have increased in size. Esophagus is unremarkable.   CARDIOVASCULAR: Cardiac chamber size within normal limits. Small pericardial effusion which appears increased from 01/22/2024. Aortic caliber normal. Normal caliber of main pulmonary artery. Multi-vessel coronary atherosclerotic calcification.   LUNGS, AIRWAYS, AND PLEURA: Spiculated left upper lobe mass measures 2.2 cm, previously approximately 2 cm. Right upper lobe/middle lobe infrahilar mass which spans the major fissure measures 3.3 cm, previously 3.1 cm. There is severe apical predominant emphysema. New patchy consolidative/ground-glass opacities within the left upper lobe/lingula. Trace left pleural effusion.   MUSCULOSKELETAL: No acute osseous abnormality or suspicious osseous lesions. Moderate multilevel spinal degenerative change.   UPPER ABDOMEN: Unremarkable.       1. Increasing size of bilateral lung masses and mediastinal/hilar adenopathy. 2. New patchy consolidative/ground-glass opacities within the left upper lobe/lingula. Consider pneumonia/pneumonitis. 3. New right breast skin  thickening. Please correlate with physical exam and/or mammography. 4. Small pericardial effusion which appears increased from 01/22/2024. 5. Trace left pleural effusion. Resolution of the right pleural effusion seen on 12/09/2023.   Signed by: Justin Alexander 1/23/2024 1:30 AM Dictation workstation:   [id number]           Assessment/Plan   Principal Problem:    Pneumonia due to infectious organism, unspecified laterality, unspecified part of lung  Active Problems:    Lung cancer (CMS/HCC)    COPD exacerbation (CMS/HCC)    Primary hypertension    Thrombocytopenia (CMS/HCC)    Chronic pain    Anemia in neoplastic disease      Principal Problem:    Pneumonia due to infectious organism, unspecified laterality, unspecified part of lung  Active Problems:    Lung cancer (CMS/HCC)    COPD exacerbation (CMS/HCC)    Primary hypertension    Thrombocytopenia (CMS/HCC)    Chronic pain    Anemia in neoplastic disease     Patient be admitted.  IV antibiotics.  IV fluids.  Transfusion.  Hemodynamic monitoring.  PT OT evaluations.  Would likely require discharge planning to include placement at discharge.       I spent   minutes in the professional and overall care of this patient.      Jd Burgos MD

## 2024-01-24 LAB
ALBUMIN SERPL BCP-MCNC: 2.3 G/DL (ref 3.4–5)
ALP SERPL-CCNC: 43 U/L (ref 33–136)
ALT SERPL W P-5'-P-CCNC: 9 U/L (ref 7–45)
ANION GAP SERPL CALC-SCNC: 12 MMOL/L (ref 10–20)
AST SERPL W P-5'-P-CCNC: 19 U/L (ref 9–39)
BILIRUB SERPL-MCNC: 0.5 MG/DL (ref 0–1.2)
BUN SERPL-MCNC: 32 MG/DL (ref 6–23)
CALCIUM SERPL-MCNC: 7.9 MG/DL (ref 8.6–10.3)
CHLORIDE SERPL-SCNC: 99 MMOL/L (ref 98–107)
CO2 SERPL-SCNC: 25 MMOL/L (ref 21–32)
CREAT SERPL-MCNC: 1.16 MG/DL (ref 0.5–1.05)
EGFRCR SERPLBLD CKD-EPI 2021: 50 ML/MIN/1.73M*2
ERYTHROCYTE [DISTWIDTH] IN BLOOD BY AUTOMATED COUNT: 15.1 % (ref 11.5–14.5)
GLUCOSE SERPL-MCNC: 103 MG/DL (ref 74–99)
HCT VFR BLD AUTO: 20.4 % (ref 36–46)
HGB BLD-MCNC: 6.8 G/DL (ref 12–16)
HOLD SPECIMEN: NORMAL
IRON SATN MFR SERPL: 18 % (ref 25–45)
IRON SERPL-MCNC: 18 UG/DL (ref 35–150)
MCH RBC QN AUTO: 31.2 PG (ref 26–34)
MCHC RBC AUTO-ENTMCNC: 33.3 G/DL (ref 32–36)
MCV RBC AUTO: 94 FL (ref 80–100)
NRBC BLD-RTO: 0 /100 WBCS (ref 0–0)
PATH REVIEW-CBC DIFFERENTIAL: NORMAL
PLATELET # BLD AUTO: 21 X10*3/UL (ref 150–450)
POTASSIUM SERPL-SCNC: 2.6 MMOL/L (ref 3.5–5.3)
PROT SERPL-MCNC: 4.5 G/DL (ref 6.4–8.2)
RBC # BLD AUTO: 2.18 X10*6/UL (ref 4–5.2)
SODIUM SERPL-SCNC: 133 MMOL/L (ref 136–145)
TIBC SERPL-MCNC: 102 UG/DL (ref 240–445)
UIBC SERPL-MCNC: 84 UG/DL (ref 110–370)
WBC # BLD AUTO: 2.8 X10*3/UL (ref 4.4–11.3)

## 2024-01-24 PROCEDURE — 2500000001 HC RX 250 WO HCPCS SELF ADMINISTERED DRUGS (ALT 637 FOR MEDICARE OP): Performed by: FAMILY MEDICINE

## 2024-01-24 PROCEDURE — 80053 COMPREHEN METABOLIC PANEL: CPT | Performed by: FAMILY MEDICINE

## 2024-01-24 PROCEDURE — 97165 OT EVAL LOW COMPLEX 30 MIN: CPT | Mod: GO

## 2024-01-24 PROCEDURE — 2500000004 HC RX 250 GENERAL PHARMACY W/ HCPCS (ALT 636 FOR OP/ED): Performed by: FAMILY MEDICINE

## 2024-01-24 PROCEDURE — 99233 SBSQ HOSP IP/OBS HIGH 50: CPT | Performed by: FAMILY MEDICINE

## 2024-01-24 PROCEDURE — C9113 INJ PANTOPRAZOLE SODIUM, VIA: HCPCS | Performed by: FAMILY MEDICINE

## 2024-01-24 PROCEDURE — 85027 COMPLETE CBC AUTOMATED: CPT | Performed by: FAMILY MEDICINE

## 2024-01-24 PROCEDURE — 83010 ASSAY OF HAPTOGLOBIN QUANT: CPT | Performed by: STUDENT IN AN ORGANIZED HEALTH CARE EDUCATION/TRAINING PROGRAM

## 2024-01-24 PROCEDURE — 85060 BLOOD SMEAR INTERPRETATION: CPT | Performed by: FAMILY MEDICINE

## 2024-01-24 PROCEDURE — 83540 ASSAY OF IRON: CPT | Performed by: STUDENT IN AN ORGANIZED HEALTH CARE EDUCATION/TRAINING PROGRAM

## 2024-01-24 PROCEDURE — 1210000001 HC SEMI-PRIVATE ROOM DAILY

## 2024-01-24 PROCEDURE — 97161 PT EVAL LOW COMPLEX 20 MIN: CPT | Mod: GP

## 2024-01-24 PROCEDURE — 82728 ASSAY OF FERRITIN: CPT | Mod: ELYLAB | Performed by: STUDENT IN AN ORGANIZED HEALTH CARE EDUCATION/TRAINING PROGRAM

## 2024-01-24 RX ORDER — IBUPROFEN 400 MG/1
400 TABLET ORAL EVERY 4 HOURS PRN
Status: DISCONTINUED | OUTPATIENT
Start: 2024-01-24 | End: 2024-01-26 | Stop reason: HOSPADM

## 2024-01-24 RX ORDER — GUAIFENESIN/DEXTROMETHORPHAN 100-10MG/5
5 SYRUP ORAL EVERY 4 HOURS PRN
Status: DISCONTINUED | OUTPATIENT
Start: 2024-01-24 | End: 2024-01-26 | Stop reason: HOSPADM

## 2024-01-24 RX ORDER — POTASSIUM CHLORIDE 1.5 G/1.58G
40 POWDER, FOR SOLUTION ORAL ONCE
Status: COMPLETED | OUTPATIENT
Start: 2024-01-24 | End: 2024-01-24

## 2024-01-24 RX ADMIN — CEFTRIAXONE SODIUM 1 G: 1 INJECTION, SOLUTION INTRAVENOUS at 13:08

## 2024-01-24 RX ADMIN — ACETAMINOPHEN 650 MG: 325 TABLET ORAL at 21:19

## 2024-01-24 RX ADMIN — PANTOPRAZOLE SODIUM 40 MG: 40 INJECTION, POWDER, FOR SOLUTION INTRAVENOUS at 05:11

## 2024-01-24 RX ADMIN — POTASSIUM CHLORIDE 40 MEQ: 1.5 POWDER, FOR SOLUTION ORAL at 12:13

## 2024-01-24 RX ADMIN — Medication 3 MG: at 21:19

## 2024-01-24 RX ADMIN — ACETAMINOPHEN 650 MG: 325 TABLET ORAL at 00:07

## 2024-01-24 RX ADMIN — AZITHROMYCIN MONOHYDRATE 500 MG: 500 INJECTION, POWDER, LYOPHILIZED, FOR SOLUTION INTRAVENOUS at 05:04

## 2024-01-24 RX ADMIN — ACETAMINOPHEN 650 MG: 325 TABLET ORAL at 16:02

## 2024-01-24 ASSESSMENT — PAIN SCALES - GENERAL
PAINLEVEL_OUTOF10: 0 - NO PAIN

## 2024-01-24 ASSESSMENT — ACTIVITIES OF DAILY LIVING (ADL)
BATHING_ASSISTANCE: MODERATE
ADL_ASSISTANCE: INDEPENDENT
ADL_ASSISTANCE: INDEPENDENT

## 2024-01-24 ASSESSMENT — COGNITIVE AND FUNCTIONAL STATUS - GENERAL
CLIMB 3 TO 5 STEPS WITH RAILING: TOTAL
WALKING IN HOSPITAL ROOM: A LITTLE
HELP NEEDED FOR BATHING: A LITTLE
WALKING IN HOSPITAL ROOM: A LOT
MOBILITY SCORE: 11
DAILY ACTIVITIY SCORE: 19
TURNING FROM BACK TO SIDE WHILE IN FLAT BAD: A LITTLE
DRESSING REGULAR UPPER BODY CLOTHING: A LITTLE
DRESSING REGULAR LOWER BODY CLOTHING: A LITTLE
TOILETING: A LOT
PERSONAL GROOMING: A LITTLE
TURNING FROM BACK TO SIDE WHILE IN FLAT BAD: A LOT
MOVING TO AND FROM BED TO CHAIR: A LITTLE
DRESSING REGULAR UPPER BODY CLOTHING: A LITTLE
STANDING UP FROM CHAIR USING ARMS: A LITTLE
DAILY ACTIVITIY SCORE: 17
MOBILITY SCORE: 18
HELP NEEDED FOR BATHING: A LOT
DRESSING REGULAR LOWER BODY CLOTHING: A LOT
CLIMB 3 TO 5 STEPS WITH RAILING: A LOT
TOILETING: A LITTLE
MOVING TO AND FROM BED TO CHAIR: A LOT
STANDING UP FROM CHAIR USING ARMS: A LOT
MOVING FROM LYING ON BACK TO SITTING ON SIDE OF FLAT BED WITH BEDRAILS: A LOT

## 2024-01-24 ASSESSMENT — PAIN - FUNCTIONAL ASSESSMENT
PAIN_FUNCTIONAL_ASSESSMENT: 0-10
PAIN_FUNCTIONAL_ASSESSMENT: 0-10

## 2024-01-24 ASSESSMENT — PAIN DESCRIPTION - LOCATION: LOCATION: HEAD

## 2024-01-24 NOTE — PROGRESS NOTES
Spoke with patient who is currently admitted with pneumonia, lives alone and is on home oxygen at 2 liters n/c. Patient states she receives assistance from her male friend and a neighbor. Patient prefers home with home care, [facility], however will wait on PT/OT evaluation also. Will continue to follow for further discharge needs.

## 2024-01-24 NOTE — PROGRESS NOTES
Occupational Therapy                 Therapy Communication Note    Patient Name: Gaby Blue  MRN: [id number]  Today's Date: 1/24/2024     Discipline: Occupational Therapy    Missed Visit Reason: Missed Visit Reason: Other (Comment) (OT eval attempt. RN asks that therapy hold eval at this time d/t pt critically low potassium and HGB levels. Awaiting orders from MD for transfusion. Will reattempt as medically appropriate.)    Missed Time: Attempt

## 2024-01-24 NOTE — CARE PLAN
The patient's goals for the shift include      The clinical goals for the shift include decrease shortness of breath this shift    Problem: Pain  Goal: My pain/discomfort is manageable  Outcome: Progressing     Problem: Safety  Goal: Patient will be injury free during hospitalization  Outcome: Progressing  Goal: I will remain free of falls  Outcome: Progressing     Problem: Daily Care  Goal: Daily care needs are met  Outcome: Progressing     Problem: Psychosocial Needs  Goal: Demonstrates ability to cope with hospitalization/illness  Outcome: Progressing  Goal: Collaborate with me, my family, and caregiver to identify my specific goals  Outcome: Progressing

## 2024-01-24 NOTE — PROGRESS NOTES
Physical Therapy                 Therapy Communication Note    Patient Name: Gaby Blue  MRN: [id number]  Today's Date: 1/24/2024     Discipline: Physical Therapy    Missed Visit Reason: 956- Missed Visit Reason: Other (Comment) (PT/OT attempt  hold per RN . pt with  low potassium and HGB awaiting for orders from MD to transfuse . reattempt as able.)    Missed Time: Attempt    Comment:

## 2024-01-24 NOTE — PROGRESS NOTES
Occupational Therapy    Evaluation    Patient Name: Gaby Blue  MRN: [id number]  Today's Date: 1/24/2024  Time Calculation  Start Time: 1243  Stop Time: 1256  Time Calculation (min): 13 min    Assessment  IP OT Assessment  Evaluation/Treatment Tolerance: Patient tolerated treatment well  Medical Staff Made Aware: Yes  End of Session Communication: Bedside nurse  End of Session Patient Position: Bed, 3 rail up, Alarm on (all current needs met)    Plan:  Treatment Interventions: ADL retraining, Visual perceptual retraining, Functional transfer training, UE strengthening/ROM, Endurance training  OT Frequency: 2 times per week  OT Discharge Recommendations: Moderate intensity level of continued care  OT - OK to Discharge: Yes (once medically appropriate)    Subjective     Current Problem:  1. Pneumonia due to infectious organism, unspecified laterality, unspecified part of lung        2. Malignant neoplasm of lung, unspecified laterality, unspecified part of lung (CMS/HCC)        3. Hypomagnesemia        4. Inability to walk            General:  General  Reason for Referral: OT eval and treat  Referred By: Jd Burgos MD  Past Medical History Relevant to Rehab: COPD on 3L O2, HTN, anemia, arthritis, former smoker, lung Ca, thrombocutopenia, neuropathy  Missed Visit: Yes  Missed Visit Reason: Other (Comment) (OT eval attempt. RN asks that therapy hold eval at this time d/t pt critically low potassium and HGB levels. Awaiting orders from MD for transfusion. Will reattempt as medically appropriate.)  Co-Treatment: PT  Co-Treatment Reason: to maximize pt safety and tolerance of session  Prior to Session Communication: Bedside nurse (cleared pt for therapy)  Patient Position Received: Bed, 3 rail up, Alarm on (pt sidelying in bed, agreeable to OT eval)  General Comment: 72 y/o F presents w/ SOB and cough x2-3 days; CT scan 1. Increasing size of bilateral lung masses and mediastinal/hilar adenopathy. 2. New patchy  consolidative/ground-glass opacities within the left upper lobe/lingula. Consider pneumonia/pneumonitis.    Precautions:  Medical Precautions: Fall precautions, Oxygen therapy device and L/min (4L)      Pain:  Pain Assessment  Pain Assessment: 0-10  Pain Score: 0 - No pain    Objective     Cognition:  Overall Cognitive Status: Within Functional Limits             Home Living:  Type of Home: Apartment  Lives With:  (friend comes over daily 7-9 hours a day and sometimes sleeps the night, but does not live w/ pt)  Home Adaptive Equipment: Walker rolling or standard, Cane, Wheelchair-manual  Home Layout: One level  Home Access: Level entry  Bathroom Shower/Tub: Tub/shower unit  Bathroom Toilet: Standard  Bathroom Equipment: Grab bars in shower, Shower chair with back     Prior Function:  Level of [place]: Independent with ADLs and functional transfers, Needs assistance with homemaking  Receives Help From: Friends  ADL Assistance: Independent  Homemaking Assistance: Needs assistance  Ambulatory Assistance: Independent (using rollator v cane)  Prior Function Comments: Pt states that her friend comes over daily for PRN assist. Her friend does grocery shopping and drives. Pt denies falls. Does not drive.    ADL:  Eating Assistance: Stand by  Grooming Assistance: Stand by  Bathing Assistance: Moderate  UE Dressing Assistance: Minimal  LE Dressing Assistance: Maximal  Toileting Assistance with Device: Maximal    Activity Tolerance:  Endurance: Decreased tolerance for upright activites    Bed Mobility/Transfers:   Bed Mobility  Bed Mobility: Yes  Bed Mobility 1  Bed Mobility 1: Supine to sitting, Sitting to supine  Level of Assistance 1: Moderate assistance  Transfers  Transfer: Yes  Transfer 1  Technique 1: Sit to stand, Stand to sit  Transfer Device 1: Walker  Transfer Level of Assistance 1: Minimum assistance, +2  Trials/Comments 1: STS from bed level    Ambulation/Gait Training:  Ambulation/Gait  Training  Ambulation/Gait Training Performed: Yes  Ambulation/Gait Training 1  Device 1: Rolling walker  Assistance 1: Minimum assistance (x2)  Comments/Distance (ft) 1: pt completes side steps toward HOB using FWW and Donte X2    Sitting Balance:  Static Sitting Balance  Static Sitting-Level of Assistance: Close supervision    Standing Balance:  Static Standing Balance  Static Standing-Level of Assistance: Minimum assistance    Sensation:  Light Touch: No apparent deficits    Strength:  Strength Comments: bilat UE grossly 4-/5    Hand Function:  Hand Function  Gross Grasp: Functional  Coordination: Functional    Extremities: RUE   RUE : Within Functional Limits and LUE   LUE: Within Functional Limits    Outcome Measures: [facility] Daily Activity  Putting on and taking off regular lower body clothing: A lot  Bathing (including washing, rinsing, drying): A lot  Putting on and taking off regular upper body clothing: A little  Toileting, which includes using toilet, bedpan or urinal: A lot  Taking care of personal grooming such as brushing teeth: None  Eating Meals: None  Daily Activity - Total Score: 17                    EDUCATION:     Education Documentation  Body Mechanics, taught by Janet Lambert OT at 1/24/2024  1:40 PM.  Learner: Patient  Readiness: Acceptance  Method: Explanation  Response: Verbalizes Understanding, Needs Reinforcement    Precautions, taught by Janet Lambert OT at 1/24/2024  1:40 PM.  Learner: Patient  Readiness: Acceptance  Method: Explanation  Response: Verbalizes Understanding, Needs Reinforcement    Education Comments  No comments found.        Goals:   Encounter Problems       Encounter Problems (Active)       OT Goals       Mod I for all functional transfers (Progressing)       Start:  01/24/24    Expected End:  02/07/24            Mod I for simulated HH distances (Progressing)       Start:  01/24/24    Expected End:  02/07/24            Mod I for lower body dressing (Progressing)        Start:  01/24/24    Expected End:  02/07/24            Mod I for all aspects of toileting (Progressing)       Start:  01/24/24    Expected End:  02/07/24

## 2024-01-24 NOTE — NURSING NOTE
Critical lab results for platelets of 21 and potassium of 2.6 were given to MD Burgos this am. He was also notified of hbg 6.8 GI was consulted but no blood was ordered. Vitals are WNL

## 2024-01-24 NOTE — PROGRESS NOTES
Physical Therapy    Physical Therapy Evaluation    Patient Name: Gaby Blue  MRN: [id number]  Today's Date: 1/24/2024   Time Calculation  Start Time: 1244  Stop Time: 1256  Time Calculation (min): 12 min    Assessment/Plan   PT Assessment  PT Assessment Results: Decreased strength, Decreased endurance, Impaired balance, Decreased mobility, Decreased coordination  Rehab Prognosis: Fair  Evaluation/Treatment Tolerance: Patient limited by fatigue  End of Session Communication: Bedside nurse  Assessment Comment: pt with decreased mobility/gait, strength balance and endurance . pt to benefit from skilled PT to address deficits and improve functional mobility .  End of Session Patient Position: Bed, 3 rail up, Alarm on  IP OR SWING BED PT PLAN  Inpatient or Swing Bed: Inpatient  PT Plan  Treatment/Interventions: Bed mobility, Transfer training, Gait training, Balance training, Strengthening, Endurance training, Therapeutic exercise, Therapeutic activity  PT Plan: Skilled PT  PT Frequency: 3 times per week  PT Discharge Recommendations: Moderate intensity level of continued care  PT Recommended Transfer Status: Assist x2, Assistive device  PT - OK to Discharge: Yes (once medically ready for discharge to next level of care.)    Subjective     Current Problem:  Patient Active Problem List   Diagnosis    Bilateral sensorineural hearing loss    Cholesteatoma of right external auditory canal    Lung cancer (CMS/HCC)    Post-op pain    Ulnar neuropathy of left upper extremity    Vertigo    COPD exacerbation (CMS/HCC)    Primary hypertension    Insomnia disorder    Thrombocytopenia (CMS/HCC)    Acute kidney injury superimposed on CKD (CMS/HCC)    Chronic pain    Hypertensive emergency    Pneumonia due to infectious organism, unspecified laterality, unspecified part of lung    Anemia in neoplastic disease       General Visit Information:  General  Reason for Referral: weakness/ impaired mobility  Referred By: Jd Burgos,  MD OT/PT 1/23  Past Medical History Relevant to Rehab: COPD on 3L O2, HTN, anemia, arthritis, former smoker, lung Ca, thrombocutopenia, neuropathy  Missed Visit: Yes  Missed Visit Reason: Other (Comment) (PT/OT attempt  hold per RN . pt with  low potassium and HGB awaiting for orders from MD to transfuse . reattempt as able.)  Prior to Session Communication: Bedside nurse (cleared to see for therapy eval. reports started pt on potassium, no transfusion as of now ok to see monitor symptoms.)  Patient Position Received: Bed, 3 rail up, Alarm on  General Comment: 74 y/o F presents w/ SOB and cough x2-3 days; CT scan 1. Increasing size of bilateral lung masses and mediastinal/hilar adenopathy. 2. New patchy consolidative/ground-glass opacities within the left upper lobe/lingula. Consider pneumonia/pneumonitis.    Home Living:  Home Living  Type of Home: Apartment  Lives With:  (friend comes over daily to help stays over a majority of the time.)  Home Adaptive Equipment: Walker rolling or standard, Cane, Wheelchair-manual  Home Layout: One level  Home Access: Level entry  Bathroom Shower/Tub: Tub/shower unit  Bathroom Toilet: Standard  Bathroom Equipment: Grab bars in shower, Shower chair with back    Prior Level of Function:  Prior Function Per Pt/Caregiver Report  Level of [place]: Independent with ADLs and functional transfers, Needs assistance with homemaking  Receives Help From: Family  ADL Assistance: Independent  Homemaking Assistance: Needs assistance  Ambulatory Assistance: Independent (rollator / SPC)  Prior Function Comments: Pt states that her friend comes over daily for PRN assist stays when needed . Her friend does grocery shopping and drives. Pt denies falls. Does not drive.    Precautions:  Precautions  Medical Precautions: Fall precautions, Oxygen therapy device and L/min    Objective     Pain:  Pain Assessment  Pain Assessment: 0-10  Pain Score: 0 - No pain    Cognition:  Cognition  Overall  Cognitive Status: Within Functional Limits    General Assessments:      Activity Tolerance  Endurance: Decreased tolerance for upright activites  Sensation  Light Touch: No apparent deficits  Strength  Strength Comments: BLE 4-/5    Dynamic Sitting Balance  Dynamic Sitting-Comments: fair-  Dynamic Standing Balance  Dynamic Standing-Comments: poor +    Functional Assessments:     Bed Mobility  Bed Mobility: Yes  Bed Mobility 1  Bed Mobility 1: Supine to sitting, Sitting to supine  Level of Assistance 1: Moderate assistance  Bed Mobility Comments 1: mod A to EOB  Transfers  Transfer: Yes  Transfer 1  Technique 1: Sit to stand, Stand to sit  Transfer Device 1: Walker  Transfer Level of Assistance 1: Minimum assistance, +2  Trials/Comments 1: cues to push up from bed  Ambulation/Gait Training  Ambulation/Gait Training Performed: Yes  Ambulation/Gait Training 1  Surface 1: Level tile  Device 1: Rolling walker  Assistance 1: Minimum assistance  Quality of Gait 1: Inconsistent stride length, Shuffling gait, Decreased step length  Comments/Distance (ft) 1: min A x 2 4 side steps with fww . with fatigue          Extremity/Trunk Assessments:        RLE   RLE : Within Functional Limits  LLE   LLE : Within Functional Limits    Outcome Measures:  [facility] Basic Mobility  Turning from your back to your side while in a flat bed without using bedrails: A lot  Moving from lying on your back to sitting on the side of a flat bed without using bedrails: A lot  Moving to and from bed to chair (including a wheelchair): A lot  Standing up from a chair using your arms (e.g. wheelchair or bedside chair): A lot  To walk in hospital room: A lot  Climbing 3-5 steps with railing: Total  Basic Mobility - Total Score: 11    Goals:  Encounter Problems       Encounter Problems (Active)       PT Problem       Pt will demonstrate supervision with bed mobility to edge of bed.         Start:  01/24/24    Expected End:  02/07/24            Pt will  demonstrate Supervision with sit to stand/chair transfers with FWW.         Start:  01/24/24    Expected End:  02/07/24            Pt will ambulate 30 feet with FWW supervision .         Start:  01/24/24    Expected End:  02/07/24            Pt to demo improved BLE strength by being able to complete supine/seated thera ex 2x20 BLEs with 4 or less rest breaks .         Start:  01/24/24    Expected End:  02/07/24                 Education Documentation  Mobility Training, taught by José Miguel Juan, PT at 1/24/2024  2:16 PM.  Learner: Patient  Readiness: Acceptance  Method: Explanation  Response: Verbalizes Understanding    Education Comments  No comments found.

## 2024-01-24 NOTE — PROGRESS NOTES
"Gaby Blue is a 73 y.o. female on day 1 of admission presenting with Pneumonia due to infectious organism, unspecified laterality, unspecified part of lung.    Subjective   Hospital follow-up.  Patient seen this morning at the bedside.  Patient's potassium is low.  Will replete.  Platelets are low.  She is having ongoing respiratory symptoms.  She feels short of breath.  She has been weak.  She tolerated transfusion of PRBCs for emergency department.  Laboratory assessment pending.  For now we will continue IV antibiotics.  PT OT evaluations.  Discharge planning.  Pulmonology and hematology consultations.  Replete potassium.       Objective     Physical Exam  Awakens to verbal.  Scattered rhonchi anteriorly diminished heart sounds diminished breath sounds.  Soft normoactive bowel sounds.  Last Recorded Vitals  Blood pressure 110/58, pulse 71, temperature 36 °C (96.8 °F), resp. rate 18, height 1.6 m (5' 2.99\"), weight 59 kg (130 lb), SpO2 99 %.  Intake/Output last 3 Shifts:  I/O last 3 completed shifts:  In: 1342.5 (22.8 mL/kg) [I.V.:668.8 (11.3 mL/kg); Blood:373.8; IV Piggyback:300]  Out: - (0 mL/kg)   Weight: 59 kg     Relevant Results  Recent Results (from the past 72 hour(s))   ECG 12 lead    Collection Time: 01/22/24 10:30 PM   Result Value Ref Range    Ventricular Rate 107 BPM    Atrial Rate 107 BPM    MS Interval 150 ms    QRS Duration 92 ms    QT Interval 316 ms    QTC Calculation(Bazett) 421 ms    P Axis 77 degrees    R Axis -33 degrees    T Axis 77 degrees    QRS Count 17 beats    Q Onset 213 ms    P Onset 138 ms    P Offset 191 ms    T Offset 371 ms    QTC Fredericia 383 ms   CBC and Auto Differential    Collection Time: 01/22/24 10:42 PM   Result Value Ref Range    WBC 6.8 4.4 - 11.3 x10*3/uL    nRBC 0.0 0.0 - 0.0 /100 WBCs    RBC 2.18 (L) 4.00 - 5.20 x10*6/uL    Hemoglobin 6.8 (L) 12.0 - 16.0 g/dL    Hematocrit 20.9 (L) 36.0 - 46.0 %    MCV 96 80 - 100 fL    MCH 31.2 26.0 - 34.0 pg    MCHC 32.5 32.0 - " 36.0 g/dL    RDW 14.6 (H) 11.5 - 14.5 %    Platelets 46 (L) 150 - 450 x10*3/uL    Immature Granulocytes %, Automated 10.1 (H) 0.0 - 0.9 %    Immature Granulocytes Absolute, Automated 0.69 (H) 0.00 - 0.50 x10*3/uL   Comprehensive metabolic panel    Collection Time: 01/22/24 10:42 PM   Result Value Ref Range    Glucose 87 74 - 99 mg/dL    Sodium 133 (L) 136 - 145 mmol/L    Potassium 3.1 (L) 3.5 - 5.3 mmol/L    Chloride 97 (L) 98 - 107 mmol/L    Bicarbonate 28 21 - 32 mmol/L    Anion Gap 11 10 - 20 mmol/L    Urea Nitrogen 26 (H) 6 - 23 mg/dL    Creatinine 1.09 (H) 0.50 - 1.05 mg/dL    eGFR 54 (L) >60 mL/min/1.73m*2    Calcium 8.2 (L) 8.6 - 10.3 mg/dL    Albumin 2.8 (L) 3.4 - 5.0 g/dL    Alkaline Phosphatase 49 33 - 136 U/L    Total Protein 5.1 (L) 6.4 - 8.2 g/dL    AST 21 9 - 39 U/L    Bilirubin, Total 0.7 0.0 - 1.2 mg/dL    ALT 11 7 - 45 U/L   Protime-INR    Collection Time: 01/22/24 10:42 PM   Result Value Ref Range    Protime 13.9 (H) 9.8 - 12.8 seconds    INR 1.2 (H) 0.9 - 1.1   Magnesium    Collection Time: 01/22/24 10:42 PM   Result Value Ref Range    Magnesium 0.89 (L) 1.60 - 2.40 mg/dL   B-type natriuretic peptide    Collection Time: 01/22/24 10:42 PM   Result Value Ref Range     (H) 0 - 99 pg/mL   Troponin I, High Sensitivity, Initial    Collection Time: 01/22/24 10:42 PM   Result Value Ref Range    Troponin I, High Sensitivity 52 (HH) 0 - 13 ng/L   Manual Differential    Collection Time: 01/22/24 10:42 PM   Result Value Ref Range    Neutrophils %, Manual 99.0 40.0 - 80.0 %    Bands %, Manual 1.0 0.0 - 5.0 %    Lymphocytes %, Manual 0.0 13.0 - 44.0 %    Monocytes %, Manual 0.0 2.0 - 10.0 %    Eosinophils %, Manual 0.0 0.0 - 6.0 %    Basophils %, Manual 0.0 0.0 - 2.0 %    Seg Neutrophils Absolute, Manual 6.73 (H) 1.60 - 5.00 x10*3/uL    Bands Absolute, Manual 0.07 0.00 - 0.50 x10*3/uL    Lymphocytes Absolute, Manual 0.00 (L) 0.80 - 3.00 x10*3/uL    Monocytes Absolute, Manual 0.00 (L) 0.05 - 0.80 x10*3/uL     Eosinophils Absolute, Manual 0.00 0.00 - 0.40 x10*3/uL    Basophils Absolute, Manual 0.00 0.00 - 0.10 x10*3/uL    Total Cells Counted 100     Neutrophils Absolute, Manual 6.80 (H) 1.60 - 5.50 x10*3/uL    RBC Morphology See Below     Hypochromia Mild    Lactate    Collection Time: 01/22/24 10:43 PM   Result Value Ref Range    Lactate 1.1 0.4 - 2.0 mmol/L   Prepare RBC: 1 Units    Collection Time: 01/22/24 11:36 PM   Result Value Ref Range    PRODUCT CODE M9088L59     Unit Number [id number]     Unit ABO O     Unit RH NEG     XM INTEP COMP     Dispense Status TR     Blood Expiration Date January 31, 2024 23:59 EST     PRODUCT BLOOD TYPE 9500     UNIT VOLUME 323    Troponin, High Sensitivity, 1 Hour    Collection Time: 01/23/24 12:28 AM   Result Value Ref Range    Troponin I, High Sensitivity 48 (H) 0 - 13 ng/L   Type and screen    Collection Time: 01/23/24 12:28 AM   Result Value Ref Range    ABO TYPE O     Rh TYPE NEG     ANTIBODY SCREEN NEG    Influenza A, and B PCR    Collection Time: 01/23/24  4:55 AM   Result Value Ref Range    Flu A Result Not Detected Not Detected    Flu B Result Not Detected Not Detected   Sars-CoV-2 PCR, Symptomatic    Collection Time: 01/23/24  4:55 AM   Result Value Ref Range    Coronavirus 2019, PCR Not Detected Not Detected   RSV PCR    Collection Time: 01/23/24  4:55 AM   Result Value Ref Range    RSV PCR Not Detected Not Detected   CBC    Collection Time: 01/24/24  5:57 AM   Result Value Ref Range    WBC 2.8 (L) 4.4 - 11.3 x10*3/uL    nRBC 0.0 0.0 - 0.0 /100 WBCs    RBC 2.18 (L) 4.00 - 5.20 x10*6/uL    Hemoglobin 6.8 (L) 12.0 - 16.0 g/dL    Hematocrit 20.4 (L) 36.0 - 46.0 %    MCV 94 80 - 100 fL    MCH 31.2 26.0 - 34.0 pg    MCHC 33.3 32.0 - 36.0 g/dL    RDW 15.1 (H) 11.5 - 14.5 %    Platelets 21 (LL) 150 - 450 x10*3/uL   Comprehensive metabolic panel    Collection Time: 01/24/24  5:57 AM   Result Value Ref Range    Glucose 103 (H) 74 - 99 mg/dL    Sodium 133 (L) 136 - 145 mmol/L     Potassium 2.6 (LL) 3.5 - 5.3 mmol/L    Chloride 99 98 - 107 mmol/L    Bicarbonate 25 21 - 32 mmol/L    Anion Gap 12 10 - 20 mmol/L    Urea Nitrogen 32 (H) 6 - 23 mg/dL    Creatinine 1.16 (H) 0.50 - 1.05 mg/dL    eGFR 50 (L) >60 mL/min/1.73m*2    Calcium 7.9 (L) 8.6 - 10.3 mg/dL    Albumin 2.3 (L) 3.4 - 5.0 g/dL    Alkaline Phosphatase 43 33 - 136 U/L    Total Protein 4.5 (L) 6.4 - 8.2 g/dL    AST 19 9 - 39 U/L    Bilirubin, Total 0.5 0.0 - 1.2 mg/dL    ALT 9 7 - 45 U/L   Pathologist Review-CBC Differential    Collection Time: 01/24/24  5:57 AM   Result Value Ref Range    Pathologist Review-CBC Differential Note pancytopenia with marked thrombocytopenia.      SST TOP    Collection Time: 01/24/24  5:57 AM   Result Value Ref Range    Extra Tube Hold for add-ons.                           azithromycin, 500 mg, intravenous, q24h  cefTRIAXone, 1 g, intravenous, q24h  enoxaparin, 40 mg, subcutaneous, q24h  melatonin, 3 mg, oral, Daily  pantoprazole, 40 mg, oral, Daily before breakfast   Or  pantoprazole, 40 mg, intravenous, Daily before breakfast  polyethylene glycol, 17 g, oral, Daily  potassium chloride, 40 mEq, oral, Once        ECG 12 lead    Result Date: 1/23/2024  Sinus tachycardia with Premature atrial complexes Left axis deviation Abnormal ECG When compared with ECG of 22-DEC-2023 19:18, No significant change was found    CT chest wo IV contrast    Result Date: 1/23/2024  Interpreted By:  Justin Alexander, STUDY: CT CHEST WO IV CONTRAST;  1/22/2024 11:58 pm   INDICATION: Signs/Symptoms:known lung cancer. chemo yesterday. reports worsening LE edema and sob. eval for pericardial and pleural effusions.   COMPARISON: 12/09/2023   ACCESSION NUMBER(S): [id number]   ORDERING CLINICIAN: ABRAM JACQUES   TECHNIQUE: Helical data acquisition of the chest was obtained without intravenous contrast. Images were reformatted in axial, coronal, and sagittal planes.  MIP reconstructions were performed on a separate workstation  and provided for interpretation.   FINDINGS: LOWER NECK AND SUPERFICIAL SOFT TISSUES:  Mild body wall edema. Right chest port catheter, partially visualized. There is extensive right breast skin thickening.   MEDIASTINUM/PATRICIA: Substantially increased size of mediastinal and right hilar lymph nodes, for example a right lower paratracheal lymph node measures 1.8 cm and was previously not clearly visible on 12/09/2023.  enlarged subcarinal lymph node which measures 2 cm short axis, previously approximately 1.7 cm. Additional right hilar lymph nodes have increased in size. Esophagus is unremarkable.   CARDIOVASCULAR: Cardiac chamber size within normal limits. Small pericardial effusion which appears increased from 01/22/2024. Aortic caliber normal. Normal caliber of main pulmonary artery. Multi-vessel coronary atherosclerotic calcification.   LUNGS, AIRWAYS, AND PLEURA: Spiculated left upper lobe mass measures 2.2 cm, previously approximately 2 cm. Right upper lobe/middle lobe infrahilar mass which spans the major fissure measures 3.3 cm, previously 3.1 cm. There is severe apical predominant emphysema. New patchy consolidative/ground-glass opacities within the left upper lobe/lingula. Trace left pleural effusion.   MUSCULOSKELETAL: No acute osseous abnormality or suspicious osseous lesions. Moderate multilevel spinal degenerative change.   UPPER ABDOMEN: Unremarkable.       1. Increasing size of bilateral lung masses and mediastinal/hilar adenopathy. 2. New patchy consolidative/ground-glass opacities within the left upper lobe/lingula. Consider pneumonia/pneumonitis. 3. New right breast skin thickening. Please correlate with physical exam and/or mammography. 4. Small pericardial effusion which appears increased from 01/22/2024. 5. Trace left pleural effusion. Resolution of the right pleural effusion seen on 12/09/2023.   Signed by: Justin Alexander 1/23/2024 1:30 AM Dictation workstation:   [id number]        Assessment/Plan   Principal Problem:    Pneumonia due to infectious organism, unspecified laterality, unspecified part of lung  Active Problems:    Lung cancer (CMS/HCC)    COPD exacerbation (CMS/HCC)    Primary hypertension    Thrombocytopenia (CMS/HCC)    Chronic pain    Anemia in neoplastic disease            I spent   minutes in the professional and overall care of this patient.      Jd Burgos MD

## 2024-01-25 LAB
ALBUMIN SERPL BCP-MCNC: 2.6 G/DL (ref 3.4–5)
ALP SERPL-CCNC: 56 U/L (ref 33–136)
ALT SERPL W P-5'-P-CCNC: 13 U/L (ref 7–45)
ANION GAP SERPL CALC-SCNC: 11 MMOL/L (ref 10–20)
AST SERPL W P-5'-P-CCNC: 26 U/L (ref 9–39)
BASOPHILS # BLD AUTO: 0.02 X10*3/UL (ref 0–0.1)
BASOPHILS NFR BLD AUTO: 0.3 %
BILIRUB DIRECT SERPL-MCNC: 0.2 MG/DL (ref 0–0.3)
BILIRUB SERPL-MCNC: 0.5 MG/DL (ref 0–1.2)
BUN SERPL-MCNC: 31 MG/DL (ref 6–23)
CALCIUM SERPL-MCNC: 7.9 MG/DL (ref 8.6–10.3)
CHLORIDE SERPL-SCNC: 95 MMOL/L (ref 98–107)
CO2 SERPL-SCNC: 25 MMOL/L (ref 21–32)
CREAT SERPL-MCNC: 1.46 MG/DL (ref 0.5–1.05)
EGFRCR SERPLBLD CKD-EPI 2021: 38 ML/MIN/1.73M*2
EOSINOPHIL # BLD AUTO: 0 X10*3/UL (ref 0–0.4)
EOSINOPHIL NFR BLD AUTO: 0 %
ERYTHROCYTE [DISTWIDTH] IN BLOOD BY AUTOMATED COUNT: 14.9 % (ref 11.5–14.5)
FERRITIN SERPL-MCNC: 1613 NG/ML (ref 8–150)
GLUCOSE SERPL-MCNC: 108 MG/DL (ref 74–99)
HCT VFR BLD AUTO: 22.2 % (ref 36–46)
HGB BLD-MCNC: 7.3 G/DL (ref 12–16)
HGB RETIC QN: 34 PG (ref 28–38)
IMM GRANULOCYTES # BLD AUTO: 0.1 X10*3/UL (ref 0–0.5)
IMM GRANULOCYTES NFR BLD AUTO: 1.3 % (ref 0–0.9)
IMMATURE RETIC FRACTION: 3.8 %
LYMPHOCYTES # BLD AUTO: 0.19 X10*3/UL (ref 0.8–3)
LYMPHOCYTES NFR BLD AUTO: 2.5 %
MCH RBC QN AUTO: 31.1 PG (ref 26–34)
MCHC RBC AUTO-ENTMCNC: 32.9 G/DL (ref 32–36)
MCV RBC AUTO: 95 FL (ref 80–100)
MONOCYTES # BLD AUTO: 0.06 X10*3/UL (ref 0.05–0.8)
MONOCYTES NFR BLD AUTO: 0.8 %
NEUTROPHILS # BLD AUTO: 7.36 X10*3/UL (ref 1.6–5.5)
NEUTROPHILS NFR BLD AUTO: 95.1 %
NRBC BLD-RTO: 0 /100 WBCS (ref 0–0)
PLATELET # BLD AUTO: 17 X10*3/UL (ref 150–450)
POTASSIUM SERPL-SCNC: 2.8 MMOL/L (ref 3.5–5.3)
PROT SERPL-MCNC: 5.2 G/DL (ref 6.4–8.2)
RBC # BLD AUTO: 2.35 X10*6/UL (ref 4–5.2)
RETICS #: 0 X10*6/UL (ref 0.02–0.11)
RETICS/RBC NFR AUTO: 0.1 % (ref 0.5–2)
SODIUM SERPL-SCNC: 128 MMOL/L (ref 136–145)
WBC # BLD AUTO: 7.7 X10*3/UL (ref 4.4–11.3)

## 2024-01-25 PROCEDURE — 2500000004 HC RX 250 GENERAL PHARMACY W/ HCPCS (ALT 636 FOR OP/ED): Performed by: FAMILY MEDICINE

## 2024-01-25 PROCEDURE — 1210000001 HC SEMI-PRIVATE ROOM DAILY

## 2024-01-25 PROCEDURE — 99238 HOSP IP/OBS DSCHRG MGMT 30/<: CPT | Performed by: FAMILY MEDICINE

## 2024-01-25 PROCEDURE — 80053 COMPREHEN METABOLIC PANEL: CPT | Performed by: STUDENT IN AN ORGANIZED HEALTH CARE EDUCATION/TRAINING PROGRAM

## 2024-01-25 PROCEDURE — 80076 HEPATIC FUNCTION PANEL: CPT | Performed by: STUDENT IN AN ORGANIZED HEALTH CARE EDUCATION/TRAINING PROGRAM

## 2024-01-25 PROCEDURE — 99223 1ST HOSP IP/OBS HIGH 75: CPT | Performed by: STUDENT IN AN ORGANIZED HEALTH CARE EDUCATION/TRAINING PROGRAM

## 2024-01-25 PROCEDURE — 2500000001 HC RX 250 WO HCPCS SELF ADMINISTERED DRUGS (ALT 637 FOR MEDICARE OP): Performed by: INTERNAL MEDICINE

## 2024-01-25 PROCEDURE — 2500000001 HC RX 250 WO HCPCS SELF ADMINISTERED DRUGS (ALT 637 FOR MEDICARE OP): Performed by: FAMILY MEDICINE

## 2024-01-25 PROCEDURE — 85045 AUTOMATED RETICULOCYTE COUNT: CPT | Performed by: STUDENT IN AN ORGANIZED HEALTH CARE EDUCATION/TRAINING PROGRAM

## 2024-01-25 PROCEDURE — 85025 COMPLETE CBC W/AUTO DIFF WBC: CPT | Performed by: STUDENT IN AN ORGANIZED HEALTH CARE EDUCATION/TRAINING PROGRAM

## 2024-01-25 RX ORDER — POTASSIUM CHLORIDE 1.5 G/1.58G
40 POWDER, FOR SOLUTION ORAL ONCE
Status: COMPLETED | OUTPATIENT
Start: 2024-01-25 | End: 2024-01-25

## 2024-01-25 RX ORDER — POTASSIUM CHLORIDE 1.5 G/1.58G
40 POWDER, FOR SOLUTION ORAL ONCE
Status: DISCONTINUED | OUTPATIENT
Start: 2024-01-25 | End: 2024-01-25

## 2024-01-25 RX ORDER — ONDANSETRON 4 MG/1
4 TABLET, FILM COATED ORAL EVERY 8 HOURS PRN
Qty: 20 TABLET | Refills: 0 | Status: SHIPPED | OUTPATIENT
Start: 2024-01-25

## 2024-01-25 RX ORDER — GUAIFENESIN/DEXTROMETHORPHAN 100-10MG/5
5 SYRUP ORAL EVERY 4 HOURS PRN
Qty: 118 ML | Refills: 0 | Status: SHIPPED | OUTPATIENT
Start: 2024-01-25

## 2024-01-25 RX ORDER — AZITHROMYCIN 500 MG/1
TABLET, FILM COATED ORAL
Qty: 7 TABLET | Refills: 0 | Status: SHIPPED | OUTPATIENT
Start: 2024-01-25

## 2024-01-25 RX ORDER — POTASSIUM CHLORIDE 20 MEQ/1
40 TABLET, EXTENDED RELEASE ORAL ONCE
Status: COMPLETED | OUTPATIENT
Start: 2024-01-25 | End: 2024-01-25

## 2024-01-25 RX ORDER — CEFDINIR 300 MG/1
300 CAPSULE ORAL 2 TIMES DAILY
Qty: 14 CAPSULE | Refills: 0 | Status: SHIPPED | OUTPATIENT
Start: 2024-01-25 | End: 2024-02-01

## 2024-01-25 RX ADMIN — AZITHROMYCIN MONOHYDRATE 500 MG: 500 INJECTION, POWDER, LYOPHILIZED, FOR SOLUTION INTRAVENOUS at 06:07

## 2024-01-25 RX ADMIN — PANTOPRAZOLE SODIUM 40 MG: 40 TABLET, DELAYED RELEASE ORAL at 06:07

## 2024-01-25 RX ADMIN — IBUPROFEN 400 MG: 400 TABLET, FILM COATED ORAL at 21:16

## 2024-01-25 RX ADMIN — CEFTRIAXONE SODIUM 1 G: 1 INJECTION, SOLUTION INTRAVENOUS at 14:57

## 2024-01-25 RX ADMIN — POTASSIUM CHLORIDE 40 MEQ: 1500 TABLET, EXTENDED RELEASE ORAL at 14:56

## 2024-01-25 RX ADMIN — IBUPROFEN 400 MG: 400 TABLET, FILM COATED ORAL at 00:23

## 2024-01-25 RX ADMIN — GUAIFENESIN AND DEXTROMETHORPHAN 5 ML: 100; 10 SYRUP ORAL at 00:23

## 2024-01-25 RX ADMIN — POTASSIUM CHLORIDE 40 MEQ: 1.5 POWDER, FOR SOLUTION ORAL at 23:59

## 2024-01-25 ASSESSMENT — PAIN SCALES - GENERAL
PAINLEVEL_OUTOF10: 0 - NO PAIN
PAINLEVEL_OUTOF10: 0 - NO PAIN
PAINLEVEL_OUTOF10: 6
PAINLEVEL_OUTOF10: 0 - NO PAIN

## 2024-01-25 ASSESSMENT — COGNITIVE AND FUNCTIONAL STATUS - GENERAL
EATING MEALS: A LITTLE
DRESSING REGULAR UPPER BODY CLOTHING: A LOT
TURNING FROM BACK TO SIDE WHILE IN FLAT BAD: A LOT
STANDING UP FROM CHAIR USING ARMS: A LOT
DRESSING REGULAR LOWER BODY CLOTHING: A LOT
STANDING UP FROM CHAIR USING ARMS: A LOT
WALKING IN HOSPITAL ROOM: A LOT
MOVING FROM LYING ON BACK TO SITTING ON SIDE OF FLAT BED WITH BEDRAILS: A LOT
MOVING FROM LYING ON BACK TO SITTING ON SIDE OF FLAT BED WITH BEDRAILS: A LOT
WALKING IN HOSPITAL ROOM: A LOT
MOBILITY SCORE: 11
PERSONAL GROOMING: A LITTLE
HELP NEEDED FOR BATHING: A LOT
CLIMB 3 TO 5 STEPS WITH RAILING: TOTAL
MOBILITY SCORE: 11
HELP NEEDED FOR BATHING: A LOT
TOILETING: A LOT
EATING MEALS: A LITTLE
CLIMB 3 TO 5 STEPS WITH RAILING: TOTAL
TURNING FROM BACK TO SIDE WHILE IN FLAT BAD: A LOT
MOVING TO AND FROM BED TO CHAIR: A LOT
TOILETING: A LOT
DAILY ACTIVITIY SCORE: 14
DRESSING REGULAR UPPER BODY CLOTHING: A LOT
DAILY ACTIVITIY SCORE: 14
PERSONAL GROOMING: A LITTLE
DRESSING REGULAR LOWER BODY CLOTHING: A LOT
MOVING TO AND FROM BED TO CHAIR: A LOT

## 2024-01-25 ASSESSMENT — PAIN DESCRIPTION - DESCRIPTORS: DESCRIPTORS: ACHING

## 2024-01-25 ASSESSMENT — PAIN - FUNCTIONAL ASSESSMENT
PAIN_FUNCTIONAL_ASSESSMENT: 0-10

## 2024-01-25 NOTE — CONSULTS
Inpatient consult to Pulmonology  Consult performed by: Trace Davey MD  Consult ordered by: Jd Burgos MD  Reason for consult: Pneumonia, lung cancer, COPD      History Of Present Illness  Gaby Blue is a 73 y.o. female presenting with  .     Patient presented with shortness of breath.  2 or 3 days.  Cough.  Sputum.  History of cancer.  Had recent chemotherapy.  Was also complaining weakness.  Evaluated emergency department.  CT scan revealed pneumonia.  Laboratory assessment revealed anemia.  She was consented initiated transfusion 1 unit PRBC in the emergency department.  Initiated on IV antibiotics.  Patient seen in the emergency department.    I was asked to evaluate and follow from a pulmonary perspective.  Unable to get much history from patient.  Most of the history obtained from prior available charts.  She has had longstanding prior smoking with severe COPD for which she is on oxygen 2 to 3 L nasal cannula.  She has history of lung cancer for which she has received radiation and chemo per Dr. Colon, a local oncologist.  Further details not available.  She comes in with severe pneumonia and has received 1 unit of PRBC.  Her functional status appears to be declining overall. CT scan shows increasing bilateral lung lesions consistent with relapsing cancer.  CT scan also shows a left upper lobe pneumonia.    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       Surgical History  She has a past surgical history that includes Tonsillectomy (03/29/2018);  "Hysterectomy (03/29/2018); CT angio neck (09/26/2022); CT angio neck (12/17/2021); Appendectomy; Excision benign skin lesion scalp / neck / hands / feet / genitalia; Cardiac catheterization; Meckel's diverticulum excision; Colon surgery; and Hand surgery.     Social History  She reports that she quit smoking about 4 years ago. Her smoking use included cigarettes. She has a 24.00 pack-year smoking history. She has never used smokeless tobacco. She reports that she does not currently use alcohol. She reports that she does not use drugs.     Family History  Family History          Family History   Problem Relation Name Age of Onset    COPD Mother        Cancer Father        Other (Lupus erythematosus) Other family history      Heart disease Other family history              Allergies  Bee pollen and Pollen extracts     Review of Systems     Physical Exam  Resting comfortably.  Awakens to verbal.  Scattered rhonchi anteriorly.  Distant heart sounds.  Soft normoactive bowel sounds.  Pale.  And oriented.  Last Recorded Vitals  Blood pressure 116/57, pulse 85, temperature 36.4 °C (97.5 °F), temperature source Temporal, resp. rate 18, height 1.6 m (5' 3\"), weight 59 kg (130 lb), SpO2 (S) 100 %.     Relevant Results     Scheduled medications  azithromycin, 500 mg, intravenous, q24h  cefTRIAXone, 1 g, intravenous, q24h  enoxaparin, 40 mg, subcutaneous, q24h  melatonin, 3 mg, oral, Daily  [START ON 1/24/2024] pantoprazole, 40 mg, oral, Daily before breakfast   Or  [START ON 1/24/2024] pantoprazole, 40 mg, intravenous, Daily before breakfast  polyethylene glycol, 17 g, oral, Daily        Continuous medications  sodium chloride 0.9%, 75 mL/hr        PRN medications  PRN medications: acetaminophen **OR** acetaminophen **OR** acetaminophen, acetaminophen **OR** acetaminophen **OR** acetaminophen, ondansetron **OR** ondansetron         Results for orders placed or performed during the hospital encounter of 01/22/24 (from the past " 24 hour(s))   ECG 12 lead   Result Value Ref Range     Ventricular Rate 107 BPM     Atrial Rate 107 BPM     WV Interval 150 ms     QRS Duration 92 ms     QT Interval 316 ms     QTC Calculation(Bazett) 421 ms     P Axis 77 degrees     R Axis -33 degrees     T Axis 77 degrees     QRS Count 17 beats     Q Onset 213 ms     P Onset 138 ms     P Offset 191 ms     T Offset 371 ms     QTC Fredericia 383 ms   CBC and Auto Differential   Result Value Ref Range     WBC 6.8 4.4 - 11.3 x10*3/uL     nRBC 0.0 0.0 - 0.0 /100 WBCs     RBC 2.18 (L) 4.00 - 5.20 x10*6/uL     Hemoglobin 6.8 (L) 12.0 - 16.0 g/dL     Hematocrit 20.9 (L) 36.0 - 46.0 %     MCV 96 80 - 100 fL     MCH 31.2 26.0 - 34.0 pg     MCHC 32.5 32.0 - 36.0 g/dL     RDW 14.6 (H) 11.5 - 14.5 %     Platelets 46 (L) 150 - 450 x10*3/uL     Immature Granulocytes %, Automated 10.1 (H) 0.0 - 0.9 %     Immature Granulocytes Absolute, Automated 0.69 (H) 0.00 - 0.50 x10*3/uL   Comprehensive metabolic panel   Result Value Ref Range     Glucose 87 74 - 99 mg/dL     Sodium 133 (L) 136 - 145 mmol/L     Potassium 3.1 (L) 3.5 - 5.3 mmol/L     Chloride 97 (L) 98 - 107 mmol/L     Bicarbonate 28 21 - 32 mmol/L     Anion Gap 11 10 - 20 mmol/L     Urea Nitrogen 26 (H) 6 - 23 mg/dL     Creatinine 1.09 (H) 0.50 - 1.05 mg/dL     eGFR 54 (L) >60 mL/min/1.73m*2     Calcium 8.2 (L) 8.6 - 10.3 mg/dL     Albumin 2.8 (L) 3.4 - 5.0 g/dL     Alkaline Phosphatase 49 33 - 136 U/L     Total Protein 5.1 (L) 6.4 - 8.2 g/dL     AST 21 9 - 39 U/L     Bilirubin, Total 0.7 0.0 - 1.2 mg/dL     ALT 11 7 - 45 U/L   Protime-INR   Result Value Ref Range     Protime 13.9 (H) 9.8 - 12.8 seconds     INR 1.2 (H) 0.9 - 1.1   Magnesium   Result Value Ref Range     Magnesium 0.89 (L) 1.60 - 2.40 mg/dL   B-type natriuretic peptide   Result Value Ref Range      (H) 0 - 99 pg/mL   Troponin I, High Sensitivity, Initial   Result Value Ref Range     Troponin I, High Sensitivity 52 (HH) 0 - 13 ng/L   Manual Differential    Result Value Ref Range     Neutrophils %, Manual 99.0 40.0 - 80.0 %     Bands %, Manual 1.0 0.0 - 5.0 %     Lymphocytes %, Manual 0.0 13.0 - 44.0 %     Monocytes %, Manual 0.0 2.0 - 10.0 %     Eosinophils %, Manual 0.0 0.0 - 6.0 %     Basophils %, Manual 0.0 0.0 - 2.0 %     Seg Neutrophils Absolute, Manual 6.73 (H) 1.60 - 5.00 x10*3/uL     Bands Absolute, Manual 0.07 0.00 - 0.50 x10*3/uL     Lymphocytes Absolute, Manual 0.00 (L) 0.80 - 3.00 x10*3/uL     Monocytes Absolute, Manual 0.00 (L) 0.05 - 0.80 x10*3/uL     Eosinophils Absolute, Manual 0.00 0.00 - 0.40 x10*3/uL     Basophils Absolute, Manual 0.00 0.00 - 0.10 x10*3/uL     Total Cells Counted 100       Neutrophils Absolute, Manual 6.80 (H) 1.60 - 5.50 x10*3/uL     RBC Morphology See Below       Hypochromia Mild     Lactate   Result Value Ref Range     Lactate 1.1 0.4 - 2.0 mmol/L   Prepare RBC: 1 Units   Result Value Ref Range     PRODUCT CODE N3710E52       Unit Number [id number]       Unit ABO O       Unit RH NEG       XM INTEP COMP       Dispense Status TR       Blood Expiration Date January 31, 2024 23:59 EST       PRODUCT BLOOD TYPE 9500       UNIT VOLUME 323     Troponin, High Sensitivity, 1 Hour   Result Value Ref Range     Troponin I, High Sensitivity 48 (H) 0 - 13 ng/L   Type and screen   Result Value Ref Range     ABO TYPE O       Rh TYPE NEG       ANTIBODY SCREEN NEG     Influenza A, and B PCR   Result Value Ref Range     Flu A Result Not Detected Not Detected     Flu B Result Not Detected Not Detected   Sars-CoV-2 PCR, Symptomatic   Result Value Ref Range     Coronavirus 2019, PCR Not Detected Not Detected   RSV PCR   Result Value Ref Range     RSV PCR Not Detected Not Detected      ECG 12 lead     Result Date: 1/23/2024  Sinus tachycardia with Premature atrial complexes Left axis deviation Abnormal ECG When compared with ECG of 22-DEC-2023 19:18, No significant change was found     CT chest wo IV contrast     Result Date:  1/23/2024  Interpreted By:  Justin Alexander, STUDY: CT CHEST WO IV CONTRAST;  1/22/2024 11:58 pm   INDICATION: Signs/Symptoms:known lung cancer. chemo yesterday. reports worsening LE edema and sob. eval for pericardial and pleural effusions.   COMPARISON: 12/09/2023   ACCESSION NUMBER(S): [id number]   ORDERING CLINICIAN: ABRAM JACQUES   TECHNIQUE: Helical data acquisition of the chest was obtained without intravenous contrast. Images were reformatted in axial, coronal, and sagittal planes.  MIP reconstructions were performed on a separate workstation and provided for interpretation.   FINDINGS: LOWER NECK AND SUPERFICIAL SOFT TISSUES:  Mild body wall edema. Right chest port catheter, partially visualized. There is extensive right breast skin thickening.   MEDIASTINUM/PATRICIA: Substantially increased size of mediastinal and right hilar lymph nodes, for example a right lower paratracheal lymph node measures 1.8 cm and was previously not clearly visible on 12/09/2023.  enlarged subcarinal lymph node which measures 2 cm short axis, previously approximately 1.7 cm. Additional right hilar lymph nodes have increased in size. Esophagus is unremarkable.   CARDIOVASCULAR: Cardiac chamber size within normal limits. Small pericardial effusion which appears increased from 01/22/2024. Aortic caliber normal. Normal caliber of main pulmonary artery. Multi-vessel coronary atherosclerotic calcification.   LUNGS, AIRWAYS, AND PLEURA: Spiculated left upper lobe mass measures 2.2 cm, previously approximately 2 cm. Right upper lobe/middle lobe infrahilar mass which spans the major fissure measures 3.3 cm, previously 3.1 cm. There is severe apical predominant emphysema. New patchy consolidative/ground-glass opacities within the left upper lobe/lingula. Trace left pleural effusion.   MUSCULOSKELETAL: No acute osseous abnormality or suspicious osseous lesions. Moderate multilevel spinal degenerative change.   UPPER ABDOMEN: Unremarkable.         1. Increasing size of bilateral lung masses and mediastinal/hilar adenopathy. 2. New patchy consolidative/ground-glass opacities within the left upper lobe/lingula. Consider pneumonia/pneumonitis. 3. New right breast skin thickening. Please correlate with physical exam and/or mammography. 4. Small pericardial effusion which appears increased from 01/22/2024. 5. Trace left pleural effusion. Resolution of the right pleural effusion seen on 12/09/2023.   Signed by: Justin Alexander 1/23/2024 1:30 AM Dictation workstation:   [id number]     ECG 12 lead     Result Date: 1/23/2024  Sinus tachycardia with Premature atrial complexes Left axis deviation Abnormal ECG When compared with ECG of 22-DEC-2023 19:18, No significant change was found     CT chest wo IV contrast     Result Date: 1/23/2024  Interpreted By:  Justin Alexander, STUDY: CT CHEST WO IV CONTRAST;  1/22/2024 11:58 pm   INDICATION: Signs/Symptoms:known lung cancer. chemo yesterday. reports worsening LE edema and sob. eval for pericardial and pleural effusions.   COMPARISON: 12/09/2023   ACCESSION NUMBER(S): [id number]   ORDERING CLINICIAN: ABRAM JACQUES   TECHNIQUE: Helical data acquisition of the chest was obtained without intravenous contrast. Images were reformatted in axial, coronal, and sagittal planes.  MIP reconstructions were performed on a separate workstation and provided for interpretation.   FINDINGS: LOWER NECK AND SUPERFICIAL SOFT TISSUES:  Mild body wall edema. Right chest port catheter, partially visualized. There is extensive right breast skin thickening.   MEDIASTINUM/PATRICIA: Substantially increased size of mediastinal and right hilar lymph nodes, for example a right lower paratracheal lymph node measures 1.8 cm and was previously not clearly visible on 12/09/2023.  enlarged subcarinal lymph node which measures 2 cm short axis, previously approximately 1.7 cm. Additional right hilar lymph nodes have increased in size. Esophagus is  unremarkable.   CARDIOVASCULAR: Cardiac chamber size within normal limits. Small pericardial effusion which appears increased from 01/22/2024. Aortic caliber normal. Normal caliber of main pulmonary artery. Multi-vessel coronary atherosclerotic calcification.   LUNGS, AIRWAYS, AND PLEURA: Spiculated left upper lobe mass measures 2.2 cm, previously approximately 2 cm. Right upper lobe/middle lobe infrahilar mass which spans the major fissure measures 3.3 cm, previously 3.1 cm. There is severe apical predominant emphysema. New patchy consolidative/ground-glass opacities within the left upper lobe/lingula. Trace left pleural effusion.   MUSCULOSKELETAL: No acute osseous abnormality or suspicious osseous lesions. Moderate multilevel spinal degenerative change.   UPPER ABDOMEN: Unremarkable.        1. Increasing size of bilateral lung masses and mediastinal/hilar adenopathy. 2. New patchy consolidative/ground-glass opacities within the left upper lobe/lingula. Consider pneumonia/pneumonitis. 3. New right breast skin thickening. Please correlate with physical exam and/or mammography. 4. Small pericardial effusion which appears increased from 01/22/2024. 5. Trace left pleural effusion. Resolution of the right pleural effusion seen on 12/09/2023.   Signed by: Justin Alexander 1/23/2024 1:30 AM Dictation workstation:   [id number]           Assessment/Plan   Principal Problem:    Pneumonia due to infectious organism, unspecified laterality, unspecified part of lung  Active Problems:    Lung cancer (CMS/HCC)    COPD exacerbation (CMS/HCC)    Primary hypertension    Thrombocytopenia (CMS/HCC)    Chronic pain    Anemia in neoplastic disease        Principal Problem:    Pneumonia due to infectious organism, unspecified laterality, unspecified part of lung  Active Problems:    Lung cancer (CMS/HCC)    COPD exacerbation (CMS/HCC)    Primary hypertension    Thrombocytopenia (CMS/HCC)    Chronic pain    Anemia in neoplastic  disease     Pulmonary comments:- Chart reviewed and patient examined.  Various x-rays/CT scans etc. have been reviewed also.  Agree with IV Levaquin as ordered for pneumonia.  Agree with IV fluids for dehydration.  Patient appropriately received 1 unit PRBC so far for anemia etiology which is unclear.  There does not appear to be any blood loss at this time.  Agree with bronchodilator nebs for COPD as ordered.  I shall continue to monitor this patient with you and I thank you for the referral. Incidentally patient is a DNR CC.  Patient may be getting close to hospice; would rather have her oncologist make this decision when patient follows them soon.      Trace Davey MD

## 2024-01-25 NOTE — PROGRESS NOTES
Spoke with patient this morning, PT/OT evaluation strongly recommending moderate level of care, patient does live alone. Patient agreeable to SNF, states she would like the one on [facility], which would be [facility]. Referral made, if they accept, can start precert once patient is more medically stable to be discharged, will continue to follow.     UPDATE:  Patient is very weak, states she does not want any heroics, does not want on life support or chest compressions. Patient plan is to go to [facility], she hopes to see Dr. Colon on 2/1 appointment is scheduled for 2:30, and she will discuss with him her options and her prognosis. Patient does have precert, Dr. Burgos is aware, patient can go tomorrow to [facility] and then hopefully follow through with her plan to see oncologist.

## 2024-01-25 NOTE — PROGRESS NOTES
Physical Therapy                 Therapy Communication Note    Patient Name: Gaby Blue  MRN: [id number]  Today's Date: 1/25/2024     Discipline: Physical Therapy    Missed Visit Reason: 1414 Missed Visit Reason: Patient placed on medical hold (Per RN hold off on therapy today pts hgb and platlets still critically low . reattempt as able.)    Missed Time: Attempt    Comment:

## 2024-01-25 NOTE — PROGRESS NOTES
"Gaby Blue is a 73 y.o. female on day 2 of admission presenting with Pneumonia due to infectious organism, unspecified laterality, unspecified part of lung.    Subjective   Patient seen this morning.  Was sitting up at the bedside chair.  Nasal cannula oxygen in place.  States breathing is stable.  Reports was seen by pulmonology.  Continues antibiotics.  Continues inhalation therapy.  Anemia.  Seen by gastroenterology.  Poor candidate for EGD colonoscopy.  Iron supplementation.  Anemia multifactorial.  Has a persistence of thrombocytopenia.  Initially was evaluated by therapy.  Recommended rehab.  Discharge planning was in process.  Discussion initially for skilled nursing facility placement.  Subsequent contact was informed for hospice referral.  Hospice referral initiated.  Will replete potassium.  Await recommendations from Dr. Adams due to.  For now continue to monitor hemodynamic status.  No hematology oncology consultation available.       Objective     Physical Exam  Scattered rhonchi anteriorly diminished breath sounds distant heart sounds soft and active bowel sounds  Last Recorded Vitals  Blood pressure 117/58, pulse 69, temperature 36.4 °C (97.5 °F), resp. rate 18, height 1.6 m (5' 2.99\"), weight 59 kg (130 lb), SpO2 98 %.  Intake/Output last 3 Shifts:  I/O last 3 completed shifts:  In: 1934.8 (32.8 mL/kg) [P.O.:1421; I.V.:513.8 (8.7 mL/kg)]  Out: 150 (2.5 mL/kg) [Urine:150 (0.1 mL/kg/hr)]  Weight: 59 kg     Relevant Results  Recent Results (from the past 72 hour(s))   ECG 12 lead    Collection Time: 01/22/24 10:30 PM   Result Value Ref Range    Ventricular Rate 107 BPM    Atrial Rate 107 BPM    HI Interval 150 ms    QRS Duration 92 ms    QT Interval 316 ms    QTC Calculation(Bazett) 421 ms    P Axis 77 degrees    R Axis -33 degrees    T Axis 77 degrees    QRS Count 17 beats    Q Onset 213 ms    P Onset 138 ms    P Offset 191 ms    T Offset 371 ms    QTC Fredericia 383 ms   CBC and Auto Differential    " Collection Time: 01/22/24 10:42 PM   Result Value Ref Range    WBC 6.8 4.4 - 11.3 x10*3/uL    nRBC 0.0 0.0 - 0.0 /100 WBCs    RBC 2.18 (L) 4.00 - 5.20 x10*6/uL    Hemoglobin 6.8 (L) 12.0 - 16.0 g/dL    Hematocrit 20.9 (L) 36.0 - 46.0 %    MCV 96 80 - 100 fL    MCH 31.2 26.0 - 34.0 pg    MCHC 32.5 32.0 - 36.0 g/dL    RDW 14.6 (H) 11.5 - 14.5 %    Platelets 46 (L) 150 - 450 x10*3/uL    Immature Granulocytes %, Automated 10.1 (H) 0.0 - 0.9 %    Immature Granulocytes Absolute, Automated 0.69 (H) 0.00 - 0.50 x10*3/uL   Comprehensive metabolic panel    Collection Time: 01/22/24 10:42 PM   Result Value Ref Range    Glucose 87 74 - 99 mg/dL    Sodium 133 (L) 136 - 145 mmol/L    Potassium 3.1 (L) 3.5 - 5.3 mmol/L    Chloride 97 (L) 98 - 107 mmol/L    Bicarbonate 28 21 - 32 mmol/L    Anion Gap 11 10 - 20 mmol/L    Urea Nitrogen 26 (H) 6 - 23 mg/dL    Creatinine 1.09 (H) 0.50 - 1.05 mg/dL    eGFR 54 (L) >60 mL/min/1.73m*2    Calcium 8.2 (L) 8.6 - 10.3 mg/dL    Albumin 2.8 (L) 3.4 - 5.0 g/dL    Alkaline Phosphatase 49 33 - 136 U/L    Total Protein 5.1 (L) 6.4 - 8.2 g/dL    AST 21 9 - 39 U/L    Bilirubin, Total 0.7 0.0 - 1.2 mg/dL    ALT 11 7 - 45 U/L   Protime-INR    Collection Time: 01/22/24 10:42 PM   Result Value Ref Range    Protime 13.9 (H) 9.8 - 12.8 seconds    INR 1.2 (H) 0.9 - 1.1   Magnesium    Collection Time: 01/22/24 10:42 PM   Result Value Ref Range    Magnesium 0.89 (L) 1.60 - 2.40 mg/dL   B-type natriuretic peptide    Collection Time: 01/22/24 10:42 PM   Result Value Ref Range     (H) 0 - 99 pg/mL   Troponin I, High Sensitivity, Initial    Collection Time: 01/22/24 10:42 PM   Result Value Ref Range    Troponin I, High Sensitivity 52 (HH) 0 - 13 ng/L   Manual Differential    Collection Time: 01/22/24 10:42 PM   Result Value Ref Range    Neutrophils %, Manual 99.0 40.0 - 80.0 %    Bands %, Manual 1.0 0.0 - 5.0 %    Lymphocytes %, Manual 0.0 13.0 - 44.0 %    Monocytes %, Manual 0.0 2.0 - 10.0 %    Eosinophils  %, Manual 0.0 0.0 - 6.0 %    Basophils %, Manual 0.0 0.0 - 2.0 %    Seg Neutrophils Absolute, Manual 6.73 (H) 1.60 - 5.00 x10*3/uL    Bands Absolute, Manual 0.07 0.00 - 0.50 x10*3/uL    Lymphocytes Absolute, Manual 0.00 (L) 0.80 - 3.00 x10*3/uL    Monocytes Absolute, Manual 0.00 (L) 0.05 - 0.80 x10*3/uL    Eosinophils Absolute, Manual 0.00 0.00 - 0.40 x10*3/uL    Basophils Absolute, Manual 0.00 0.00 - 0.10 x10*3/uL    Total Cells Counted 100     Neutrophils Absolute, Manual 6.80 (H) 1.60 - 5.50 x10*3/uL    RBC Morphology See Below     Hypochromia Mild    Lactate    Collection Time: 01/22/24 10:43 PM   Result Value Ref Range    Lactate 1.1 0.4 - 2.0 mmol/L   Prepare RBC: 1 Units    Collection Time: 01/22/24 11:36 PM   Result Value Ref Range    PRODUCT CODE W1590T23     Unit Number [id number]     Unit ABO O     Unit RH NEG     XM INTEP COMP     Dispense Status TR     Blood Expiration Date January 31, 2024 23:59 EST     PRODUCT BLOOD TYPE 9500     UNIT VOLUME 323    Troponin, High Sensitivity, 1 Hour    Collection Time: 01/23/24 12:28 AM   Result Value Ref Range    Troponin I, High Sensitivity 48 (H) 0 - 13 ng/L   Type and screen    Collection Time: 01/23/24 12:28 AM   Result Value Ref Range    ABO TYPE O     Rh TYPE NEG     ANTIBODY SCREEN NEG    Influenza A, and B PCR    Collection Time: 01/23/24  4:55 AM   Result Value Ref Range    Flu A Result Not Detected Not Detected    Flu B Result Not Detected Not Detected   Sars-CoV-2 PCR, Symptomatic    Collection Time: 01/23/24  4:55 AM   Result Value Ref Range    Coronavirus 2019, PCR Not Detected Not Detected   RSV PCR    Collection Time: 01/23/24  4:55 AM   Result Value Ref Range    RSV PCR Not Detected Not Detected   CBC    Collection Time: 01/24/24  5:57 AM   Result Value Ref Range    WBC 2.8 (L) 4.4 - 11.3 x10*3/uL    nRBC 0.0 0.0 - 0.0 /100 WBCs    RBC 2.18 (L) 4.00 - 5.20 x10*6/uL    Hemoglobin 6.8 (L) 12.0 - 16.0 g/dL    Hematocrit 20.4 (L) 36.0 - 46.0 %    MCV  94 80 - 100 fL    MCH 31.2 26.0 - 34.0 pg    MCHC 33.3 32.0 - 36.0 g/dL    RDW 15.1 (H) 11.5 - 14.5 %    Platelets 21 (LL) 150 - 450 x10*3/uL   Comprehensive metabolic panel    Collection Time: 01/24/24  5:57 AM   Result Value Ref Range    Glucose 103 (H) 74 - 99 mg/dL    Sodium 133 (L) 136 - 145 mmol/L    Potassium 2.6 (LL) 3.5 - 5.3 mmol/L    Chloride 99 98 - 107 mmol/L    Bicarbonate 25 21 - 32 mmol/L    Anion Gap 12 10 - 20 mmol/L    Urea Nitrogen 32 (H) 6 - 23 mg/dL    Creatinine 1.16 (H) 0.50 - 1.05 mg/dL    eGFR 50 (L) >60 mL/min/1.73m*2    Calcium 7.9 (L) 8.6 - 10.3 mg/dL    Albumin 2.3 (L) 3.4 - 5.0 g/dL    Alkaline Phosphatase 43 33 - 136 U/L    Total Protein 4.5 (L) 6.4 - 8.2 g/dL    AST 19 9 - 39 U/L    Bilirubin, Total 0.5 0.0 - 1.2 mg/dL    ALT 9 7 - 45 U/L   Pathologist Review-CBC Differential    Collection Time: 01/24/24  5:57 AM   Result Value Ref Range    Pathologist Review-CBC Differential Note pancytopenia with marked thrombocytopenia.      SST TOP    Collection Time: 01/24/24  5:57 AM   Result Value Ref Range    Extra Tube Hold for add-ons.    Ferritin    Collection Time: 01/24/24  5:57 AM   Result Value Ref Range    Ferritin 1,613 (H) 8 - 150 ng/mL   Iron and TIBC    Collection Time: 01/24/24  5:57 AM   Result Value Ref Range    Iron 18 (L) 35 - 150 ug/dL    UIBC 84 (L) 110 - 370 ug/dL    TIBC 102 (L) 240 - 445 ug/dL    % Saturation 18 (L) 25 - 45 %   CBC and Auto Differential    Collection Time: 01/25/24  9:34 AM   Result Value Ref Range    WBC 7.7 4.4 - 11.3 x10*3/uL    nRBC 0.0 0.0 - 0.0 /100 WBCs    RBC 2.35 (L) 4.00 - 5.20 x10*6/uL    Hemoglobin 7.3 (L) 12.0 - 16.0 g/dL    Hematocrit 22.2 (L) 36.0 - 46.0 %    MCV 95 80 - 100 fL    MCH 31.1 26.0 - 34.0 pg    MCHC 32.9 32.0 - 36.0 g/dL    RDW 14.9 (H) 11.5 - 14.5 %    Platelets 17 (LL) 150 - 450 x10*3/uL    Neutrophils % 95.1 40.0 - 80.0 %    Immature Granulocytes %, Automated 1.3 (H) 0.0 - 0.9 %    Lymphocytes % 2.5 13.0 - 44.0 %     Monocytes % 0.8 2.0 - 10.0 %    Eosinophils % 0.0 0.0 - 6.0 %    Basophils % 0.3 0.0 - 2.0 %    Neutrophils Absolute 7.36 (H) 1.60 - 5.50 x10*3/uL    Immature Granulocytes Absolute, Automated 0.10 0.00 - 0.50 x10*3/uL    Lymphocytes Absolute 0.19 (L) 0.80 - 3.00 x10*3/uL    Monocytes Absolute 0.06 0.05 - 0.80 x10*3/uL    Eosinophils Absolute 0.00 0.00 - 0.40 x10*3/uL    Basophils Absolute 0.02 0.00 - 0.10 x10*3/uL   Basic metabolic panel    Collection Time: 01/25/24  9:34 AM   Result Value Ref Range    Glucose 108 (H) 74 - 99 mg/dL    Sodium 128 (L) 136 - 145 mmol/L    Potassium 2.8 (LL) 3.5 - 5.3 mmol/L    Chloride 95 (L) 98 - 107 mmol/L    Bicarbonate 25 21 - 32 mmol/L    Anion Gap 11 10 - 20 mmol/L    Urea Nitrogen 31 (H) 6 - 23 mg/dL    Creatinine 1.46 (H) 0.50 - 1.05 mg/dL    eGFR 38 (L) >60 mL/min/1.73m*2    Calcium 7.9 (L) 8.6 - 10.3 mg/dL   Hepatic function panel    Collection Time: 01/25/24  9:34 AM   Result Value Ref Range    Albumin 2.6 (L) 3.4 - 5.0 g/dL    Bilirubin, Total 0.5 0.0 - 1.2 mg/dL    Bilirubin, Direct 0.2 0.0 - 0.3 mg/dL    Alkaline Phosphatase 56 33 - 136 U/L    ALT 13 7 - 45 U/L    AST 26 9 - 39 U/L    Total Protein 5.2 (L) 6.4 - 8.2 g/dL   Reticulocytes    Collection Time: 01/25/24  9:34 AM   Result Value Ref Range    Retic % 0.1 (L) 0.5 - 2.0 %    Retic Absolute 0.003 (L) 0.017 - 0.110 x10*6/uL    Reticulocyte Hemoglobin 34 28 - 38 pg    Immature Retic fraction 3.8 <=16.0 %                          azithromycin, 500 mg, intravenous, q24h  cefTRIAXone, 1 g, intravenous, q24h  [Held by provider] enoxaparin, 40 mg, subcutaneous, q24h  melatonin, 3 mg, oral, Daily  pantoprazole, 40 mg, oral, Daily before breakfast   Or  pantoprazole, 40 mg, intravenous, Daily before breakfast  polyethylene glycol, 17 g, oral, Daily  potassium chloride CR, 40 mEq, oral, Once        ECG 12 lead    Result Date: 1/23/2024  Sinus tachycardia with Premature atrial complexes Left axis deviation Abnormal ECG When  compared with ECG of 22-DEC-2023 19:18, No significant change was found    CT chest wo IV contrast    Result Date: 1/23/2024  Interpreted By:  Justin Alexander, STUDY: CT CHEST WO IV CONTRAST;  1/22/2024 11:58 pm   INDICATION: Signs/Symptoms:known lung cancer. chemo yesterday. reports worsening LE edema and sob. eval for pericardial and pleural effusions.   COMPARISON: 12/09/2023   ACCESSION NUMBER(S): [id number]   ORDERING CLINICIAN: ABRAM JACQUES   TECHNIQUE: Helical data acquisition of the chest was obtained without intravenous contrast. Images were reformatted in axial, coronal, and sagittal planes.  MIP reconstructions were performed on a separate workstation and provided for interpretation.   FINDINGS: LOWER NECK AND SUPERFICIAL SOFT TISSUES:  Mild body wall edema. Right chest port catheter, partially visualized. There is extensive right breast skin thickening.   MEDIASTINUM/PATRICIA: Substantially increased size of mediastinal and right hilar lymph nodes, for example a right lower paratracheal lymph node measures 1.8 cm and was previously not clearly visible on 12/09/2023.  enlarged subcarinal lymph node which measures 2 cm short axis, previously approximately 1.7 cm. Additional right hilar lymph nodes have increased in size. Esophagus is unremarkable.   CARDIOVASCULAR: Cardiac chamber size within normal limits. Small pericardial effusion which appears increased from 01/22/2024. Aortic caliber normal. Normal caliber of main pulmonary artery. Multi-vessel coronary atherosclerotic calcification.   LUNGS, AIRWAYS, AND PLEURA: Spiculated left upper lobe mass measures 2.2 cm, previously approximately 2 cm. Right upper lobe/middle lobe infrahilar mass which spans the major fissure measures 3.3 cm, previously 3.1 cm. There is severe apical predominant emphysema. New patchy consolidative/ground-glass opacities within the left upper lobe/lingula. Trace left pleural effusion.   MUSCULOSKELETAL: No acute osseous  abnormality or suspicious osseous lesions. Moderate multilevel spinal degenerative change.   UPPER ABDOMEN: Unremarkable.       1. Increasing size of bilateral lung masses and mediastinal/hilar adenopathy. 2. New patchy consolidative/ground-glass opacities within the left upper lobe/lingula. Consider pneumonia/pneumonitis. 3. New right breast skin thickening. Please correlate with physical exam and/or mammography. 4. Small pericardial effusion which appears increased from 01/22/2024. 5. Trace left pleural effusion. Resolution of the right pleural effusion seen on 12/09/2023.   Signed by: Justin Alexander 1/23/2024 1:30 AM Dictation workstation:   [id number]       Assessment/Plan   Principal Problem:    Pneumonia due to infectious organism, unspecified laterality, unspecified part of lung  Active Problems:    Lung cancer (CMS/HCC)    COPD exacerbation (CMS/HCC)    Primary hypertension    Thrombocytopenia (CMS/HCC)    Chronic pain    Anemia in neoplastic disease    See plan above       I spent   minutes in the professional and overall care of this patient.      Jd Burgos MD

## 2024-01-25 NOTE — DISCHARGE INSTR - APPOINTMENTS
Patient needs to follow up on Thursday February 1st 2:30 pm with   Jackson Colon DO   [address] [place] [address]   Phone: [phone]   Fax: [phone]    This appointment is to schedule future chemotherapy treatments as patient had finished with her first round of treatments.

## 2024-01-25 NOTE — NURSING NOTE
This nurse has attempted to contact Dr. Burgos several times for critical labs (K+ 2.8, Na 128, platelets 18). This nurse sent several securechat messages and called MD's office twice and no response.

## 2024-01-25 NOTE — PROGRESS NOTES
Physical Therapy                 Therapy Communication Note    Patient Name: Gaby Blue  MRN: [id number]  Today's Date: 1/25/2024     Discipline: Physical Therapy    Missed Visit Reason: 1111- Missed Visit Reason: Patient refused    Missed Time: Attempt    Comment: PT attempt for treatment.  pt declined to get OOB at this time. Pt states she was up for several hours in the morning in a chair  and is exhausted and would like to rest. spoke with nursing and reports pts hgb and platelets are still very low and she has call out to the  to see if pt will need another blood transfusion.  Re-attempt as able.

## 2024-01-25 NOTE — CONSULTS
Department of Internal Medicine  Gastroenterology  Consult note    IMPRESSION/RECOMMENDATIONS    Normocytic anemia likely multifactorial--CHEPE, chronic disease, DARSHAN 2/2 myelosuppression  Thrombocytopenia likely related to effects of DAPT and chemotherapy  Stage IV lung cancer, end-stage COPD on continuous O2 3 L  Protein malnutrition  Electrolyte abnormalities 2/2 #4    Patient is not a good candidate for EGD or colonoscopy or anesthesia required for both procedures.  Patient would need to be intubated prior to these procedures and likely would have difficulty being extubated.  She would not tolerate a bowel prep at this time due to significant shortness of breath, weakness and fatigue. Patient declines EGD and colonoscopy at this time.    Check iron, TIBC, ferritin, folate, B12, reticulocyte count, and haptoglobin  Trend H&H and transfuse PRBC for Hgb <7.0  Monitor stool color and consistency and document and notify GI with any concerns of GIB  Continue supportive care  Continue to monitor electrolytes and replete as necessary  Consult dietitian for protein malnutrition  Check folate and B12.  Clement if indicated  Recommend iron supplementation at discharge  Consult social work to discuss goals of care  Recommend palliative care versus hospice care    GI will sign off for now but feel free to call with any questions or concerns  Discussed with Dr. Ferro    Reason for Consult: anemia    History of Present Illness      Gaby Blue is a 73 y.o. female with a PMH of lung cancer stage IV on palliative chemotherapy, last dose 01/21/23, COPD, and CAD s/p PCI with stents on aspirin presented to the emergency room of [facility] with complaint of SOB, generalized weakness associated with progressive cough times several days. She wears 3 L nasal cannula at baseline. CT concerning for increasing size of lung masses and hilar adenopathy as well as OLIVERIO pneumonia.     Hemoglobin 6.8-->7.3 after 1 unit PRBCs.  MCV 96. Hemoglobin  1 month ago 8.5.  Platelets continue to downtrend from 46-->21-->17.    Serum sodium 133, potassium 3.1, chloride 97.  Bicarb 28, BUN 26, creatinine 1.09.  Calcium, albumin and total protein low.  LFTs normal.  INR 1.2.  Normal lactate. CT chest shows increasing size of bilateral lung masses and mediastinal/hilar adenopathy.  New patchy consolidated/groundglass opacities within the left upper lobe concerning for pneumonia or pneumonitis.  Mild pericardial effusion and trace left pleural effusion.     GI has been consulted for severe anemia. Patient seen and examined at bedside.  No acute distress but appears chronically ill, thin and cachectic short of breath at rest.  Wearing 3 L nasal cannula continuous.  Denies abdominal pain, nausea, vomiting, diarrhea.  No black or bloody stools, no hematemesis.  Patient states her last colonoscopy was >10 years ago.  No history of polyps.  No family history of CRC.    Significant abnormals today - serum sodium 128, potassium 2.8, chloride 95, BUN 32, creatinine 1.46.  Low calcium and total protein and albumin.  Hemoglobin 7.3, hematocrit 22.2%.  MCV 95.  Platelets 17.    ENDOSCOPIC REVIEW  EGD: None  Colonoscopy: Results unavailable to review    Allergies  Bee pollen and Pollen extracts    Current Medication    Current Facility-Administered Medications:     acetaminophen (Tylenol) tablet 650 mg, 650 mg, oral, q4h PRN, 650 mg at 01/24/24 2119 **OR** acetaminophen (Tylenol) oral liquid 650 mg, 650 mg, nasogastric tube, q4h PRN **OR** acetaminophen (Tylenol) suppository 650 mg, 650 mg, rectal, q4h PRN, Jd Burgos MD    acetaminophen (Tylenol) tablet 650 mg, 650 mg, oral, q4h PRN, 650 mg at 01/24/24 1602 **OR** acetaminophen (Tylenol) oral liquid 650 mg, 650 mg, oral, q4h PRN **OR** acetaminophen (Tylenol) suppository 650 mg, 650 mg, rectal, q4h PRN, Jd Burgos MD    azithromycin (Zithromax) in dextrose 5 % in water (D5W) 250 mL  mg, 500 mg, intravenous,  q24h, Jd Burgos MD, Stopped at 01/25/24 0707    cefTRIAXone (Rocephin) IVPB 1 g, 1 g, intravenous, q24h, Jd Burgos MD, Stopped at 01/24/24 1338    dextromethorphan-guaifenesin (Robitussin DM)  mg/5 mL oral liquid 5 mL, 5 mL, oral, q4h PRN, Jd Burgos MD, 5 mL at 01/25/24 0023    [Held by provider] enoxaparin (Lovenox) syringe 40 mg, 40 mg, subcutaneous, q24h, Jd Burgos MD, 40 mg at 01/23/24 1516    ibuprofen tablet 400 mg, 400 mg, oral, q4h PRN, Jd Burgos MD, 400 mg at 01/25/24 0023    melatonin tablet 3 mg, 3 mg, oral, Daily, Jd Burgos MD, 3 mg at 01/24/24 2119    ondansetron (Zofran) tablet 4 mg, 4 mg, oral, q8h PRN **OR** ondansetron (Zofran) injection 4 mg, 4 mg, intravenous, q8h PRN, Jd Burgos MD    pantoprazole (ProtoNix) EC tablet 40 mg, 40 mg, oral, Daily before breakfast, 40 mg at 01/25/24 0607 **OR** pantoprazole (ProtoNix) injection 40 mg, 40 mg, intravenous, Daily before breakfast, Jd Burgos MD, 40 mg at 01/24/24 0511    polyethylene glycol (Glycolax, Miralax) packet 17 g, 17 g, oral, Daily, Jd Burgos MD    Past Medical History  Active Ambulatory Problems     Diagnosis Date Noted    Bilateral sensorineural hearing loss 03/06/2023    Cholesteatoma of right external auditory canal 03/06/2023    Lung cancer (CMS/[facility]) 03/06/2023    Post-op pain 03/06/2023    Ulnar neuropathy of left upper extremity 03/06/2023    Vertigo 03/06/2023    COPD exacerbation (CMS/[facility]) 03/07/2023    Primary hypertension 12/15/2023    Insomnia disorder 12/15/2023    Thrombocytopenia (CMS/HCC) 12/15/2023    Acute kidney injury superimposed on CKD (CMS/[facility]) 12/15/2023    Chronic pain 12/17/2023    Hypertensive emergency 12/22/2023     Resolved Ambulatory Problems     Diagnosis Date Noted    Carpal tunnel syndrome of left wrist 03/06/2023    Cubital tunnel syndrome on left 03/06/2023    Entrapment of left ulnar nerve at wrist  03/06/2023    Impacted cerumen of right ear 03/06/2023    Otitis externa, chronic infective, right 03/06/2023    Neck mass 03/06/2023    Skin lesion of scalp 03/06/2023    Supraclavicular mass 03/06/2023    Pain of left upper extremity 03/06/2023    Tinnitus of both ears 03/06/2023    Wound drainage 03/06/2023    Acute hypercapnic respiratory failure (CMS/HCC) 12/15/2023    Tobacco abuse 12/15/2023    Acute delirium 12/15/2023    Uncontrolled hypertension 12/15/2023    Hypokalemia 12/17/2023     Past Medical History:   Diagnosis Date    Arthritis     Chronic hypoxic respiratory failure, on home oxygen therapy (CMS/HCC)     COPD (chronic obstructive pulmonary disease) (CMS/HCC)     Diverticulitis     History of radiation therapy     History of transfusion     Hypertension     Non-small cell lung cancer (NSCLC) (CMS/HCC)     Secondary malignant neoplasm of right lung (CMS/HCC)        Past Surgical History  Past Surgical History:   Procedure Laterality Date    APPENDECTOMY      CARDIAC CATHETERIZATION      COLON SURGERY      CT ANGIO NECK  09/26/2022    CT NECK ANGIO W AND WO IV CONTRAST    CT ANGIO NECK  12/17/2021    CT NECK ANGIO W AND WO IV CONTRAST    EXCISION BENIGN SKIN LESION SCALP / NECK / HANDS / FEET / GENITALIA      scalp    HAND SURGERY      HYSTERECTOMY  03/29/2018    Hysterectomy    MECKEL DIVERTICULUM EXCISION      TONSILLECTOMY  03/29/2018    Tonsillectomy       Family History  Family History   Problem Relation Name Age of Onset    COPD Mother      Cancer Father      Other (Lupus erythematosus) Other family history     Heart disease Other family history        Social History  TOBACCO:  reports that she quit smoking about 4 years ago. Her smoking use included cigarettes. She has a 24.00 pack-year smoking history. She has never used smokeless tobacco.  ETOH:  reports that she does not currently use alcohol.  DRUGS:  reports no history of drug use.    Review of Systems  Review of systems negative unless  "otherwise stated above.    PHYSICAL EXAM  VS: /58   Pulse 69   Temp 36.4 °C (97.5 °F)   Resp 18   Ht 1.6 m (5' 2.99\")   Wt 59 kg (130 lb)   SpO2 98%   BMI 23.03 kg/m²  Body mass index is 23.03 kg/m².    Constitutional-cachectic, malnourished with muscle wasting  (temporal and subclavicular), NAD  No mouth sores or oral cavities noted. No scleral icterus.   Res-mild SOB at rest.  Continuous O2 at 3 L nasal cannula.    Cardiac- RRR, no murmurs or gallops   Abdominal- Soft, nondistended, nontender abdomen with positive bowel sounds x4 quadrants  Rectal- Rectal exam deferred at this time   Peripheral- Thin extremities warm with positive pedal pulses, no edema noted.   Skin- No rashes or skin lesions noted. Non-jaundiced.    Neuro- Patient alert and oriented x3, pays attention to caregiver, MONDRAGON x4.   Psychological-mood stable, behavior is appropriate to situation.     DATA  Recent blood work and relevant radiology and endoscopic studies were reviewed and discussed with the patient   Results from last 7 days   Lab Units 01/25/24  0934   WBC AUTO x10*3/uL 7.7   RBC AUTO x10*6/uL 2.35*   HEMOGLOBIN g/dL 7.3*   HEMATOCRIT % 22.2*   MCV fL 95   MCHC g/dL 32.9   RDW % 14.9*   PLATELETS AUTO x10*3/uL 17*       Results from last 72 hours   Lab Units 01/25/24  0934   SODIUM mmol/L 128*   POTASSIUM mmol/L 2.8*   CHLORIDE mmol/L 95*   CO2 mmol/L 25   BUN mg/dL 31*   CREATININE mg/dL 1.46*   CALCIUM mg/dL 7.9*   PROTEIN TOTAL g/dL 5.2*   BILIRUBIN TOTAL mg/dL 0.5   ALK PHOS U/L 56   AST U/L 26   ALT U/L 13       Results from last 72 hours   Lab Units 01/22/24  2242   INR  1.2*         RADIOLOGY REVIEW  === 01/22/24 ===    CT CHEST WO IV CONTRAST    - Impression -  1. Increasing size of bilateral lung masses and mediastinal/hilar  adenopathy.  2. New patchy consolidative/ground-glass opacities within the left  upper lobe/lingula. Consider pneumonia/pneumonitis.  3. New right breast skin thickening. Please correlate with " physical  exam and/or mammography.  4. Small pericardial effusion which appears increased from 01/22/2024.  5. Trace left pleural effusion. Resolution of the right pleural  effusion seen on 12/09/2023.    Signed by: Justin Alexander 1/23/2024 1:30 AM  Dictation workstation:   [id number]       (Electronically signed byAUGUSTA Armenta on 1/25/2024 at 10:21 AM)    I was present with the NP who participated in the documentation of this note. I have personally seen and re-examined the patient and performed the medical decision-making components (assessment and plan of care). I have reviewed the NP documentation and verified the findings in the note as written with additions or exceptions as stated in the body of this note.    73-year-old female with multiple significant comorbidities.  We are consulted for anemia.  Ferritin: 1613.  Suspect her anemia is related to myelosuppression and chronic disease.  She is high risk for procedures with her lung disease and platelet count of 17.  Furthermore, patient does not want any scopes.  Consider goals of care discussion.  Please call with any questions.  Rest per NP note.      Fidel Ferro, DO

## 2024-01-25 NOTE — PROGRESS NOTES
Per matthew pt. Will need snf placement has spoken to pt. And spouse who are agreeable and requesting [facility].  Referral sent to [facility], will await response.  Pt. With [employer], will require ins. Precert.    Update: pt. Accepted at [facility] ins. Team to obtain precert.    Update:  ins. Precert has been obtained and good through  1/30.  Will await final discharge orders.

## 2024-01-26 VITALS
WEIGHT: 130 LBS | BODY MASS INDEX: 23.04 KG/M2 | HEIGHT: 63 IN | RESPIRATION RATE: 20 BRPM | TEMPERATURE: 97.5 F | DIASTOLIC BLOOD PRESSURE: 53 MMHG | SYSTOLIC BLOOD PRESSURE: 113 MMHG | OXYGEN SATURATION: 97 % | HEART RATE: 76 BPM

## 2024-01-26 DIAGNOSIS — G89.4 CHRONIC PAIN SYNDROME: Primary | ICD-10-CM

## 2024-01-26 DIAGNOSIS — F51.01 PRIMARY INSOMNIA: ICD-10-CM

## 2024-01-26 DIAGNOSIS — C34.12 MALIGNANT NEOPLASM OF UPPER LOBE OF LEFT LUNG (MULTI): ICD-10-CM

## 2024-01-26 LAB
ALBUMIN SERPL BCP-MCNC: 2.3 G/DL (ref 3.4–5)
ALP SERPL-CCNC: 56 U/L (ref 33–136)
ALT SERPL W P-5'-P-CCNC: 15 U/L (ref 7–45)
ANION GAP SERPL CALC-SCNC: 11 MMOL/L (ref 10–20)
AST SERPL W P-5'-P-CCNC: 28 U/L (ref 9–39)
BILIRUB SERPL-MCNC: 0.4 MG/DL (ref 0–1.2)
BUN SERPL-MCNC: 30 MG/DL (ref 6–23)
CALCIUM SERPL-MCNC: 7.9 MG/DL (ref 8.6–10.3)
CHLORIDE SERPL-SCNC: 99 MMOL/L (ref 98–107)
CO2 SERPL-SCNC: 26 MMOL/L (ref 21–32)
CREAT SERPL-MCNC: 1.26 MG/DL (ref 0.5–1.05)
EGFRCR SERPLBLD CKD-EPI 2021: 45 ML/MIN/1.73M*2
ERYTHROCYTE [DISTWIDTH] IN BLOOD BY AUTOMATED COUNT: 14.7 % (ref 11.5–14.5)
GLUCOSE SERPL-MCNC: 85 MG/DL (ref 74–99)
HAPTOGLOB SERPL-MCNC: 136 MG/DL (ref 37–246)
HCT VFR BLD AUTO: 21.2 % (ref 36–46)
HGB BLD-MCNC: 6.9 G/DL (ref 12–16)
MCH RBC QN AUTO: 31.2 PG (ref 26–34)
MCHC RBC AUTO-ENTMCNC: 32.5 G/DL (ref 32–36)
MCV RBC AUTO: 96 FL (ref 80–100)
NRBC BLD-RTO: 0 /100 WBCS (ref 0–0)
PLATELET # BLD AUTO: 8 X10*3/UL (ref 150–450)
POTASSIUM SERPL-SCNC: 3.8 MMOL/L (ref 3.5–5.3)
PROT SERPL-MCNC: 4.7 G/DL (ref 6.4–8.2)
RBC # BLD AUTO: 2.21 X10*6/UL (ref 4–5.2)
SODIUM SERPL-SCNC: 132 MMOL/L (ref 136–145)
WBC # BLD AUTO: 6.2 X10*3/UL (ref 4.4–11.3)

## 2024-01-26 PROCEDURE — 80053 COMPREHEN METABOLIC PANEL: CPT | Performed by: FAMILY MEDICINE

## 2024-01-26 PROCEDURE — 85027 COMPLETE CBC AUTOMATED: CPT | Performed by: FAMILY MEDICINE

## 2024-01-26 PROCEDURE — 2500000004 HC RX 250 GENERAL PHARMACY W/ HCPCS (ALT 636 FOR OP/ED): Performed by: FAMILY MEDICINE

## 2024-01-26 RX ORDER — OXYCODONE AND ACETAMINOPHEN 7.5; 325 MG/1; MG/1
1 TABLET ORAL EVERY 6 HOURS PRN
Qty: 30 TABLET | Refills: 0 | Status: SHIPPED | OUTPATIENT
Start: 2024-01-26

## 2024-01-26 RX ORDER — DIAZEPAM 2 MG/1
2 TABLET ORAL NIGHTLY PRN
Qty: 30 TABLET | Refills: 0 | Status: SHIPPED | OUTPATIENT
Start: 2024-01-26

## 2024-01-26 RX ORDER — DRONABINOL 2.5 MG/1
2.5 CAPSULE ORAL
Qty: 40 CAPSULE | Refills: 0 | Status: SHIPPED | OUTPATIENT
Start: 2024-01-26

## 2024-01-26 RX ADMIN — PANTOPRAZOLE SODIUM 40 MG: 40 TABLET, DELAYED RELEASE ORAL at 05:10

## 2024-01-26 RX ADMIN — AZITHROMYCIN MONOHYDRATE 500 MG: 500 INJECTION, POWDER, LYOPHILIZED, FOR SOLUTION INTRAVENOUS at 05:10

## 2024-01-26 NOTE — CARE PLAN
Problem: Safety  Goal: Patient will be injury free during hospitalization  Outcome: Progressing     The patient's goals for the shift include safety    The clinical goals for the shift include safety

## 2024-01-26 NOTE — PROGRESS NOTES
Ekaterina Jimenez has been obntained.  Pt. Will discharge this dtae to [facility] snf at 11:00 am via ambulance.  Pt. Aware and in agreement to transfer.  Message left for asif vera.

## 2024-01-26 NOTE — PROGRESS NOTES
Physical Therapy                 Therapy Communication Note    Patient Name: Gaby Blue  MRN: [id number]  Today's Date: 1/26/2024     Discipline: Physical Therapy    Missed Visit Reason: Missed Visit Reason:  (Pt. declined participation)    Missed Time: Attempt    Comment:  Pt. Declined therapy participation/need stating preparing to dc to snf.

## 2024-01-26 NOTE — DISCHARGE SUMMARY
Discharge Diagnosis  Pneumonia due to infectious organism, unspecified laterality, unspecified part of lung    Issues Requiring Follow-Up       Test Results Pending At Discharge  Pending Labs       Order Current Status    Haptoglobin In process            Hospital Course   Patient has a history of cancer.  Presented with weakness.  Cough.  Shortness of breath.  Found to be anemic.  Was given 1 unit PRBC transfusion in the emergency department.  Also found to have pneumonia.  Placed on IV antibiotics.  Patient was admitted.  Seen in consultation by pulmonology.  Also seen in consultation with gastroenterology.  Multifactorial anemia.  Not a good candidate for EGD colonoscopy.  Hemodynamic lability stable old.  Seen by therapy.  Patient was weak.  Consented to skilled nursing facility placement.  Patient is arranged for skilled level skilled nursing facility placement.  Will complete a course of antibiotics.  She is can see your oncologist as an outpatient and make further treatment decisions.  During the hospitalization she wished to be made DNR CC.  Orders were entered.    Pertinent Physical Exam At Time of Discharge  Physical Exam    Home Medications     Medication List      START taking these medications     azithromycin 500 mg tablet; Commonly known as: Zithromax; Take 1 tab   daily for 7 days   cefdinir 300 mg capsule; Commonly known as: Omnicef; Take 1 capsule (300   mg) by mouth 2 times a day for 7 days.   dextromethorphan-guaifenesin  mg/5 mL oral liquid; Commonly known   as: Robitussin DM; Take 5 mL by mouth every 4 hours if needed for cough.   ondansetron 4 mg tablet; Commonly known as: Zofran; Take 1 tablet (4 mg)   by mouth every 8 hours if needed for nausea or vomiting.     CHANGE how you take these medications     gabapentin 400 mg capsule; Commonly known as: Neurontin; Take 1 capsule   (400 mg) by mouth 3 times a day.; What changed: how much to take     CONTINUE taking these medications      albuterol 90 mcg/actuation inhaler; Inhale 2 puffs every 4 hours if   needed for shortness of breath or wheezing.   All Day Allergy (cetirizine) 10 mg tablet; Generic drug: cetirizine   diazePAM 2 mg tablet; Commonly known as: Valium   dronabinol 2.5 mg capsule; Commonly known as: Marinol   DULoxetine 60 mg DR capsule; Commonly known as: Cymbalta   fluticasone 50 mcg/actuation nasal spray; Commonly known as: Flonase   fluticasone-umeclidin-vilanter 100-62.5-25 mcg blister with device;   Commonly known as: TRELEGY-ELLIPTA   hydrALAZINE 50 mg tablet; Commonly known as: Apresoline; Take 1 tablet   (50 mg) by mouth 3 times a day.   ipratropium-albuteroL 0.5-2.5 mg/3 mL nebulizer solution; Commonly known   as: Duo-Neb; Take 3 mL by nebulization 3 times a day.   lisinopril 20 mg tablet   montelukast 10 mg tablet; Commonly known as: Singulair   naloxone 4 mg/0.1 mL nasal spray; Commonly known as: Narcan   nicotine 21 mg/24 hr patch; Commonly known as: Nicoderm CQ; Place 1   patch over 24 hours on the skin once daily. Do not start before December 18, 2023.   * oxyCODONE-acetaminophen  mg tablet; Commonly known as: Percocet   * oxyCODONE-acetaminophen 7.5-325 mg tablet; Commonly known as: Percocet   prochlorperazine 10 mg tablet; Commonly known as: Compazine   sertraline 50 mg tablet; Commonly known as: Zoloft  * This list has 2 medication(s) that are the same as other medications   prescribed for you. Read the directions carefully, and ask your doctor or   other care provider to review them with you.     STOP taking these medications     ALPRAZolam 1 mg tablet; Commonly known as: Xanax   aspirin 81 mg chewable tablet   buPROPion  mg 24 hr tablet; Commonly known as: Wellbutrin XL   folic acid 1 mg tablet; Commonly known as: Folvite   levalbuterol 1.25 mg/3 mL nebulizer solution; Commonly known as: Xopenex   lisinopriL-hydrochlorothiazide 20-25 mg tablet   metoprolol succinate XL 50 mg 24 hr tablet; Commonly  known as: Toprol-XL   OLANZapine 5 mg tablet; Commonly known as: ZyPREXA   oxygen gas therapy; Commonly known as: O2   pantoprazole 40 mg EC tablet; Commonly known as: ProtoNix       Outpatient Follow-Up  No future appointments.    Jd Burgos MD

## 2024-01-26 NOTE — PROGRESS NOTES
History Of Present Illness  Gaby Blue is a 73 y.o. female presenting with  .     Patient presented with shortness of breath.  2 or 3 days.  Cough.  Sputum.  History of cancer.  Had recent chemotherapy.  Was also complaining weakness.  Evaluated emergency department.  CT scan revealed pneumonia.  Laboratory assessment revealed anemia.  She was consented initiated transfusion 1 unit PRBC in the emergency department.  Initiated on IV antibiotics.  Patient seen in the emergency department.    I was asked to evaluate and follow from a pulmonary perspective.  Unable to get much history from patient.  Most of the history obtained from prior available charts.  She has had longstanding prior smoking with severe COPD for which she is on oxygen 2 to 3 L nasal cannula.  She has history of lung cancer for which she has received radiation and chemo per Dr. Colon, a local oncologist.  Further details not available.  She comes in with severe pneumonia and has received 1 unit of PRBC.  Her functional status appears to be declining overall. CT scan shows increasing bilateral lung lesions consistent with relapsing cancer.  CT scan also shows a left upper lobe pneumonia.  Recent  events have been noted.  Patient has anemia felt to be multifactorial without any obvious bleeding.  She is not a candidate for any EGD or colonoscopy.  She has made herself a DNR CC.  She is being treated for pneumonia and is to go to skilled nursing facility to regain strength and complete antibiotics. Patient is noted to have low potassium today of 2.8 and 40 mg p.o. 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       Surgical History  She has a past surgical history that includes Tonsillectomy (03/29/2018); Hysterectomy (03/29/2018); CT angio neck (09/26/2022); CT angio neck (12/17/2021); Appendectomy; Excision benign skin lesion scalp / neck / hands / feet / genitalia; Cardiac catheterization; Meckel's diverticulum excision; Colon surgery; and Hand surgery.     Social History  She reports that she quit smoking about 4 years ago. Her smoking use included cigarettes. She has a 24.00 pack-year smoking history. She has never used smokeless tobacco. She reports that she does not currently use alcohol. She reports that she does not use drugs.     Family History  Family History          Family History   Problem Relation Name Age of Onset    COPD Mother        Cancer Father        Other (Lupus erythematosus) Other family history      Heart disease Other family history              Allergies  Bee pollen and Pollen extracts     Review of Systems     Physical Exam  Resting comfortably.  Awakens to verbal.  Scattered rhonchi anteriorly.  Distant heart sounds.  Soft normoactive bowel sounds.  Pale.  And oriented.  Last Recorded Vitals  Blood pressure 116/57, pulse 85, temperature 36.4 °C (97.5 °F), temperature source Temporal, resp. rate 18, height 1.6 m (5' 3\"), weight 59 kg (130 lb), SpO2 (S) 100 %.     Relevant Results     Scheduled medications  azithromycin, 500 mg, intravenous, q24h  cefTRIAXone, 1 g, intravenous, q24h  enoxaparin, 40 mg, subcutaneous, q24h  melatonin, 3 mg, oral, Daily  [START ON 1/24/2024] pantoprazole, 40 mg, oral, Daily before breakfast   Or  [START ON 1/24/2024] pantoprazole, 40 mg, intravenous, Daily before breakfast  polyethylene glycol, 17 g, oral, Daily        Continuous medications  sodium chloride 0.9%, 75 mL/hr        PRN medications  PRN medications: acetaminophen **OR** " acetaminophen **OR** acetaminophen, acetaminophen **OR** acetaminophen **OR** acetaminophen, ondansetron **OR** ondansetron         Results for orders placed or performed during the hospital encounter of 01/22/24 (from the past 24 hour(s))   ECG 12 lead   Result Value Ref Range     Ventricular Rate 107 BPM     Atrial Rate 107 BPM     IN Interval 150 ms     QRS Duration 92 ms     QT Interval 316 ms     QTC Calculation(Bazett) 421 ms     P Axis 77 degrees     R Axis -33 degrees     T Axis 77 degrees     QRS Count 17 beats     Q Onset 213 ms     P Onset 138 ms     P Offset 191 ms     T Offset 371 ms     QTC Fredericia 383 ms   CBC and Auto Differential   Result Value Ref Range     WBC 6.8 4.4 - 11.3 x10*3/uL     nRBC 0.0 0.0 - 0.0 /100 WBCs     RBC 2.18 (L) 4.00 - 5.20 x10*6/uL     Hemoglobin 6.8 (L) 12.0 - 16.0 g/dL     Hematocrit 20.9 (L) 36.0 - 46.0 %     MCV 96 80 - 100 fL     MCH 31.2 26.0 - 34.0 pg     MCHC 32.5 32.0 - 36.0 g/dL     RDW 14.6 (H) 11.5 - 14.5 %     Platelets 46 (L) 150 - 450 x10*3/uL     Immature Granulocytes %, Automated 10.1 (H) 0.0 - 0.9 %     Immature Granulocytes Absolute, Automated 0.69 (H) 0.00 - 0.50 x10*3/uL   Comprehensive metabolic panel   Result Value Ref Range     Glucose 87 74 - 99 mg/dL     Sodium 133 (L) 136 - 145 mmol/L     Potassium 3.1 (L) 3.5 - 5.3 mmol/L     Chloride 97 (L) 98 - 107 mmol/L     Bicarbonate 28 21 - 32 mmol/L     Anion Gap 11 10 - 20 mmol/L     Urea Nitrogen 26 (H) 6 - 23 mg/dL     Creatinine 1.09 (H) 0.50 - 1.05 mg/dL     eGFR 54 (L) >60 mL/min/1.73m*2     Calcium 8.2 (L) 8.6 - 10.3 mg/dL     Albumin 2.8 (L) 3.4 - 5.0 g/dL     Alkaline Phosphatase 49 33 - 136 U/L     Total Protein 5.1 (L) 6.4 - 8.2 g/dL     AST 21 9 - 39 U/L     Bilirubin, Total 0.7 0.0 - 1.2 mg/dL     ALT 11 7 - 45 U/L   Protime-INR   Result Value Ref Range     Protime 13.9 (H) 9.8 - 12.8 seconds     INR 1.2 (H) 0.9 - 1.1   Magnesium   Result Value Ref Range     Magnesium 0.89 (L) 1.60 - 2.40  mg/dL   B-type natriuretic peptide   Result Value Ref Range      (H) 0 - 99 pg/mL   Troponin I, High Sensitivity, Initial   Result Value Ref Range     Troponin I, High Sensitivity 52 (HH) 0 - 13 ng/L   Manual Differential   Result Value Ref Range     Neutrophils %, Manual 99.0 40.0 - 80.0 %     Bands %, Manual 1.0 0.0 - 5.0 %     Lymphocytes %, Manual 0.0 13.0 - 44.0 %     Monocytes %, Manual 0.0 2.0 - 10.0 %     Eosinophils %, Manual 0.0 0.0 - 6.0 %     Basophils %, Manual 0.0 0.0 - 2.0 %     Seg Neutrophils Absolute, Manual 6.73 (H) 1.60 - 5.00 x10*3/uL     Bands Absolute, Manual 0.07 0.00 - 0.50 x10*3/uL     Lymphocytes Absolute, Manual 0.00 (L) 0.80 - 3.00 x10*3/uL     Monocytes Absolute, Manual 0.00 (L) 0.05 - 0.80 x10*3/uL     Eosinophils Absolute, Manual 0.00 0.00 - 0.40 x10*3/uL     Basophils Absolute, Manual 0.00 0.00 - 0.10 x10*3/uL     Total Cells Counted 100       Neutrophils Absolute, Manual 6.80 (H) 1.60 - 5.50 x10*3/uL     RBC Morphology See Below       Hypochromia Mild     Lactate   Result Value Ref Range     Lactate 1.1 0.4 - 2.0 mmol/L   Prepare RBC: 1 Units   Result Value Ref Range     PRODUCT CODE W3859M42       Unit Number [id number]       Unit ABO O       Unit RH NEG       XM INTEP COMP       Dispense Status TR       Blood Expiration Date January 31, 2024 23:59 EST       PRODUCT BLOOD TYPE 9500       UNIT VOLUME 323     Troponin, High Sensitivity, 1 Hour   Result Value Ref Range     Troponin I, High Sensitivity 48 (H) 0 - 13 ng/L   Type and screen   Result Value Ref Range     ABO TYPE O       Rh TYPE NEG       ANTIBODY SCREEN NEG     Influenza A, and B PCR   Result Value Ref Range     Flu A Result Not Detected Not Detected     Flu B Result Not Detected Not Detected   Sars-CoV-2 PCR, Symptomatic   Result Value Ref Range     Coronavirus 2019, PCR Not Detected Not Detected   RSV PCR   Result Value Ref Range     RSV PCR Not Detected Not Detected      ECG 12 lead     Result Date:  1/23/2024  Sinus tachycardia with Premature atrial complexes Left axis deviation Abnormal ECG When compared with ECG of 22-DEC-2023 19:18, No significant change was found     CT chest wo IV contrast     Result Date: 1/23/2024  Interpreted By:  Justin Alexander, STUDY: CT CHEST WO IV CONTRAST;  1/22/2024 11:58 pm   INDICATION: Signs/Symptoms:known lung cancer. chemo yesterday. reports worsening LE edema and sob. eval for pericardial and pleural effusions.   COMPARISON: 12/09/2023   ACCESSION NUMBER(S): [id number]   ORDERING CLINICIAN: ABRAM JACQUES   TECHNIQUE: Helical data acquisition of the chest was obtained without intravenous contrast. Images were reformatted in axial, coronal, and sagittal planes.  MIP reconstructions were performed on a separate workstation and provided for interpretation.   FINDINGS: LOWER NECK AND SUPERFICIAL SOFT TISSUES:  Mild body wall edema. Right chest port catheter, partially visualized. There is extensive right breast skin thickening.   MEDIASTINUM/PATRICIA: Substantially increased size of mediastinal and right hilar lymph nodes, for example a right lower paratracheal lymph node measures 1.8 cm and was previously not clearly visible on 12/09/2023.  enlarged subcarinal lymph node which measures 2 cm short axis, previously approximately 1.7 cm. Additional right hilar lymph nodes have increased in size. Esophagus is unremarkable.   CARDIOVASCULAR: Cardiac chamber size within normal limits. Small pericardial effusion which appears increased from 01/22/2024. Aortic caliber normal. Normal caliber of main pulmonary artery. Multi-vessel coronary atherosclerotic calcification.   LUNGS, AIRWAYS, AND PLEURA: Spiculated left upper lobe mass measures 2.2 cm, previously approximately 2 cm. Right upper lobe/middle lobe infrahilar mass which spans the major fissure measures 3.3 cm, previously 3.1 cm. There is severe apical predominant emphysema. New patchy consolidative/ground-glass opacities within  the left upper lobe/lingula. Trace left pleural effusion.   MUSCULOSKELETAL: No acute osseous abnormality or suspicious osseous lesions. Moderate multilevel spinal degenerative change.   UPPER ABDOMEN: Unremarkable.        1. Increasing size of bilateral lung masses and mediastinal/hilar adenopathy. 2. New patchy consolidative/ground-glass opacities within the left upper lobe/lingula. Consider pneumonia/pneumonitis. 3. New right breast skin thickening. Please correlate with physical exam and/or mammography. 4. Small pericardial effusion which appears increased from 01/22/2024. 5. Trace left pleural effusion. Resolution of the right pleural effusion seen on 12/09/2023.   Signed by: Justin Alexander 1/23/2024 1:30 AM Dictation workstation:   [id number]     ECG 12 lead     Result Date: 1/23/2024  Sinus tachycardia with Premature atrial complexes Left axis deviation Abnormal ECG When compared with ECG of 22-DEC-2023 19:18, No significant change was found     CT chest wo IV contrast     Result Date: 1/23/2024  Interpreted By:  Justin Alexander, STUDY: CT CHEST WO IV CONTRAST;  1/22/2024 11:58 pm   INDICATION: Signs/Symptoms:known lung cancer. chemo yesterday. reports worsening LE edema and sob. eval for pericardial and pleural effusions.   COMPARISON: 12/09/2023   ACCESSION NUMBER(S): [id number]   ORDERING CLINICIAN: ABRAM JACQUES   TECHNIQUE: Helical data acquisition of the chest was obtained without intravenous contrast. Images were reformatted in axial, coronal, and sagittal planes.  MIP reconstructions were performed on a separate workstation and provided for interpretation.   FINDINGS: LOWER NECK AND SUPERFICIAL SOFT TISSUES:  Mild body wall edema. Right chest port catheter, partially visualized. There is extensive right breast skin thickening.   MEDIASTINUM/PATRICIA: Substantially increased size of mediastinal and right hilar lymph nodes, for example a right lower paratracheal lymph node measures 1.8 cm and  was previously not clearly visible on 12/09/2023.  enlarged subcarinal lymph node which measures 2 cm short axis, previously approximately 1.7 cm. Additional right hilar lymph nodes have increased in size. Esophagus is unremarkable.   CARDIOVASCULAR: Cardiac chamber size within normal limits. Small pericardial effusion which appears increased from 01/22/2024. Aortic caliber normal. Normal caliber of main pulmonary artery. Multi-vessel coronary atherosclerotic calcification.   LUNGS, AIRWAYS, AND PLEURA: Spiculated left upper lobe mass measures 2.2 cm, previously approximately 2 cm. Right upper lobe/middle lobe infrahilar mass which spans the major fissure measures 3.3 cm, previously 3.1 cm. There is severe apical predominant emphysema. New patchy consolidative/ground-glass opacities within the left upper lobe/lingula. Trace left pleural effusion.   MUSCULOSKELETAL: No acute osseous abnormality or suspicious osseous lesions. Moderate multilevel spinal degenerative change.   UPPER ABDOMEN: Unremarkable.        1. Increasing size of bilateral lung masses and mediastinal/hilar adenopathy. 2. New patchy consolidative/ground-glass opacities within the left upper lobe/lingula. Consider pneumonia/pneumonitis. 3. New right breast skin thickening. Please correlate with physical exam and/or mammography. 4. Small pericardial effusion which appears increased from 01/22/2024. 5. Trace left pleural effusion. Resolution of the right pleural effusion seen on 12/09/2023.   Signed by: Justin Alexander 1/23/2024 1:30 AM Dictation workstation:   [id number]           Assessment/Plan   Principal Problem:    Pneumonia due to infectious organism, unspecified laterality, unspecified part of lung  Active Problems:    Lung cancer (CMS/HCC)    COPD exacerbation (CMS/HCC)    Primary hypertension    Thrombocytopenia (CMS/HCC)    Chronic pain    Anemia in neoplastic disease        Principal Problem:    Pneumonia due to infectious organism,  unspecified laterality, unspecified part of lung  Active Problems:    Lung cancer (CMS/HCC)    COPD exacerbation (CMS/HCC)    Primary hypertension    Thrombocytopenia (CMS/HCC)    Chronic pain    Anemia in neoplastic disease     Pulmonary comments:- Chart reviewed and patient examined.  Various x-rays/CT scans etc. have been reviewed also.  Agree with IV Levaquin as ordered for pneumonia.  Agree with IV fluids for dehydration.  Patient appropriately received 1 unit PRBC so far for anemia etiology which is unclear.  There does not appear to be any blood loss at this time.  I shall continue to monitor this patient with you and I thank you for the referral. Incidentally patient is a DNR CC.  Patient may be getting close to hospice; would rather have her oncologist make this decision when patient follows them soon.    1/25:- Patient is noted to have low platelets which are dropping I wonder if this is from bone marrow involvement of cancer.  Her hemoglobin is better after 1 unit of PRBC.  Give additional 40 mEq of KCl p.o. tonight.  Plan of care discussed with family friend who is next of kin about cardiac prognosis.  Patient needs to follow-up with her oncologist Dr. Isaiah SALINAS to discuss goals of care and if any other treatment options are left.  Prognosis is obviously extremely guarded.  Trace Davey MD

## 2024-01-28 ENCOUNTER — NURSING HOME VISIT (OUTPATIENT)
Dept: POST ACUTE CARE | Facility: EXTERNAL LOCATION | Age: 74
End: 2024-01-28
Payer: MEDICARE

## 2024-01-28 DIAGNOSIS — C34.90 MALIGNANT NEOPLASM OF LUNG, UNSPECIFIED LATERALITY, UNSPECIFIED PART OF LUNG (MULTI): ICD-10-CM

## 2024-01-28 DIAGNOSIS — J18.9 PNEUMONIA DUE TO INFECTIOUS ORGANISM, UNSPECIFIED LATERALITY, UNSPECIFIED PART OF LUNG: ICD-10-CM

## 2024-01-28 DIAGNOSIS — D63.0 ANEMIA IN NEOPLASTIC DISEASE: ICD-10-CM

## 2024-01-28 DIAGNOSIS — D69.6 THROMBOCYTOPENIA (CMS-HCC): ICD-10-CM

## 2024-01-28 DIAGNOSIS — I10 PRIMARY HYPERTENSION: Primary | ICD-10-CM

## 2024-01-28 PROCEDURE — 99306 1ST NF CARE HIGH MDM 50: CPT | Performed by: INTERNAL MEDICINE

## 2024-01-28 NOTE — LETTER
Patient: Gaby Blue  : 1950    Encounter Date: 2024    Subjective  Patient ID: Gaby Blue is a 73 y.o. female who is acute skilled care and presents for initial visit for skilled nursing.    73-year-old female patient has been admitted after having complicated hospitalization.  Patient has a bilateral upper lobe malignancy, patient has a hilar and mediastinal lymphadenopathy.  The nature and severity of lung cancer is unknown, pathology of the lung cancer is unknown, patient was admitted because of weakness found to have a pulmonary infiltrate groundglass infiltrate, treated with IV antibiotics, it is not very much clear what kind of treatment patient has received in the past for this bilateral upper lobe mass.  Patient has been having significant thrombocytopenia and anemia, patient remains on nasal oxygen, patient's oral intake is poor, creatinine was 1.44.  CT reports were reviewed, it is a significant amount of malignancy burden patient has been exhibiting and actually patient's condition does not look well, as I see this patient  has communication with patient's sister and they are looking for terminal care and hospice like care.  Patient is admitted here for skilled nursing and rehabilitation after having this pneumonia like episode and bilateral lung cancer.  She has been not in any condition to undergo any sort of physical therapy seems like after my evaluation.  Latest set of labs and radiological imagings were reviewed, has not looked into the document to see what kind of lung cancer this patient has.         Review of Systems   Constitutional:  Positive for activity change and fatigue.   HENT:  Positive for congestion.    Respiratory:  Positive for cough and shortness of breath. Negative for apnea and choking.    Cardiovascular:  Positive for leg swelling.   Gastrointestinal:  Positive for nausea. Negative for abdominal distention, constipation and diarrhea.   Musculoskeletal:   Positive for arthralgias and back pain.   Skin: Negative.    Neurological:  Positive for weakness.   Hematological:  Bruises/bleeds easily.   Psychiatric/Behavioral:  Positive for confusion.        Objective  BP 80/62   Pulse 100     Physical Exam  Constitutional:       General: She is in acute distress.      Appearance: She is normal weight. She is ill-appearing.   HENT:      Head: Normocephalic.      Nose: Nose normal.   Eyes:      Conjunctiva/sclera: Conjunctivae normal.   Cardiovascular:      Rate and Rhythm: Regular rhythm. Tachycardia present.      Heart sounds: Normal heart sounds.   Pulmonary:      Breath sounds: Rales present.   Abdominal:      General: Abdomen is flat.      Palpations: Abdomen is soft.   Musculoskeletal:         General: Tenderness present.      Cervical back: Neck supple. Tenderness present.   Skin:     General: Skin is warm and dry.      Coloration: Skin is pale.      Findings: Bruising present.   Neurological:      General: No focal deficit present.      Mental Status: Mental status is at baseline. She is disoriented.         Assessment/Plan  Problem List Items Addressed This Visit             ICD-10-CM    Lung cancer (CMS/[facility]) C34.90    Primary hypertension - Primary I10    Thrombocytopenia (CMS/[facility]) D69.6    Pneumonia due to infectious organism, unspecified laterality, unspecified part of lung J18.9    Anemia in neoplastic disease D63.0   Patient's condition does not look well, she needs hospice, she has a multiple skin tears, there is a skin tear in the right forearm.  Patient is listed to be on azithromycin and cefdinir not sure what is a value of this antibiotics, albuterol, cough suppressant, Valium 2 mg as needed, dronabinol, duloxetine, gabapentin, hydralazine, lisinopril, nicotine patch, prochlorperazine, sertraline, Trelegy, albuterol.  Patient has a port on the chest wall that means that patient has previously received chemotherapy.  Patient is not going to help,  thrombocytopenia is quite significant, anemia persist, patient's prognosis is poor, her survival could be less than 6 months, hospice evaluation is warranted.   will discuss with family member.  She will not be undergoing any sort of physical therapy seems like.  Appetite remains poor, hospice like treatment to be initiated once hospice is involved which includes morphine and dronabinol to be continued, DNR CC comfort care reviewed.  Prognosis is poor.     Goals    None           Electronically Signed By: Marquez Nance MD   1/29/24  9:05 PM

## 2024-01-29 VITALS — SYSTOLIC BLOOD PRESSURE: 80 MMHG | DIASTOLIC BLOOD PRESSURE: 62 MMHG | HEART RATE: 100 BPM

## 2024-01-29 ASSESSMENT — ENCOUNTER SYMPTOMS
ABDOMINAL DISTENTION: 0
APNEA: 0
FATIGUE: 1
ACTIVITY CHANGE: 1
CHOKING: 0
CONSTIPATION: 0
DIARRHEA: 0
SHORTNESS OF BREATH: 1
ARTHRALGIAS: 1
BRUISES/BLEEDS EASILY: 1
COUGH: 1
NAUSEA: 1
BACK PAIN: 1
CONFUSION: 1
WEAKNESS: 1

## 2024-01-30 ENCOUNTER — NURSING HOME VISIT (OUTPATIENT)
Dept: POST ACUTE CARE | Facility: EXTERNAL LOCATION | Age: 74
End: 2024-01-30
Payer: MEDICARE

## 2024-01-30 DIAGNOSIS — J18.9 PNEUMONIA DUE TO INFECTIOUS ORGANISM, UNSPECIFIED LATERALITY, UNSPECIFIED PART OF LUNG: ICD-10-CM

## 2024-01-30 DIAGNOSIS — J44.1 COPD EXACERBATION (MULTI): ICD-10-CM

## 2024-01-30 DIAGNOSIS — C34.12 MALIGNANT NEOPLASM OF UPPER LOBE OF LEFT LUNG (MULTI): Primary | ICD-10-CM

## 2024-01-30 PROCEDURE — 99310 SBSQ NF CARE HIGH MDM 45: CPT | Performed by: NURSE PRACTITIONER

## 2024-01-30 NOTE — LETTER
"Patient: Gaby Blue  : 1950    Encounter Date: 2024    Name: Gaby Blue  YOB: 1950    INITIAL NURSE PRACTITIONER FOLLOW UP VISIT: SNF, PNA    HPI   The patient is 73 yr old female who is here at [facility] after a hospitalization at  from  - 24. Patient presented with weakness and cough. She was short of breath and found to be anemic. She has a h/o lung cancer. She received one unit of blood. Patient also found to have PNA and received IVAB. Pulmnology seen as well as GI. She was not a candidate for EGD and colonoscopy. Patient is now here for PT/OT.   Nursing reporting that patient is not doing well and appears to be dying. Patient is in bed currently and not able to respond. She opens eyes but is not able to communicate due to weakness. Patient is not well and is not able to participate in therapy. She is a DNRCC. Long discussion w/sister who lives in [place] regarding comfort care and comfort meds.     REVIEW OF SYSTEMS:  Review of systems are negative except where noted in HPI.    /54   Pulse 77   Temp 36.7 °C (98 °F)   Resp 20   Ht 1.6 m (5' 3\")   Wt 59.5 kg (131 lb 3.2 oz)   SpO2 90%   BMI 23.24 kg/m²      Physical Exam  Constitutional:       General: She is in acute distress.      Appearance: She is normal weight. She is ill-appearing.   HENT:      Head: Normocephalic.      Nose: Nose normal.   Eyes:      Conjunctiva/sclera: Conjunctivae normal.   Cardiovascular:      Rate and Rhythm: Regular rhythm. Tachycardia present.      Heart sounds: Normal heart sounds.   Pulmonary:      Breath sounds: Rales present.   Abdominal:      General: Abdomen is flat.      Palpations: Abdomen is soft.   Musculoskeletal:         General: Tenderness present.      Cervical back: Neck supple. Tenderness present.   Skin:     General: Skin is warm and dry.      Coloration: Skin is pale.      Findings: Bruising present.   Neurological:      General: No focal " deficit present.      Mental Status: Mental status is at baseline. She is disoriented.      Living will related issues reviewed-CODE STATUS: DNRCC    DISCHARGE PLANNING: no date in place  Patient will be discharge home when goals are met.   Patient will need follow up with PCP in 1-2 weeks after discharge from facility.   If desired and agreeable, C to follow after discharge for continued PT/OT and Nursing.    RECENT HOSPITALIZATION DATES:   1/22 - 1/26/24  All laboratories, diagnostic tests, progress notes, and consultation notes reviewed from recent hospitalization.     LABORATORIES OR DIAGNOSTIC TESTS DONE/REVIEWED IN FACILITY:  Pending    MEDICATIONS REVIEWED AT THE FACILITY:    azithromycin 500 mg tablet; Commonly known as: Zithromax; Take 1 tab   daily for 7 days  • cefdinir 300 mg capsule; Commonly known as: Omnicef; Take 1 capsule (300   mg) by mouth 2 times a day for 7 days.  • dextromethorphan-guaifenesin  mg/5 mL oral liquid; Commonly known   as: Robitussin DM; Take 5 mL by mouth every 4 hours if needed for cough.  • ondansetron 4 mg tablet; Commonly known as: Zofran; Take 1 tablet (4 mg)   by mouth every 8 hours if needed for nausea or vomiting.     CHANGE how you take these medications    • gabapentin 400 mg capsule; Commonly known as: Neurontin; Take 1 capsule   (400 mg) by mouth 3 times a day.; What changed: how much to take     CONTINUE taking these medications    • albuterol 90 mcg/actuation inhaler; Inhale 2 puffs every 4 hours if   needed for shortness of breath or wheezing.  • All Day Allergy (cetirizine) 10 mg tablet; Generic drug: cetirizine  • diazePAM 2 mg tablet; Commonly known as: Valium  • dronabinol 2.5 mg capsule; Commonly known as: Marinol  • DULoxetine 60 mg DR capsule; Commonly known as: Cymbalta  • fluticasone 50 mcg/actuation nasal spray; Commonly known as: Flonase  • fluticasone-umeclidin-vilanter 100-62.5-25 mcg blister with device;   Commonly known as: TRELEGY-ELLIPTA  •  hydrALAZINE 50 mg tablet; Commonly known as: Apresoline; Take 1 tablet   (50 mg) by mouth 3 times a day.  • ipratropium-albuteroL 0.5-2.5 mg/3 mL nebulizer solution; Commonly known   as: Duo-Neb; Take 3 mL by nebulization 3 times a day.  • lisinopril 20 mg tablet  • montelukast 10 mg tablet; Commonly known as: Singulair  • naloxone 4 mg/0.1 mL nasal spray; Commonly known as: Narcan  • nicotine 21 mg/24 hr patch; Commonly known as: Nicoderm CQ; Place 1   patch over 24 hours on the skin once daily. Do not start before December 18, 2023.  • * oxyCODONE-acetaminophen  mg tablet; Commonly known as: Percocet  • * oxyCODONE-acetaminophen 7.5-325 mg tablet; Commonly known as: Percocet  • prochlorperazine 10 mg tablet; Commonly known as: Compazine  • sertraline 50 mg tablet; Commonly known as: Zoloft       Assessment/Plan   Problem List Items Addressed This Visit       Lung cancer (CMS/[facility]) - Primary    COPD exacerbation (CMS/[facility])    Pneumonia due to infectious organism, unspecified laterality, unspecified part of lung       Skin Integrity:  Nursing to monitor skin integrity as patient is at risk for pressure injuries.    PLAN:   Recent nursing evaluation and notes were reviewed.   Overall, patient is stable despite his/her chronic conditions.    #Lung Cancer, COPD, PNA: complete antibiotics, Oxygen NC, breathing treatments, patient is not doing well. Long discussion with sister who does not want any extraordinary treatments and to keep patient comfortable. Morphine, Ativan, and Levsin ordered PRN, Consult Hospice.  #Weakness and physical deconditioning: PT/OT/ST to maximize strength, function, and endurance all while maintaining safety. Patient is not going to be able to participate due to extreme weakness and poor condition.  Any decline or change in condition needs to be communicated with the physician or myself.    Discussion with nursing staff regarding ongoing care and management.  If needed, would communicate  with family who are not present at this time.   There are no concerns at this time.    We will continue with the medications noted above.    We will continue to follow the patient here at the facility.    *Please note that nursing facility, outside laboratory agency, and  [facility] do not interface.     Completion of the note was done through Dragon voice recognition technology and may include   unintended or grammatical errors which may not have been recognized when finalizing the note.     Time: I spent 45 minutes or greater with the patient. Greater than 50% of this time was spent in counseling and or coordination of care. The time includes prep time of reviewing vital signs, report from direct nursing staff and or therapists, hospital documentation, reviewing labs, radiographs, diagnostic tests and or consultations, time directly spent with the patient, discussion with sister over the phone, interviewing, examining, and education regarding diagnosis, treatments, and medications, as well as documentation in the electronic medical record, and reviewing the plan of care and any new orders with the patient, nursing staff and other staff directly related to the patients care.       AUGUSTA Zhou       Electronically Signed By: AUGUSTA Zhou   2/5/24 12:40 PM

## 2024-01-30 NOTE — PROGRESS NOTES
Subjective   Patient ID: Gaby Blue is a 73 y.o. female who is acute skilled care and presents for initial visit for skilled nursing.    73-year-old female patient has been admitted after having complicated hospitalization.  Patient has a bilateral upper lobe malignancy, patient has a hilar and mediastinal lymphadenopathy.  The nature and severity of lung cancer is unknown, pathology of the lung cancer is unknown, patient was admitted because of weakness found to have a pulmonary infiltrate groundglass infiltrate, treated with IV antibiotics, it is not very much clear what kind of treatment patient has received in the past for this bilateral upper lobe mass.  Patient has been having significant thrombocytopenia and anemia, patient remains on nasal oxygen, patient's oral intake is poor, creatinine was 1.44.  CT reports were reviewed, it is a significant amount of malignancy burden patient has been exhibiting and actually patient's condition does not look well, as I see this patient  has communication with patient's sister and they are looking for terminal care and hospice like care.  Patient is admitted here for skilled nursing and rehabilitation after having this pneumonia like episode and bilateral lung cancer.  She has been not in any condition to undergo any sort of physical therapy seems like after my evaluation.  Latest set of labs and radiological imagings were reviewed, has not looked into the document to see what kind of lung cancer this patient has.         Review of Systems   Constitutional:  Positive for activity change and fatigue.   HENT:  Positive for congestion.    Respiratory:  Positive for cough and shortness of breath. Negative for apnea and choking.    Cardiovascular:  Positive for leg swelling.   Gastrointestinal:  Positive for nausea. Negative for abdominal distention, constipation and diarrhea.   Musculoskeletal:  Positive for arthralgias and back pain.   Skin: Negative.     Neurological:  Positive for weakness.   Hematological:  Bruises/bleeds easily.   Psychiatric/Behavioral:  Positive for confusion.        Objective   BP 80/62   Pulse 100     Physical Exam  Constitutional:       General: She is in acute distress.      Appearance: She is normal weight. She is ill-appearing.   HENT:      Head: Normocephalic.      Nose: Nose normal.   Eyes:      Conjunctiva/sclera: Conjunctivae normal.   Cardiovascular:      Rate and Rhythm: Regular rhythm. Tachycardia present.      Heart sounds: Normal heart sounds.   Pulmonary:      Breath sounds: Rales present.   Abdominal:      General: Abdomen is flat.      Palpations: Abdomen is soft.   Musculoskeletal:         General: Tenderness present.      Cervical back: Neck supple. Tenderness present.   Skin:     General: Skin is warm and dry.      Coloration: Skin is pale.      Findings: Bruising present.   Neurological:      General: No focal deficit present.      Mental Status: Mental status is at baseline. She is disoriented.         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  Patient is listed to be on azithromycin and cefdinir not sure what is a value of this antibiotics, albuterol, cough suppressant, Valium 2 mg as needed, dronabinol, duloxetine, gabapentin, hydralazine, lisinopril, nicotine patch, prochlorperazine, sertraline, Trelegy, albuterol.  Patient has a port on the chest wall that means that patient has previously received chemotherapy.  Patient is not going to help, thrombocytopenia is quite significant, anemia persist, patient's  prognosis is poor, her survival could be less than 6 months, hospice evaluation is warranted.   will discuss with family member.  She will not be undergoing any sort of physical therapy seems like.  Appetite remains poor, hospice like treatment to be initiated once hospice is involved which includes morphine and dronabinol to be continued, DNR CC comfort care reviewed.  Prognosis is poor.     Goals    None

## 2024-02-01 ENCOUNTER — NURSING HOME VISIT (OUTPATIENT)
Dept: POST ACUTE CARE | Facility: EXTERNAL LOCATION | Age: 74
End: 2024-02-01
Payer: MEDICARE

## 2024-02-01 DIAGNOSIS — J44.1 COPD EXACERBATION (MULTI): ICD-10-CM

## 2024-02-01 DIAGNOSIS — C34.12 MALIGNANT NEOPLASM OF UPPER LOBE OF LEFT LUNG (MULTI): Primary | ICD-10-CM

## 2024-02-01 DIAGNOSIS — J18.9 PNEUMONIA DUE TO INFECTIOUS ORGANISM, UNSPECIFIED LATERALITY, UNSPECIFIED PART OF LUNG: ICD-10-CM

## 2024-02-01 PROCEDURE — 99309 SBSQ NF CARE MODERATE MDM 30: CPT | Performed by: NURSE PRACTITIONER

## 2024-02-01 NOTE — LETTER
"Patient: Gaby Blue  : 1950    Encounter Date: 2024    Name: Gaby Blue  YOB: 1950    FOLLOW UP VISIT: SNF, PNA, Lung Cancer    SUBJECTIVE:  Patient is awake in room resting in bed. She is not able to communicate due to weakness. Nursing reports that she is not able to take po meds.     REVIEW OF SYSTEMS:   All review of systems are negative unless otherwise stated above under subjective.    MEDICATIONS REVIEWED AT FACILITY:  PO meds d'cd  Cont Morphine, Ativan, Levsin PRN, cont nebulizers    Living will related issues reviewed-Code status:     OBJECTIVE:  /70   Pulse 98   Temp 36.6 °C (97.8 °F)   Resp 24   Ht 1.6 m (5' 3\")   Wt 59.5 kg (131 lb 3.2 oz)   SpO2 (!) 89%   BMI 23.24 kg/m²   Physical Exam  Physical Exam  Constitutional:       General: She is in acute distress.      Appearance: She is normal weight. She is ill-appearing.   HENT:      Head: Normocephalic.      Nose: Nose normal.   Eyes:      Conjunctiva/sclera: Conjunctivae normal.   Cardiovascular:      Rate and Rhythm: Regular rhythm. Tachycardia present.      Heart sounds: Normal heart sounds.   Pulmonary:      Breath sounds: Rales present.   Abdominal:      General: Abdomen is flat.      Palpations: Abdomen is soft.   Musculoskeletal:         General: Tenderness present.      Cervical back: Neck supple. Tenderness present.   Skin:     General: Skin is warm and dry.      Coloration: Skin is pale.      Findings: Bruising present.   Neurological:      General: No focal deficit present.   Mental Status: Mental status is at baseline. She is disoriented.      Assessment/Plan  Problem List Items Addressed This Visit       Lung cancer (CMS/[facility]) - Primary    COPD exacerbation (CMS/[facility])    Pneumonia due to infectious organism, unspecified laterality, unspecified part of lung       Skin integrity:  Nursing to monitor skin integrity as patient is at risk for pressure injuries.  Wound care per nursing  Left hip  Right " forearm  See Facility notes for measurements/assessments  Turn and reposition Q 2 hours or more  Air mattress and when up in chair cushion reducing device  Dietician to evaluate and recommend:  Nutritional supplement:  Please monitor skin integrity and other pressure areas  Referral to wound clinic if appropriate:    PLAN:  Pt has been seen for follow up visit.  The patient is here at facility for PT/OT/ST to maximize strength, function, endurance and safety.  The patient is not able to participate in therapy.   Please see PT/OT/ST notes in the facility for detailed information regarding progression of patients progress.   Recent nursing evaluation and notes were reviewed.   #Lung ca, PNA, COPD: keep comfortable, DNRCC, comfort meds  Any decline or change in condition needs to be communicated with the physician or myself.    Discussion with nursing staff regarding ongoing care and management.  If needed, would communicate with family who are not present at this time.   We will continue with the medications noted above.    We will continue to follow the patient here at the facility.    Discharge planning: no date  Patient will be discharge home when goals are met.   Patient will need a follow up with PCP in 1-2 weeks after discharge from facility.   If desired and in agreement, C to follow after discharge from the facility for continued PT/OT and Nursing.    *Please note that nursing facility, outside laboratory agency, and  [facility] do not interface.     Completion of the note was done through Dragon voice recognition technology and may include   unintended or grammatical errors which may not have been recognized when finalizing the note.     Time:    AUGUSTA Zhou      Electronically Signed By: AUGUSTA Zhou   2/5/24 12:46 PM

## 2024-02-05 VITALS
TEMPERATURE: 97.8 F | HEIGHT: 63 IN | BODY MASS INDEX: 23.25 KG/M2 | SYSTOLIC BLOOD PRESSURE: 173 MMHG | DIASTOLIC BLOOD PRESSURE: 70 MMHG | RESPIRATION RATE: 24 BRPM | OXYGEN SATURATION: 89 % | HEART RATE: 98 BPM | WEIGHT: 131.2 LBS

## 2024-02-05 VITALS
TEMPERATURE: 98 F | DIASTOLIC BLOOD PRESSURE: 54 MMHG | SYSTOLIC BLOOD PRESSURE: 115 MMHG | BODY MASS INDEX: 23.25 KG/M2 | HEART RATE: 77 BPM | OXYGEN SATURATION: 90 % | HEIGHT: 63 IN | WEIGHT: 131.2 LBS | RESPIRATION RATE: 20 BRPM

## 2024-02-05 NOTE — PROGRESS NOTES
"Name: Gaby Blue  YOB: 1950    FOLLOW UP VISIT: SNF, PNA, Lung Cancer    SUBJECTIVE:  Patient is awake in room resting in bed. She is not able to communicate due to weakness. Nursing reports that she is not able to take po meds.     REVIEW OF SYSTEMS:   All review of systems are negative unless otherwise stated above under subjective.    MEDICATIONS REVIEWED AT FACILITY:  PO meds d'cd  Cont Morphine, Ativan, Levsin PRN, cont nebulizers    Living will related issues reviewed-Code status:     OBJECTIVE:  /70   Pulse 98   Temp 36.6 °C (97.8 °F)   Resp 24   Ht 1.6 m (5' 3\")   Wt 59.5 kg (131 lb 3.2 oz)   SpO2 (!) 89%   BMI 23.24 kg/m²   Physical Exam  Physical Exam  Constitutional:       General: She is in acute distress.      Appearance: She is normal weight. She is ill-appearing.   HENT:      Head: Normocephalic.      Nose: Nose normal.   Eyes:      Conjunctiva/sclera: Conjunctivae normal.   Cardiovascular:      Rate and Rhythm: Regular rhythm. Tachycardia present.      Heart sounds: Normal heart sounds.   Pulmonary:      Breath sounds: Rales present.   Abdominal:      General: Abdomen is flat.      Palpations: Abdomen is soft.   Musculoskeletal:         General: Tenderness present.      Cervical back: Neck supple. Tenderness present.   Skin:     General: Skin is warm and dry.      Coloration: Skin is pale.      Findings: Bruising present.   Neurological:      General: No focal deficit present.   Mental Status: Mental status is at baseline. She is disoriented.      Assessment/Plan   Problem List Items Addressed This Visit       Lung cancer (CMS/[facility]) - Primary    COPD exacerbation (CMS/[facility])    Pneumonia due to infectious organism, unspecified laterality, unspecified part of lung       Skin integrity:  Nursing to monitor skin integrity as patient is at risk for pressure injuries.  Wound care per nursing  Left hip  Right forearm  See Facility notes for measurements/assessments  Turn and " reposition Q 2 hours or more  Air mattress and when up in chair cushion reducing device  Dietician to evaluate and recommend:  Nutritional supplement:  Please monitor skin integrity and other pressure areas  Referral to wound clinic if appropriate:    PLAN:  Pt has been seen for follow up visit.  The patient is here at facility for PT/OT/ST to maximize strength, function, endurance and safety.  The patient is not able to participate in therapy.   Please see PT/OT/ST notes in the facility for detailed information regarding progression of patients progress.   Recent nursing evaluation and notes were reviewed.   #Lung ca, PNA, COPD: keep comfortable, DNRCC, comfort meds  Any decline or change in condition needs to be communicated with the physician or myself.    Discussion with nursing staff regarding ongoing care and management.  If needed, would communicate with family who are not present at this time.   We will continue with the medications noted above.    We will continue to follow the patient here at the facility.    Discharge planning: no date  Patient will be discharge home when goals are met.   Patient will need a follow up with PCP in 1-2 weeks after discharge from facility.   If desired and in agreement, [facility] to follow after discharge from the facility for continued PT/OT and Nursing.    *Please note that nursing facility, outside laboratory agency, and  [facility] do not interface.     Completion of the note was done through Dragon voice recognition technology and may include   unintended or grammatical errors which may not have been recognized when finalizing the note.     Time:    Laine Sinclair, APRN-CNP

## 2024-02-05 NOTE — PROGRESS NOTES
"Name: Gaby Blue  YOB: 1950    INITIAL NURSE PRACTITIONER FOLLOW UP VISIT: SNF, PNA    HPI   The patient is 73 yr old female who is here at [facility] after a hospitalization at  from 1/22 - 1/26/24. Patient presented with weakness and cough. She was short of breath and found to be anemic. She has a h/o lung cancer. She received one unit of blood. Patient also found to have PNA and received IVAB. Pulmnology seen as well as GI. She was not a candidate for EGD and colonoscopy. Patient is now here for PT/OT.   Nursing reporting that patient is not doing well and appears to be dying. Patient is in bed currently and not able to respond. She opens eyes but is not able to communicate due to weakness. Patient is not well and is not able to participate in therapy. She is a DNRCC. Long discussion w/sister who lives in [place] regarding comfort care and comfort meds.     REVIEW OF SYSTEMS:  Review of systems are negative except where noted in HPI.    /54   Pulse 77   Temp 36.7 °C (98 °F)   Resp 20   Ht 1.6 m (5' 3\")   Wt 59.5 kg (131 lb 3.2 oz)   SpO2 90%   BMI 23.24 kg/m²      Physical Exam  Constitutional:       General: She is in acute distress.      Appearance: She is normal weight. She is ill-appearing.   HENT:      Head: Normocephalic.      Nose: Nose normal.   Eyes:      Conjunctiva/sclera: Conjunctivae normal.   Cardiovascular:      Rate and Rhythm: Regular rhythm. Tachycardia present.      Heart sounds: Normal heart sounds.   Pulmonary:      Breath sounds: Rales present.   Abdominal:      General: Abdomen is flat.      Palpations: Abdomen is soft.   Musculoskeletal:         General: Tenderness present.      Cervical back: Neck supple. Tenderness present.   Skin:     General: Skin is warm and dry.      Coloration: Skin is pale.      Findings: Bruising present.   Neurological:      General: No focal deficit present.      Mental Status: Mental status is at baseline. She is " disoriented.      Living will related issues reviewed-CODE STATUS: DNRCC    DISCHARGE PLANNING: no date in place  Patient will be discharge home when goals are met.   Patient will need follow up with PCP in 1-2 weeks after discharge from facility.   If desired and agreeable, C to follow after discharge for continued PT/OT and Nursing.    RECENT HOSPITALIZATION DATES:   1/22 - 1/26/24  All laboratories, diagnostic tests, progress notes, and consultation notes reviewed from recent hospitalization.     LABORATORIES OR DIAGNOSTIC TESTS DONE/REVIEWED IN FACILITY:  Pending    MEDICATIONS REVIEWED AT THE FACILITY:    azithromycin 500 mg tablet; Commonly known as: Zithromax; Take 1 tab   daily for 7 days   cefdinir 300 mg capsule; Commonly known as: Omnicef; Take 1 capsule (300   mg) by mouth 2 times a day for 7 days.   dextromethorphan-guaifenesin  mg/5 mL oral liquid; Commonly known   as: Robitussin DM; Take 5 mL by mouth every 4 hours if needed for cough.   ondansetron 4 mg tablet; Commonly known as: Zofran; Take 1 tablet (4 mg)   by mouth every 8 hours if needed for nausea or vomiting.     CHANGE how you take these medications     gabapentin 400 mg capsule; Commonly known as: Neurontin; Take 1 capsule   (400 mg) by mouth 3 times a day.; What changed: how much to take     CONTINUE taking these medications     albuterol 90 mcg/actuation inhaler; Inhale 2 puffs every 4 hours if   needed for shortness of breath or wheezing.   All Day Allergy (cetirizine) 10 mg tablet; Generic drug: cetirizine   diazePAM 2 mg tablet; Commonly known as: Valium   dronabinol 2.5 mg capsule; Commonly known as: Marinol   DULoxetine 60 mg DR capsule; Commonly known as: Cymbalta   fluticasone 50 mcg/actuation nasal spray; Commonly known as: Flonase   fluticasone-umeclidin-vilanter 100-62.5-25 mcg blister with device;   Commonly known as: TRELEGY-ELLIPTA   hydrALAZINE 50 mg tablet; Commonly known as: Apresoline; Take 1 tablet   (50 mg) by  mouth 3 times a day.   ipratropium-albuteroL 0.5-2.5 mg/3 mL nebulizer solution; Commonly known   as: Duo-Neb; Take 3 mL by nebulization 3 times a day.   lisinopril 20 mg tablet   montelukast 10 mg tablet; Commonly known as: Singulair   naloxone 4 mg/0.1 mL nasal spray; Commonly known as: Narcan   nicotine 21 mg/24 hr patch; Commonly known as: Nicoderm CQ; Place 1   patch over 24 hours on the skin once daily. Do not start before December 18, 2023.   * oxyCODONE-acetaminophen  mg tablet; Commonly known as: Percocet   * oxyCODONE-acetaminophen 7.5-325 mg tablet; Commonly known as: Percocet   prochlorperazine 10 mg tablet; Commonly known as: Compazine   sertraline 50 mg tablet; Commonly known as: Zoloft       Assessment/Plan    Problem List Items Addressed This Visit       Lung cancer (CMS/[facility]) - Primary    COPD exacerbation (CMS/[facility])    Pneumonia due to infectious organism, unspecified laterality, unspecified part of lung       Skin Integrity:  Nursing to monitor skin integrity as patient is at risk for pressure injuries.    PLAN:   Recent nursing evaluation and notes were reviewed.   Overall, patient is stable despite his/her chronic conditions.    #Lung Cancer, COPD, PNA: complete antibiotics, Oxygen NC, breathing treatments, patient is not doing well. Long discussion with sister who does not want any extraordinary treatments and to keep patient comfortable. Morphine, Ativan, and Levsin ordered PRN, Consult Hospice.  #Weakness and physical deconditioning: PT/OT/ST to maximize strength, function, and endurance all while maintaining safety. Patient is not going to be able to participate due to extreme weakness and poor condition.  Any decline or change in condition needs to be communicated with the physician or myself.    Discussion with nursing staff regarding ongoing care and management.  If needed, would communicate with family who are not present at this time.   There are no concerns at this time.    We  will continue with the medications noted above.    We will continue to follow the patient here at the facility.    *Please note that nursing facility, outside laboratory agency, and  [facility] do not interface.     Completion of the note was done through Dragon voice recognition technology and may include   unintended or grammatical errors which may not have been recognized when finalizing the note.     Time: I spent 45 minutes or greater with the patient. Greater than 50% of this time was spent in counseling and or coordination of care. The time includes prep time of reviewing vital signs, report from direct nursing staff and or therapists, hospital documentation, reviewing labs, radiographs, diagnostic tests and or consultations, time directly spent with the patient, discussion with sister over the phone, interviewing, examining, and education regarding diagnosis, treatments, and medications, as well as documentation in the electronic medical record, and reviewing the plan of care and any new orders with the patient, nursing staff and other staff directly related to the patients care.       Laine Sinclair, APRN-CNP

## 2024-02-06 NOTE — DOCUMENTATION CLARIFICATION NOTE
"    PATIENT:               ALLEN CABRERA  ACCT #:                  [id number]  MRN:                       [id number]  :                       1950  ADMIT DATE:       2024 10:07 PM  DISCH DATE:        2024 1:45 PM  RESPONDING PROVIDER #:        [id number]          PROVIDER RESPONSE TEXT:    Gram Negative PNA    CDI QUERY TEXT:    [facility]_Pneumonia Specificity      Instruction:    Based on your assessment of the patient and the clinical information, please provide the requested documentation by clicking on the appropriate radio button and enter any additional information if prompted.    Question: Please further specify the type of pneumonia being treated    When answering this query, please exercise your independent professional judgment. The fact that a question is being asked, does not imply that any particular answer is desired or expected.    The patient's clinical indicators include:  Clinical Information: 73 yof with pneumonia with evidence of consolidative/ground-glass opacities on CXR    Clinical Indicators:     CT chest:  \"Impression: New patchy consolidative/ground-glass opacities within the left upper lobe/lingula. Consider pneumonia/pneumonitis.\"    Treatment: Azithromycin, Rocephin    Risk Factors: 73 yof with COPD, lung cancer, chronic respiratory failure  Options provided:  -- Gram Negative PNA  -- Other - I will add my own diagnosis  -- Refer to Clinical Documentation Reviewer    Query created by: Mita Juárez on 2024 2:12 PM      Electronically signed by:  SCOTT ALFARO MD 2024 4:58 PM          "

## 2024-02-06 NOTE — DOCUMENTATION CLARIFICATION NOTE
PATIENT:               ALLEN CABRERA  ACCT #:                  [id number]  MRN:                       [id number]  :                       1950  ADMIT DATE:       2024 10:07 PM  DISCH DATE:        2024 1:45 PM  RESPONDING PROVIDER #:        [id number]          PROVIDER RESPONSE TEXT:    Pancytopenia due to chemotherapy in the setting of lung cancer    CDI QUERY TEXT:    [facility]_Pancytopenia Diagnosis        Instruction:    Based on your assessment of the patient and the clinical information, please provide the requested documentation by clicking on the appropriate radio button and enter any additional information if prompted.    Question: Is there a diagnosis indicative of the above lab values    When answering this query, please exercise your independent professional judgment. The fact that a question is being asked, does not imply that any particular answer is desired or expected.    The patient's clinical indicators include:  Clinical Information: 73 yof with lung cancer on chemotherapy treatment with leukocytosis, thrombocytopenia and anemia in neoplastic disease    Clinical Indicators:    WBC: 2.8    H/H: 6.8/20.4    Platelets: 21    Treatment: PRBC, monitoring labs    Risk Factors: 73 yof with lung cancer s/p recent chemotherapy  Options provided:  -- Pancytopenia due to chemotherapy in the setting of lung cancer  -- Pancytopenia due to other, Please specify additional information below  -- Other - I will add my own diagnosis  -- Refer to Clinical Documentation Reviewer    Query created by: Mita Juárez on 2024 2:05 PM      Electronically signed by:  SCOTT ALFARO MD 2024 4:58 PM

## 2024-07-11 NOTE — FLOWSHEET NOTE
Patient to the floor via wheelchair for her Cycle 13 Day 2 chemo treatment. Zarxio today. Vital signs taken. Call light within reach. No distress noted. rectal bleed

## 2025-05-23 NOTE — DISCHARGE INSTRUCTIONS
-You were admitted with worsening of your COPD, associated with a high carbon dioxide level.  -While you are in the intensive care unit, you had an acute delirium (confusion state) and had several of your medications which can contribute to this decreased  -Along with the Xopenex/levalbuterol that you have at home for your nebulizer, I am sending you with a prescription for DuoNebs/ipratropium albuterol-use these 3 times a day, you can use your Xopenex in between if needed.  The DuoNebs have a longer lasting bronchodilator in them so may work better for you.  -Your blood pressure has been elevated so along with your lisinopril HCT in the morning, you will take a lisinopril 20 mg in the evenings.  -You were also started on a blood pressure medicine called Apresoline-take it 3 times a day.  -Home health care has been ordered to help you with the new medications and monitoring your blood pressure.   Cigarettes